# Patient Record
Sex: FEMALE | Race: WHITE | NOT HISPANIC OR LATINO | Employment: UNEMPLOYED | URBAN - NONMETROPOLITAN AREA
[De-identification: names, ages, dates, MRNs, and addresses within clinical notes are randomized per-mention and may not be internally consistent; named-entity substitution may affect disease eponyms.]

---

## 2018-08-23 ENCOUNTER — OFFICE VISIT (OUTPATIENT)
Dept: UROLOGY | Facility: CLINIC | Age: 73
End: 2018-08-23

## 2018-08-23 VITALS — HEIGHT: 63 IN | BODY MASS INDEX: 30.12 KG/M2 | WEIGHT: 170 LBS

## 2018-08-23 DIAGNOSIS — N30.00 ACUTE CYSTITIS WITHOUT HEMATURIA: Primary | ICD-10-CM

## 2018-08-23 DIAGNOSIS — N30.40 IRRADIATION CYSTITIS: ICD-10-CM

## 2018-08-23 DIAGNOSIS — N30.00 ACUTE CYSTITIS WITHOUT HEMATURIA: ICD-10-CM

## 2018-08-23 LAB
BILIRUB BLD-MCNC: NEGATIVE MG/DL
CLARITY, POC: CLEAR
COLOR UR: YELLOW
GLUCOSE UR STRIP-MCNC: NEGATIVE MG/DL
KETONES UR QL: NEGATIVE
LEUKOCYTE EST, POC: NEGATIVE
NITRITE UR-MCNC: NEGATIVE MG/ML
PH UR: 7 [PH] (ref 5–8)
PROT UR STRIP-MCNC: NEGATIVE MG/DL
RBC # UR STRIP: NEGATIVE /UL
SP GR UR: 1.01 (ref 1–1.03)
UROBILINOGEN UR QL: NORMAL

## 2018-08-23 PROCEDURE — 81003 URINALYSIS AUTO W/O SCOPE: CPT | Performed by: UROLOGY

## 2018-08-23 PROCEDURE — 99204 OFFICE O/P NEW MOD 45 MIN: CPT | Performed by: UROLOGY

## 2018-08-23 PROCEDURE — 51798 US URINE CAPACITY MEASURE: CPT | Performed by: UROLOGY

## 2018-08-23 PROCEDURE — 87086 URINE CULTURE/COLONY COUNT: CPT | Performed by: UROLOGY

## 2018-08-23 RX ORDER — LOSARTAN POTASSIUM 25 MG/1
25 TABLET ORAL DAILY
COMMUNITY
End: 2019-01-10 | Stop reason: ALTCHOICE

## 2018-08-23 RX ORDER — VENLAFAXINE HYDROCHLORIDE 37.5 MG/1
CAPSULE, EXTENDED RELEASE ORAL DAILY
Status: ON HOLD | COMMUNITY
Start: 2014-03-12 | End: 2019-11-16

## 2018-08-23 RX ORDER — HYDROCHLOROTHIAZIDE 25 MG/1
25 TABLET ORAL DAILY
COMMUNITY
End: 2019-11-23 | Stop reason: HOSPADM

## 2018-08-23 RX ORDER — EZETIMIBE 10 MG/1
10 TABLET ORAL DAILY
COMMUNITY
End: 2019-01-10 | Stop reason: ALTCHOICE

## 2018-08-23 RX ORDER — LEVOTHYROXINE SODIUM 0.03 MG/1
25 TABLET ORAL DAILY
COMMUNITY
Start: 2013-03-12

## 2018-08-23 RX ORDER — TOPIRAMATE 100 MG/1
100 TABLET, FILM COATED ORAL EVERY MORNING
Refills: 0 | COMMUNITY
Start: 2018-07-27

## 2018-08-23 RX ORDER — CLOTRIMAZOLE AND BETAMETHASONE DIPROPIONATE 10; .64 MG/G; MG/G
CREAM TOPICAL EVERY 12 HOURS SCHEDULED
Qty: 45 G | Refills: 2 | Status: SHIPPED | OUTPATIENT
Start: 2018-08-23 | End: 2018-08-23 | Stop reason: SDUPTHER

## 2018-08-23 RX ORDER — DIAZEPAM 5 MG/1
5 TABLET ORAL NIGHTLY
Refills: 0 | COMMUNITY
Start: 2018-06-26 | End: 2020-07-17 | Stop reason: HOSPADM

## 2018-08-23 RX ORDER — NITROFURANTOIN 25; 75 MG/1; MG/1
100 CAPSULE ORAL DAILY
Qty: 60 CAPSULE | Refills: 2 | Status: SHIPPED | OUTPATIENT
Start: 2018-08-23 | End: 2018-08-23 | Stop reason: SDUPTHER

## 2018-08-23 RX ORDER — MELOXICAM 15 MG/1
TABLET ORAL DAILY
Status: ON HOLD | COMMUNITY
Start: 2013-03-12 | End: 2018-12-14

## 2018-08-23 RX ORDER — LANSOPRAZOLE 30 MG/1
30 CAPSULE, DELAYED RELEASE ORAL EVERY MORNING
Refills: 0 | COMMUNITY
Start: 2018-05-16 | End: 2020-07-17 | Stop reason: HOSPADM

## 2018-08-23 RX ORDER — CYCLOBENZAPRINE HCL 10 MG
10 TABLET ORAL 3 TIMES DAILY PRN
Status: ON HOLD | COMMUNITY
End: 2018-12-14

## 2018-08-23 RX ORDER — RISEDRONATE SODIUM 150 MG/1
150 TABLET, FILM COATED ORAL
COMMUNITY
End: 2020-07-17 | Stop reason: HOSPADM

## 2018-08-23 RX ORDER — AMITRIPTYLINE HYDROCHLORIDE 50 MG/1
50 TABLET, FILM COATED ORAL NIGHTLY
COMMUNITY
End: 2020-07-17 | Stop reason: HOSPADM

## 2018-08-23 RX ORDER — ASPIRIN 81 MG/1
81 TABLET ORAL NIGHTLY
COMMUNITY

## 2018-08-23 RX ORDER — MONTELUKAST SODIUM 10 MG/1
10 TABLET ORAL NIGHTLY
COMMUNITY
End: 2018-12-12

## 2018-08-23 RX ORDER — ROSUVASTATIN CALCIUM 10 MG/1
TABLET, COATED ORAL
Status: ON HOLD | COMMUNITY
Start: 2013-03-12 | End: 2019-11-16

## 2018-08-23 RX ORDER — PHENYTOIN SODIUM 100 MG/1
2 CAPSULE, EXTENDED RELEASE ORAL EVERY MORNING
COMMUNITY
Start: 2014-03-12 | End: 2020-07-17 | Stop reason: HOSPADM

## 2018-08-23 RX ORDER — CLOTRIMAZOLE AND BETAMETHASONE DIPROPIONATE 10; .64 MG/G; MG/G
CREAM TOPICAL EVERY 12 HOURS SCHEDULED
Qty: 45 G | Refills: 2 | Status: ON HOLD | OUTPATIENT
Start: 2018-08-23 | End: 2018-12-14

## 2018-08-23 RX ORDER — ALBUTEROL SULFATE 90 UG/1
2 AEROSOL, METERED RESPIRATORY (INHALATION) EVERY 4 HOURS PRN
Status: ON HOLD | COMMUNITY
End: 2018-12-14

## 2018-08-23 RX ORDER — PREGABALIN 50 MG/1
50 CAPSULE ORAL 3 TIMES DAILY
Status: ON HOLD | COMMUNITY
End: 2019-11-16

## 2018-08-23 RX ORDER — POTASSIUM CHLORIDE 20 MEQ/1
20 TABLET, EXTENDED RELEASE ORAL DAILY
COMMUNITY
End: 2020-07-17 | Stop reason: HOSPADM

## 2018-08-23 RX ORDER — NITROFURANTOIN 25; 75 MG/1; MG/1
100 CAPSULE ORAL DAILY
Qty: 60 CAPSULE | Refills: 2 | Status: SHIPPED | OUTPATIENT
Start: 2018-08-23 | End: 2018-12-12

## 2018-08-23 NOTE — PROGRESS NOTES
Chief Complaint:          Chief Complaint   Patient presents with   • Chronic Cystitis       HPI:   72 y.o. female.  72-year-old white female referred with chronic cystitis.  He is cut off Cefdinar twice a day.  She has no dysuria, significant pressure, continued frequency.  Continued with recurrent infections she's been off and on Macrobid she had apparent brain bleed with an apparent sub-arachnoid hemorrhage she is a  2 para 2 postvoid residuals for analysis negative.    Past Medical History:        Past Medical History:   Diagnosis Date   • COPD (chronic obstructive pulmonary disease) (CMS/[facility], located within [facility])    • High cholesterol    • Hypertension    • Seizures (CMS/[facility], located within [facility])          Current Meds:     Current Outpatient Prescriptions   Medication Sig Dispense Refill   • albuterol (PROVENTIL HFA;VENTOLIN HFA) 108 (90 Base) MCG/ACT inhaler Inhale 2 puffs Every 4 (Four) Hours As Needed for Wheezing.     • amitriptyline (ELAVIL) 50 MG tablet Take 50 mg by mouth Every Night.     • aspirin 81 MG EC tablet Take 81 mg by mouth Daily.     • cyclobenzaprine (FLEXERIL) 10 MG tablet Take 10 mg by mouth 3 (Three) Times a Day As Needed for Muscle Spasms.     • diazePAM (VALIUM) 5 MG tablet   0   • ezetimibe (ZETIA) 10 MG tablet Take 10 mg by mouth Daily.     • hydrochlorothiazide (HYDRODIURIL) 25 MG tablet Take 25 mg by mouth Daily.     • lansoprazole (PREVACID) 30 MG capsule   0   • levothyroxine (SYNTHROID) 25 MCG tablet Take  by mouth Daily.     • losartan (COZAAR) 25 MG tablet Take 25 mg by mouth Daily.     • meloxicam (MOBIC) 15 MG tablet Take  by mouth Daily.     • montelukast (SINGULAIR) 10 MG tablet Take 10 mg by mouth Every Night.     • phenytoin (DILANTIN) 100 MG ER capsule Take 2 capsules by mouth Daily.     • potassium chloride (K-DUR,KLOR-CON) 20 MEQ CR tablet Take 20 mEq by mouth 2 (Two) Times a Day.     • pregabalin (LYRICA) 50 MG capsule Take 50 mg by mouth 3 (Three) Times a Day.     • risedronate (ACTONEL) 150 MG tablet  Take 150 mg by mouth Every 30 (Thirty) Days. with water on empty stomach, nothing by mouth or lie down for next 30 minutes.     • rosuvastatin (CRESTOR) 10 MG tablet Take  by mouth.     • topiramate (TOPAMAX) 100 MG tablet   0   • venlafaxine XR (EFFEXOR-XR) 37.5 MG 24 hr capsule Take  by mouth Daily.       No current facility-administered medications for this visit.         Allergies:      Allergies   Allergen Reactions   • Sulfa Antibiotics Hives        Past Surgical History:     Past Surgical History:   Procedure Laterality Date   • BRAIN SURGERY     •  SECTION     • HEMORRHOIDECTOMY           Social History:     Social History     Social History   • Marital status: Unknown     Spouse name: N/A   • Number of children: N/A   • Years of education: N/A     Occupational History   • Not on file.     Social History Main Topics   • Smoking status: Former Smoker   • Smokeless tobacco: Never Used   • Alcohol use No   • Drug use: No   • Sexual activity: Not on file     Other Topics Concern   • Not on file     Social History Narrative   • No narrative on file       Family History:     Family History   Problem Relation Age of Onset   • Heart disease Father    • Lung cancer Father        Review of Systems:     Review of Systems   Constitutional: Negative.  Negative for activity change, appetite change, chills, diaphoresis, fatigue and unexpected weight change.   HENT: Negative for congestion, dental problem, drooling, ear discharge, ear pain, facial swelling, hearing loss, mouth sores, nosebleeds, postnasal drip, rhinorrhea, sinus pressure, sneezing, sore throat, tinnitus, trouble swallowing and voice change.    Eyes: Negative.  Negative for photophobia, pain, discharge, redness, itching and visual disturbance.   Respiratory: Negative.  Negative for apnea, cough, choking, chest tightness, shortness of breath, wheezing and stridor.    Cardiovascular: Negative.  Negative for chest pain, palpitations and leg swelling.    Gastrointestinal: Negative.  Negative for abdominal distention, abdominal pain, anal bleeding, blood in stool, constipation, diarrhea, nausea, rectal pain and vomiting.   Endocrine: Negative.  Negative for cold intolerance, heat intolerance, polydipsia, polyphagia and polyuria.   Genitourinary: Positive for dysuria and pelvic pain.   Musculoskeletal: Negative.  Negative for arthralgias, back pain, gait problem, joint swelling, myalgias, neck pain and neck stiffness.   Skin: Negative.  Negative for color change, pallor, rash and wound.   Allergic/Immunologic: Negative.  Negative for environmental allergies, food allergies and immunocompromised state.   Neurological: Negative.  Negative for dizziness, tremors, seizures, syncope, facial asymmetry, speech difficulty, weakness, light-headedness, numbness and headaches.   Hematological: Negative.  Negative for adenopathy. Does not bruise/bleed easily.   Psychiatric/Behavioral: Negative for agitation, behavioral problems, confusion, decreased concentration, dysphoric mood, hallucinations, self-injury, sleep disturbance and suicidal ideas. The patient is not nervous/anxious and is not hyperactive.    All other systems reviewed and are negative.      Physical Exam:     Physical Exam   Constitutional: She appears well-developed and well-nourished.   HENT:   Head: Normocephalic and atraumatic.   Right Ear: External ear normal.   Left Ear: External ear normal.   Mouth/Throat: Oropharynx is clear and moist.   Eyes: Pupils are equal, round, and reactive to light. Conjunctivae are normal.   Cardiovascular: Normal rate, regular rhythm, normal heart sounds and intact distal pulses.    Pulmonary/Chest: Effort normal and breath sounds normal.   Abdominal: Soft. Bowel sounds are normal. She exhibits no distension and no mass. There is no tenderness. There is no rebound and no guarding.   Genitourinary: No vaginal discharge found.   Genitourinary Comments: Soft nontender abdomen with  no organomegaly, rigidity, guarding or tenderness.  Normal vaginal orifice.  No evidence of prolapse no palpable masses.  No significant perineal body abnormalities and a normal external anus.  Neurologic exam is nonfocal.   Musculoskeletal: Normal range of motion.   Neurological: She is alert. She has normal reflexes.   Skin: Skin is warm and dry.   Psychiatric: She has a normal mood and affect. Her behavior is normal. Judgment and thought content normal.       I have reviewed the following portions of the patient's history: allergies, current medications, past family history, past medical history, past social history, past surgical history, problem list and ROS and confirm it's accurate.      Procedure:       Assessment/Plan:   Recurrent urinary tract infections-patient has been referred and diagnosed with recurrent urinary tract infections.  We discussed the types of organisms that are found in the urinary tract indicating that the vast majority are results of the patient's own gastrointestinal madyson.  We discussed how many of the antibiotics that are utilized can actually exacerbate these infections by creating resistant organisms and there is only a very few antibiotics that are concentrated in the urine and do not affect the rectal reservoir nor cause recurrent yeast vaginitis.  We discussed the risk factors for recurrent infections being intercourse in younger patients and atrophic changes in older patients.  We discussed the symptoms that are found including pain, pressure, burning, frequency, urgency suprapubic pain and painful intercourse.  I discussed upper tract symptoms including fevers, chills, and indicated the workup would be much more aggressive if the patient were to present with recurrent infections in the face of upper tract symptomatology such as fever.  I discussed the history of vesicoureteral reflux in young patients and finally chronic renal scarring as a result of such.  I recommend  concomitant probiotics with treatment with antibiotics to protect the rectal reservoir including over-the-counter yogurt preparations to alisa oral pills containing the appropriate probiotics.  Going to start her on prophylactic Macrodantin, recommended probiotics and see her back based on this.  Suspect she'll need a lower tract investigation     Patient's Body mass index is 30.11 kg/m². BMI is above normal parameters. Recommendations include: educational material.          This document has been electronically signed by SHIVAM BERRY MD August 23, 2018 10:47 AM

## 2018-08-25 LAB — BACTERIA SPEC AEROBE CULT: NO GROWTH

## 2018-10-01 ENCOUNTER — CONSULT (OUTPATIENT)
Dept: GASTROENTEROLOGY | Facility: CLINIC | Age: 73
End: 2018-10-01

## 2018-10-01 ENCOUNTER — LAB (OUTPATIENT)
Dept: LAB | Facility: HOSPITAL | Age: 73
End: 2018-10-01

## 2018-10-01 ENCOUNTER — HOSPITAL ENCOUNTER (OUTPATIENT)
Dept: GENERAL RADIOLOGY | Facility: HOSPITAL | Age: 73
Discharge: HOME | End: 2018-10-01
Admitting: PHYSICIAN ASSISTANT

## 2018-10-01 ENCOUNTER — TRANSCRIBE ORDERS (OUTPATIENT)
Dept: ADMINISTRATIVE | Facility: HOSPITAL | Age: 73
End: 2018-10-01

## 2018-10-01 VITALS
DIASTOLIC BLOOD PRESSURE: 76 MMHG | HEART RATE: 88 BPM | BODY MASS INDEX: 30.15 KG/M2 | SYSTOLIC BLOOD PRESSURE: 129 MMHG | WEIGHT: 170.2 LBS | OXYGEN SATURATION: 96 %

## 2018-10-01 DIAGNOSIS — R11.0 NAUSEA: ICD-10-CM

## 2018-10-01 DIAGNOSIS — R10.817 GENERALIZED ABDOMINAL TENDERNESS WITHOUT REBOUND TENDERNESS: ICD-10-CM

## 2018-10-01 DIAGNOSIS — R74.8 ELEVATED ALKALINE PHOSPHATASE LEVEL: ICD-10-CM

## 2018-10-01 DIAGNOSIS — R74.8 ELEVATED ALKALINE PHOSPHATASE LEVEL: Primary | ICD-10-CM

## 2018-10-01 DIAGNOSIS — Z86.010 HISTORY OF COLON POLYPS: ICD-10-CM

## 2018-10-01 DIAGNOSIS — Z11.59 ENCOUNTER FOR SCREENING FOR OTHER VIRAL DISEASES: ICD-10-CM

## 2018-10-01 DIAGNOSIS — R05.9 COUGH: Primary | ICD-10-CM

## 2018-10-01 LAB
ALBUMIN SERPL-MCNC: 4.7 G/DL (ref 3.4–4.8)
ALBUMIN/GLOB SERPL: 1.9 G/DL (ref 1.5–2.5)
ALP SERPL-CCNC: 196 U/L (ref 35–104)
ALT SERPL W P-5'-P-CCNC: 37 U/L (ref 10–36)
ANION GAP SERPL CALCULATED.3IONS-SCNC: 9.8 MMOL/L (ref 3.6–11.2)
AST SERPL-CCNC: 26 U/L (ref 10–30)
BILIRUB SERPL-MCNC: 0.4 MG/DL (ref 0.2–1.8)
BUN BLD-MCNC: 11 MG/DL (ref 7–21)
BUN/CREAT SERPL: 13.6 (ref 7–25)
CALCIUM SPEC-SCNC: 9 MG/DL (ref 7.7–10)
CHLORIDE SERPL-SCNC: 104 MMOL/L (ref 99–112)
CO2 SERPL-SCNC: 27.2 MMOL/L (ref 24.3–31.9)
CREAT BLD-MCNC: 0.81 MG/DL (ref 0.43–1.29)
DEPRECATED RDW RBC AUTO: 47.6 FL (ref 37–54)
ERYTHROCYTE [DISTWIDTH] IN BLOOD BY AUTOMATED COUNT: 13.9 % (ref 11.5–14.5)
FERRITIN SERPL-MCNC: 61 NG/ML (ref 10–290.3)
GFR SERPL CREATININE-BSD FRML MDRD: 69 ML/MIN/1.73
GGT SERPL-CCNC: 220 U/L (ref 7–32)
GLOBULIN UR ELPH-MCNC: 2.5 GM/DL
GLUCOSE BLD-MCNC: 94 MG/DL (ref 70–110)
HAV IGM SERPL QL IA: NORMAL
HBV CORE IGM SERPL QL IA: NORMAL
HBV SURFACE AB SER RIA-ACNC: NORMAL
HBV SURFACE AG SERPL QL IA: NORMAL
HCT VFR BLD AUTO: 44.2 % (ref 37–47)
HCV AB SER DONR QL: NORMAL
HGB BLD-MCNC: 14.6 G/DL (ref 12–16)
IRON 24H UR-MRATE: 111 MCG/DL (ref 49–151)
IRON SATN MFR SERPL: 41 % (ref 15–50)
MCH RBC QN AUTO: 31.3 PG (ref 27–33)
MCHC RBC AUTO-ENTMCNC: 33 G/DL (ref 33–37)
MCV RBC AUTO: 94.8 FL (ref 80–94)
OSMOLALITY SERPL CALC.SUM OF ELEC: 280.4 MOSM/KG (ref 273–305)
PLATELET # BLD AUTO: 219 10*3/MM3 (ref 130–400)
PMV BLD AUTO: 10.7 FL (ref 6–10)
POTASSIUM BLD-SCNC: 3 MMOL/L (ref 3.5–5.3)
PROT SERPL-MCNC: 7.2 G/DL (ref 6–8)
RBC # BLD AUTO: 4.66 10*6/MM3 (ref 4.2–5.4)
SODIUM BLD-SCNC: 141 MMOL/L (ref 135–153)
TIBC SERPL-MCNC: 272 MCG/DL (ref 241–421)
WBC NRBC COR # BLD: 7.38 10*3/MM3 (ref 4.5–12.5)

## 2018-10-01 PROCEDURE — 86038 ANTINUCLEAR ANTIBODIES: CPT

## 2018-10-01 PROCEDURE — 82104 ALPHA-1-ANTITRYPSIN PHENO: CPT

## 2018-10-01 PROCEDURE — 82784 ASSAY IGA/IGD/IGG/IGM EACH: CPT

## 2018-10-01 PROCEDURE — 83516 IMMUNOASSAY NONANTIBODY: CPT

## 2018-10-01 PROCEDURE — 36415 COLL VENOUS BLD VENIPUNCTURE: CPT

## 2018-10-01 PROCEDURE — 74019 RADEX ABDOMEN 2 VIEWS: CPT

## 2018-10-01 PROCEDURE — 86255 FLUORESCENT ANTIBODY SCREEN: CPT

## 2018-10-01 PROCEDURE — 83540 ASSAY OF IRON: CPT

## 2018-10-01 PROCEDURE — 80074 ACUTE HEPATITIS PANEL: CPT

## 2018-10-01 PROCEDURE — 82977 ASSAY OF GGT: CPT

## 2018-10-01 PROCEDURE — 86708 HEPATITIS A ANTIBODY: CPT

## 2018-10-01 PROCEDURE — 82390 ASSAY OF CERULOPLASMIN: CPT

## 2018-10-01 PROCEDURE — 82103 ALPHA-1-ANTITRYPSIN TOTAL: CPT

## 2018-10-01 PROCEDURE — 83550 IRON BINDING TEST: CPT

## 2018-10-01 PROCEDURE — 86706 HEP B SURFACE ANTIBODY: CPT

## 2018-10-01 PROCEDURE — 82728 ASSAY OF FERRITIN: CPT

## 2018-10-01 PROCEDURE — 99203 OFFICE O/P NEW LOW 30 MIN: CPT | Performed by: PHYSICIAN ASSISTANT

## 2018-10-01 PROCEDURE — 80053 COMPREHEN METABOLIC PANEL: CPT

## 2018-10-01 PROCEDURE — 85027 COMPLETE CBC AUTOMATED: CPT

## 2018-10-01 PROCEDURE — 74018 RADEX ABDOMEN 1 VIEW: CPT | Performed by: RADIOLOGY

## 2018-10-01 NOTE — PATIENT INSTRUCTIONS
Fiber Foods  It is recommended that you consume 25-45 grams daily.    Fresh & Dried Fruit  Serving Size Fiber (g)    Apples with skin  1 medium 5.0    Apricot  3 medium 1.0    Apricots, dried  4 pieces 2.9    Banana  1 medium 3.9    Blueberries  1 cup 4.2    Cantaloupe, cubes  1 cup 1.3    Figs, dried  2 medium 3.7    Grapefruit  1/2 medium 3.1    Orange, navel  1 medium 3.4    Peach  1 medium 2.0    Peaches, dried  3 pieces 3.2    Pear  1 medium 5.1    Plum  1 medium 1.1    Raisins  1.5 oz box 1.6    Raspberries  1 cup 6.4    Strawberries  1 cup 4.4      Grains, Beans, Nuts & Seeds  Serving Size Fiber (g)    Almonds  1 oz 4.2    Black beans, cooked  1 cup 13.9    Bran cereal  1 cup 19.9    Bread, whole wheat  1 slice 2.0    Brown rice, dry  1 cup 7.9    Cashews  1 oz 1.0    Flax seeds  3 Tbsp. 6.9    Garbanzo beans, cooked  1 cup 5.8    Kidney beans, cooked  1 cup 11.6    Lentils, red cooked  1 cup 13.6    Lima beans, cooked  1 cup 8.6    Oats, rolled dry  1 cup 12.0    Quinoa (seeds) dry  1/4 cup 6.2    Quinoa, cooked  1 cup 8.4    Pasta, whole wheat  1 cup 6.3    Peanuts  1 oz 2.3    Pistachio nuts  1 oz 3.1    Pumpkin seeds  1/4 cup 4.1    Soybeans, cooked  1 cup 8.6    Sunflower seeds  1/4 cup 3.0    Walnuts  1 oz 3.1            Vegetables  Serving Size Fiber (g)    Avocado (fruit)  1 medium 11.8    Beets, cooked  1 cup 2.8    Beet greens  1 cup 4.2    Bok lian, cooked  1 cup 2.8    Broccoli, cooked  1 cup 4.5    [place] sprouts, cooked  1 cup 3.6    Cabbage, cooked  1 cup 4.2    Carrot  1 medium 2.6    Carrot, cooked  1 cup 5.2    Cauliflower, cooked  1 cup 3.4    Nathan slaw  1 cup 4.0    Symone greens, cooked  1 cup 2.6    Corn, sweet  1 cup 4.6    Green beans  1 cup 4.0    Celery  1 stalk 1.1    Kale, cooked  1 cup 7.2    Onions, raw  1 cup 2.9    Peas, cooked  1 cup 8.8    Peppers, sweet  1 cup 2.6    Pop corn, air-popped  3 cups 3.6    Potato, baked w/ skin  1 medium 4.8    Spinach, cooked  1 cup 4.3     Summer squash, cooked  1 cup 2.5    Sweet potato, cooked  1 medium 4.9    Swiss chard, cooked  1 cup 3.7    Tomato  1 medium 1.0    Winter squash, cooked  1 cup 6.2    Zucchini, cooked  1 cup 2.6

## 2018-10-01 NOTE — PROGRESS NOTES
: 1945    Chief Complaint   Patient presents with   • Elevated Alkaline Phosphate       Siria Rueda is a 73 y.o. female who presents to the office today as a consultation from ANNA Lundy for evaluation of Elevated Alkaline Phosphatase.    History of Present Illness:  She reports that she was recently told that there was an abnormality on labs and needed further GI evaluation regarding this. She denies any current weight loss and has a good appetite. She is having some nausea without vomiting. She denies any past history of alkaline phosphatase elevation. She still has her gallbladder.     Last colonoscopy was in  which showed polyps and was performed by Dr. Decker. She states that her son passed at age 21 which was very difficult for her. She states that she had constipation after this and had been using laxatives at that time of her last procedure. She was told that she had melanosis coli and was directed to use fiber supplements instead of laxatives and has been following this advice since then. She admits that she drinks a lot of water daily. Her diet is low in fiber.     Labs 2018:  Total cholesterol 148  Triglycerides 169  HDL 65  LDL 49  Alkaline phosphatase 192  AST 29  ALT 39  Bilirubin 0.2  Platelets 201    Labs 3/2015:  Phosphatase 149    Review of Systems   Constitutional: Negative for chills, fatigue and fever.   HENT: Positive for trouble swallowing.    Eyes: Negative.    Respiratory: Positive for cough, choking and shortness of breath. Negative for chest tightness.    Cardiovascular: Negative for chest pain.   Gastrointestinal: Positive for constipation and nausea. Negative for abdominal distention, abdominal pain, anal bleeding, blood in stool, diarrhea, rectal pain and vomiting.   Endocrine: Negative.    Genitourinary: Negative.    Musculoskeletal: Positive for back pain.   Skin: Negative.    Allergic/Immunologic: Negative.    Neurological: Positive for seizures.    Hematological: Bruises/bleeds easily.   Psychiatric/Behavioral: Negative.        Past Medical History:   Diagnosis Date   • COPD (chronic obstructive pulmonary disease) (CMS/HCC)    • High cholesterol    • Hypertension    • Seizures (CMS/HCC)        Past Surgical History:   Procedure Laterality Date   • BRAIN SURGERY     •  SECTION     • HEMORRHOIDECTOMY         Family History   Problem Relation Age of Onset   • Heart disease Father    • Lung cancer Father        Social History     Social History   • Marital status:      Social History Main Topics   • Smoking status: Former Smoker   • Smokeless tobacco: Never Used   • Alcohol use No   • Drug use: No     Other Topics Concern   • Not on file       Current Outpatient Prescriptions:   •  albuterol (PROVENTIL HFA;VENTOLIN HFA) 108 (90 Base) MCG/ACT inhaler, Inhale 2 puffs Every 4 (Four) Hours As Needed for Wheezing., Disp: , Rfl:   •  amitriptyline (ELAVIL) 50 MG tablet, Take 50 mg by mouth Every Night., Disp: , Rfl:   •  aspirin 81 MG EC tablet, Take 81 mg by mouth Daily., Disp: , Rfl:   •  clotrimazole-betamethasone (LOTRISONE) 1-0.05 % cream, Apply  topically to the appropriate area as directed Every 12 (Twelve) Hours., Disp: 45 g, Rfl: 2  •  conjugated estrogens (PREMARIN) 0.625 MG/GM vaginal cream, Insert  into the vagina Daily., Disp: 30 g, Rfl: 2  •  cyclobenzaprine (FLEXERIL) 10 MG tablet, Take 10 mg by mouth 3 (Three) Times a Day As Needed for Muscle Spasms., Disp: , Rfl:   •  diazePAM (VALIUM) 5 MG tablet, , Disp: , Rfl: 0  •  ezetimibe (ZETIA) 10 MG tablet, Take 10 mg by mouth Daily., Disp: , Rfl:   •  hydrochlorothiazide (HYDRODIURIL) 25 MG tablet, Take 25 mg by mouth Daily., Disp: , Rfl:   •  lansoprazole (PREVACID) 30 MG capsule, , Disp: , Rfl: 0  •  levothyroxine (SYNTHROID) 25 MCG tablet, Take  by mouth Daily., Disp: , Rfl:   •  losartan (COZAAR) 25 MG tablet, Take 25 mg by mouth Daily., Disp: , Rfl:   •  meloxicam (MOBIC) 15 MG tablet,  Take  by mouth Daily., Disp: , Rfl:   •  montelukast (SINGULAIR) 10 MG tablet, Take 10 mg by mouth Every Night., Disp: , Rfl:   •  nitrofurantoin, macrocrystal-monohydrate, (MACROBID) 100 MG capsule, Take 1 capsule by mouth Daily., Disp: 60 capsule, Rfl: 2  •  phenytoin (DILANTIN) 100 MG ER capsule, Take 2 capsules by mouth Daily., Disp: , Rfl:   •  potassium chloride (K-DUR,KLOR-CON) 20 MEQ CR tablet, Take 20 mEq by mouth 2 (Two) Times a Day., Disp: , Rfl:   •  pregabalin (LYRICA) 50 MG capsule, Take 50 mg by mouth 3 (Three) Times a Day., Disp: , Rfl:   •  risedronate (ACTONEL) 150 MG tablet, Take 150 mg by mouth Every 30 (Thirty) Days. with water on empty stomach, nothing by mouth or lie down for next 30 minutes., Disp: , Rfl:   •  rosuvastatin (CRESTOR) 10 MG tablet, Take  by mouth., Disp: , Rfl:   •  topiramate (TOPAMAX) 100 MG tablet, , Disp: , Rfl: 0  •  venlafaxine XR (EFFEXOR-XR) 37.5 MG 24 hr capsule, Take  by mouth Daily., Disp: , Rfl:     Allergies:   Sulfa antibiotics    Vitals:  /76 (BP Location: Right arm, Patient Position: Sitting, Cuff Size: Adult)   Pulse 88   Wt 77.2 kg (170 lb 3.2 oz)   SpO2 96%   BMI 30.15 kg/m²     Physical Exam   Constitutional: She is oriented to person, place, and time. She appears well-developed and well-nourished. No distress.   HENT:   Head: Normocephalic and atraumatic.   Nose: Nose normal.   Mouth/Throat: Oropharynx is clear and moist.   Eyes: Conjunctivae are normal. Right eye exhibits no discharge. Left eye exhibits no discharge. No scleral icterus.   Neck: Normal range of motion. No JVD present.   Cardiovascular: Normal rate, regular rhythm and normal heart sounds.  Exam reveals no gallop and no friction rub.    No murmur heard.  Pulmonary/Chest: Effort normal and breath sounds normal. No respiratory distress. She has no wheezes. She has no rales. She exhibits no tenderness.   Abdominal: Soft. Bowel sounds are normal. She exhibits no mass. There is  tenderness (generalized, mild).   Musculoskeletal: Normal range of motion. She exhibits no edema or deformity.   Neurological: She is alert and oriented to person, place, and time. Coordination normal.   Skin: Skin is warm and dry. No rash noted. She is not diaphoretic. No erythema.   Psychiatric: She has a normal mood and affect. Her behavior is normal. Judgment and thought content normal.   Vitals reviewed.      Assessment/Plan:  1. Elevated alkaline phosphatase level    2. Nausea     3. Encounter for screening for other viral diseases     4. Generalized abdominal tenderness without rebound tenderness    5. History of colon polyps      Orders Placed This Encounter   Procedures   • US Liver   • XR Abdomen Flat & Upright   • Comprehensive Metabolic Panel   • CBC (No Diff)   • Gamma GT   • Alpha - 1 - Antitrypsin Phenotype   • Anti-Smooth Muscle Antibody Titer   • Celiac Comprehensive Panel   • Ceruloplasmin   • Ferritin   • Hepatitis Panel, Acute   • IgG, IgA, IgM   • Iron Profile   • Mitochondrial Antibodies, M2   • Nuclear Antigen Antibody, IFA   • Hepatitis A Antibody, Total   • Hepatitis B Surface Antibody     Abnormal alkaline phosphatase was noted on recent and past labs. She will be tested for possible etiologies of this elevation including; Alpha 1 antitrypsin deficiency, autoimmune hepatitis, primary biliary cirrhosis (most consistent with elev AP), Bryant's disease, hereditary hemochromatosis, celiac diease and acute hepatitis. Immunity to hepatitis A and B will also be determined and vaccinations recommended if needed. Ultrasound of the liver will also be performed to assess for any liver lesions or other liver diseases. Alkaline phosphatase can also be elevated in some malignancies or with gallbladder disease.     I have discussed with her that she is due to have another colonoscopy for colorectal cancer screening due to history of colon polyps. She would like to have this completed by Dr. Decker who  has performed her past procedures. I have asked her to arrange this as soon as possible.     With reports of constipation, she was instructed to increase dietary fiber intake to 25-45g daily and a list of fiber foods was given. She has agreed to try to increase daily water intake and daily exercise as well.           Return in about 3 weeks (around 10/22/2018) for discussion of results.      Electronically signed 10/5/2018 4:18 PM  Jennifer Castellon PA-C, [facility]

## 2018-10-03 LAB
ACTIN IGG SERPL-ACNC: 9 UNITS (ref 0–19)
ANA SER QL IA: NEGATIVE
CERULOPLASMIN SERPL-MCNC: 37.7 MG/DL (ref 19–39)
DEPRECATED MITOCHONDRIA M2 IGG SER-ACNC: <20 UNITS (ref 0–20)
ENDOMYSIUM IGA SER QL: NEGATIVE
GLIADIN PEPTIDE IGA SER-ACNC: 3 UNITS (ref 0–19)
GLIADIN PEPTIDE IGG SER-ACNC: 7 UNITS (ref 0–19)
HAV AB SER QL IA: POSITIVE
IGA SERPL-MCNC: 199 MG/DL (ref 64–422)
IGA SERPL-MCNC: 205 MG/DL (ref 64–422)
IGG SERPL-MCNC: 666 MG/DL (ref 700–1600)
IGM SERPL-MCNC: 64 MG/DL (ref 26–217)
TTG IGA SER-ACNC: <2 U/ML (ref 0–3)
TTG IGG SER-ACNC: <2 U/ML (ref 0–5)

## 2018-10-05 LAB
A1AT SERPL-MCNC: 169 MG/DL (ref 90–200)
PHENOTYPE: NORMAL

## 2018-10-11 ENCOUNTER — TELEPHONE (OUTPATIENT)
Dept: GASTROENTEROLOGY | Facility: CLINIC | Age: 73
End: 2018-10-11

## 2018-10-11 ENCOUNTER — APPOINTMENT (OUTPATIENT)
Dept: ULTRASOUND IMAGING | Facility: HOSPITAL | Age: 73
End: 2018-10-11

## 2018-10-11 NOTE — TELEPHONE ENCOUNTER
----- Message from Dorys Richards sent at 10/10/2018 11:55 AM EDT -----  Regarding: Test  Siria called to let you know she had canceled her ultra sound for tomorrow.  Dr. Cruzito Palumbo had order one for her back in august and it was normal.  She said that his office was faxing you the results and some other test he ordered for her.

## 2018-10-11 NOTE — TELEPHONE ENCOUNTER
Spoke with Jennifer. There is an ultrasound from May 2018. The dates were misunderstood. Patient does not need new US.

## 2018-10-11 NOTE — TELEPHONE ENCOUNTER
That US was over 1 year ago. I still want a new image of her liver due to her recent abnormal results.

## 2018-10-22 ENCOUNTER — OFFICE VISIT (OUTPATIENT)
Dept: GASTROENTEROLOGY | Facility: CLINIC | Age: 73
End: 2018-10-22

## 2018-10-22 VITALS
DIASTOLIC BLOOD PRESSURE: 71 MMHG | OXYGEN SATURATION: 94 % | HEART RATE: 90 BPM | WEIGHT: 173.4 LBS | HEIGHT: 63 IN | SYSTOLIC BLOOD PRESSURE: 132 MMHG | BODY MASS INDEX: 30.72 KG/M2

## 2018-10-22 DIAGNOSIS — E78.2 MIXED HYPERLIPIDEMIA: ICD-10-CM

## 2018-10-22 DIAGNOSIS — Z23 ENCOUNTER FOR IMMUNIZATION: ICD-10-CM

## 2018-10-22 DIAGNOSIS — R74.01 ELEVATED ALT MEASUREMENT: Primary | ICD-10-CM

## 2018-10-22 DIAGNOSIS — Z01.84 LACK OF IMMUNITY TO HEPATITIS B VIRUS DEMONSTRATED BY SEROLOGIC TEST: ICD-10-CM

## 2018-10-22 DIAGNOSIS — R74.8 ELEVATED ALKALINE PHOSPHATASE LEVEL: ICD-10-CM

## 2018-10-22 PROCEDURE — 99214 OFFICE O/P EST MOD 30 MIN: CPT | Performed by: PHYSICIAN ASSISTANT

## 2018-10-22 RX ORDER — POTASSIUM CHLORIDE 750 MG/1
50 CAPSULE, EXTENDED RELEASE ORAL DAILY
Status: ON HOLD | COMMUNITY
End: 2019-11-16

## 2018-10-22 NOTE — PROGRESS NOTES
: 1945    Chief Complaint   Patient presents with   • Elevated Alkaline Phosphatase       Siria Rueda is a 73 y.o. female who presents to the office today as a follow up appointment regarding Elevated Alkaline Phosphatase.    History of Present Illness:  Today, she is feeling the same. Denies abdominal pain. She was previously told by her PCP that she has elevated liver enzymes. Reports that she had CT abd/.pelv at [facility] in Aug 2018. Also had normal US liver in May 2018. She denies any current weight loss and has a good appetite. She is having some nausea without vomiting. She denies any past history of alkaline phosphatase elevation. She still has her gallbladder. No current or past alcohol use.      Labs 10/1/2018:  Ferritin normal  Iron profile normal  [id number] normal  Anti-smooth muscle antibody normal  Anti-nuclear antibody normal  Anti-mitochondrial antibody normal  Ceruloplasmin normal  Celiac panel negative  Hep B surf Ab negative  Hep A total Ab positive  Acute hepatitis panel negative  IgG, IgA, IgM normal except mildly decr IgG at 666  CBC normal    ALT (SGPT) 10 - 36 U/L 37     AST (SGOT) 10 - 30 U/L 26    Alkaline Phosphatase 35 - 104 U/L 196     Comment: Note New Reference Ranges   Total Bilirubin 0.2 - 1.8 mg/dL 0.4      GGT 7 - 32 U/L 220       Previous history:  Last colonoscopy was in  which showed polyps and was performed by Dr. Decker and she has not scheduled another with him yet. She states that her son passed at age 21 which was very difficult for her. She states that she had constipation after this and had been using laxatives at that time of her last procedure. She was told that she had melanosis coli and was directed to use fiber supplements instead of laxatives and has been following this advice since then. She admits that she drinks a lot of water daily. Her diet is low in fiber.      Labs 2018:  Total cholesterol 148  Triglycerides 169  HDL 65  LDL 49  Alkaline  phosphatase 192  AST 29  ALT 39  Bilirubin 0.2  Platelets 201     Labs 3/2015:  Phosphatase 149    Review of Systems   Constitutional: Negative for chills, fatigue and fever.   HENT: Positive for trouble swallowing.    Eyes: Negative.    Respiratory: Positive for cough, choking and shortness of breath. Negative for chest tightness.    Cardiovascular: Negative for chest pain.   Gastrointestinal: Positive for constipation and nausea. Negative for abdominal distention, abdominal pain, anal bleeding, blood in stool, diarrhea, rectal pain and vomiting.   Endocrine: Negative.    Genitourinary: Negative.    Musculoskeletal: Positive for back pain.   Skin: Negative.    Allergic/Immunologic: Negative.    Neurological: Positive for seizures.   Hematological: Bruises/bleeds easily.   Psychiatric/Behavioral: Negative.        Past Medical History:   Diagnosis Date   • COPD (chronic obstructive pulmonary disease) (CMS/HCC)    • High cholesterol    • Hypertension    • Seizures (CMS/HCC)        Past Surgical History:   Procedure Laterality Date   • BRAIN SURGERY     •  SECTION     • HEMORRHOIDECTOMY         Family History   Problem Relation Age of Onset   • Heart disease Father    • Lung cancer Father        Social History     Social History   • Marital status:      Social History Main Topics   • Smoking status: Former Smoker   • Smokeless tobacco: Never Used   • Alcohol use No   • Drug use: No     Other Topics Concern   • Not on file       Current Outpatient Prescriptions:   •  albuterol (PROVENTIL HFA;VENTOLIN HFA) 108 (90 Base) MCG/ACT inhaler, Inhale 2 puffs Every 4 (Four) Hours As Needed for Wheezing., Disp: , Rfl:   •  amitriptyline (ELAVIL) 50 MG tablet, Take 50 mg by mouth Every Night., Disp: , Rfl:   •  aspirin 81 MG EC tablet, Take 81 mg by mouth Daily., Disp: , Rfl:   •  clotrimazole-betamethasone (LOTRISONE) 1-0.05 % cream, Apply  topically to the appropriate area as directed Every 12 (Twelve) Hours.,  Disp: 45 g, Rfl: 2  •  conjugated estrogens (PREMARIN) 0.625 MG/GM vaginal cream, Insert  into the vagina Daily., Disp: 30 g, Rfl: 2  •  cyclobenzaprine (FLEXERIL) 10 MG tablet, Take 10 mg by mouth 3 (Three) Times a Day As Needed for Muscle Spasms., Disp: , Rfl:   •  diazePAM (VALIUM) 5 MG tablet, , Disp: , Rfl: 0  •  ezetimibe (ZETIA) 10 MG tablet, Take 10 mg by mouth Daily., Disp: , Rfl:   •  hydrochlorothiazide (HYDRODIURIL) 25 MG tablet, Take 25 mg by mouth Daily., Disp: , Rfl:   •  lansoprazole (PREVACID) 30 MG capsule, , Disp: , Rfl: 0  •  levothyroxine (SYNTHROID) 25 MCG tablet, Take  by mouth Daily., Disp: , Rfl:   •  losartan (COZAAR) 25 MG tablet, Take 25 mg by mouth Daily., Disp: , Rfl:   •  meloxicam (MOBIC) 15 MG tablet, Take  by mouth Daily., Disp: , Rfl:   •  montelukast (SINGULAIR) 10 MG tablet, Take 10 mg by mouth Every Night., Disp: , Rfl:   •  nitrofurantoin, macrocrystal-monohydrate, (MACROBID) 100 MG capsule, Take 1 capsule by mouth Daily., Disp: 60 capsule, Rfl: 2  •  phenytoin (DILANTIN) 100 MG ER capsule, Take 2 capsules by mouth Daily., Disp: , Rfl:   •  potassium chloride (K-DUR,KLOR-CON) 20 MEQ CR tablet, Take 20 mEq by mouth 2 (Two) Times a Day., Disp: , Rfl:   •  potassium chloride (MICRO-K) 10 MEQ CR capsule, Take 50 mEq by mouth Daily., Disp: , Rfl:   •  pregabalin (LYRICA) 50 MG capsule, Take 50 mg by mouth 3 (Three) Times a Day., Disp: , Rfl:   •  risedronate (ACTONEL) 150 MG tablet, Take 150 mg by mouth Every 30 (Thirty) Days. with water on empty stomach, nothing by mouth or lie down for next 30 minutes., Disp: , Rfl:   •  rosuvastatin (CRESTOR) 10 MG tablet, Take  by mouth., Disp: , Rfl:   •  topiramate (TOPAMAX) 100 MG tablet, , Disp: , Rfl: 0  •  venlafaxine XR (EFFEXOR-XR) 37.5 MG 24 hr capsule, Take  by mouth Daily., Disp: , Rfl:     Allergies:   Sulfa antibiotics    Vitals:  /71 (BP Location: Right arm, Patient Position: Sitting, Cuff Size: Adult)   Pulse 90   Ht 160  "cm (63\")   Wt 78.7 kg (173 lb 6.4 oz)   SpO2 94%   BMI 30.72 kg/m²     Physical Exam   Constitutional: She is oriented to person, place, and time. She appears well-developed and well-nourished. No distress.   HENT:   Head: Normocephalic and atraumatic.   Nose: Nose normal.   Mouth/Throat: Oropharynx is clear and moist.   Eyes: Conjunctivae are normal. Right eye exhibits no discharge. Left eye exhibits no discharge. No scleral icterus.   Neck: Normal range of motion. No JVD present.   Cardiovascular: Normal rate, regular rhythm and normal heart sounds.  Exam reveals no gallop and no friction rub.    No murmur heard.  Pulmonary/Chest: Effort normal and breath sounds normal. No respiratory distress. She has no wheezes. She has no rales. She exhibits no tenderness.   Abdominal: Soft. Bowel sounds are normal. She exhibits no mass. There is tenderness (generalized, mild).   Musculoskeletal: Normal range of motion. She exhibits no edema or deformity.   Neurological: She is alert and oriented to person, place, and time. Coordination normal.   Skin: Skin is warm and dry. No rash noted. She is not diaphoretic. No erythema.   Psychiatric: She has a normal mood and affect. Her behavior is normal. Judgment and thought content normal.   Vitals reviewed.      Assessment/Plan:  1. Elevated ALT measurement    2. Elevated alkaline phosphatase level    3. Mixed hyperlipidemia     4. 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 Encounter for immunization       Orders Placed This Encounter   Procedures   • Hepatitis B Vaccine Adult IM   • Alkaline Phosphatase, Isoenzymes   • Gamma GT   • Anti-microsomal Antibody   • AFP Tumor Marker   • Hepatic Function Panel     We will request records from [place] (CT scan from Aug 2018) for review. She may need liver biopsy which was discussed today. I highly suspected primary biliary cirrhosis but her mitochondrial antibody was normal. She did not have any " auto-immune or hereditary liver diseases. Liver US has been normal without fatty liver disease. Her cholesterol has been near normal. No history of DM2. She does not drink alcohol.     Series of immunizations for Hepatitis B should be obtained and have been ordered today. Immunity is important to prevent further insult to her liver. Information regarding locations that these can be performed has been expressed.            Return visit will be determined after review of results.        Electronically signed 10/22/2018 1:35 PM  Jennifer Castellon PA-C, [facility]

## 2018-10-24 ENCOUNTER — TELEPHONE (OUTPATIENT)
Dept: GASTROENTEROLOGY | Facility: CLINIC | Age: 73
End: 2018-10-24

## 2018-10-24 DIAGNOSIS — R79.89 ELEVATED LIVER FUNCTION TESTS: Primary | ICD-10-CM

## 2018-10-25 ENCOUNTER — DOCUMENTATION (OUTPATIENT)
Dept: GASTROENTEROLOGY | Facility: CLINIC | Age: 73
End: 2018-10-25

## 2018-10-25 NOTE — PROGRESS NOTES
Patient called and wanted her lab orders faxed to [facility] I faxed them to [phone]. Pt. Forgot to go to the [facility] when was seen earlier this week.

## 2018-10-30 DIAGNOSIS — R74.01 ELEVATED ALT MEASUREMENT: ICD-10-CM

## 2018-10-30 DIAGNOSIS — R74.8 ELEVATED ALKALINE PHOSPHATASE LEVEL: ICD-10-CM

## 2018-11-07 ENCOUNTER — TELEPHONE (OUTPATIENT)
Dept: UROLOGY | Facility: CLINIC | Age: 73
End: 2018-11-07

## 2018-11-07 DIAGNOSIS — R74.01 ELEVATED ALT MEASUREMENT: ICD-10-CM

## 2018-11-07 DIAGNOSIS — R74.8 ELEVATED ALKALINE PHOSPHATASE LEVEL: ICD-10-CM

## 2018-11-07 DIAGNOSIS — Z12.11 ENCOUNTER FOR SCREENING FOR MALIGNANT NEOPLASM OF COLON: Primary | ICD-10-CM

## 2018-11-07 NOTE — TELEPHONE ENCOUNTER
Pt called to let you know she could not get a colonoscopy done in [place] until the 28 and she would have to do an office visit first.  She would like you to schedule her one her if you can do it before then.

## 2018-11-08 NOTE — TELEPHONE ENCOUNTER
After reviewing labs, it seems that [facility] has not drawn the correct orders.     So far, I have reviewed results on:  Alk phos, isoenzymes  AFP  Hepatic function panel    They vivi labs incorrectly and I do not have:  GGT (lab [place] [id number])  Anti-microsomal antibody ([facility] [place] [id number])    The labs in chart labeled thyroperoxidease Ab, HBV genotype and gamma hydroxybutric acid are incorrect. Please call the lab there and ask if they can re-do these orders, if not, patient will have to have re-draw. (I looked up lab [place] numbers so they can get correct lab)

## 2018-11-08 NOTE — TELEPHONE ENCOUNTER
Spoke with patient and told her I would send information to Dr. Lazaro to do her Colonoscopy. I mailed out the [place] prep as well.

## 2018-11-08 NOTE — TELEPHONE ENCOUNTER
I called lab and they stated they no longer had her blood. I spoke with patient and let her know that they would need to be drawn again. Do you need to put a new order in for the two labs? Sorry I wasn't sure.

## 2018-11-08 NOTE — TELEPHONE ENCOUNTER
I placed new orders, please print and fax for patient. We will need to make sure the lab gets the correct codes for these to be completed properly

## 2018-11-09 ENCOUNTER — RESULTS ENCOUNTER (OUTPATIENT)
Dept: GASTROENTEROLOGY | Facility: CLINIC | Age: 73
End: 2018-11-09

## 2018-11-09 DIAGNOSIS — R79.89 ELEVATED LIVER FUNCTION TESTS: ICD-10-CM

## 2018-11-13 ENCOUNTER — DOCUMENTATION (OUTPATIENT)
Dept: GASTROENTEROLOGY | Facility: CLINIC | Age: 73
End: 2018-11-13

## 2018-11-13 NOTE — PROGRESS NOTES
Verbal order given to Corinne in [facility] prep as directed with no refills.

## 2018-11-14 ENCOUNTER — TELEPHONE (OUTPATIENT)
Dept: SURGERY | Facility: CLINIC | Age: 73
End: 2018-11-14

## 2018-11-14 NOTE — TELEPHONE ENCOUNTER
Patient called to reschedule her colonoscopy. They have a family member dying and have to go out of town. I told her to call when they got back and we would schedule her later.

## 2018-11-19 NOTE — TELEPHONE ENCOUNTER
I left message for patient to call back. I need to ask her if she was able to go back to get her labs redrawn.

## 2018-11-21 NOTE — TELEPHONE ENCOUNTER
Spoke with patient and she stated that she had labs done at [facility]. I will call and get results. Pt. Had to cancel her EGD due to a death in the family. She is calling Dr. Lazaro's office to reschedule. I made her an appointment for the end of December.

## 2018-12-10 PROBLEM — Z12.11 ENCOUNTER FOR SCREENING FOR MALIGNANT NEOPLASM OF COLON: Status: ACTIVE | Noted: 2018-12-10

## 2018-12-10 PROBLEM — R74.8 ELEVATED ALKALINE PHOSPHATASE LEVEL: Status: ACTIVE | Noted: 2018-12-10

## 2018-12-14 ENCOUNTER — HOSPITAL ENCOUNTER (OUTPATIENT)
Facility: HOSPITAL | Age: 73
Setting detail: HOSPITAL OUTPATIENT SURGERY
Discharge: HOME | End: 2018-12-14
Attending: SURGERY | Admitting: SURGERY

## 2018-12-14 ENCOUNTER — ANESTHESIA (OUTPATIENT)
Dept: PERIOP | Facility: HOSPITAL | Age: 73
End: 2018-12-14

## 2018-12-14 ENCOUNTER — ANESTHESIA EVENT (OUTPATIENT)
Dept: PERIOP | Facility: HOSPITAL | Age: 73
End: 2018-12-14

## 2018-12-14 VITALS
HEIGHT: 63 IN | WEIGHT: 172 LBS | OXYGEN SATURATION: 95 % | BODY MASS INDEX: 30.48 KG/M2 | RESPIRATION RATE: 18 BRPM | DIASTOLIC BLOOD PRESSURE: 66 MMHG | HEART RATE: 71 BPM | SYSTOLIC BLOOD PRESSURE: 119 MMHG | TEMPERATURE: 97.2 F

## 2018-12-14 DIAGNOSIS — Z12.11 ENCOUNTER FOR SCREENING FOR MALIGNANT NEOPLASM OF COLON: ICD-10-CM

## 2018-12-14 DIAGNOSIS — R74.8 ELEVATED ALKALINE PHOSPHATASE LEVEL: ICD-10-CM

## 2018-12-14 PROCEDURE — 25010000002 PROPOFOL 1000 MG/ML EMULSION: Performed by: NURSE ANESTHETIST, CERTIFIED REGISTERED

## 2018-12-14 PROCEDURE — 45380 COLONOSCOPY AND BIOPSY: CPT | Performed by: SURGERY

## 2018-12-14 PROCEDURE — S0260 H&P FOR SURGERY: HCPCS | Performed by: SURGERY

## 2018-12-14 PROCEDURE — 25010000002 MIDAZOLAM PER 1 MG: Performed by: NURSE ANESTHETIST, CERTIFIED REGISTERED

## 2018-12-14 PROCEDURE — 88305 TISSUE EXAM BY PATHOLOGIST: CPT | Performed by: SURGERY

## 2018-12-14 PROCEDURE — 25010000002 PROPOFOL 10 MG/ML EMULSION: Performed by: NURSE ANESTHETIST, CERTIFIED REGISTERED

## 2018-12-14 RX ORDER — IPRATROPIUM BROMIDE AND ALBUTEROL SULFATE 2.5; .5 MG/3ML; MG/3ML
3 SOLUTION RESPIRATORY (INHALATION) ONCE AS NEEDED
Status: DISCONTINUED | OUTPATIENT
Start: 2018-12-14 | End: 2018-12-14 | Stop reason: HOSPADM

## 2018-12-14 RX ORDER — FENTANYL CITRATE 50 UG/ML
50 INJECTION, SOLUTION INTRAMUSCULAR; INTRAVENOUS
Status: DISCONTINUED | OUTPATIENT
Start: 2018-12-14 | End: 2018-12-14 | Stop reason: HOSPADM

## 2018-12-14 RX ORDER — SODIUM CHLORIDE 0.9 % (FLUSH) 0.9 %
3 SYRINGE (ML) INJECTION EVERY 12 HOURS SCHEDULED
Status: DISCONTINUED | OUTPATIENT
Start: 2018-12-14 | End: 2018-12-14 | Stop reason: HOSPADM

## 2018-12-14 RX ORDER — ONDANSETRON 2 MG/ML
4 INJECTION INTRAMUSCULAR; INTRAVENOUS ONCE AS NEEDED
Status: DISCONTINUED | OUTPATIENT
Start: 2018-12-14 | End: 2018-12-14 | Stop reason: HOSPADM

## 2018-12-14 RX ORDER — SODIUM CHLORIDE, SODIUM LACTATE, POTASSIUM CHLORIDE, CALCIUM CHLORIDE 600; 310; 30; 20 MG/100ML; MG/100ML; MG/100ML; MG/100ML
125 INJECTION, SOLUTION INTRAVENOUS CONTINUOUS
Status: DISCONTINUED | OUTPATIENT
Start: 2018-12-14 | End: 2018-12-14 | Stop reason: HOSPADM

## 2018-12-14 RX ORDER — PROPOFOL 10 MG/ML
VIAL (ML) INTRAVENOUS AS NEEDED
Status: DISCONTINUED | OUTPATIENT
Start: 2018-12-14 | End: 2018-12-14 | Stop reason: SURG

## 2018-12-14 RX ORDER — MIDAZOLAM HYDROCHLORIDE 1 MG/ML
INJECTION INTRAMUSCULAR; INTRAVENOUS AS NEEDED
Status: DISCONTINUED | OUTPATIENT
Start: 2018-12-14 | End: 2018-12-14 | Stop reason: SURG

## 2018-12-14 RX ORDER — SODIUM CHLORIDE 0.9 % (FLUSH) 0.9 %
3-10 SYRINGE (ML) INJECTION AS NEEDED
Status: DISCONTINUED | OUTPATIENT
Start: 2018-12-14 | End: 2018-12-14 | Stop reason: HOSPADM

## 2018-12-14 RX ORDER — LIDOCAINE HYDROCHLORIDE 20 MG/ML
INJECTION, SOLUTION INFILTRATION; PERINEURAL AS NEEDED
Status: DISCONTINUED | OUTPATIENT
Start: 2018-12-14 | End: 2018-12-14 | Stop reason: SURG

## 2018-12-14 RX ADMIN — PROPOFOL 50 MG: 10 INJECTION, EMULSION INTRAVENOUS at 08:15

## 2018-12-14 RX ADMIN — PROPOFOL 140 MCG/KG/MIN: 10 INJECTION, EMULSION INTRAVENOUS at 08:15

## 2018-12-14 RX ADMIN — MIDAZOLAM HYDROCHLORIDE 2 MG: 1 INJECTION, SOLUTION INTRAMUSCULAR; INTRAVENOUS at 08:13

## 2018-12-14 RX ADMIN — SODIUM CHLORIDE, POTASSIUM CHLORIDE, SODIUM LACTATE AND CALCIUM CHLORIDE: 600; 310; 30; 20 INJECTION, SOLUTION INTRAVENOUS at 08:13

## 2018-12-14 RX ADMIN — LIDOCAINE HYDROCHLORIDE 60 MG: 20 INJECTION, SOLUTION INFILTRATION; PERINEURAL at 08:15

## 2018-12-14 NOTE — H&P
Patient Care Team:  Cruzito Palumbo MD as PCP - General (Internal Medicine)  Radha Abarca APRN as PCP - Claims Attributed    Chief complaint Screening colonoscopy    Subjective     History of Present Illness She is here for a screening colonoscopy. She has no bowel symptoms, melena or gross bleeding. She did have a colonoscopy with some benign polyps about 5 years ago. She has had some elevated liver enzymes recently but no known hepatitis or liver disease. She had a unremarkable CT of the abdomen done in [place] October 2018.     Review of Systems   Constitutional: Negative for activity change, appetite change, chills, fever and unexpected weight change.   HENT: Negative for congestion, facial swelling and sore throat.    Eyes: Negative for photophobia and visual disturbance.   Respiratory: Negative for chest tightness, shortness of breath and wheezing.    Cardiovascular: Negative for chest pain, palpitations and leg swelling.   Gastrointestinal: Negative for abdominal distention, abdominal pain, anal bleeding, blood in stool, constipation, diarrhea, nausea, rectal pain and vomiting.   Endocrine: Negative for cold intolerance, heat intolerance, polydipsia and polyuria.   Genitourinary: Negative for difficulty urinating, dysuria, flank pain and urgency.   Musculoskeletal: Negative for back pain and myalgias.   Skin: Negative for rash and wound.   Allergic/Immunologic: Negative for immunocompromised state.   Neurological: Negative for dizziness, seizures, syncope, light-headedness, numbness and headaches.   Hematological: Negative for adenopathy. Does not bruise/bleed easily.   Psychiatric/Behavioral: Negative for behavioral problems and confusion. The patient is not nervous/anxious.         Past Medical History:   Diagnosis Date   • Arthritis    • COPD (chronic obstructive pulmonary disease) (CMS/HCC)    • Coronary artery disease    • Disease of thyroid gland    • Elevated cholesterol    • GERD  (gastroesophageal reflux disease)    • High cholesterol    • Hypertension    • Seizures (CMS/HCC)    • Stroke (CMS/HCC)      Past Surgical History:   Procedure Laterality Date   • BRAIN SURGERY     • CARDIAC CATHETERIZATION      x2   • CARDIAC SURGERY      heart stent placed   •  SECTION     • HEMORRHOIDECTOMY       Family History   Problem Relation Age of Onset   • Heart disease Father    • Lung cancer Father      Social History     Tobacco Use   • Smoking status: Former Smoker   • Smokeless tobacco: Never Used   Substance Use Topics   • Alcohol use: No   • Drug use: No     Medications Prior to Admission   Medication Sig Dispense Refill Last Dose   • amitriptyline (ELAVIL) 50 MG tablet Take 50 mg by mouth Every Night.   2018 at Unknown time   • diazePAM (VALIUM) 5 MG tablet   0 2018 at Unknown time   • ezetimibe (ZETIA) 10 MG tablet Take 10 mg by mouth Daily.   2018 at Unknown time   • hydrochlorothiazide (HYDRODIURIL) 25 MG tablet Take 25 mg by mouth Daily.   2018 at Unknown time   • lansoprazole (PREVACID) 30 MG capsule   0 2018 at Unknown time   • levothyroxine (SYNTHROID) 25 MCG tablet Take  by mouth Daily.   2018 at Unknown time   • losartan (COZAAR) 25 MG tablet Take 25 mg by mouth Daily.   2018 at Unknown time   • phenytoin (DILANTIN) 100 MG ER capsule Take 2 capsules by mouth Daily.   2018 at Unknown time   • polyethylene glycol (GoLYTELY) 236 g solution Starting at 6pm the day before procedure, drink 8 ounces every 30 minutes until all gone or stools are clear. May add flavor packet. 4000 mL 0 2018 at Unknown time   • potassium chloride (K-DUR,KLOR-CON) 20 MEQ CR tablet Take 20 mEq by mouth 2 (Two) Times a Day.   2018 at Unknown time   • potassium chloride (MICRO-K) 10 MEQ CR capsule Take 50 mEq by mouth Daily.   2018 at Unknown time   • pregabalin (LYRICA) 50 MG capsule Take 50 mg by mouth 3 (Three) Times a Day.   2018 at  Unknown time   • rosuvastatin (CRESTOR) 10 MG tablet Take  by mouth.   12/13/2018 at Unknown time   • topiramate (TOPAMAX) 100 MG tablet   0 12/13/2018 at Unknown time   • venlafaxine XR (EFFEXOR-XR) 37.5 MG 24 hr capsule Take  by mouth Daily.   12/13/2018 at Unknown time   • aspirin 81 MG EC tablet Take 81 mg by mouth Daily.   12/7/2018   • risedronate (ACTONEL) 150 MG tablet Take 150 mg by mouth Every 30 (Thirty) Days. with water on empty stomach, nothing by mouth or lie down for next 30 minutes.   11/18/2018     Allergies:  Sulfa antibiotics    Objective      Vital Signs       Physical Exam   Constitutional: She is oriented to person, place, and time. She appears well-developed and well-nourished. She does not appear ill. No distress.   HENT:   Head: Normocephalic. Head is without laceration. Hair is normal.   Right Ear: Hearing and ear canal normal.   Left Ear: Hearing and ear canal normal.   Nose: Nose normal. No sinus tenderness. No epistaxis. Right sinus exhibits no maxillary sinus tenderness and no frontal sinus tenderness. Left sinus exhibits no maxillary sinus tenderness and no frontal sinus tenderness.   Eyes: Conjunctivae and lids are normal. Pupils are equal, round, and reactive to light.   Neck: Normal range of motion. No JVD present. No tracheal tenderness present. No tracheal deviation present. No thyroid mass and no thyromegaly present.   Cardiovascular: Normal rate and regular rhythm. Exam reveals no gallop.   No murmur heard.  Pulmonary/Chest: Effort normal and breath sounds normal. No stridor. She has no wheezes. She exhibits no tenderness.   Abdominal: Soft. Bowel sounds are normal. She exhibits no distension, no ascites and no mass. There is no tenderness. There is no rebound and no guarding. No hernia.   Musculoskeletal: She exhibits no edema or deformity.   Lymphadenopathy:     She has no cervical adenopathy.     She has no axillary adenopathy.        Right: No inguinal and no supraclavicular  adenopathy present.        Left: No inguinal and no supraclavicular adenopathy present.   Neurological: She is alert and oriented to person, place, and time. She exhibits normal muscle tone.   Skin: Skin is warm, dry and intact. No rash noted. No erythema. No pallor.   Psychiatric: She has a normal mood and affect. Her behavior is normal. Thought content normal.   Vitals reviewed.      Results Review:   I reviewed the patient's new clinical results.  I reviewed the patient's other test results and agree with the interpretation      Assessment/Plan       Elevated alkaline phosphatase level    Encounter for screening for malignant neoplasm of colon      Assessment & Plan She will get a screening colonoscopy    I discussed the patients findings and my recommendations with patient and nursing staff    Jose Lazaro MD  12/14/18  7:52 AM    Time:

## 2018-12-14 NOTE — OP NOTE
COLONOSCOPY FOR SCREENING  Procedure Note    Siria Rueda  12/14/2018    Pre-op Diagnosis:   Elevated alkaline phosphatase level [R74.8]  Encounter for screening for malignant neoplasm of colon [Z12.11]    Post-op Diagnosis: polyps transverse colon, diverticulosis, hemorrhoids        Procedure(s):  COLONOSCOPY FOR SCREENING CPT CODE:     Surgeon(s):  Jose Lazaro MD    Anesthesia: General    Staff:   Circulator: Starr Rahman RN  Endo Technician: Barak Small    Estimated Blood Loss: none    Specimens:                  Order Name Source Comment Collection Info Order Time   TISSUE PATHOLOGY EXAM Large Intestine  Collected By: Jose Lazaro MD 12/14/2018  8:31 AM         Drains:      Procedure: The colon was fairly torturous and she had a lot of diverticuli in the left side, but no inflammation. The ileocecal valve was normal and the terminal ileum. There were two small polyps removed in the proximal transverse colon using biopsy forceps. The scope was slowly withdrawn and only the diverticulosis and hemorrhoids seen.     Findings: polyps, diverticulosis, hemorrhoids    Complications: none   Grafts / Implants N/A    Jose Lazaro MD     Date: 12/14/2018  Time: 8:39 AM

## 2018-12-14 NOTE — ANESTHESIA POSTPROCEDURE EVALUATION
Patient: Siria Rueda    Procedure Summary     Date:  12/14/18 Room / Location:  [facility] OR [address] [place] [address] COR OR    Anesthesia Start:  0813 Anesthesia Stop:  0839    Procedure:  COLONOSCOPY FOR SCREENING CPT CODE:  (N/A ) Diagnosis:       Elevated alkaline phosphatase level      Encounter for screening for malignant neoplasm of colon      (Elevated alkaline phosphatase level [R74.8])      (Encounter for screening for malignant neoplasm of colon [Z12.11])    Surgeon:  Jose Lazaro MD Provider:  Ricky York DO    Anesthesia Type:  general ASA Status:  3          Anesthesia Type: general  Last vitals  BP   119/66 (12/14/18 0910)   Temp   97.2 °F (36.2 °C) (12/14/18 0840)   Pulse   71 (12/14/18 0910)   Resp   18 (12/14/18 0910)     SpO2   95 % (12/14/18 0910)     Post Anesthesia Care and Evaluation    Patient location during evaluation: PHASE II  Patient participation: complete - patient participated  Level of consciousness: awake and alert  Pain score: 1  Pain management: adequate  Airway patency: patent  Anesthetic complications: No anesthetic complications  PONV Status: controlled  Cardiovascular status: acceptable  Respiratory status: acceptable  Hydration status: acceptable

## 2018-12-14 NOTE — ANESTHESIA PREPROCEDURE EVALUATION
Anesthesia Evaluation                  Airway   Mallampati: III  TM distance: >3 FB  Neck ROM: full  No difficulty expected  Dental - normal exam   (+) poor dentition    Pulmonary - normal exam   (+) COPD, sleep apnea on CPAP,   Cardiovascular - normal exam    (+) hypertension, CAD, hyperlipidemia,       Neuro/Psych  (+) seizures well controlled, CVA,     GI/Hepatic/Renal/Endo    (+)  GERD,      Musculoskeletal     Abdominal  - normal exam    Bowel sounds: normal.   Substance History      OB/GYN          Other   (+) arthritis                     Anesthesia Plan    ASA 3     general     intravenous induction   Anesthetic plan, all risks, benefits, and alternatives have been provided, discussed and informed consent has been obtained with: patient.    Plan discussed with CRNA.

## 2018-12-18 LAB
LAB AP CASE REPORT: NORMAL
PATH REPORT.FINAL DX SPEC: NORMAL

## 2018-12-28 ENCOUNTER — OFFICE VISIT (OUTPATIENT)
Dept: GASTROENTEROLOGY | Facility: CLINIC | Age: 73
End: 2018-12-28

## 2018-12-28 ENCOUNTER — TELEPHONE (OUTPATIENT)
Dept: GASTROENTEROLOGY | Facility: CLINIC | Age: 73
End: 2018-12-28

## 2018-12-28 VITALS
HEART RATE: 91 BPM | WEIGHT: 175.4 LBS | SYSTOLIC BLOOD PRESSURE: 142 MMHG | BODY MASS INDEX: 31.08 KG/M2 | OXYGEN SATURATION: 97 % | DIASTOLIC BLOOD PRESSURE: 75 MMHG | HEIGHT: 63 IN

## 2018-12-28 DIAGNOSIS — R74.01 ELEVATED ALT MEASUREMENT: ICD-10-CM

## 2018-12-28 DIAGNOSIS — D12.6 TUBULAR ADENOMA OF COLON: ICD-10-CM

## 2018-12-28 DIAGNOSIS — R74.8 ELEVATED ALKALINE PHOSPHATASE LEVEL: Primary | ICD-10-CM

## 2018-12-28 LAB
ALBUMIN SERPL-MCNC: 4.5 G/DL (ref 3.4–4.8)
ALP SERPL-CCNC: 172 U/L (ref 35–104)
ALT SERPL W P-5'-P-CCNC: 34 U/L (ref 10–36)
AST SERPL-CCNC: 25 U/L (ref 10–30)
BILIRUB CONJ SERPL-MCNC: 0.1 MG/DL (ref 0–0.2)
BILIRUB INDIRECT SERPL-MCNC: 0.2 MG/DL
BILIRUB SERPL-MCNC: 0.3 MG/DL (ref 0.2–1.8)
PROT SERPL-MCNC: 6.7 G/DL (ref 6–8)

## 2018-12-28 PROCEDURE — 80076 HEPATIC FUNCTION PANEL: CPT | Performed by: PHYSICIAN ASSISTANT

## 2018-12-28 PROCEDURE — 86376 MICROSOMAL ANTIBODY EACH: CPT | Performed by: PHYSICIAN ASSISTANT

## 2018-12-28 PROCEDURE — 99214 OFFICE O/P EST MOD 30 MIN: CPT | Performed by: PHYSICIAN ASSISTANT

## 2018-12-28 PROCEDURE — 83516 IMMUNOASSAY NONANTIBODY: CPT | Performed by: PHYSICIAN ASSISTANT

## 2018-12-28 NOTE — PROGRESS NOTES
": 1945    Chief Complaint   Patient presents with   • Constipation       Siria Rueda is a 73 y.o. female who presents to the office today as a follow up appointment regarding Elevated Hepatic Enzymes and recent procedure results.     History of Present Illness:  Today, she is feeling well per her report. She was initally referred due to elevated alkaline phosphatase, she was due for [facility] colonoscopy so had this completed since her last visit. She forgot to complete labs as ordered at [facility] last appointment so she was called and reminded to complete this and then did so at [facility] lab. Not all of these labs were translated correctly and 1 order was never resulted. She does have 1-2 glasses of wine intermittently but not daily. No family history of liver disease or liver cancer other than 1 first cousin who passed with liver cancer. Denies current abdominal pain, loss or appetite or weight loss. She still has her gallbladder. Has been told in the past that her cholesterol is only midly elevated.     Labs 2018:   elev  AFP 3.9 normal  Anti-microsomal Ab not resulted  Alk phos 182- 80 % liver fraction  ALT 40  AST 24    Colonoscopy completed by Dr. Lazaro on 18 which showed \"fairly torturous colon and a lot of diverticuli in the left side, but no inflammation.\" She also had 2 small proximal transverse colon polyps which were tubular adenomas on pathology.     Previous results:  US abd 2018 normal gallbladder    Labs 10/1/2018:  Ferritin normal  Iron profile normal  Flpv-5-[id number] normal  Anti-smooth muscle antibody normal  Anti-nuclear antibody normal  Anti-mitochondrial antibody normal  Ceruloplasmin normal  Celiac panel negative  Hep B surf Ab negative  Hep A total Ab positive  Acute hepatitis panel negative  IgG, IgA, IgM normal except mildly decr IgG at 666  CBC normal     ALT (SGPT) 10 - 36 U/L 37     AST (SGOT) 10 - 30 U/L 26    Alkaline Phosphatase 35 - 104 U/L 196   "   Comment: Note New Reference Ranges   Total Bilirubin 0.2 - 1.8 mg/dL 0.4       GGT 7 - 32 U/L 220        Labs 2018:  Total cholesterol 148  Triglycerides 169  HDL 65  LDL 49  Alkaline phosphatase 192  AST 29  ALT 39  Bilirubin 0.2  Platelets 201     Labs 3/2015:  Phosphatase 149    Review of Systems   Constitutional: Negative for chills, fatigue and fever.   HENT: Positive for trouble swallowing.    Eyes: Negative.    Respiratory: Positive for cough, choking and shortness of breath. Negative for chest tightness.    Cardiovascular: Negative for chest pain.   Gastrointestinal: Positive for constipation and nausea. Negative for abdominal distention, abdominal pain, anal bleeding, blood in stool, diarrhea, rectal pain and vomiting.   Endocrine: Negative.    Genitourinary: Negative.    Musculoskeletal: Positive for back pain.   Skin: Negative.    Allergic/Immunologic: Negative.    Neurological: Positive for seizures.   Hematological: Bruises/bleeds easily.   Psychiatric/Behavioral: Negative.        Past Medical History:   Diagnosis Date   • Arthritis    • COPD (chronic obstructive pulmonary disease) (CMS/HCC)    • Coronary artery disease    • Disease of thyroid gland    • Elevated cholesterol    • GERD (gastroesophageal reflux disease)    • High cholesterol    • Hypertension    • Seizures (CMS/HCC)    • Stroke (CMS/HCC)        Past Surgical History:   Procedure Laterality Date   • BRAIN SURGERY     • CARDIAC CATHETERIZATION      x2   • CARDIAC SURGERY      heart stent placed   •  SECTION     • COLONOSCOPY N/A 2018    Procedure: COLONOSCOPY FOR SCREENING CPT CODE: ;  Surgeon: Jose Lazaro MD;  Location: [facility];  Service: Gastroenterology   • HEMORRHOIDECTOMY         Family History   Problem Relation Age of Onset   • Heart disease Father    • Lung cancer Father        Social History     Socioeconomic History   • Marital status:      Spouse name: Not on file   • Number of children: Not  "on file   • Years of education: Not on file   • Highest education level: Not on file   Tobacco Use   • Smoking status: Former Smoker   • Smokeless tobacco: Never Used   Substance and Sexual Activity   • Alcohol use: No   • Drug use: No   • Sexual activity: Defer       Current Outpatient Medications:   •  amitriptyline (ELAVIL) 50 MG tablet, Take 50 mg by mouth Every Night., Disp: , Rfl:   •  aspirin 81 MG EC tablet, Take 81 mg by mouth Daily., Disp: , Rfl:   •  diazePAM (VALIUM) 5 MG tablet, , Disp: , Rfl: 0  •  ezetimibe (ZETIA) 10 MG tablet, Take 10 mg by mouth Daily., Disp: , Rfl:   •  hydrochlorothiazide (HYDRODIURIL) 25 MG tablet, Take 25 mg by mouth Daily., Disp: , Rfl:   •  lansoprazole (PREVACID) 30 MG capsule, , Disp: , Rfl: 0  •  levothyroxine (SYNTHROID) 25 MCG tablet, Take  by mouth Daily., Disp: , Rfl:   •  losartan (COZAAR) 25 MG tablet, Take 25 mg by mouth Daily., Disp: , Rfl:   •  phenytoin (DILANTIN) 100 MG ER capsule, Take 2 capsules by mouth Daily., Disp: , Rfl:   •  potassium chloride (K-DUR,KLOR-CON) 20 MEQ CR tablet, Take 20 mEq by mouth 2 (Two) Times a Day., Disp: , Rfl:   •  potassium chloride (MICRO-K) 10 MEQ CR capsule, Take 50 mEq by mouth Daily., Disp: , Rfl:   •  pregabalin (LYRICA) 50 MG capsule, Take 50 mg by mouth 3 (Three) Times a Day., Disp: , Rfl:   •  risedronate (ACTONEL) 150 MG tablet, Take 150 mg by mouth Every 30 (Thirty) Days. with water on empty stomach, nothing by mouth or lie down for next 30 minutes., Disp: , Rfl:   •  rosuvastatin (CRESTOR) 10 MG tablet, Take  by mouth., Disp: , Rfl:   •  topiramate (TOPAMAX) 100 MG tablet, , Disp: , Rfl: 0  •  venlafaxine XR (EFFEXOR-XR) 37.5 MG 24 hr capsule, Take  by mouth Daily., Disp: , Rfl:     Allergies:   Sulfa antibiotics    Vitals:  /75 (BP Location: Left arm, Patient Position: Sitting, Cuff Size: Adult)   Pulse 91   Ht 160 cm (63\")   Wt 79.6 kg (175 lb 6.4 oz)   SpO2 97%   BMI 31.07 kg/m²     Physical Exam "   Constitutional: She is oriented to person, place, and time. She appears well-developed and well-nourished. No distress.   HENT:   Head: Normocephalic and atraumatic.   Nose: Nose normal.   Mouth/Throat: Oropharynx is clear and moist.   Eyes: Conjunctivae are normal. Right eye exhibits no discharge. Left eye exhibits no discharge. No scleral icterus.   Neck: Normal range of motion. No JVD present.   Cardiovascular: Normal rate, regular rhythm and normal heart sounds. Exam reveals no gallop and no friction rub.   No murmur heard.  Pulmonary/Chest: Effort normal and breath sounds normal. No respiratory distress. She has no wheezes. She has no rales. She exhibits no tenderness.   Abdominal: Soft. Bowel sounds are normal. She exhibits no mass. There is tenderness (generalized, mild).   Musculoskeletal: Normal range of motion. She exhibits no edema or deformity.   Neurological: She is alert and oriented to person, place, and time. Coordination normal.   Skin: Skin is warm and dry. No rash noted. She is not diaphoretic. No erythema.   Psychiatric: She has a normal mood and affect. Her behavior is normal. Judgment and thought content normal.   Vitals reviewed.      Assessment/Plan:  1. Elevated alkaline phosphatase level    2. Elevated ALT measurement    3. Tubular adenoma of colon      Orders Placed This Encounter   Procedures   • US Guided Liver Biopsy   • Anti-microsomal Antibody   • Hepatic Function Panel   • Mitochondrial Antibodies, M2     She will have more labs today to further evaluate elevated alkaline phosphatase. Previous abdominal imaging has not shown gallbladder disease and liver has been normal in appearance. She has elevated GGT, ALT and alk phos with 80% liver fraction. Alcohol use is limited. To diagnose Primary Biliary Cirrhosis, you must meet 2/3 criteria: >1.5 elevation from UL of alk phos for 6 months, positive AMA, positive liver biopsy. She meets 1/2 and due to the severity of her elevation, I  have recommended US liver biopsy. She agrees.     She will need a repeat colonoscopy in 5 years due to personal history of tubular adenomatous colon polyps. We will place her on the recall list to be called to schedule an appointment closer to that date. She was instructed to call the office if no reminder call is made within the proper number of years. Also, the indications for a repeat colonoscopy sooner than the recall date were discussed; rectal bleeding, melena, change in bowel habits or significant abdominal pain.            Return in about 3 weeks (around 1/18/2019) for discussion of results.      Electronically signed 12/28/2018 2:11 PM  Jennifer Castellon PA-C, [facility]

## 2018-12-31 LAB
ALP LIVER CFR SERPL: <1 UNITS (ref 0–20)
DEPRECATED MITOCHONDRIA M2 IGG SER-ACNC: <20 UNITS (ref 0–20)

## 2019-01-10 ENCOUNTER — HOSPITAL ENCOUNTER (OUTPATIENT)
Dept: ULTRASOUND IMAGING | Facility: HOSPITAL | Age: 74
Discharge: HOME | End: 2019-01-10
Admitting: PHYSICIAN ASSISTANT

## 2019-01-10 VITALS
HEART RATE: 78 BPM | DIASTOLIC BLOOD PRESSURE: 68 MMHG | TEMPERATURE: 97.7 F | HEIGHT: 63 IN | WEIGHT: 173 LBS | RESPIRATION RATE: 16 BRPM | OXYGEN SATURATION: 97 % | SYSTOLIC BLOOD PRESSURE: 141 MMHG | BODY MASS INDEX: 30.65 KG/M2

## 2019-01-10 DIAGNOSIS — R74.01 ELEVATED ALT MEASUREMENT: ICD-10-CM

## 2019-01-10 DIAGNOSIS — D12.6 TUBULAR ADENOMA OF COLON: ICD-10-CM

## 2019-01-10 DIAGNOSIS — R74.8 ELEVATED ALKALINE PHOSPHATASE LEVEL: ICD-10-CM

## 2019-01-10 LAB
APTT PPP: 29.5 SECONDS (ref 23.8–36.1)
INR PPP: 1.04 (ref 0.9–1.1)
PLATELET # BLD AUTO: 202 10*3/MM3 (ref 130–400)
PROTHROMBIN TIME: 13.9 SECONDS (ref 11–15.4)

## 2019-01-10 PROCEDURE — 85049 AUTOMATED PLATELET COUNT: CPT | Performed by: PHYSICIAN ASSISTANT

## 2019-01-10 PROCEDURE — 76942 ECHO GUIDE FOR BIOPSY: CPT

## 2019-01-10 PROCEDURE — 88313 SPECIAL STAINS GROUP 2: CPT | Performed by: PHYSICIAN ASSISTANT

## 2019-01-10 PROCEDURE — 85730 THROMBOPLASTIN TIME PARTIAL: CPT | Performed by: PHYSICIAN ASSISTANT

## 2019-01-10 PROCEDURE — 47000 NEEDLE BIOPSY OF LIVER PERQ: CPT | Performed by: RADIOLOGY

## 2019-01-10 PROCEDURE — 36415 COLL VENOUS BLD VENIPUNCTURE: CPT | Performed by: PHYSICIAN ASSISTANT

## 2019-01-10 PROCEDURE — 88307 TISSUE EXAM BY PATHOLOGIST: CPT | Performed by: PHYSICIAN ASSISTANT

## 2019-01-10 PROCEDURE — 85610 PROTHROMBIN TIME: CPT | Performed by: PHYSICIAN ASSISTANT

## 2019-01-10 PROCEDURE — 76942 ECHO GUIDE FOR BIOPSY: CPT | Performed by: RADIOLOGY

## 2019-01-10 RX ORDER — NEBIVOLOL 5 MG/1
5 TABLET ORAL DAILY
Status: ON HOLD | COMMUNITY
End: 2019-11-16

## 2019-01-14 LAB
LAB AP CASE REPORT: NORMAL
PATH REPORT.FINAL DX SPEC: NORMAL

## 2019-01-17 ENCOUNTER — OFFICE VISIT (OUTPATIENT)
Dept: GASTROENTEROLOGY | Facility: CLINIC | Age: 74
End: 2019-01-17

## 2019-01-17 VITALS
BODY MASS INDEX: 30.65 KG/M2 | WEIGHT: 173 LBS | HEART RATE: 82 BPM | DIASTOLIC BLOOD PRESSURE: 74 MMHG | HEIGHT: 63 IN | SYSTOLIC BLOOD PRESSURE: 133 MMHG

## 2019-01-17 DIAGNOSIS — R74.8 ELEVATED SERUM GGT LEVEL: ICD-10-CM

## 2019-01-17 DIAGNOSIS — R74.8 ELEVATED ALKALINE PHOSPHATASE LEVEL: Primary | ICD-10-CM

## 2019-01-17 PROCEDURE — 99213 OFFICE O/P EST LOW 20 MIN: CPT | Performed by: PHYSICIAN ASSISTANT

## 2019-01-17 NOTE — PROGRESS NOTES
": 1945    Chief Complaint   Patient presents with   • Elevated Hepatic Enzymes       Siria Rueda is a 73 y.o. female who presents to the office today as a follow up appointment regarding Elevated Hepatic Enzymes.    History of Present Illness:  Patient had liver biopsy completed since last office visit as recommended. She is still feeling well, did not have any problems after her biopsy was completed and only had mild soreness the day after it was completed. She was initally referred due to elevated alkaline phosphatase, she was due for [facility] colonoscopy so had that was completed on 2018 by Dr. Lazaro. Colonoscopy completed by Dr. Lazaro on 18 which showed \"fairly torturous colon and a lot of diverticuli in the left side, but no inflammation.\" She also had 2 small proximal transverse colon polyps which were tubular adenomas on pathology.  She does have 1-2 glasses of wine intermittently but not daily. No family history of liver disease or liver cancer other than 1 first cousin who passed with liver cancer. Denies current abdominal pain, loss or appetite or weight loss. She still has her gallbladder. Has been told in the past that her cholesterol is only midly elevated.     Liver biopsy pathology:       Labs 2018:   elev  AFP 3.9 normal  Anti-microsomal Ab normal  Alk phos 182- 80 % liver fraction  ALT 40  AST 24     Previous results:  US abd 2018 normal gallbladder     Labs 10/1/2018:  Ferritin normal  Iron profile normal  [id number]-2-pfdenupkffr normal  Anti-smooth muscle antibody normal  Anti-nuclear antibody normal  Anti-mitochondrial antibody normal  Ceruloplasmin normal  Celiac panel negative  Hep B surf Ab negative  Hep A total Ab positive  Acute hepatitis panel negative  IgG, IgA, IgM normal except mildly decr IgG at 666  CBC normal     ALT (SGPT) 10 - 36 U/L 37     AST (SGOT) 10 - 30 U/L 26    Alkaline Phosphatase 35 - 104 U/L 196     Comment: Note New Reference Ranges   Total " Bilirubin 0.2 - 1.8 mg/dL 0.4       GGT 7 - 32 U/L 220        Labs 2018:  Total cholesterol 148  Triglycerides 169  HDL 65  LDL 49  Alkaline phosphatase 192  AST 29  ALT 39  Bilirubin 0.2  Platelets 201     Labs 3/2015:  Phosphatase 149      Review of Systems   Constitutional: Negative for chills, fatigue and fever.   HENT: Positive for trouble swallowing.    Eyes: Negative.    Respiratory: Positive for cough, choking and shortness of breath. Negative for chest tightness.    Cardiovascular: Negative for chest pain.   Gastrointestinal: Positive for constipation and nausea. Negative for abdominal distention, abdominal pain, anal bleeding, blood in stool, diarrhea, rectal pain and vomiting.   Endocrine: Negative.    Genitourinary: Negative.    Musculoskeletal: Positive for back pain.   Skin: Negative.    Allergic/Immunologic: Negative.    Neurological: Positive for seizures.   Hematological: Bruises/bleeds easily.   Psychiatric/Behavioral: Negative.        Past Medical History:   Diagnosis Date   • Arthritis    • COPD (chronic obstructive pulmonary disease) (CMS/HCC)    • Coronary artery disease    • Disease of thyroid gland    • Elevated cholesterol    • GERD (gastroesophageal reflux disease)    • High cholesterol    • Hypertension    • Liver disease    • Seizures (CMS/HCC)    • Sleep apnea    • Stroke (CMS/HCC)        Past Surgical History:   Procedure Laterality Date   • BRAIN SURGERY     • CARDIAC CATHETERIZATION      x2   • CARDIAC SURGERY      heart stent placed   •  SECTION     • COLONOSCOPY N/A 2018    Procedure: COLONOSCOPY FOR SCREENING CPT CODE: ;  Surgeon: Jose Lazaro MD;  Location: [facility];  Service: Gastroenterology   • HEMORRHOIDECTOMY         Family History   Problem Relation Age of Onset   • Heart disease Father    • Lung cancer Father        Social History     Socioeconomic History   • Marital status:      Spouse name: Not on file   • Number of children: Not on file  "  • Years of education: Not on file   • Highest education level: Not on file   Tobacco Use   • Smoking status: Former Smoker   • Smokeless tobacco: Never Used   Substance and Sexual Activity   • Alcohol use: No   • Drug use: No   • Sexual activity: Defer       Current Outpatient Medications:   •  amitriptyline (ELAVIL) 50 MG tablet, Take 50 mg by mouth Every Night., Disp: , Rfl:   •  aspirin 81 MG EC tablet, Take 81 mg by mouth Daily., Disp: , Rfl:   •  diazePAM (VALIUM) 5 MG tablet, , Disp: , Rfl: 0  •  hydrochlorothiazide (HYDRODIURIL) 25 MG tablet, Take 25 mg by mouth Daily., Disp: , Rfl:   •  lansoprazole (PREVACID) 30 MG capsule, , Disp: , Rfl: 0  •  levothyroxine (SYNTHROID) 25 MCG tablet, Take  by mouth Daily., Disp: , Rfl:   •  nebivolol (BYSTOLIC) 5 MG tablet, Take 5 mg by mouth Daily., Disp: , Rfl:   •  phenytoin (DILANTIN) 100 MG ER capsule, Take 2 capsules by mouth Daily., Disp: , Rfl:   •  potassium chloride (K-DUR,KLOR-CON) 20 MEQ CR tablet, Take 20 mEq by mouth 2 (Two) Times a Day., Disp: , Rfl:   •  potassium chloride (MICRO-K) 10 MEQ CR capsule, Take 50 mEq by mouth Daily., Disp: , Rfl:   •  pregabalin (LYRICA) 50 MG capsule, Take 50 mg by mouth 3 (Three) Times a Day., Disp: , Rfl:   •  risedronate (ACTONEL) 150 MG tablet, Take 150 mg by mouth Every 30 (Thirty) Days. with water on empty stomach, nothing by mouth or lie down for next 30 minutes., Disp: , Rfl:   •  rosuvastatin (CRESTOR) 10 MG tablet, Take  by mouth., Disp: , Rfl:   •  topiramate (TOPAMAX) 100 MG tablet, , Disp: , Rfl: 0  •  venlafaxine XR (EFFEXOR-XR) 37.5 MG 24 hr capsule, Take  by mouth Daily., Disp: , Rfl:     Allergies:   Sulfa antibiotics    Vitals:  /74 (BP Location: Left arm, Patient Position: Sitting, Cuff Size: Adult)   Pulse 82   Ht 160 cm (63\")   Wt 78.5 kg (173 lb)   BMI 30.65 kg/m²     Physical Exam   Constitutional: She is oriented to person, place, and time. She appears well-developed and well-nourished. No " distress.   HENT:   Head: Normocephalic and atraumatic.   Nose: Nose normal.   Mouth/Throat: Oropharynx is clear and moist.   Eyes: Conjunctivae are normal. Right eye exhibits no discharge. Left eye exhibits no discharge. No scleral icterus.   Neck: Normal range of motion. No JVD present.   Cardiovascular: Normal rate, regular rhythm and normal heart sounds. Exam reveals no gallop and no friction rub.   No murmur heard.  Pulmonary/Chest: Effort normal and breath sounds normal. No respiratory distress. She has no wheezes. She has no rales. She exhibits no tenderness.   Abdominal: Soft. Bowel sounds are normal. She exhibits no mass. There is no tenderness.   Musculoskeletal: Normal range of motion. She exhibits no edema or deformity.   Neurological: She is alert and oriented to person, place, and time. Coordination normal.   Skin: Skin is warm and dry. No rash noted. She is not diaphoretic. No erythema.   Psychiatric: She has a normal mood and affect. Her behavior is normal. Judgment and thought content normal.   Vitals reviewed.      Assessment/Plan:  1. Elevated alkaline phosphatase level    2. Elevated serum GGT level      Orders Placed This Encounter   Procedures   • Hepatic Function Panel   • Gamma GT     I have asked patient to abstain from any alcohol use for 14 days and have repeat hepatic panel and GGT lab. Liver biopsy was benign. Alk phos elevation has been persistent. All other liver disease testing has been negative. She does not meet diagnosis criteria for primary biliary cirrhosis because even though her alk phos has been consistently elevated >1.5 ULN, her liver biopsy was negative and anti-mitochondrial antibody is negative. We may need to perform extrahepatic testing to determine if alk phos elevation could be related to malignancy or other inflammation outside the GI tract.         She will be called with her results. Follow up will be determined after results have been reviewed.        Electronically signed 1/25/2019 3:10 PM  Jennifer Castellon PA-C, [facility]

## 2019-02-04 ENCOUNTER — TELEPHONE (OUTPATIENT)
Dept: GASTROENTEROLOGY | Facility: CLINIC | Age: 74
End: 2019-02-04

## 2019-02-04 NOTE — TELEPHONE ENCOUNTER
Spoke with patient and she stated she had them done at [facility]. I will call and get the results.

## 2019-02-04 NOTE — TELEPHONE ENCOUNTER
Patient was asked to have labs at local hospital on 2/1/2019 which were ordered last visit. Please ck on status of these orders. Thanks.

## 2019-02-08 NOTE — TELEPHONE ENCOUNTER
I called and requested labs again and we received the same labs. I am guessing the GGT lab was not ordered correctly at the lab.

## 2019-02-15 NOTE — TELEPHONE ENCOUNTER
Please let patient know that her alk phos is still elevated on most recent labs. Please tell her to let PCP know that we cannot find any GI source of this elevation. She needs further workup for inflammation or possible cancer in other areas of the body.

## 2019-02-19 NOTE — TELEPHONE ENCOUNTER
Spoke with patient and told her the Lab results and that she needed to follow up with her PCP. She voiced understanding and stated she will call and make her an appointment.

## 2019-07-09 DIAGNOSIS — M25.572 LEFT ANKLE PAIN, UNSPECIFIED CHRONICITY: Primary | ICD-10-CM

## 2019-07-10 ENCOUNTER — OFFICE VISIT (OUTPATIENT)
Dept: ORTHOPEDIC SURGERY | Facility: CLINIC | Age: 74
End: 2019-07-10

## 2019-07-10 ENCOUNTER — HOSPITAL ENCOUNTER (OUTPATIENT)
Dept: GENERAL RADIOLOGY | Facility: HOSPITAL | Age: 74
Discharge: HOME | End: 2019-07-10
Admitting: PHYSICIAN ASSISTANT

## 2019-07-10 VITALS
SYSTOLIC BLOOD PRESSURE: 126 MMHG | HEIGHT: 63 IN | DIASTOLIC BLOOD PRESSURE: 67 MMHG | BODY MASS INDEX: 30.66 KG/M2 | WEIGHT: 173.06 LBS | HEART RATE: 91 BPM

## 2019-07-10 DIAGNOSIS — M25.572 LEFT ANKLE PAIN, UNSPECIFIED CHRONICITY: ICD-10-CM

## 2019-07-10 DIAGNOSIS — M84.364A STRESS FRACTURE OF LEFT FIBULA, INITIAL ENCOUNTER: Primary | ICD-10-CM

## 2019-07-10 PROCEDURE — 73610 X-RAY EXAM OF ANKLE: CPT

## 2019-07-10 PROCEDURE — 73610 X-RAY EXAM OF ANKLE: CPT | Performed by: RADIOLOGY

## 2019-07-10 PROCEDURE — 99213 OFFICE O/P EST LOW 20 MIN: CPT | Performed by: PHYSICIAN ASSISTANT

## 2019-07-10 NOTE — PROGRESS NOTES
New Patient Visit        Patient: Siria Rueda  YOB: 1945  Date of encounter: 7/10/2019    Chief Complaint   Patient presents with   • Left Ankle - Pain       History of Present Illness:   Siria Rueda is a 73 y.o. female who was referred here today by Dr. Palumbo for evaluation of left ankle pain.  She states the left ankle began bothering her about 2 weeks ago with no known injury.  She complains of pain and swelling along the lateral aspect of the ankle.  She states it is worse with any kind of weightbearing activities.  She does not recall any injury to the foot or ankle.  She states she was treated for a stress fracture in the right foot about 2 months ago.  She did have a DEXA scan revealing osteopenia.    PMH:   Patient Active Problem List   Diagnosis   • Elevated alkaline phosphatase level   • Encounter for screening for malignant neoplasm of colon     Past Medical History:   Diagnosis Date   • Arthritis    • COPD (chronic obstructive pulmonary disease) (CMS/HCC)    • Coronary artery disease    • Disease of thyroid gland    • Elevated cholesterol    • GERD (gastroesophageal reflux disease)    • High cholesterol    • Hypertension    • Liver disease    • Seizures (CMS/HCC)    • Sleep apnea    • Stroke (CMS/HCC)        PSH:  Past Surgical History:   Procedure Laterality Date   • BRAIN SURGERY     • CARDIAC CATHETERIZATION      x2   • CARDIAC SURGERY      heart stent placed   •  SECTION     • COLONOSCOPY N/A 2018    Procedure: COLONOSCOPY FOR SCREENING CPT CODE: ;  Surgeon: Jose Lazaro MD;  Location: [facility];  Service: Gastroenterology   • HEMORRHOIDECTOMY         Allergies:     Allergies   Allergen Reactions   • Sulfa Antibiotics Hives       Medications:     Current Outpatient Medications:   •  amitriptyline (ELAVIL) 50 MG tablet, Take 50 mg by mouth Every Night., Disp: , Rfl:   •  aspirin 81 MG EC tablet, Take 81 mg by mouth Daily., Disp: , Rfl:   •  diazePAM (VALIUM) 5  MG tablet, , Disp: , Rfl: 0  •  hydrochlorothiazide (HYDRODIURIL) 25 MG tablet, Take 25 mg by mouth Daily., Disp: , Rfl:   •  lansoprazole (PREVACID) 30 MG capsule, , Disp: , Rfl: 0  •  levothyroxine (SYNTHROID) 25 MCG tablet, Take  by mouth Daily., Disp: , Rfl:   •  nebivolol (BYSTOLIC) 5 MG tablet, Take 5 mg by mouth Daily., Disp: , Rfl:   •  phenytoin (DILANTIN) 100 MG ER capsule, Take 2 capsules by mouth Daily., Disp: , Rfl:   •  potassium chloride (K-DUR,KLOR-CON) 20 MEQ CR tablet, Take 20 mEq by mouth 2 (Two) Times a Day., Disp: , Rfl:   •  potassium chloride (MICRO-K) 10 MEQ CR capsule, Take 50 mEq by mouth Daily., Disp: , Rfl:   •  pregabalin (LYRICA) 50 MG capsule, Take 50 mg by mouth 3 (Three) Times a Day., Disp: , Rfl:   •  risedronate (ACTONEL) 150 MG tablet, Take 150 mg by mouth Every 30 (Thirty) Days. with water on empty stomach, nothing by mouth or lie down for next 30 minutes., Disp: , Rfl:   •  rosuvastatin (CRESTOR) 10 MG tablet, Take  by mouth., Disp: , Rfl:   •  topiramate (TOPAMAX) 100 MG tablet, , Disp: , Rfl: 0  •  venlafaxine XR (EFFEXOR-XR) 37.5 MG 24 hr capsule, Take  by mouth Daily., Disp: , Rfl:     Social History:  Social History     Socioeconomic History   • Marital status:      Spouse name: Not on file   • Number of children: Not on file   • Years of education: Not on file   • Highest education level: Not on file   Tobacco Use   • Smoking status: Former Smoker   • Smokeless tobacco: Never Used   Substance and Sexual Activity   • Alcohol use: No   • Drug use: No   • Sexual activity: Defer       Family History:     Family History   Problem Relation Age of Onset   • Heart disease Father    • Lung cancer Father        Review of Systems:   Review of Systems   Constitutional: Positive for fatigue.   HENT: Positive for ear pain, sinus pressure and sore throat.    Eyes: Negative.    Respiratory: Positive for apnea, cough, chest tightness, shortness of breath and wheezing.   "  Cardiovascular: Positive for leg swelling.   Gastrointestinal: Positive for constipation.   Endocrine: Positive for polyuria.   Genitourinary: Positive for frequency and urgency.   Musculoskeletal: Positive for arthralgias, back pain, joint swelling and myalgias.   Skin: Negative.    Neurological: Positive for seizures and numbness.   Hematological: Bruises/bleeds easily.   Psychiatric/Behavioral: Negative.        Physical Exam: 73 y.o. female  General Appearance:    Alert and oriented x 3, cooperative, in no acute distress                   Vitals:    07/10/19 1543   BP: 126/67   Pulse: 91   Weight: 78.5 kg (173 lb 1 oz)   Height: 160 cm (62.99\")              Body mass index is 30.66 kg/m².        Musculoskeletal: Examination of the left ankle reveals mild swelling without ecchymosis.  He does have tenderness along the distal fibular shaft.  She has full motion.  Her neurovascular status is intact.    Radiology:     3 views of the left ankle were reviewed revealing subtle lucency along the distal fibular shaft that correlates with her point tenderness.    Assessment    ICD-10-CM ICD-9-CM   1. Stress fracture of left fibula, initial encounter M84.364A 733.93       Plan:  73-year-old female with left ankle pain with no known injury.  She does have a history of stress fracture in the right foot approximately 2 months ago.  Radiographs of the left ankle today reveal subtle lucency along the distal fibular shaft that correlates with the point tenderness on exam.  She likely has a stress fracture of the distal fibula.  I will treat conservatively with immobilization and today she was placed in a tall equalizer boot.  She can bear weight as tolerated letting pain and swelling be her guide.  She will return back in 4 weeks for repeat x-rays and evaluation.    Courtney Alfaro PA-C           "

## 2019-08-05 DIAGNOSIS — M84.364A STRESS FRACTURE OF LEFT FIBULA, INITIAL ENCOUNTER: Primary | ICD-10-CM

## 2019-08-07 ENCOUNTER — OFFICE VISIT (OUTPATIENT)
Dept: ORTHOPEDIC SURGERY | Facility: CLINIC | Age: 74
End: 2019-08-07

## 2019-08-07 ENCOUNTER — HOSPITAL ENCOUNTER (OUTPATIENT)
Dept: GENERAL RADIOLOGY | Facility: HOSPITAL | Age: 74
Discharge: HOME | End: 2019-08-07
Admitting: PHYSICIAN ASSISTANT

## 2019-08-07 ENCOUNTER — HOSPITAL ENCOUNTER (OUTPATIENT)
Dept: GENERAL RADIOLOGY | Facility: HOSPITAL | Age: 74
End: 2019-08-07

## 2019-08-07 DIAGNOSIS — M25.572 LEFT ANKLE PAIN, UNSPECIFIED CHRONICITY: Primary | ICD-10-CM

## 2019-08-07 DIAGNOSIS — M84.364A STRESS FRACTURE OF LEFT FIBULA, INITIAL ENCOUNTER: ICD-10-CM

## 2019-08-07 DIAGNOSIS — M25.572 ACUTE LEFT ANKLE PAIN: Primary | ICD-10-CM

## 2019-08-07 PROCEDURE — 73610 X-RAY EXAM OF ANKLE: CPT | Performed by: RADIOLOGY

## 2019-08-07 PROCEDURE — 73610 X-RAY EXAM OF ANKLE: CPT

## 2019-08-07 PROCEDURE — 99212 OFFICE O/P EST SF 10 MIN: CPT | Performed by: PHYSICIAN ASSISTANT

## 2019-08-07 NOTE — PROGRESS NOTES
Siria Rueda   :1945    Date of encounter:2019    Chief Complaint   Patient presents with   • Left Ankle - Follow-up       HPI:  Siria Rueda is a 73 y.o. female who returns here today for follow-up of left ankle pain.  There was some initial concern for possible stress fracture and she was placed in a equalizer boot.  She states for the last month her symptoms have improved significantly.  She states she has been going without the boot some at home and bearing full weight without any problems.  She has no further complaints of pain or swelling.  She denies paresthesias.    PMH:   Patient Active Problem List   Diagnosis   • Elevated alkaline phosphatase level   • Encounter for screening for malignant neoplasm of colon       Exam:  General Appearance:    73 y.o. female  cooperative, in no acute distress.  Alert and oriented x 3,                 There were no vitals filed for this visit.       There is no height or weight on file to calculate BMI.    Examination of the left ankle reveals minimal swelling.  There is no tenderness on palpation.  She has full range of motion.  No gross instability.  Her neurovascular status is intact.    Radiology:  3 views of the left ankle reviewed revealing no acute fractures or dislocations.  No periosteal reaction seen.    Assessment    ICD-10-CM ICD-9-CM   1. Acute left ankle pain M25.572 719.47       Plan:  73-year-old female with a left ankle pain.  Initial concern for possible stress fracture however on today's radiographs there is no evidence of callus formation.  She likely just contused the ankle.  She is doing well clinically without any further pain.  She can discontinue the boot and back into regular shoe.  She can ease back and activities as tolerated.  She will return back here on an as-needed basis with any further difficulties.    Courtney Alfaro PA-C

## 2019-11-15 ENCOUNTER — HOSPITAL ENCOUNTER (INPATIENT)
Facility: HOSPITAL | Age: 74
LOS: 8 days | Discharge: HOME | End: 2019-11-23
Attending: EMERGENCY MEDICINE | Admitting: FAMILY MEDICINE

## 2019-11-15 ENCOUNTER — APPOINTMENT (OUTPATIENT)
Dept: CT IMAGING | Facility: HOSPITAL | Age: 74
End: 2019-11-15

## 2019-11-15 ENCOUNTER — APPOINTMENT (OUTPATIENT)
Dept: GENERAL RADIOLOGY | Facility: HOSPITAL | Age: 74
End: 2019-11-15

## 2019-11-15 DIAGNOSIS — J18.9 PNEUMONIA OF BOTH LUNGS DUE TO INFECTIOUS ORGANISM, UNSPECIFIED PART OF LUNG: Primary | ICD-10-CM

## 2019-11-15 LAB
ALBUMIN SERPL-MCNC: 3.44 G/DL (ref 3.5–5.2)
ALBUMIN/GLOB SERPL: 1.1 G/DL
ALP SERPL-CCNC: 180 U/L (ref 39–117)
ALT SERPL W P-5'-P-CCNC: 26 U/L (ref 1–33)
ANION GAP SERPL CALCULATED.3IONS-SCNC: 12.3 MMOL/L (ref 5–15)
AST SERPL-CCNC: 43 U/L (ref 1–32)
BASOPHILS # BLD AUTO: 0.05 10*3/MM3 (ref 0–0.2)
BASOPHILS NFR BLD AUTO: 0.4 % (ref 0–1.5)
BILIRUB SERPL-MCNC: 0.7 MG/DL (ref 0.2–1.2)
BUN BLD-MCNC: 11 MG/DL (ref 8–23)
BUN/CREAT SERPL: 12.9 (ref 7–25)
CALCIUM SPEC-SCNC: 8.5 MG/DL (ref 8.6–10.5)
CHLORIDE SERPL-SCNC: 103 MMOL/L (ref 98–107)
CO2 SERPL-SCNC: 22.7 MMOL/L (ref 22–29)
CREAT BLD-MCNC: 0.85 MG/DL (ref 0.57–1)
CRP SERPL-MCNC: 34.11 MG/DL (ref 0–0.5)
D DIMER PPP FEU-MCNC: 1.24 MCGFEU/ML (ref 0–0.5)
D-LACTATE SERPL-SCNC: 0.9 MMOL/L (ref 0.5–2)
DEPRECATED RDW RBC AUTO: 50.8 FL (ref 37–54)
EOSINOPHIL # BLD AUTO: 0.25 10*3/MM3 (ref 0–0.4)
EOSINOPHIL NFR BLD AUTO: 2 % (ref 0.3–6.2)
ERYTHROCYTE [DISTWIDTH] IN BLOOD BY AUTOMATED COUNT: 14.1 % (ref 12.3–15.4)
FLUAV AG NPH QL: NEGATIVE
FLUBV AG NPH QL IA: NEGATIVE
GFR SERPL CREATININE-BSD FRML MDRD: 65 ML/MIN/1.73
GLOBULIN UR ELPH-MCNC: 3.1 GM/DL
GLUCOSE BLD-MCNC: 110 MG/DL (ref 65–99)
HCT VFR BLD AUTO: 34.1 % (ref 34–46.6)
HGB BLD-MCNC: 11.1 G/DL (ref 12–15.9)
IMM GRANULOCYTES # BLD AUTO: 0.06 10*3/MM3 (ref 0–0.05)
IMM GRANULOCYTES NFR BLD AUTO: 0.5 % (ref 0–0.5)
LYMPHOCYTES # BLD AUTO: 2.33 10*3/MM3 (ref 0.7–3.1)
LYMPHOCYTES NFR BLD AUTO: 18.2 % (ref 19.6–45.3)
MCH RBC QN AUTO: 31.9 PG (ref 26.6–33)
MCHC RBC AUTO-ENTMCNC: 32.6 G/DL (ref 31.5–35.7)
MCV RBC AUTO: 98 FL (ref 79–97)
MONOCYTES # BLD AUTO: 0.82 10*3/MM3 (ref 0.1–0.9)
MONOCYTES NFR BLD AUTO: 6.4 % (ref 5–12)
NEUTROPHILS # BLD AUTO: 9.28 10*3/MM3 (ref 1.7–7)
NEUTROPHILS NFR BLD AUTO: 72.5 % (ref 42.7–76)
NRBC BLD AUTO-RTO: 0 /100 WBC (ref 0–0.2)
PLATELET # BLD AUTO: 172 10*3/MM3 (ref 140–450)
PMV BLD AUTO: 9.4 FL (ref 6–12)
POTASSIUM BLD-SCNC: 3.9 MMOL/L (ref 3.5–5.2)
PROT SERPL-MCNC: 6.5 G/DL (ref 6–8.5)
RBC # BLD AUTO: 3.48 10*6/MM3 (ref 3.77–5.28)
SODIUM BLD-SCNC: 138 MMOL/L (ref 136–145)
WBC NRBC COR # BLD: 12.79 10*3/MM3 (ref 3.4–10.8)

## 2019-11-15 PROCEDURE — 85379 FIBRIN DEGRADATION QUANT: CPT | Performed by: NURSE PRACTITIONER

## 2019-11-15 PROCEDURE — 0099U HC BIOFIRE FILMARRAY RESP PANEL 1: CPT | Performed by: HOSPITALIST

## 2019-11-15 PROCEDURE — 36415 COLL VENOUS BLD VENIPUNCTURE: CPT

## 2019-11-15 PROCEDURE — 87205 SMEAR GRAM STAIN: CPT | Performed by: NURSE PRACTITIONER

## 2019-11-15 PROCEDURE — 87070 CULTURE OTHR SPECIMN AEROBIC: CPT | Performed by: NURSE PRACTITIONER

## 2019-11-15 PROCEDURE — 85025 COMPLETE CBC W/AUTO DIFF WBC: CPT | Performed by: NURSE PRACTITIONER

## 2019-11-15 PROCEDURE — 71045 X-RAY EXAM CHEST 1 VIEW: CPT

## 2019-11-15 PROCEDURE — 0 IOVERSOL 74 % SOLUTION: Performed by: FAMILY MEDICINE

## 2019-11-15 PROCEDURE — 71045 X-RAY EXAM CHEST 1 VIEW: CPT | Performed by: RADIOLOGY

## 2019-11-15 PROCEDURE — 80053 COMPREHEN METABOLIC PANEL: CPT | Performed by: NURSE PRACTITIONER

## 2019-11-15 PROCEDURE — 87804 INFLUENZA ASSAY W/OPTIC: CPT | Performed by: NURSE PRACTITIONER

## 2019-11-15 PROCEDURE — 99284 EMERGENCY DEPT VISIT MOD MDM: CPT

## 2019-11-15 PROCEDURE — 87040 BLOOD CULTURE FOR BACTERIA: CPT | Performed by: NURSE PRACTITIONER

## 2019-11-15 PROCEDURE — 25010000002 VANCOMYCIN 5 G RECONSTITUTED SOLUTION 5,000 MG VIAL: Performed by: PHYSICIAN ASSISTANT

## 2019-11-15 PROCEDURE — 83605 ASSAY OF LACTIC ACID: CPT | Performed by: NURSE PRACTITIONER

## 2019-11-15 PROCEDURE — 71275 CT ANGIOGRAPHY CHEST: CPT

## 2019-11-15 PROCEDURE — 86140 C-REACTIVE PROTEIN: CPT | Performed by: NURSE PRACTITIONER

## 2019-11-15 RX ORDER — SODIUM CHLORIDE 0.9 % (FLUSH) 0.9 %
10 SYRINGE (ML) INJECTION AS NEEDED
Status: DISCONTINUED | OUTPATIENT
Start: 2019-11-15 | End: 2019-11-23 | Stop reason: HOSPADM

## 2019-11-15 RX ORDER — SODIUM CHLORIDE 9 MG/ML
75 INJECTION, SOLUTION INTRAVENOUS CONTINUOUS
Status: DISCONTINUED | OUTPATIENT
Start: 2019-11-16 | End: 2019-11-16

## 2019-11-15 RX ORDER — HEPARIN SODIUM 5000 [USP'U]/ML
5000 INJECTION, SOLUTION INTRAVENOUS; SUBCUTANEOUS EVERY 12 HOURS SCHEDULED
Status: DISCONTINUED | OUTPATIENT
Start: 2019-11-16 | End: 2019-11-23 | Stop reason: HOSPADM

## 2019-11-15 RX ORDER — SODIUM CHLORIDE 0.9 % (FLUSH) 0.9 %
10 SYRINGE (ML) INJECTION EVERY 12 HOURS SCHEDULED
Status: DISCONTINUED | OUTPATIENT
Start: 2019-11-16 | End: 2019-11-23 | Stop reason: HOSPADM

## 2019-11-15 RX ADMIN — VANCOMYCIN HYDROCHLORIDE 1500 MG: 5 INJECTION, POWDER, LYOPHILIZED, FOR SOLUTION INTRAVENOUS at 23:02

## 2019-11-15 RX ADMIN — IOVERSOL 70 ML: 741 INJECTION INTRA-ARTERIAL; INTRAVENOUS at 22:00

## 2019-11-16 ENCOUNTER — APPOINTMENT (OUTPATIENT)
Dept: ULTRASOUND IMAGING | Facility: HOSPITAL | Age: 74
End: 2019-11-16

## 2019-11-16 LAB
ALBUMIN SERPL-MCNC: 2.91 G/DL (ref 3.5–5.2)
ALBUMIN/GLOB SERPL: 0.9 G/DL
ALP SERPL-CCNC: 167 U/L (ref 39–117)
ALT SERPL W P-5'-P-CCNC: 21 U/L (ref 1–33)
ANION GAP SERPL CALCULATED.3IONS-SCNC: 8.9 MMOL/L (ref 5–15)
AST SERPL-CCNC: 27 U/L (ref 1–32)
B PERT DNA SPEC QL NAA+PROBE: NOT DETECTED
BASOPHILS # BLD AUTO: 0.04 10*3/MM3 (ref 0–0.2)
BASOPHILS NFR BLD AUTO: 0.4 % (ref 0–1.5)
BILIRUB SERPL-MCNC: 0.4 MG/DL (ref 0.2–1.2)
BUN BLD-MCNC: 9 MG/DL (ref 8–23)
BUN/CREAT SERPL: 13.4 (ref 7–25)
C PNEUM DNA NPH QL NAA+NON-PROBE: NOT DETECTED
CALCIUM SPEC-SCNC: 8.1 MG/DL (ref 8.6–10.5)
CHLORIDE SERPL-SCNC: 107 MMOL/L (ref 98–107)
CO2 SERPL-SCNC: 22.1 MMOL/L (ref 22–29)
CREAT BLD-MCNC: 0.67 MG/DL (ref 0.57–1)
CRP SERPL-MCNC: 33.45 MG/DL (ref 0–0.5)
DEPRECATED RDW RBC AUTO: 53.2 FL (ref 37–54)
EOSINOPHIL # BLD AUTO: 0.25 10*3/MM3 (ref 0–0.4)
EOSINOPHIL NFR BLD AUTO: 2.5 % (ref 0.3–6.2)
ERYTHROCYTE [DISTWIDTH] IN BLOOD BY AUTOMATED COUNT: 14.2 % (ref 12.3–15.4)
FLUAV H1 2009 PAND RNA NPH QL NAA+PROBE: NOT DETECTED
FLUAV H1 HA GENE NPH QL NAA+PROBE: NOT DETECTED
FLUAV H3 RNA NPH QL NAA+PROBE: NOT DETECTED
FLUAV SUBTYP SPEC NAA+PROBE: NOT DETECTED
FLUBV RNA ISLT QL NAA+PROBE: NOT DETECTED
GFR SERPL CREATININE-BSD FRML MDRD: 86 ML/MIN/1.73
GLOBULIN UR ELPH-MCNC: 3.2 GM/DL
GLUCOSE BLD-MCNC: 119 MG/DL (ref 65–99)
HADV DNA SPEC NAA+PROBE: NOT DETECTED
HCOV 229E RNA SPEC QL NAA+PROBE: NOT DETECTED
HCOV HKU1 RNA SPEC QL NAA+PROBE: NOT DETECTED
HCOV NL63 RNA SPEC QL NAA+PROBE: NOT DETECTED
HCOV OC43 RNA SPEC QL NAA+PROBE: NOT DETECTED
HCT VFR BLD AUTO: 36 % (ref 34–46.6)
HGB BLD-MCNC: 11.2 G/DL (ref 12–15.9)
HMPV RNA NPH QL NAA+NON-PROBE: NOT DETECTED
HPIV1 RNA SPEC QL NAA+PROBE: NOT DETECTED
HPIV2 RNA SPEC QL NAA+PROBE: NOT DETECTED
HPIV3 RNA NPH QL NAA+PROBE: NOT DETECTED
HPIV4 P GENE NPH QL NAA+PROBE: NOT DETECTED
IMM GRANULOCYTES # BLD AUTO: 0.06 10*3/MM3 (ref 0–0.05)
IMM GRANULOCYTES NFR BLD AUTO: 0.6 % (ref 0–0.5)
L PNEUMO1 AG UR QL IA: NEGATIVE
LYMPHOCYTES # BLD AUTO: 2 10*3/MM3 (ref 0.7–3.1)
LYMPHOCYTES NFR BLD AUTO: 19.9 % (ref 19.6–45.3)
M PNEUMO IGG SER IA-ACNC: NOT DETECTED
MCH RBC QN AUTO: 31.7 PG (ref 26.6–33)
MCHC RBC AUTO-ENTMCNC: 31.1 G/DL (ref 31.5–35.7)
MCV RBC AUTO: 102 FL (ref 79–97)
MONOCYTES # BLD AUTO: 0.88 10*3/MM3 (ref 0.1–0.9)
MONOCYTES NFR BLD AUTO: 8.7 % (ref 5–12)
NEUTROPHILS # BLD AUTO: 6.83 10*3/MM3 (ref 1.7–7)
NEUTROPHILS NFR BLD AUTO: 67.9 % (ref 42.7–76)
NRBC BLD AUTO-RTO: 0 /100 WBC (ref 0–0.2)
PHENYTOIN SERPL-MCNC: 6.9 MCG/ML (ref 10–20)
PLATELET # BLD AUTO: 182 10*3/MM3 (ref 140–450)
PMV BLD AUTO: 9.5 FL (ref 6–12)
POTASSIUM BLD-SCNC: 3.4 MMOL/L (ref 3.5–5.2)
POTASSIUM BLD-SCNC: 4.6 MMOL/L (ref 3.5–5.2)
PROT SERPL-MCNC: 6.1 G/DL (ref 6–8.5)
RBC # BLD AUTO: 3.53 10*6/MM3 (ref 3.77–5.28)
RHINOVIRUS RNA SPEC NAA+PROBE: NOT DETECTED
RSV RNA NPH QL NAA+NON-PROBE: NOT DETECTED
SODIUM BLD-SCNC: 138 MMOL/L (ref 136–145)
TROPONIN T SERPL-MCNC: <0.01 NG/ML (ref 0–0.03)
WBC NRBC COR # BLD: 10.06 10*3/MM3 (ref 3.4–10.8)

## 2019-11-16 PROCEDURE — 25010000002 METHYLPREDNISOLONE PER 40 MG: Performed by: HOSPITALIST

## 2019-11-16 PROCEDURE — 93970 EXTREMITY STUDY: CPT

## 2019-11-16 PROCEDURE — 93970 EXTREMITY STUDY: CPT | Performed by: RADIOLOGY

## 2019-11-16 PROCEDURE — 86140 C-REACTIVE PROTEIN: CPT | Performed by: HOSPITALIST

## 2019-11-16 PROCEDURE — 94799 UNLISTED PULMONARY SVC/PX: CPT

## 2019-11-16 PROCEDURE — 99222 1ST HOSP IP/OBS MODERATE 55: CPT | Performed by: NURSE PRACTITIONER

## 2019-11-16 PROCEDURE — 84484 ASSAY OF TROPONIN QUANT: CPT | Performed by: HOSPITALIST

## 2019-11-16 PROCEDURE — 93005 ELECTROCARDIOGRAM TRACING: CPT | Performed by: HOSPITALIST

## 2019-11-16 PROCEDURE — 84132 ASSAY OF SERUM POTASSIUM: CPT | Performed by: INTERNAL MEDICINE

## 2019-11-16 PROCEDURE — 94640 AIRWAY INHALATION TREATMENT: CPT

## 2019-11-16 PROCEDURE — 25010000002 CEFTRIAXONE: Performed by: HOSPITALIST

## 2019-11-16 PROCEDURE — 25010000002 HEPARIN (PORCINE) PER 1000 UNITS: Performed by: HOSPITALIST

## 2019-11-16 PROCEDURE — 80185 ASSAY OF PHENYTOIN TOTAL: CPT | Performed by: HOSPITALIST

## 2019-11-16 PROCEDURE — 85025 COMPLETE CBC W/AUTO DIFF WBC: CPT | Performed by: HOSPITALIST

## 2019-11-16 PROCEDURE — 87899 AGENT NOS ASSAY W/OPTIC: CPT | Performed by: HOSPITALIST

## 2019-11-16 PROCEDURE — 80053 COMPREHEN METABOLIC PANEL: CPT | Performed by: HOSPITALIST

## 2019-11-16 RX ORDER — TOPIRAMATE 100 MG/1
100 TABLET, FILM COATED ORAL EVERY MORNING
Status: DISCONTINUED | OUTPATIENT
Start: 2019-11-16 | End: 2019-11-23 | Stop reason: HOSPADM

## 2019-11-16 RX ORDER — CALCIUM POLYCARBOPHIL 625 MG 625 MG/1
625 TABLET ORAL DAILY
Status: DISCONTINUED | OUTPATIENT
Start: 2019-11-16 | End: 2019-11-23 | Stop reason: HOSPADM

## 2019-11-16 RX ORDER — AMITRIPTYLINE HYDROCHLORIDE 50 MG/1
50 TABLET, FILM COATED ORAL NIGHTLY
Status: DISCONTINUED | OUTPATIENT
Start: 2019-11-16 | End: 2019-11-23 | Stop reason: HOSPADM

## 2019-11-16 RX ORDER — MAGNESIUM SULFATE HEPTAHYDRATE 40 MG/ML
2 INJECTION, SOLUTION INTRAVENOUS AS NEEDED
Status: DISCONTINUED | OUTPATIENT
Start: 2019-11-16 | End: 2019-11-23 | Stop reason: HOSPADM

## 2019-11-16 RX ORDER — DOCUSATE SODIUM 250 MG
250 CAPSULE ORAL 2 TIMES DAILY PRN
COMMUNITY
End: 2020-07-17 | Stop reason: HOSPADM

## 2019-11-16 RX ORDER — PRAMIPEXOLE DIHYDROCHLORIDE 1 MG/1
1 TABLET ORAL NIGHTLY
Status: DISCONTINUED | OUTPATIENT
Start: 2019-11-16 | End: 2019-11-23 | Stop reason: HOSPADM

## 2019-11-16 RX ORDER — BISACODYL 10 MG
10 SUPPOSITORY, RECTAL RECTAL DAILY PRN
Status: DISCONTINUED | OUTPATIENT
Start: 2019-11-16 | End: 2019-11-23 | Stop reason: HOSPADM

## 2019-11-16 RX ORDER — PRAMIPEXOLE DIHYDROCHLORIDE 0.5 MG/1
1 TABLET ORAL NIGHTLY
COMMUNITY

## 2019-11-16 RX ORDER — CYCLOBENZAPRINE HCL 10 MG
10 TABLET ORAL NIGHTLY PRN
Status: DISCONTINUED | OUTPATIENT
Start: 2019-11-16 | End: 2019-11-16

## 2019-11-16 RX ORDER — ERGOCALCIFEROL 1.25 MG/1
50000 CAPSULE ORAL WEEKLY
COMMUNITY
End: 2020-07-17 | Stop reason: HOSPADM

## 2019-11-16 RX ORDER — VENLAFAXINE HYDROCHLORIDE 75 MG/1
75 CAPSULE, EXTENDED RELEASE ORAL EVERY EVENING
COMMUNITY

## 2019-11-16 RX ORDER — HYDROCHLOROTHIAZIDE 25 MG/1
25 TABLET ORAL DAILY
Status: CANCELLED | OUTPATIENT
Start: 2019-11-16

## 2019-11-16 RX ORDER — GUAIFENESIN 600 MG/1
1200 TABLET, EXTENDED RELEASE ORAL EVERY 12 HOURS SCHEDULED
Status: DISCONTINUED | OUTPATIENT
Start: 2019-11-16 | End: 2019-11-23 | Stop reason: HOSPADM

## 2019-11-16 RX ORDER — LISINOPRIL 20 MG/1
20 TABLET ORAL DAILY
COMMUNITY
End: 2020-07-17 | Stop reason: HOSPADM

## 2019-11-16 RX ORDER — PHENYTOIN SODIUM 100 MG/1
100 CAPSULE, EXTENDED RELEASE ORAL NIGHTLY
COMMUNITY
End: 2020-07-17 | Stop reason: HOSPADM

## 2019-11-16 RX ORDER — LEVOTHYROXINE SODIUM 0.03 MG/1
25 TABLET ORAL
Status: DISCONTINUED | OUTPATIENT
Start: 2019-11-16 | End: 2019-11-23 | Stop reason: HOSPADM

## 2019-11-16 RX ORDER — METHYLPREDNISOLONE SODIUM SUCCINATE 40 MG/ML
20 INJECTION, POWDER, LYOPHILIZED, FOR SOLUTION INTRAMUSCULAR; INTRAVENOUS EVERY 12 HOURS
Status: DISCONTINUED | OUTPATIENT
Start: 2019-11-16 | End: 2019-11-20

## 2019-11-16 RX ORDER — CALCIUM POLYCARBOPHIL 625 MG 625 MG/1
625 TABLET ORAL DAILY
COMMUNITY

## 2019-11-16 RX ORDER — POTASSIUM CHLORIDE 20 MEQ/1
40 TABLET, EXTENDED RELEASE ORAL AS NEEDED
Status: DISCONTINUED | OUTPATIENT
Start: 2019-11-16 | End: 2019-11-23 | Stop reason: HOSPADM

## 2019-11-16 RX ORDER — POTASSIUM CHLORIDE 20 MEQ/1
40 TABLET, EXTENDED RELEASE ORAL EVERY 4 HOURS
Status: COMPLETED | OUTPATIENT
Start: 2019-11-16 | End: 2019-11-16

## 2019-11-16 RX ORDER — LACTULOSE 10 G/15ML
30 SOLUTION ORAL ONCE
Status: COMPLETED | OUTPATIENT
Start: 2019-11-16 | End: 2019-11-16

## 2019-11-16 RX ORDER — CYCLOBENZAPRINE HCL 10 MG
5 TABLET ORAL NIGHTLY PRN
Status: DISCONTINUED | OUTPATIENT
Start: 2019-11-16 | End: 2019-11-23 | Stop reason: HOSPADM

## 2019-11-16 RX ORDER — ACETYLCYSTEINE 200 MG/ML
3 SOLUTION ORAL; RESPIRATORY (INHALATION)
Status: DISCONTINUED | OUTPATIENT
Start: 2019-11-16 | End: 2019-11-23 | Stop reason: HOSPADM

## 2019-11-16 RX ORDER — ROSUVASTATIN CALCIUM 10 MG/1
5 TABLET, COATED ORAL NIGHTLY
Status: DISCONTINUED | OUTPATIENT
Start: 2019-11-16 | End: 2019-11-23 | Stop reason: HOSPADM

## 2019-11-16 RX ORDER — TOPIRAMATE 100 MG/1
150 TABLET, FILM COATED ORAL NIGHTLY
COMMUNITY

## 2019-11-16 RX ORDER — CALCIUM POLYCARBOPHIL 625 MG 625 MG/1
625 TABLET ORAL DAILY
Status: CANCELLED | OUTPATIENT
Start: 2019-11-16

## 2019-11-16 RX ORDER — POTASSIUM CHLORIDE 1.5 G/1.77G
40 POWDER, FOR SOLUTION ORAL AS NEEDED
Status: DISCONTINUED | OUTPATIENT
Start: 2019-11-16 | End: 2019-11-23 | Stop reason: HOSPADM

## 2019-11-16 RX ORDER — PANTOPRAZOLE SODIUM 40 MG/1
40 TABLET, DELAYED RELEASE ORAL EVERY MORNING
Status: DISCONTINUED | OUTPATIENT
Start: 2019-11-16 | End: 2019-11-23 | Stop reason: HOSPADM

## 2019-11-16 RX ORDER — ROSUVASTATIN CALCIUM 5 MG/1
5 TABLET, COATED ORAL NIGHTLY
Status: ON HOLD | COMMUNITY
End: 2020-07-06

## 2019-11-16 RX ORDER — DOCUSATE SODIUM 100 MG/1
200 CAPSULE, LIQUID FILLED ORAL 2 TIMES DAILY
Status: DISCONTINUED | OUTPATIENT
Start: 2019-11-16 | End: 2019-11-23 | Stop reason: HOSPADM

## 2019-11-16 RX ORDER — VENLAFAXINE HYDROCHLORIDE 75 MG/1
75 CAPSULE, EXTENDED RELEASE ORAL EVERY EVENING
Status: DISCONTINUED | OUTPATIENT
Start: 2019-11-16 | End: 2019-11-23 | Stop reason: HOSPADM

## 2019-11-16 RX ORDER — HYDROCODONE BITARTRATE AND ACETAMINOPHEN 7.5; 325 MG/1; MG/1
1 TABLET ORAL 2 TIMES DAILY PRN
Status: CANCELLED | OUTPATIENT
Start: 2019-11-16

## 2019-11-16 RX ORDER — POTASSIUM CHLORIDE 7.45 MG/ML
10 INJECTION INTRAVENOUS
Status: DISCONTINUED | OUTPATIENT
Start: 2019-11-16 | End: 2019-11-23 | Stop reason: HOSPADM

## 2019-11-16 RX ORDER — LISINOPRIL 10 MG/1
20 TABLET ORAL DAILY
Status: DISCONTINUED | OUTPATIENT
Start: 2019-11-16 | End: 2019-11-23 | Stop reason: HOSPADM

## 2019-11-16 RX ORDER — DIAZEPAM 5 MG/1
5 TABLET ORAL NIGHTLY
Status: CANCELLED | OUTPATIENT
Start: 2019-11-16

## 2019-11-16 RX ORDER — PHENYTOIN SODIUM 100 MG/1
200 CAPSULE, EXTENDED RELEASE ORAL EVERY MORNING
Status: DISCONTINUED | OUTPATIENT
Start: 2019-11-16 | End: 2019-11-23 | Stop reason: HOSPADM

## 2019-11-16 RX ORDER — METHYLPREDNISOLONE SODIUM SUCCINATE 40 MG/ML
40 INJECTION, POWDER, LYOPHILIZED, FOR SOLUTION INTRAMUSCULAR; INTRAVENOUS EVERY 12 HOURS
Status: DISCONTINUED | OUTPATIENT
Start: 2019-11-16 | End: 2019-11-16

## 2019-11-16 RX ORDER — DOCUSATE SODIUM 100 MG/1
200 CAPSULE, LIQUID FILLED ORAL 2 TIMES DAILY PRN
Status: DISCONTINUED | OUTPATIENT
Start: 2019-11-16 | End: 2019-11-16

## 2019-11-16 RX ORDER — EZETIMIBE 10 MG/1
10 TABLET ORAL DAILY
COMMUNITY
End: 2020-07-17 | Stop reason: HOSPADM

## 2019-11-16 RX ORDER — ASPIRIN 81 MG/1
81 TABLET ORAL DAILY
Status: DISCONTINUED | OUTPATIENT
Start: 2019-11-16 | End: 2019-11-23 | Stop reason: HOSPADM

## 2019-11-16 RX ORDER — MAGNESIUM SULFATE HEPTAHYDRATE 40 MG/ML
4 INJECTION, SOLUTION INTRAVENOUS AS NEEDED
Status: DISCONTINUED | OUTPATIENT
Start: 2019-11-16 | End: 2019-11-23 | Stop reason: HOSPADM

## 2019-11-16 RX ORDER — HYDROCODONE BITARTRATE AND ACETAMINOPHEN 7.5; 325 MG/1; MG/1
1 TABLET ORAL 2 TIMES DAILY PRN
COMMUNITY
End: 2020-07-17 | Stop reason: HOSPADM

## 2019-11-16 RX ORDER — IPRATROPIUM BROMIDE AND ALBUTEROL SULFATE 2.5; .5 MG/3ML; MG/3ML
3 SOLUTION RESPIRATORY (INHALATION)
Status: DISCONTINUED | OUTPATIENT
Start: 2019-11-16 | End: 2019-11-23 | Stop reason: HOSPADM

## 2019-11-16 RX ORDER — CYCLOBENZAPRINE HCL 10 MG
10 TABLET ORAL NIGHTLY
COMMUNITY
End: 2020-07-17 | Stop reason: HOSPADM

## 2019-11-16 RX ORDER — POTASSIUM CHLORIDE 20 MEQ/1
20 TABLET, EXTENDED RELEASE ORAL DAILY
Status: DISCONTINUED | OUTPATIENT
Start: 2019-11-16 | End: 2019-11-23 | Stop reason: HOSPADM

## 2019-11-16 RX ORDER — PHENYTOIN SODIUM 100 MG/1
100 CAPSULE, EXTENDED RELEASE ORAL NIGHTLY
Status: DISCONTINUED | OUTPATIENT
Start: 2019-11-16 | End: 2019-11-23 | Stop reason: HOSPADM

## 2019-11-16 RX ADMIN — MAGNESIUM GLUCONATE 500 MG ORAL TABLET 800 MG: 500 TABLET ORAL at 09:02

## 2019-11-16 RX ADMIN — DOCUSATE SODIUM 200 MG: 100 CAPSULE ORAL at 21:44

## 2019-11-16 RX ADMIN — HEPARIN SODIUM 5000 UNITS: 5000 INJECTION INTRAVENOUS; SUBCUTANEOUS at 03:28

## 2019-11-16 RX ADMIN — AMITRIPTYLINE HYDROCHLORIDE 50 MG: 50 TABLET, FILM COATED ORAL at 21:58

## 2019-11-16 RX ADMIN — SODIUM CHLORIDE 75 ML/HR: 9 INJECTION, SOLUTION INTRAVENOUS at 00:19

## 2019-11-16 RX ADMIN — IPRATROPIUM BROMIDE AND ALBUTEROL SULFATE 3 ML: .5; 3 SOLUTION RESPIRATORY (INHALATION) at 06:59

## 2019-11-16 RX ADMIN — PANTOPRAZOLE SODIUM 40 MG: 40 TABLET, DELAYED RELEASE ORAL at 05:34

## 2019-11-16 RX ADMIN — VENLAFAXINE HYDROCHLORIDE 75 MG: 75 CAPSULE, EXTENDED RELEASE ORAL at 16:04

## 2019-11-16 RX ADMIN — LEVOTHYROXINE SODIUM 25 MCG: 25 TABLET ORAL at 05:34

## 2019-11-16 RX ADMIN — PHENYTOIN SODIUM 200 MG: 100 CAPSULE, EXTENDED RELEASE ORAL at 05:34

## 2019-11-16 RX ADMIN — POLYETHYLENE GLYCOL (3350) 17 G: 17 POWDER, FOR SOLUTION ORAL at 09:03

## 2019-11-16 RX ADMIN — METHYLPREDNISOLONE SODIUM SUCCINATE 20 MG: 40 INJECTION, POWDER, FOR SOLUTION INTRAMUSCULAR; INTRAVENOUS at 16:05

## 2019-11-16 RX ADMIN — HEPARIN SODIUM 5000 UNITS: 5000 INJECTION INTRAVENOUS; SUBCUTANEOUS at 09:03

## 2019-11-16 RX ADMIN — HEPARIN SODIUM 5000 UNITS: 5000 INJECTION INTRAVENOUS; SUBCUTANEOUS at 21:40

## 2019-11-16 RX ADMIN — METRONIDAZOLE 500 MG: 500 INJECTION, SOLUTION INTRAVENOUS at 03:25

## 2019-11-16 RX ADMIN — ROSUVASTATIN CALCIUM 5 MG: 10 TABLET, FILM COATED ORAL at 21:46

## 2019-11-16 RX ADMIN — POTASSIUM CHLORIDE 40 MEQ: 1500 TABLET, EXTENDED RELEASE ORAL at 11:23

## 2019-11-16 RX ADMIN — DOCUSATE SODIUM 200 MG: 100 CAPSULE ORAL at 09:03

## 2019-11-16 RX ADMIN — SODIUM CHLORIDE, PRESERVATIVE FREE 10 ML: 5 INJECTION INTRAVENOUS at 09:03

## 2019-11-16 RX ADMIN — DOXYCYCLINE 100 MG: 100 INJECTION, POWDER, LYOPHILIZED, FOR SOLUTION INTRAVENOUS at 02:29

## 2019-11-16 RX ADMIN — SODIUM CHLORIDE, PRESERVATIVE FREE 10 ML: 5 INJECTION INTRAVENOUS at 21:58

## 2019-11-16 RX ADMIN — METRONIDAZOLE 500 MG: 500 INJECTION, SOLUTION INTRAVENOUS at 09:18

## 2019-11-16 RX ADMIN — TOPIRAMATE 150 MG: 100 TABLET, FILM COATED ORAL at 21:40

## 2019-11-16 RX ADMIN — METRONIDAZOLE 500 MG: 500 INJECTION, SOLUTION INTRAVENOUS at 17:03

## 2019-11-16 RX ADMIN — IPRATROPIUM BROMIDE AND ALBUTEROL SULFATE 3 ML: .5; 3 SOLUTION RESPIRATORY (INHALATION) at 13:13

## 2019-11-16 RX ADMIN — PHENYTOIN SODIUM 100 MG: 100 CAPSULE, EXTENDED RELEASE ORAL at 21:45

## 2019-11-16 RX ADMIN — ACETYLCYSTEINE 3 ML: 200 SOLUTION ORAL; RESPIRATORY (INHALATION) at 19:34

## 2019-11-16 RX ADMIN — POTASSIUM CHLORIDE 40 MEQ: 1500 TABLET, EXTENDED RELEASE ORAL at 15:41

## 2019-11-16 RX ADMIN — CEFTRIAXONE 1 G: 1 INJECTION, POWDER, FOR SOLUTION INTRAMUSCULAR; INTRAVENOUS at 02:23

## 2019-11-16 RX ADMIN — PRAMIPEXOLE DIHYDROCHLORIDE 1 MG: 1 TABLET ORAL at 21:45

## 2019-11-16 RX ADMIN — GUAIFENESIN 1200 MG: 600 TABLET, EXTENDED RELEASE ORAL at 21:45

## 2019-11-16 RX ADMIN — POTASSIUM CHLORIDE 20 MEQ: 1500 TABLET, EXTENDED RELEASE ORAL at 09:02

## 2019-11-16 RX ADMIN — TOPIRAMATE 100 MG: 100 TABLET, FILM COATED ORAL at 05:34

## 2019-11-16 RX ADMIN — IPRATROPIUM BROMIDE AND ALBUTEROL SULFATE 3 ML: .5; 3 SOLUTION RESPIRATORY (INHALATION) at 19:34

## 2019-11-16 RX ADMIN — METHYLPREDNISOLONE SODIUM SUCCINATE 20 MG: 40 INJECTION, POWDER, FOR SOLUTION INTRAMUSCULAR; INTRAVENOUS at 05:34

## 2019-11-16 RX ADMIN — GUAIFENESIN 1200 MG: 600 TABLET, EXTENDED RELEASE ORAL at 11:23

## 2019-11-16 RX ADMIN — LACTULOSE 30 G: 10 SOLUTION ORAL at 11:36

## 2019-11-16 RX ADMIN — ASPIRIN 81 MG: 81 TABLET, COATED ORAL at 09:03

## 2019-11-16 RX ADMIN — DOXYCYCLINE 100 MG: 100 INJECTION, POWDER, LYOPHILIZED, FOR SOLUTION INTRAVENOUS at 14:20

## 2019-11-17 LAB
ALBUMIN SERPL-MCNC: 2.79 G/DL (ref 3.5–5.2)
ALBUMIN/GLOB SERPL: 1 G/DL
ALP SERPL-CCNC: 160 U/L (ref 39–117)
ALT SERPL W P-5'-P-CCNC: 17 U/L (ref 1–33)
ANION GAP SERPL CALCULATED.3IONS-SCNC: 12.2 MMOL/L (ref 5–15)
AST SERPL-CCNC: 21 U/L (ref 1–32)
BACTERIA SPEC RESP CULT: NORMAL
BASOPHILS # BLD AUTO: 0.04 10*3/MM3 (ref 0–0.2)
BASOPHILS NFR BLD AUTO: 0.5 % (ref 0–1.5)
BILIRUB SERPL-MCNC: 0.3 MG/DL (ref 0.2–1.2)
BUN BLD-MCNC: 9 MG/DL (ref 8–23)
BUN/CREAT SERPL: 15.3 (ref 7–25)
CALCIUM SPEC-SCNC: 8.2 MG/DL (ref 8.6–10.5)
CHLORIDE SERPL-SCNC: 111 MMOL/L (ref 98–107)
CO2 SERPL-SCNC: 16.8 MMOL/L (ref 22–29)
CREAT BLD-MCNC: 0.59 MG/DL (ref 0.57–1)
CRP SERPL-MCNC: 20.83 MG/DL (ref 0–0.5)
DEPRECATED RDW RBC AUTO: 52 FL (ref 37–54)
EOSINOPHIL # BLD AUTO: 0.12 10*3/MM3 (ref 0–0.4)
EOSINOPHIL NFR BLD AUTO: 1.5 % (ref 0.3–6.2)
ERYTHROCYTE [DISTWIDTH] IN BLOOD BY AUTOMATED COUNT: 14 % (ref 12.3–15.4)
FOLATE SERPL-MCNC: 8.84 NG/ML (ref 4.78–24.2)
GFR SERPL CREATININE-BSD FRML MDRD: 100 ML/MIN/1.73
GLOBULIN UR ELPH-MCNC: 2.9 GM/DL
GLUCOSE BLD-MCNC: 88 MG/DL (ref 65–99)
GRAM STN SPEC: NORMAL
HCT VFR BLD AUTO: 32.3 % (ref 34–46.6)
HGB BLD-MCNC: 10.2 G/DL (ref 12–15.9)
IMM GRANULOCYTES # BLD AUTO: 0.07 10*3/MM3 (ref 0–0.05)
IMM GRANULOCYTES NFR BLD AUTO: 0.9 % (ref 0–0.5)
LYMPHOCYTES # BLD AUTO: 2.17 10*3/MM3 (ref 0.7–3.1)
LYMPHOCYTES NFR BLD AUTO: 26.6 % (ref 19.6–45.3)
MAGNESIUM SERPL-MCNC: 2.3 MG/DL (ref 1.6–2.4)
MCH RBC QN AUTO: 31.7 PG (ref 26.6–33)
MCHC RBC AUTO-ENTMCNC: 31.6 G/DL (ref 31.5–35.7)
MCV RBC AUTO: 100.3 FL (ref 79–97)
MONOCYTES # BLD AUTO: 0.75 10*3/MM3 (ref 0.1–0.9)
MONOCYTES NFR BLD AUTO: 9.2 % (ref 5–12)
NEUTROPHILS # BLD AUTO: 5 10*3/MM3 (ref 1.7–7)
NEUTROPHILS NFR BLD AUTO: 61.3 % (ref 42.7–76)
NRBC BLD AUTO-RTO: 0 /100 WBC (ref 0–0.2)
PLATELET # BLD AUTO: 195 10*3/MM3 (ref 140–450)
PMV BLD AUTO: 9.4 FL (ref 6–12)
POTASSIUM BLD-SCNC: 4 MMOL/L (ref 3.5–5.2)
PROT SERPL-MCNC: 5.7 G/DL (ref 6–8.5)
RBC # BLD AUTO: 3.22 10*6/MM3 (ref 3.77–5.28)
SODIUM BLD-SCNC: 140 MMOL/L (ref 136–145)
TSH SERPL DL<=0.05 MIU/L-ACNC: 1.48 UIU/ML (ref 0.27–4.2)
VIT B12 BLD-MCNC: 1396 PG/ML (ref 211–946)
WBC NRBC COR # BLD: 8.15 10*3/MM3 (ref 3.4–10.8)

## 2019-11-17 PROCEDURE — 83735 ASSAY OF MAGNESIUM: CPT | Performed by: INTERNAL MEDICINE

## 2019-11-17 PROCEDURE — 25010000002 METHYLPREDNISOLONE PER 40 MG: Performed by: HOSPITALIST

## 2019-11-17 PROCEDURE — 94799 UNLISTED PULMONARY SVC/PX: CPT

## 2019-11-17 PROCEDURE — 85025 COMPLETE CBC W/AUTO DIFF WBC: CPT | Performed by: INTERNAL MEDICINE

## 2019-11-17 PROCEDURE — 84443 ASSAY THYROID STIM HORMONE: CPT | Performed by: INTERNAL MEDICINE

## 2019-11-17 PROCEDURE — 86140 C-REACTIVE PROTEIN: CPT | Performed by: INTERNAL MEDICINE

## 2019-11-17 PROCEDURE — 82607 VITAMIN B-12: CPT | Performed by: INTERNAL MEDICINE

## 2019-11-17 PROCEDURE — 80053 COMPREHEN METABOLIC PANEL: CPT | Performed by: INTERNAL MEDICINE

## 2019-11-17 PROCEDURE — 25010000002 CEFTRIAXONE: Performed by: HOSPITALIST

## 2019-11-17 PROCEDURE — 99232 SBSQ HOSP IP/OBS MODERATE 35: CPT | Performed by: INTERNAL MEDICINE

## 2019-11-17 PROCEDURE — 82746 ASSAY OF FOLIC ACID SERUM: CPT | Performed by: INTERNAL MEDICINE

## 2019-11-17 PROCEDURE — 25010000002 HEPARIN (PORCINE) PER 1000 UNITS: Performed by: HOSPITALIST

## 2019-11-17 RX ADMIN — GUAIFENESIN 1200 MG: 600 TABLET, EXTENDED RELEASE ORAL at 08:52

## 2019-11-17 RX ADMIN — SODIUM CHLORIDE, PRESERVATIVE FREE 10 ML: 5 INJECTION INTRAVENOUS at 21:26

## 2019-11-17 RX ADMIN — TOPIRAMATE 100 MG: 100 TABLET, FILM COATED ORAL at 06:39

## 2019-11-17 RX ADMIN — MAGNESIUM GLUCONATE 500 MG ORAL TABLET 800 MG: 500 TABLET ORAL at 08:51

## 2019-11-17 RX ADMIN — PHENYTOIN SODIUM 100 MG: 100 CAPSULE, EXTENDED RELEASE ORAL at 21:25

## 2019-11-17 RX ADMIN — IPRATROPIUM BROMIDE AND ALBUTEROL SULFATE 3 ML: .5; 3 SOLUTION RESPIRATORY (INHALATION) at 00:50

## 2019-11-17 RX ADMIN — POLYETHYLENE GLYCOL (3350) 17 G: 17 POWDER, FOR SOLUTION ORAL at 08:51

## 2019-11-17 RX ADMIN — SODIUM CHLORIDE, PRESERVATIVE FREE 10 ML: 5 INJECTION INTRAVENOUS at 08:51

## 2019-11-17 RX ADMIN — IPRATROPIUM BROMIDE AND ALBUTEROL SULFATE 3 ML: .5; 3 SOLUTION RESPIRATORY (INHALATION) at 13:58

## 2019-11-17 RX ADMIN — PHENYTOIN SODIUM 200 MG: 100 CAPSULE, EXTENDED RELEASE ORAL at 06:38

## 2019-11-17 RX ADMIN — TOPIRAMATE 150 MG: 100 TABLET, FILM COATED ORAL at 21:26

## 2019-11-17 RX ADMIN — CEFTRIAXONE 1 G: 1 INJECTION, POWDER, FOR SOLUTION INTRAMUSCULAR; INTRAVENOUS at 01:45

## 2019-11-17 RX ADMIN — HEPARIN SODIUM 5000 UNITS: 5000 INJECTION INTRAVENOUS; SUBCUTANEOUS at 08:51

## 2019-11-17 RX ADMIN — DOXYCYCLINE 100 MG: 100 INJECTION, POWDER, LYOPHILIZED, FOR SOLUTION INTRAVENOUS at 12:25

## 2019-11-17 RX ADMIN — GUAIFENESIN 1200 MG: 600 TABLET, EXTENDED RELEASE ORAL at 21:26

## 2019-11-17 RX ADMIN — DOCUSATE SODIUM 200 MG: 100 CAPSULE ORAL at 08:52

## 2019-11-17 RX ADMIN — METRONIDAZOLE 500 MG: 500 INJECTION, SOLUTION INTRAVENOUS at 02:30

## 2019-11-17 RX ADMIN — HEPARIN SODIUM 5000 UNITS: 5000 INJECTION INTRAVENOUS; SUBCUTANEOUS at 21:24

## 2019-11-17 RX ADMIN — IPRATROPIUM BROMIDE AND ALBUTEROL SULFATE 3 ML: .5; 3 SOLUTION RESPIRATORY (INHALATION) at 19:32

## 2019-11-17 RX ADMIN — DOCUSATE SODIUM 200 MG: 100 CAPSULE ORAL at 21:25

## 2019-11-17 RX ADMIN — PRAMIPEXOLE DIHYDROCHLORIDE 1 MG: 1 TABLET ORAL at 21:26

## 2019-11-17 RX ADMIN — METRONIDAZOLE 500 MG: 500 INJECTION, SOLUTION INTRAVENOUS at 09:02

## 2019-11-17 RX ADMIN — PANTOPRAZOLE SODIUM 40 MG: 40 TABLET, DELAYED RELEASE ORAL at 06:39

## 2019-11-17 RX ADMIN — ACETYLCYSTEINE 3 ML: 200 SOLUTION ORAL; RESPIRATORY (INHALATION) at 07:33

## 2019-11-17 RX ADMIN — VENLAFAXINE HYDROCHLORIDE 75 MG: 75 CAPSULE, EXTENDED RELEASE ORAL at 16:13

## 2019-11-17 RX ADMIN — METRONIDAZOLE 500 MG: 500 INJECTION, SOLUTION INTRAVENOUS at 17:18

## 2019-11-17 RX ADMIN — ACETYLCYSTEINE 3 ML: 200 SOLUTION ORAL; RESPIRATORY (INHALATION) at 19:32

## 2019-11-17 RX ADMIN — METHYLPREDNISOLONE SODIUM SUCCINATE 20 MG: 40 INJECTION, POWDER, FOR SOLUTION INTRAMUSCULAR; INTRAVENOUS at 05:21

## 2019-11-17 RX ADMIN — DOXYCYCLINE 100 MG: 100 INJECTION, POWDER, LYOPHILIZED, FOR SOLUTION INTRAVENOUS at 01:40

## 2019-11-17 RX ADMIN — AMITRIPTYLINE HYDROCHLORIDE 50 MG: 50 TABLET, FILM COATED ORAL at 21:26

## 2019-11-17 RX ADMIN — POTASSIUM CHLORIDE 20 MEQ: 1500 TABLET, EXTENDED RELEASE ORAL at 08:51

## 2019-11-17 RX ADMIN — ASPIRIN 81 MG: 81 TABLET, COATED ORAL at 08:52

## 2019-11-17 RX ADMIN — SODIUM CHLORIDE, PRESERVATIVE FREE 10 ML: 5 INJECTION INTRAVENOUS at 01:37

## 2019-11-17 RX ADMIN — ROSUVASTATIN CALCIUM 5 MG: 10 TABLET, FILM COATED ORAL at 21:25

## 2019-11-17 RX ADMIN — LISINOPRIL 20 MG: 10 TABLET ORAL at 08:51

## 2019-11-17 RX ADMIN — IPRATROPIUM BROMIDE AND ALBUTEROL SULFATE 3 ML: .5; 3 SOLUTION RESPIRATORY (INHALATION) at 07:32

## 2019-11-17 RX ADMIN — METHYLPREDNISOLONE SODIUM SUCCINATE 20 MG: 40 INJECTION, POWDER, FOR SOLUTION INTRAMUSCULAR; INTRAVENOUS at 16:13

## 2019-11-17 RX ADMIN — LEVOTHYROXINE SODIUM 25 MCG: 25 TABLET ORAL at 06:39

## 2019-11-18 LAB
ALBUMIN SERPL-MCNC: 2.65 G/DL (ref 3.5–5.2)
ALBUMIN/GLOB SERPL: 0.8 G/DL
ALP SERPL-CCNC: 154 U/L (ref 39–117)
ALT SERPL W P-5'-P-CCNC: 18 U/L (ref 1–33)
ANION GAP SERPL CALCULATED.3IONS-SCNC: 11 MMOL/L (ref 5–15)
AST SERPL-CCNC: 17 U/L (ref 1–32)
BASOPHILS # BLD AUTO: 0.07 10*3/MM3 (ref 0–0.2)
BASOPHILS NFR BLD AUTO: 0.9 % (ref 0–1.5)
BILIRUB SERPL-MCNC: 0.2 MG/DL (ref 0.2–1.2)
BUN BLD-MCNC: 11 MG/DL (ref 8–23)
BUN/CREAT SERPL: 17.7 (ref 7–25)
CALCIUM SPEC-SCNC: 8.4 MG/DL (ref 8.6–10.5)
CHLORIDE SERPL-SCNC: 109 MMOL/L (ref 98–107)
CO2 SERPL-SCNC: 18 MMOL/L (ref 22–29)
CREAT BLD-MCNC: 0.62 MG/DL (ref 0.57–1)
CRP SERPL-MCNC: 12.57 MG/DL (ref 0–0.5)
DEPRECATED RDW RBC AUTO: 53.2 FL (ref 37–54)
EOSINOPHIL # BLD AUTO: 0.31 10*3/MM3 (ref 0–0.4)
EOSINOPHIL NFR BLD AUTO: 3.9 % (ref 0.3–6.2)
ERYTHROCYTE [DISTWIDTH] IN BLOOD BY AUTOMATED COUNT: 14.1 % (ref 12.3–15.4)
GFR SERPL CREATININE-BSD FRML MDRD: 94 ML/MIN/1.73
GLOBULIN UR ELPH-MCNC: 3.3 GM/DL
GLUCOSE BLD-MCNC: 94 MG/DL (ref 65–99)
HCT VFR BLD AUTO: 34.1 % (ref 34–46.6)
HGB BLD-MCNC: 10.3 G/DL (ref 12–15.9)
IMM GRANULOCYTES # BLD AUTO: 0.05 10*3/MM3 (ref 0–0.05)
IMM GRANULOCYTES NFR BLD AUTO: 0.6 % (ref 0–0.5)
LYMPHOCYTES # BLD AUTO: 2.32 10*3/MM3 (ref 0.7–3.1)
LYMPHOCYTES NFR BLD AUTO: 28.9 % (ref 19.6–45.3)
MCH RBC QN AUTO: 30.8 PG (ref 26.6–33)
MCHC RBC AUTO-ENTMCNC: 30.2 G/DL (ref 31.5–35.7)
MCV RBC AUTO: 102.1 FL (ref 79–97)
MONOCYTES # BLD AUTO: 0.71 10*3/MM3 (ref 0.1–0.9)
MONOCYTES NFR BLD AUTO: 8.8 % (ref 5–12)
NEUTROPHILS # BLD AUTO: 4.58 10*3/MM3 (ref 1.7–7)
NEUTROPHILS NFR BLD AUTO: 56.9 % (ref 42.7–76)
NRBC BLD AUTO-RTO: 0 /100 WBC (ref 0–0.2)
PLATELET # BLD AUTO: 213 10*3/MM3 (ref 140–450)
PMV BLD AUTO: 8.7 FL (ref 6–12)
POTASSIUM BLD-SCNC: 3.7 MMOL/L (ref 3.5–5.2)
PROT SERPL-MCNC: 5.9 G/DL (ref 6–8.5)
RBC # BLD AUTO: 3.34 10*6/MM3 (ref 3.77–5.28)
SODIUM BLD-SCNC: 138 MMOL/L (ref 136–145)
WBC NRBC COR # BLD: 8.04 10*3/MM3 (ref 3.4–10.8)

## 2019-11-18 PROCEDURE — 25010000002 HEPARIN (PORCINE) PER 1000 UNITS: Performed by: HOSPITALIST

## 2019-11-18 PROCEDURE — 25010000002 METHYLPREDNISOLONE PER 40 MG: Performed by: HOSPITALIST

## 2019-11-18 PROCEDURE — 80053 COMPREHEN METABOLIC PANEL: CPT | Performed by: INTERNAL MEDICINE

## 2019-11-18 PROCEDURE — 99232 SBSQ HOSP IP/OBS MODERATE 35: CPT | Performed by: FAMILY MEDICINE

## 2019-11-18 PROCEDURE — 25010000002 CEFTRIAXONE: Performed by: HOSPITALIST

## 2019-11-18 PROCEDURE — 94799 UNLISTED PULMONARY SVC/PX: CPT

## 2019-11-18 PROCEDURE — 86140 C-REACTIVE PROTEIN: CPT | Performed by: INTERNAL MEDICINE

## 2019-11-18 PROCEDURE — 85025 COMPLETE CBC W/AUTO DIFF WBC: CPT | Performed by: INTERNAL MEDICINE

## 2019-11-18 RX ORDER — L.ACID,PARA/B.BIFIDUM/S.THERM 8B CELL
1 CAPSULE ORAL DAILY
Status: DISCONTINUED | OUTPATIENT
Start: 2019-11-18 | End: 2019-11-23 | Stop reason: HOSPADM

## 2019-11-18 RX ORDER — ACETAMINOPHEN 325 MG/1
650 TABLET ORAL EVERY 6 HOURS PRN
Status: DISCONTINUED | OUTPATIENT
Start: 2019-11-18 | End: 2019-11-23 | Stop reason: HOSPADM

## 2019-11-18 RX ORDER — GUAIFENESIN/DEXTROMETHORPHAN 100-10MG/5
10 SYRUP ORAL EVERY 4 HOURS PRN
Status: DISCONTINUED | OUTPATIENT
Start: 2019-11-18 | End: 2019-11-20

## 2019-11-18 RX ADMIN — POTASSIUM CHLORIDE 20 MEQ: 1500 TABLET, EXTENDED RELEASE ORAL at 09:31

## 2019-11-18 RX ADMIN — DOCUSATE SODIUM 200 MG: 100 CAPSULE ORAL at 21:32

## 2019-11-18 RX ADMIN — SODIUM CHLORIDE, PRESERVATIVE FREE 10 ML: 5 INJECTION INTRAVENOUS at 21:44

## 2019-11-18 RX ADMIN — PHENYTOIN SODIUM 200 MG: 100 CAPSULE, EXTENDED RELEASE ORAL at 05:47

## 2019-11-18 RX ADMIN — Medication 1 CAPSULE: at 16:44

## 2019-11-18 RX ADMIN — MAGNESIUM GLUCONATE 500 MG ORAL TABLET 800 MG: 500 TABLET ORAL at 09:31

## 2019-11-18 RX ADMIN — IPRATROPIUM BROMIDE AND ALBUTEROL SULFATE 3 ML: .5; 3 SOLUTION RESPIRATORY (INHALATION) at 07:39

## 2019-11-18 RX ADMIN — ACETAMINOPHEN 650 MG: 325 TABLET ORAL at 13:55

## 2019-11-18 RX ADMIN — GUAIFENESIN 1200 MG: 600 TABLET, EXTENDED RELEASE ORAL at 21:31

## 2019-11-18 RX ADMIN — TOPIRAMATE 150 MG: 100 TABLET, FILM COATED ORAL at 21:34

## 2019-11-18 RX ADMIN — TOPIRAMATE 100 MG: 100 TABLET, FILM COATED ORAL at 05:48

## 2019-11-18 RX ADMIN — DOXYCYCLINE 100 MG: 100 INJECTION, POWDER, LYOPHILIZED, FOR SOLUTION INTRAVENOUS at 01:50

## 2019-11-18 RX ADMIN — LEVOTHYROXINE SODIUM 25 MCG: 25 TABLET ORAL at 12:59

## 2019-11-18 RX ADMIN — PANTOPRAZOLE SODIUM 40 MG: 40 TABLET, DELAYED RELEASE ORAL at 05:47

## 2019-11-18 RX ADMIN — IPRATROPIUM BROMIDE AND ALBUTEROL SULFATE 3 ML: .5; 3 SOLUTION RESPIRATORY (INHALATION) at 01:11

## 2019-11-18 RX ADMIN — METRONIDAZOLE 500 MG: 500 INJECTION, SOLUTION INTRAVENOUS at 09:37

## 2019-11-18 RX ADMIN — GUAIFENESIN 1200 MG: 600 TABLET, EXTENDED RELEASE ORAL at 09:31

## 2019-11-18 RX ADMIN — PRAMIPEXOLE DIHYDROCHLORIDE 1 MG: 1 TABLET ORAL at 21:32

## 2019-11-18 RX ADMIN — HEPARIN SODIUM 5000 UNITS: 5000 INJECTION INTRAVENOUS; SUBCUTANEOUS at 09:29

## 2019-11-18 RX ADMIN — POLYETHYLENE GLYCOL (3350) 17 G: 17 POWDER, FOR SOLUTION ORAL at 09:29

## 2019-11-18 RX ADMIN — METRONIDAZOLE 500 MG: 500 INJECTION, SOLUTION INTRAVENOUS at 02:17

## 2019-11-18 RX ADMIN — AMITRIPTYLINE HYDROCHLORIDE 50 MG: 50 TABLET, FILM COATED ORAL at 21:34

## 2019-11-18 RX ADMIN — SODIUM CHLORIDE, PRESERVATIVE FREE 10 ML: 5 INJECTION INTRAVENOUS at 09:30

## 2019-11-18 RX ADMIN — IPRATROPIUM BROMIDE AND ALBUTEROL SULFATE 3 ML: .5; 3 SOLUTION RESPIRATORY (INHALATION) at 19:23

## 2019-11-18 RX ADMIN — METHYLPREDNISOLONE SODIUM SUCCINATE 20 MG: 40 INJECTION, POWDER, FOR SOLUTION INTRAMUSCULAR; INTRAVENOUS at 05:46

## 2019-11-18 RX ADMIN — DOXYCYCLINE 100 MG: 100 INJECTION, POWDER, LYOPHILIZED, FOR SOLUTION INTRAVENOUS at 12:59

## 2019-11-18 RX ADMIN — ROSUVASTATIN CALCIUM 5 MG: 10 TABLET, FILM COATED ORAL at 21:33

## 2019-11-18 RX ADMIN — METHYLPREDNISOLONE SODIUM SUCCINATE 20 MG: 40 INJECTION, POWDER, FOR SOLUTION INTRAMUSCULAR; INTRAVENOUS at 16:45

## 2019-11-18 RX ADMIN — ACETYLCYSTEINE: 200 SOLUTION ORAL; RESPIRATORY (INHALATION) at 07:39

## 2019-11-18 RX ADMIN — VENLAFAXINE HYDROCHLORIDE 75 MG: 75 CAPSULE, EXTENDED RELEASE ORAL at 16:44

## 2019-11-18 RX ADMIN — ACETYLCYSTEINE 3 ML: 200 SOLUTION ORAL; RESPIRATORY (INHALATION) at 19:27

## 2019-11-18 RX ADMIN — CEFTRIAXONE 1 G: 1 INJECTION, POWDER, FOR SOLUTION INTRAMUSCULAR; INTRAVENOUS at 01:50

## 2019-11-18 RX ADMIN — OFLOXACIN 50000 UNITS: 300 TABLET, COATED ORAL at 09:31

## 2019-11-18 RX ADMIN — DOCUSATE SODIUM 200 MG: 100 CAPSULE ORAL at 09:31

## 2019-11-18 RX ADMIN — HEPARIN SODIUM 5000 UNITS: 5000 INJECTION INTRAVENOUS; SUBCUTANEOUS at 21:36

## 2019-11-18 RX ADMIN — ASPIRIN 81 MG: 81 TABLET, COATED ORAL at 09:31

## 2019-11-18 RX ADMIN — METRONIDAZOLE 500 MG: 500 INJECTION, SOLUTION INTRAVENOUS at 16:47

## 2019-11-18 RX ADMIN — PHENYTOIN SODIUM 100 MG: 100 CAPSULE, EXTENDED RELEASE ORAL at 21:35

## 2019-11-18 RX ADMIN — IPRATROPIUM BROMIDE AND ALBUTEROL SULFATE 3 ML: .5; 3 SOLUTION RESPIRATORY (INHALATION) at 13:51

## 2019-11-19 LAB
ANION GAP SERPL CALCULATED.3IONS-SCNC: 14 MMOL/L (ref 5–15)
BASOPHILS # BLD AUTO: 0.1 10*3/MM3 (ref 0–0.2)
BASOPHILS NFR BLD AUTO: 1.2 % (ref 0–1.5)
BUN BLD-MCNC: 15 MG/DL (ref 8–23)
BUN/CREAT SERPL: 19.7 (ref 7–25)
CALCIUM SPEC-SCNC: 8.7 MG/DL (ref 8.6–10.5)
CHLORIDE SERPL-SCNC: 107 MMOL/L (ref 98–107)
CO2 SERPL-SCNC: 20 MMOL/L (ref 22–29)
CREAT BLD-MCNC: 0.76 MG/DL (ref 0.57–1)
DEPRECATED RDW RBC AUTO: 51.5 FL (ref 37–54)
EOSINOPHIL # BLD AUTO: 0.48 10*3/MM3 (ref 0–0.4)
EOSINOPHIL NFR BLD AUTO: 5.7 % (ref 0.3–6.2)
ERYTHROCYTE [DISTWIDTH] IN BLOOD BY AUTOMATED COUNT: 14 % (ref 12.3–15.4)
GFR SERPL CREATININE-BSD FRML MDRD: 74 ML/MIN/1.73
GLUCOSE BLD-MCNC: 84 MG/DL (ref 65–99)
HCT VFR BLD AUTO: 33.3 % (ref 34–46.6)
HGB BLD-MCNC: 10.6 G/DL (ref 12–15.9)
IMM GRANULOCYTES # BLD AUTO: 0.19 10*3/MM3 (ref 0–0.05)
IMM GRANULOCYTES NFR BLD AUTO: 2.3 % (ref 0–0.5)
LYMPHOCYTES # BLD AUTO: 2.42 10*3/MM3 (ref 0.7–3.1)
LYMPHOCYTES NFR BLD AUTO: 28.9 % (ref 19.6–45.3)
MCH RBC QN AUTO: 31.7 PG (ref 26.6–33)
MCHC RBC AUTO-ENTMCNC: 31.8 G/DL (ref 31.5–35.7)
MCV RBC AUTO: 99.7 FL (ref 79–97)
MONOCYTES # BLD AUTO: 0.76 10*3/MM3 (ref 0.1–0.9)
MONOCYTES NFR BLD AUTO: 9.1 % (ref 5–12)
NEUTROPHILS # BLD AUTO: 4.43 10*3/MM3 (ref 1.7–7)
NEUTROPHILS NFR BLD AUTO: 52.8 % (ref 42.7–76)
NRBC BLD AUTO-RTO: 0 /100 WBC (ref 0–0.2)
PLATELET # BLD AUTO: 251 10*3/MM3 (ref 140–450)
PMV BLD AUTO: 8.4 FL (ref 6–12)
POTASSIUM BLD-SCNC: 3.9 MMOL/L (ref 3.5–5.2)
RBC # BLD AUTO: 3.34 10*6/MM3 (ref 3.77–5.28)
SODIUM BLD-SCNC: 141 MMOL/L (ref 136–145)
WBC NRBC COR # BLD: 8.38 10*3/MM3 (ref 3.4–10.8)

## 2019-11-19 PROCEDURE — 25010000002 METHYLPREDNISOLONE PER 40 MG: Performed by: HOSPITALIST

## 2019-11-19 PROCEDURE — 80048 BASIC METABOLIC PNL TOTAL CA: CPT | Performed by: FAMILY MEDICINE

## 2019-11-19 PROCEDURE — 25010000002 CEFTRIAXONE: Performed by: FAMILY MEDICINE

## 2019-11-19 PROCEDURE — 25010000002 HEPARIN (PORCINE) PER 1000 UNITS: Performed by: HOSPITALIST

## 2019-11-19 PROCEDURE — 94799 UNLISTED PULMONARY SVC/PX: CPT

## 2019-11-19 PROCEDURE — 85025 COMPLETE CBC W/AUTO DIFF WBC: CPT | Performed by: FAMILY MEDICINE

## 2019-11-19 PROCEDURE — 99232 SBSQ HOSP IP/OBS MODERATE 35: CPT | Performed by: FAMILY MEDICINE

## 2019-11-19 RX ORDER — DIAZEPAM 5 MG/1
2.5 TABLET ORAL NIGHTLY PRN
Status: DISCONTINUED | OUTPATIENT
Start: 2019-11-19 | End: 2019-11-23 | Stop reason: HOSPADM

## 2019-11-19 RX ADMIN — PRAMIPEXOLE DIHYDROCHLORIDE 1 MG: 1 TABLET ORAL at 21:48

## 2019-11-19 RX ADMIN — VENLAFAXINE HYDROCHLORIDE 75 MG: 75 CAPSULE, EXTENDED RELEASE ORAL at 17:39

## 2019-11-19 RX ADMIN — IPRATROPIUM BROMIDE AND ALBUTEROL SULFATE 3 ML: .5; 3 SOLUTION RESPIRATORY (INHALATION) at 13:10

## 2019-11-19 RX ADMIN — TOPIRAMATE 100 MG: 100 TABLET, FILM COATED ORAL at 05:40

## 2019-11-19 RX ADMIN — METHYLPREDNISOLONE SODIUM SUCCINATE 20 MG: 40 INJECTION, POWDER, FOR SOLUTION INTRAMUSCULAR; INTRAVENOUS at 17:39

## 2019-11-19 RX ADMIN — SODIUM CHLORIDE, PRESERVATIVE FREE 10 ML: 5 INJECTION INTRAVENOUS at 09:03

## 2019-11-19 RX ADMIN — HEPARIN SODIUM 5000 UNITS: 5000 INJECTION INTRAVENOUS; SUBCUTANEOUS at 09:02

## 2019-11-19 RX ADMIN — Medication 1 CAPSULE: at 09:02

## 2019-11-19 RX ADMIN — ASPIRIN 81 MG: 81 TABLET, COATED ORAL at 09:03

## 2019-11-19 RX ADMIN — GUAIFENESIN AND DEXTROMETHORPHAN 10 ML: 100; 10 SYRUP ORAL at 21:49

## 2019-11-19 RX ADMIN — ACETAMINOPHEN 650 MG: 325 TABLET ORAL at 17:39

## 2019-11-19 RX ADMIN — METRONIDAZOLE 500 MG: 500 INJECTION, SOLUTION INTRAVENOUS at 11:29

## 2019-11-19 RX ADMIN — DIAZEPAM 2.5 MG: 5 TABLET ORAL at 22:53

## 2019-11-19 RX ADMIN — ACETYLCYSTEINE 3 ML: 200 SOLUTION ORAL; RESPIRATORY (INHALATION) at 06:46

## 2019-11-19 RX ADMIN — PANTOPRAZOLE SODIUM 40 MG: 40 TABLET, DELAYED RELEASE ORAL at 05:40

## 2019-11-19 RX ADMIN — AMITRIPTYLINE HYDROCHLORIDE 50 MG: 50 TABLET, FILM COATED ORAL at 21:48

## 2019-11-19 RX ADMIN — LISINOPRIL 20 MG: 10 TABLET ORAL at 09:03

## 2019-11-19 RX ADMIN — PHENYTOIN SODIUM 100 MG: 100 CAPSULE, EXTENDED RELEASE ORAL at 21:47

## 2019-11-19 RX ADMIN — MAGNESIUM GLUCONATE 500 MG ORAL TABLET 800 MG: 500 TABLET ORAL at 09:03

## 2019-11-19 RX ADMIN — GUAIFENESIN AND DEXTROMETHORPHAN 10 ML: 100; 10 SYRUP ORAL at 05:59

## 2019-11-19 RX ADMIN — TOPIRAMATE 150 MG: 100 TABLET, FILM COATED ORAL at 21:47

## 2019-11-19 RX ADMIN — METHYLPREDNISOLONE SODIUM SUCCINATE 20 MG: 40 INJECTION, POWDER, FOR SOLUTION INTRAMUSCULAR; INTRAVENOUS at 06:47

## 2019-11-19 RX ADMIN — LEVOTHYROXINE SODIUM 25 MCG: 25 TABLET ORAL at 05:41

## 2019-11-19 RX ADMIN — DOCUSATE SODIUM 200 MG: 100 CAPSULE ORAL at 09:03

## 2019-11-19 RX ADMIN — METRONIDAZOLE 500 MG: 500 INJECTION, SOLUTION INTRAVENOUS at 04:02

## 2019-11-19 RX ADMIN — CEFTRIAXONE 1 G: 1 INJECTION, POWDER, FOR SOLUTION INTRAMUSCULAR; INTRAVENOUS at 02:00

## 2019-11-19 RX ADMIN — DOXYCYCLINE 100 MG: 100 INJECTION, POWDER, LYOPHILIZED, FOR SOLUTION INTRAVENOUS at 02:00

## 2019-11-19 RX ADMIN — PHENYTOIN SODIUM 200 MG: 100 CAPSULE, EXTENDED RELEASE ORAL at 05:41

## 2019-11-19 RX ADMIN — IPRATROPIUM BROMIDE AND ALBUTEROL SULFATE 3 ML: .5; 3 SOLUTION RESPIRATORY (INHALATION) at 06:46

## 2019-11-19 RX ADMIN — SODIUM CHLORIDE, PRESERVATIVE FREE 10 ML: 5 INJECTION INTRAVENOUS at 21:49

## 2019-11-19 RX ADMIN — DOCUSATE SODIUM 200 MG: 100 CAPSULE ORAL at 21:47

## 2019-11-19 RX ADMIN — POTASSIUM CHLORIDE 20 MEQ: 1500 TABLET, EXTENDED RELEASE ORAL at 09:02

## 2019-11-19 RX ADMIN — GUAIFENESIN 1200 MG: 600 TABLET, EXTENDED RELEASE ORAL at 21:48

## 2019-11-19 RX ADMIN — DOXYCYCLINE 100 MG: 100 INJECTION, POWDER, LYOPHILIZED, FOR SOLUTION INTRAVENOUS at 14:48

## 2019-11-19 RX ADMIN — HEPARIN SODIUM 5000 UNITS: 5000 INJECTION INTRAVENOUS; SUBCUTANEOUS at 21:46

## 2019-11-19 RX ADMIN — METRONIDAZOLE 500 MG: 500 INJECTION, SOLUTION INTRAVENOUS at 17:39

## 2019-11-19 RX ADMIN — IPRATROPIUM BROMIDE AND ALBUTEROL SULFATE 3 ML: .5; 3 SOLUTION RESPIRATORY (INHALATION) at 19:16

## 2019-11-19 RX ADMIN — IPRATROPIUM BROMIDE AND ALBUTEROL SULFATE 3 ML: .5; 3 SOLUTION RESPIRATORY (INHALATION) at 00:47

## 2019-11-19 RX ADMIN — ACETYLCYSTEINE 3 ML: 200 SOLUTION ORAL; RESPIRATORY (INHALATION) at 19:16

## 2019-11-19 RX ADMIN — POLYETHYLENE GLYCOL (3350) 17 G: 17 POWDER, FOR SOLUTION ORAL at 09:02

## 2019-11-19 RX ADMIN — GUAIFENESIN 1200 MG: 600 TABLET, EXTENDED RELEASE ORAL at 09:02

## 2019-11-19 RX ADMIN — ROSUVASTATIN CALCIUM 5 MG: 10 TABLET, FILM COATED ORAL at 21:48

## 2019-11-20 ENCOUNTER — APPOINTMENT (OUTPATIENT)
Dept: GENERAL RADIOLOGY | Facility: HOSPITAL | Age: 74
End: 2019-11-20

## 2019-11-20 LAB
ANION GAP SERPL CALCULATED.3IONS-SCNC: 12.5 MMOL/L (ref 5–15)
BACTERIA SPEC AEROBE CULT: NORMAL
BACTERIA SPEC AEROBE CULT: NORMAL
BASOPHILS # BLD AUTO: 0.11 10*3/MM3 (ref 0–0.2)
BASOPHILS NFR BLD AUTO: 1 % (ref 0–1.5)
BUN BLD-MCNC: 14 MG/DL (ref 8–23)
BUN/CREAT SERPL: 22.2 (ref 7–25)
CALCIUM SPEC-SCNC: 9.2 MG/DL (ref 8.6–10.5)
CHLORIDE SERPL-SCNC: 105 MMOL/L (ref 98–107)
CO2 SERPL-SCNC: 19.5 MMOL/L (ref 22–29)
CREAT BLD-MCNC: 0.63 MG/DL (ref 0.57–1)
DEPRECATED RDW RBC AUTO: 51.7 FL (ref 37–54)
EOSINOPHIL # BLD AUTO: 0.64 10*3/MM3 (ref 0–0.4)
EOSINOPHIL NFR BLD AUTO: 5.9 % (ref 0.3–6.2)
ERYTHROCYTE [DISTWIDTH] IN BLOOD BY AUTOMATED COUNT: 14.1 % (ref 12.3–15.4)
GFR SERPL CREATININE-BSD FRML MDRD: 92 ML/MIN/1.73
GLUCOSE BLD-MCNC: 101 MG/DL (ref 65–99)
HCT VFR BLD AUTO: 37.8 % (ref 34–46.6)
HGB BLD-MCNC: 11.8 G/DL (ref 12–15.9)
IMM GRANULOCYTES # BLD AUTO: 0.36 10*3/MM3 (ref 0–0.05)
IMM GRANULOCYTES NFR BLD AUTO: 3.3 % (ref 0–0.5)
LYMPHOCYTES # BLD AUTO: 3.58 10*3/MM3 (ref 0.7–3.1)
LYMPHOCYTES NFR BLD AUTO: 33.3 % (ref 19.6–45.3)
MCH RBC QN AUTO: 31.4 PG (ref 26.6–33)
MCHC RBC AUTO-ENTMCNC: 31.2 G/DL (ref 31.5–35.7)
MCV RBC AUTO: 100.5 FL (ref 79–97)
MONOCYTES # BLD AUTO: 0.75 10*3/MM3 (ref 0.1–0.9)
MONOCYTES NFR BLD AUTO: 7 % (ref 5–12)
NEUTROPHILS # BLD AUTO: 5.32 10*3/MM3 (ref 1.7–7)
NEUTROPHILS NFR BLD AUTO: 49.5 % (ref 42.7–76)
NRBC BLD AUTO-RTO: 0 /100 WBC (ref 0–0.2)
PLATELET # BLD AUTO: 295 10*3/MM3 (ref 140–450)
PMV BLD AUTO: 8.5 FL (ref 6–12)
POTASSIUM BLD-SCNC: 3.8 MMOL/L (ref 3.5–5.2)
RBC # BLD AUTO: 3.76 10*6/MM3 (ref 3.77–5.28)
SODIUM BLD-SCNC: 137 MMOL/L (ref 136–145)
WBC NRBC COR # BLD: 10.76 10*3/MM3 (ref 3.4–10.8)

## 2019-11-20 PROCEDURE — 71045 X-RAY EXAM CHEST 1 VIEW: CPT

## 2019-11-20 PROCEDURE — 85025 COMPLETE CBC W/AUTO DIFF WBC: CPT | Performed by: FAMILY MEDICINE

## 2019-11-20 PROCEDURE — 71045 X-RAY EXAM CHEST 1 VIEW: CPT | Performed by: RADIOLOGY

## 2019-11-20 PROCEDURE — 80048 BASIC METABOLIC PNL TOTAL CA: CPT | Performed by: FAMILY MEDICINE

## 2019-11-20 PROCEDURE — 25010000002 METHYLPREDNISOLONE PER 40 MG: Performed by: FAMILY MEDICINE

## 2019-11-20 PROCEDURE — 25010000002 CEFTRIAXONE: Performed by: FAMILY MEDICINE

## 2019-11-20 PROCEDURE — 94799 UNLISTED PULMONARY SVC/PX: CPT

## 2019-11-20 PROCEDURE — 25010000002 HEPARIN (PORCINE) PER 1000 UNITS: Performed by: HOSPITALIST

## 2019-11-20 PROCEDURE — 99232 SBSQ HOSP IP/OBS MODERATE 35: CPT | Performed by: FAMILY MEDICINE

## 2019-11-20 PROCEDURE — 25010000002 METHYLPREDNISOLONE PER 40 MG: Performed by: HOSPITALIST

## 2019-11-20 RX ORDER — METHYLPREDNISOLONE SODIUM SUCCINATE 40 MG/ML
40 INJECTION, POWDER, LYOPHILIZED, FOR SOLUTION INTRAMUSCULAR; INTRAVENOUS EVERY 12 HOURS
Status: DISCONTINUED | OUTPATIENT
Start: 2019-11-20 | End: 2019-11-23 | Stop reason: HOSPADM

## 2019-11-20 RX ORDER — NEOMYCIN SULFATE, POLYMYXIN B SULFATE AND HYDROCORTISONE 10; 3.5; 1 MG/ML; MG/ML; [USP'U]/ML
4 SUSPENSION/ DROPS AURICULAR (OTIC) EVERY 8 HOURS SCHEDULED
Status: COMPLETED | OUTPATIENT
Start: 2019-11-20 | End: 2019-11-21

## 2019-11-20 RX ADMIN — DOCUSATE SODIUM 200 MG: 100 CAPSULE ORAL at 23:17

## 2019-11-20 RX ADMIN — METRONIDAZOLE 500 MG: 500 INJECTION, SOLUTION INTRAVENOUS at 10:53

## 2019-11-20 RX ADMIN — METHYLPREDNISOLONE SODIUM SUCCINATE 40 MG: 40 INJECTION, POWDER, FOR SOLUTION INTRAMUSCULAR; INTRAVENOUS at 18:23

## 2019-11-20 RX ADMIN — METRONIDAZOLE 500 MG: 500 INJECTION, SOLUTION INTRAVENOUS at 18:00

## 2019-11-20 RX ADMIN — POTASSIUM CHLORIDE 20 MEQ: 1500 TABLET, EXTENDED RELEASE ORAL at 08:54

## 2019-11-20 RX ADMIN — HEPARIN SODIUM 5000 UNITS: 5000 INJECTION INTRAVENOUS; SUBCUTANEOUS at 08:56

## 2019-11-20 RX ADMIN — VENLAFAXINE HYDROCHLORIDE 75 MG: 75 CAPSULE, EXTENDED RELEASE ORAL at 18:36

## 2019-11-20 RX ADMIN — SODIUM CHLORIDE, PRESERVATIVE FREE 10 ML: 5 INJECTION INTRAVENOUS at 08:56

## 2019-11-20 RX ADMIN — PHENYTOIN SODIUM 100 MG: 100 CAPSULE, EXTENDED RELEASE ORAL at 23:18

## 2019-11-20 RX ADMIN — GUAIFENESIN 1200 MG: 600 TABLET, EXTENDED RELEASE ORAL at 08:54

## 2019-11-20 RX ADMIN — NEOMYCIN SULFATE, POLYMYXIN B SULFATE AND HYDROCORTISONE 4 DROP: 10; 3.5; 1 SUSPENSION/ DROPS AURICULAR (OTIC) at 23:23

## 2019-11-20 RX ADMIN — PANTOPRAZOLE SODIUM 40 MG: 40 TABLET, DELAYED RELEASE ORAL at 06:08

## 2019-11-20 RX ADMIN — METHYLPREDNISOLONE SODIUM SUCCINATE 20 MG: 40 INJECTION, POWDER, FOR SOLUTION INTRAMUSCULAR; INTRAVENOUS at 06:08

## 2019-11-20 RX ADMIN — METRONIDAZOLE 500 MG: 500 INJECTION, SOLUTION INTRAVENOUS at 01:46

## 2019-11-20 RX ADMIN — GUAIFENESIN AND DEXTROMETHORPHAN 10 ML: 100; 10 SYRUP ORAL at 06:11

## 2019-11-20 RX ADMIN — LISINOPRIL 20 MG: 10 TABLET ORAL at 08:54

## 2019-11-20 RX ADMIN — CEFTRIAXONE 1 G: 1 INJECTION, POWDER, FOR SOLUTION INTRAMUSCULAR; INTRAVENOUS at 01:45

## 2019-11-20 RX ADMIN — PHENYTOIN SODIUM 200 MG: 100 CAPSULE, EXTENDED RELEASE ORAL at 06:08

## 2019-11-20 RX ADMIN — IPRATROPIUM BROMIDE AND ALBUTEROL SULFATE 3 ML: .5; 3 SOLUTION RESPIRATORY (INHALATION) at 13:56

## 2019-11-20 RX ADMIN — ROSUVASTATIN CALCIUM 5 MG: 10 TABLET, FILM COATED ORAL at 23:18

## 2019-11-20 RX ADMIN — DOXYCYCLINE 100 MG: 100 INJECTION, POWDER, LYOPHILIZED, FOR SOLUTION INTRAVENOUS at 13:21

## 2019-11-20 RX ADMIN — IPRATROPIUM BROMIDE AND ALBUTEROL SULFATE 3 ML: .5; 3 SOLUTION RESPIRATORY (INHALATION) at 00:37

## 2019-11-20 RX ADMIN — GUAIFENESIN 1200 MG: 600 TABLET, EXTENDED RELEASE ORAL at 23:17

## 2019-11-20 RX ADMIN — IPRATROPIUM BROMIDE AND ALBUTEROL SULFATE 3 ML: .5; 3 SOLUTION RESPIRATORY (INHALATION) at 07:59

## 2019-11-20 RX ADMIN — MAGNESIUM GLUCONATE 500 MG ORAL TABLET 800 MG: 500 TABLET ORAL at 08:54

## 2019-11-20 RX ADMIN — Medication 1 CAPSULE: at 08:54

## 2019-11-20 RX ADMIN — ACETYLCYSTEINE 3 ML: 200 SOLUTION ORAL; RESPIRATORY (INHALATION) at 19:22

## 2019-11-20 RX ADMIN — DOXYCYCLINE 100 MG: 100 INJECTION, POWDER, LYOPHILIZED, FOR SOLUTION INTRAVENOUS at 01:45

## 2019-11-20 RX ADMIN — PRAMIPEXOLE DIHYDROCHLORIDE 1 MG: 1 TABLET ORAL at 23:18

## 2019-11-20 RX ADMIN — ACETYLCYSTEINE: 200 SOLUTION ORAL; RESPIRATORY (INHALATION) at 07:59

## 2019-11-20 RX ADMIN — ASPIRIN 81 MG: 81 TABLET, COATED ORAL at 08:54

## 2019-11-20 RX ADMIN — HYDROCODONE POLISTIREX AND CHLORPHENIRAMINE POLISTIREX 5 ML: 10; 8 SUSPENSION, EXTENDED RELEASE ORAL at 14:26

## 2019-11-20 RX ADMIN — HEPARIN SODIUM 5000 UNITS: 5000 INJECTION INTRAVENOUS; SUBCUTANEOUS at 23:17

## 2019-11-20 RX ADMIN — AMITRIPTYLINE HYDROCHLORIDE 50 MG: 50 TABLET, FILM COATED ORAL at 23:18

## 2019-11-20 RX ADMIN — ACETAMINOPHEN 650 MG: 325 TABLET ORAL at 08:54

## 2019-11-20 RX ADMIN — NEOMYCIN SULFATE, POLYMYXIN B SULFATE AND HYDROCORTISONE 4 DROP: 10; 3.5; 1 SUSPENSION/ DROPS AURICULAR (OTIC) at 14:26

## 2019-11-20 RX ADMIN — IPRATROPIUM BROMIDE AND ALBUTEROL SULFATE 3 ML: .5; 3 SOLUTION RESPIRATORY (INHALATION) at 19:22

## 2019-11-20 RX ADMIN — LEVOTHYROXINE SODIUM 25 MCG: 25 TABLET ORAL at 06:08

## 2019-11-20 RX ADMIN — TOPIRAMATE 100 MG: 100 TABLET, FILM COATED ORAL at 06:08

## 2019-11-20 RX ADMIN — TOPIRAMATE 150 MG: 100 TABLET, FILM COATED ORAL at 23:19

## 2019-11-20 RX ADMIN — SODIUM CHLORIDE, PRESERVATIVE FREE 10 ML: 5 INJECTION INTRAVENOUS at 23:23

## 2019-11-21 LAB
ANION GAP SERPL CALCULATED.3IONS-SCNC: 12.5 MMOL/L (ref 5–15)
BASOPHILS # BLD AUTO: 0.09 10*3/MM3 (ref 0–0.2)
BASOPHILS NFR BLD AUTO: 0.9 % (ref 0–1.5)
BUN BLD-MCNC: 17 MG/DL (ref 8–23)
BUN/CREAT SERPL: 23.9 (ref 7–25)
CALCIUM SPEC-SCNC: 9.1 MG/DL (ref 8.6–10.5)
CHLORIDE SERPL-SCNC: 105 MMOL/L (ref 98–107)
CO2 SERPL-SCNC: 19.5 MMOL/L (ref 22–29)
CREAT BLD-MCNC: 0.71 MG/DL (ref 0.57–1)
CRP SERPL-MCNC: 3.99 MG/DL (ref 0–0.5)
DEPRECATED RDW RBC AUTO: 52.8 FL (ref 37–54)
EOSINOPHIL # BLD AUTO: 0.63 10*3/MM3 (ref 0–0.4)
EOSINOPHIL NFR BLD AUTO: 6.3 % (ref 0.3–6.2)
ERYTHROCYTE [DISTWIDTH] IN BLOOD BY AUTOMATED COUNT: 14.2 % (ref 12.3–15.4)
GFR SERPL CREATININE-BSD FRML MDRD: 80 ML/MIN/1.73
GLUCOSE BLD-MCNC: 101 MG/DL (ref 65–99)
HCT VFR BLD AUTO: 38 % (ref 34–46.6)
HGB BLD-MCNC: 11.7 G/DL (ref 12–15.9)
IMM GRANULOCYTES # BLD AUTO: 0.5 10*3/MM3 (ref 0–0.05)
IMM GRANULOCYTES NFR BLD AUTO: 5 % (ref 0–0.5)
LYMPHOCYTES # BLD AUTO: 2.72 10*3/MM3 (ref 0.7–3.1)
LYMPHOCYTES NFR BLD AUTO: 27.4 % (ref 19.6–45.3)
MCH RBC QN AUTO: 31.5 PG (ref 26.6–33)
MCHC RBC AUTO-ENTMCNC: 30.8 G/DL (ref 31.5–35.7)
MCV RBC AUTO: 102.2 FL (ref 79–97)
MONOCYTES # BLD AUTO: 0.86 10*3/MM3 (ref 0.1–0.9)
MONOCYTES NFR BLD AUTO: 8.7 % (ref 5–12)
NEUTROPHILS # BLD AUTO: 5.14 10*3/MM3 (ref 1.7–7)
NEUTROPHILS NFR BLD AUTO: 51.7 % (ref 42.7–76)
NRBC BLD AUTO-RTO: 0 /100 WBC (ref 0–0.2)
PLATELET # BLD AUTO: 291 10*3/MM3 (ref 140–450)
PMV BLD AUTO: 8.5 FL (ref 6–12)
POTASSIUM BLD-SCNC: 4 MMOL/L (ref 3.5–5.2)
RBC # BLD AUTO: 3.72 10*6/MM3 (ref 3.77–5.28)
SODIUM BLD-SCNC: 137 MMOL/L (ref 136–145)
WBC NRBC COR # BLD: 9.94 10*3/MM3 (ref 3.4–10.8)

## 2019-11-21 PROCEDURE — 94799 UNLISTED PULMONARY SVC/PX: CPT

## 2019-11-21 PROCEDURE — 80048 BASIC METABOLIC PNL TOTAL CA: CPT | Performed by: FAMILY MEDICINE

## 2019-11-21 PROCEDURE — 85025 COMPLETE CBC W/AUTO DIFF WBC: CPT | Performed by: FAMILY MEDICINE

## 2019-11-21 PROCEDURE — 25010000002 METHYLPREDNISOLONE PER 40 MG: Performed by: FAMILY MEDICINE

## 2019-11-21 PROCEDURE — 99232 SBSQ HOSP IP/OBS MODERATE 35: CPT | Performed by: FAMILY MEDICINE

## 2019-11-21 PROCEDURE — 25010000002 CEFTRIAXONE: Performed by: FAMILY MEDICINE

## 2019-11-21 PROCEDURE — 86140 C-REACTIVE PROTEIN: CPT | Performed by: FAMILY MEDICINE

## 2019-11-21 PROCEDURE — 25010000002 HEPARIN (PORCINE) PER 1000 UNITS: Performed by: HOSPITALIST

## 2019-11-21 RX ADMIN — METRONIDAZOLE 500 MG: 500 INJECTION, SOLUTION INTRAVENOUS at 11:40

## 2019-11-21 RX ADMIN — PANTOPRAZOLE SODIUM 40 MG: 40 TABLET, DELAYED RELEASE ORAL at 06:48

## 2019-11-21 RX ADMIN — PRAMIPEXOLE DIHYDROCHLORIDE 1 MG: 1 TABLET ORAL at 21:28

## 2019-11-21 RX ADMIN — GUAIFENESIN 1200 MG: 600 TABLET, EXTENDED RELEASE ORAL at 21:28

## 2019-11-21 RX ADMIN — DOCUSATE SODIUM 200 MG: 100 CAPSULE ORAL at 08:49

## 2019-11-21 RX ADMIN — METHYLPREDNISOLONE SODIUM SUCCINATE 40 MG: 40 INJECTION, POWDER, FOR SOLUTION INTRAMUSCULAR; INTRAVENOUS at 17:50

## 2019-11-21 RX ADMIN — PHENYTOIN SODIUM 100 MG: 100 CAPSULE, EXTENDED RELEASE ORAL at 21:27

## 2019-11-21 RX ADMIN — ACETAMINOPHEN 650 MG: 325 TABLET ORAL at 03:48

## 2019-11-21 RX ADMIN — ASPIRIN 81 MG: 81 TABLET, COATED ORAL at 08:50

## 2019-11-21 RX ADMIN — METRONIDAZOLE 500 MG: 500 INJECTION, SOLUTION INTRAVENOUS at 04:36

## 2019-11-21 RX ADMIN — HEPARIN SODIUM 5000 UNITS: 5000 INJECTION INTRAVENOUS; SUBCUTANEOUS at 08:51

## 2019-11-21 RX ADMIN — IPRATROPIUM BROMIDE AND ALBUTEROL SULFATE 3 ML: .5; 3 SOLUTION RESPIRATORY (INHALATION) at 19:35

## 2019-11-21 RX ADMIN — NEOMYCIN SULFATE, POLYMYXIN B SULFATE AND HYDROCORTISONE 4 DROP: 10; 3.5; 1 SUSPENSION/ DROPS AURICULAR (OTIC) at 06:49

## 2019-11-21 RX ADMIN — IPRATROPIUM BROMIDE AND ALBUTEROL SULFATE 3 ML: .5; 3 SOLUTION RESPIRATORY (INHALATION) at 07:47

## 2019-11-21 RX ADMIN — DOCUSATE SODIUM 200 MG: 100 CAPSULE ORAL at 21:28

## 2019-11-21 RX ADMIN — TOPIRAMATE 150 MG: 100 TABLET, FILM COATED ORAL at 21:27

## 2019-11-21 RX ADMIN — TOPIRAMATE 100 MG: 100 TABLET, FILM COATED ORAL at 06:48

## 2019-11-21 RX ADMIN — METHYLPREDNISOLONE SODIUM SUCCINATE 40 MG: 40 INJECTION, POWDER, FOR SOLUTION INTRAMUSCULAR; INTRAVENOUS at 06:48

## 2019-11-21 RX ADMIN — ROSUVASTATIN CALCIUM 5 MG: 10 TABLET, FILM COATED ORAL at 21:28

## 2019-11-21 RX ADMIN — POTASSIUM CHLORIDE 20 MEQ: 1500 TABLET, EXTENDED RELEASE ORAL at 08:57

## 2019-11-21 RX ADMIN — GUAIFENESIN 1200 MG: 600 TABLET, EXTENDED RELEASE ORAL at 08:48

## 2019-11-21 RX ADMIN — NEOMYCIN SULFATE, POLYMYXIN B SULFATE AND HYDROCORTISONE 4 DROP: 10; 3.5; 1 SUSPENSION/ DROPS AURICULAR (OTIC) at 13:31

## 2019-11-21 RX ADMIN — HYDROCODONE POLISTIREX AND CHLORPHENIRAMINE POLISTIREX 5 ML: 10; 8 SUSPENSION, EXTENDED RELEASE ORAL at 06:48

## 2019-11-21 RX ADMIN — LEVOTHYROXINE SODIUM 25 MCG: 25 TABLET ORAL at 06:48

## 2019-11-21 RX ADMIN — NEOMYCIN SULFATE, POLYMYXIN B SULFATE AND HYDROCORTISONE 4 DROP: 10; 3.5; 1 SUSPENSION/ DROPS AURICULAR (OTIC) at 21:28

## 2019-11-21 RX ADMIN — ACETYLCYSTEINE: 200 SOLUTION ORAL; RESPIRATORY (INHALATION) at 07:48

## 2019-11-21 RX ADMIN — ACETYLCYSTEINE 3 ML: 200 SOLUTION ORAL; RESPIRATORY (INHALATION) at 19:35

## 2019-11-21 RX ADMIN — HEPARIN SODIUM 5000 UNITS: 5000 INJECTION INTRAVENOUS; SUBCUTANEOUS at 21:28

## 2019-11-21 RX ADMIN — DOXYCYCLINE 100 MG: 100 INJECTION, POWDER, LYOPHILIZED, FOR SOLUTION INTRAVENOUS at 01:56

## 2019-11-21 RX ADMIN — MAGNESIUM GLUCONATE 500 MG ORAL TABLET 800 MG: 500 TABLET ORAL at 08:49

## 2019-11-21 RX ADMIN — AMITRIPTYLINE HYDROCHLORIDE 50 MG: 50 TABLET, FILM COATED ORAL at 21:28

## 2019-11-21 RX ADMIN — METRONIDAZOLE 500 MG: 500 INJECTION, SOLUTION INTRAVENOUS at 17:52

## 2019-11-21 RX ADMIN — IPRATROPIUM BROMIDE AND ALBUTEROL SULFATE 3 ML: .5; 3 SOLUTION RESPIRATORY (INHALATION) at 13:49

## 2019-11-21 RX ADMIN — DIAZEPAM 2.5 MG: 5 TABLET ORAL at 22:57

## 2019-11-21 RX ADMIN — Medication 1 CAPSULE: at 08:50

## 2019-11-21 RX ADMIN — DOXYCYCLINE 100 MG: 100 INJECTION, POWDER, LYOPHILIZED, FOR SOLUTION INTRAVENOUS at 13:32

## 2019-11-21 RX ADMIN — PHENYTOIN SODIUM 200 MG: 100 CAPSULE, EXTENDED RELEASE ORAL at 06:48

## 2019-11-21 RX ADMIN — SODIUM CHLORIDE, PRESERVATIVE FREE 10 ML: 5 INJECTION INTRAVENOUS at 08:58

## 2019-11-21 RX ADMIN — SODIUM CHLORIDE, PRESERVATIVE FREE 10 ML: 5 INJECTION INTRAVENOUS at 21:29

## 2019-11-21 RX ADMIN — CEFTRIAXONE 1 G: 1 INJECTION, POWDER, FOR SOLUTION INTRAMUSCULAR; INTRAVENOUS at 01:56

## 2019-11-21 RX ADMIN — POLYETHYLENE GLYCOL (3350) 17 G: 17 POWDER, FOR SOLUTION ORAL at 08:57

## 2019-11-21 RX ADMIN — LISINOPRIL 20 MG: 10 TABLET ORAL at 08:50

## 2019-11-21 RX ADMIN — VENLAFAXINE HYDROCHLORIDE 75 MG: 75 CAPSULE, EXTENDED RELEASE ORAL at 17:51

## 2019-11-21 RX ADMIN — IPRATROPIUM BROMIDE AND ALBUTEROL SULFATE 3 ML: .5; 3 SOLUTION RESPIRATORY (INHALATION) at 00:36

## 2019-11-22 PROCEDURE — 25010000002 CEFTRIAXONE: Performed by: FAMILY MEDICINE

## 2019-11-22 PROCEDURE — 94799 UNLISTED PULMONARY SVC/PX: CPT

## 2019-11-22 PROCEDURE — 25010000002 HEPARIN (PORCINE) PER 1000 UNITS: Performed by: HOSPITALIST

## 2019-11-22 PROCEDURE — 25010000002 ONDANSETRON PER 1 MG: Performed by: FAMILY MEDICINE

## 2019-11-22 PROCEDURE — 99232 SBSQ HOSP IP/OBS MODERATE 35: CPT | Performed by: FAMILY MEDICINE

## 2019-11-22 PROCEDURE — 25010000002 METHYLPREDNISOLONE PER 40 MG: Performed by: FAMILY MEDICINE

## 2019-11-22 RX ORDER — ONDANSETRON 2 MG/ML
4 INJECTION INTRAMUSCULAR; INTRAVENOUS EVERY 6 HOURS PRN
Status: DISCONTINUED | OUTPATIENT
Start: 2019-11-22 | End: 2019-11-23 | Stop reason: HOSPADM

## 2019-11-22 RX ORDER — SUCRALFATE ORAL 1 G/10ML
1 SUSPENSION ORAL
Status: DISCONTINUED | OUTPATIENT
Start: 2019-11-22 | End: 2019-11-23 | Stop reason: HOSPADM

## 2019-11-22 RX ADMIN — IPRATROPIUM BROMIDE AND ALBUTEROL SULFATE 3 ML: .5; 3 SOLUTION RESPIRATORY (INHALATION) at 19:02

## 2019-11-22 RX ADMIN — ACETYLCYSTEINE 3 ML: 200 SOLUTION ORAL; RESPIRATORY (INHALATION) at 06:52

## 2019-11-22 RX ADMIN — METHYLPREDNISOLONE SODIUM SUCCINATE 40 MG: 40 INJECTION, POWDER, FOR SOLUTION INTRAMUSCULAR; INTRAVENOUS at 05:51

## 2019-11-22 RX ADMIN — DOXYCYCLINE 100 MG: 100 INJECTION, POWDER, LYOPHILIZED, FOR SOLUTION INTRAVENOUS at 02:07

## 2019-11-22 RX ADMIN — HEPARIN SODIUM 5000 UNITS: 5000 INJECTION INTRAVENOUS; SUBCUTANEOUS at 08:07

## 2019-11-22 RX ADMIN — PHENYTOIN SODIUM 100 MG: 100 CAPSULE, EXTENDED RELEASE ORAL at 21:30

## 2019-11-22 RX ADMIN — CEFTRIAXONE 1 G: 1 INJECTION, POWDER, FOR SOLUTION INTRAMUSCULAR; INTRAVENOUS at 02:07

## 2019-11-22 RX ADMIN — ROSUVASTATIN CALCIUM 5 MG: 10 TABLET, FILM COATED ORAL at 21:31

## 2019-11-22 RX ADMIN — IPRATROPIUM BROMIDE AND ALBUTEROL SULFATE 3 ML: .5; 3 SOLUTION RESPIRATORY (INHALATION) at 13:19

## 2019-11-22 RX ADMIN — HYDROCODONE POLISTIREX AND CHLORPHENIRAMINE POLISTIREX 5 ML: 10; 8 SUSPENSION, EXTENDED RELEASE ORAL at 05:51

## 2019-11-22 RX ADMIN — TOPIRAMATE 150 MG: 100 TABLET, FILM COATED ORAL at 05:50

## 2019-11-22 RX ADMIN — TOPIRAMATE 100 MG: 100 TABLET, FILM COATED ORAL at 05:53

## 2019-11-22 RX ADMIN — GUAIFENESIN 1200 MG: 600 TABLET, EXTENDED RELEASE ORAL at 08:07

## 2019-11-22 RX ADMIN — SODIUM CHLORIDE, PRESERVATIVE FREE 10 ML: 5 INJECTION INTRAVENOUS at 08:08

## 2019-11-22 RX ADMIN — SODIUM CHLORIDE, PRESERVATIVE FREE 10 ML: 5 INJECTION INTRAVENOUS at 21:31

## 2019-11-22 RX ADMIN — TOPIRAMATE 150 MG: 100 TABLET, FILM COATED ORAL at 21:31

## 2019-11-22 RX ADMIN — DOXYCYCLINE 100 MG: 100 INJECTION, POWDER, LYOPHILIZED, FOR SOLUTION INTRAVENOUS at 14:00

## 2019-11-22 RX ADMIN — LEVOTHYROXINE SODIUM 25 MCG: 25 TABLET ORAL at 05:55

## 2019-11-22 RX ADMIN — METRONIDAZOLE 500 MG: 500 INJECTION, SOLUTION INTRAVENOUS at 11:41

## 2019-11-22 RX ADMIN — PHENYTOIN SODIUM 200 MG: 100 CAPSULE, EXTENDED RELEASE ORAL at 05:51

## 2019-11-22 RX ADMIN — PRAMIPEXOLE DIHYDROCHLORIDE 1 MG: 1 TABLET ORAL at 21:31

## 2019-11-22 RX ADMIN — AMITRIPTYLINE HYDROCHLORIDE 50 MG: 50 TABLET, FILM COATED ORAL at 21:30

## 2019-11-22 RX ADMIN — GUAIFENESIN 1200 MG: 600 TABLET, EXTENDED RELEASE ORAL at 21:30

## 2019-11-22 RX ADMIN — POTASSIUM CHLORIDE 20 MEQ: 1500 TABLET, EXTENDED RELEASE ORAL at 08:07

## 2019-11-22 RX ADMIN — SUCRALFATE 1 G: 1 SUSPENSION ORAL at 17:51

## 2019-11-22 RX ADMIN — METRONIDAZOLE 500 MG: 500 INJECTION, SOLUTION INTRAVENOUS at 03:00

## 2019-11-22 RX ADMIN — VENLAFAXINE HYDROCHLORIDE 75 MG: 75 CAPSULE, EXTENDED RELEASE ORAL at 17:51

## 2019-11-22 RX ADMIN — PANTOPRAZOLE SODIUM 40 MG: 40 TABLET, DELAYED RELEASE ORAL at 05:50

## 2019-11-22 RX ADMIN — DIAZEPAM 2.5 MG: 5 TABLET ORAL at 21:29

## 2019-11-22 RX ADMIN — METRONIDAZOLE 500 MG: 500 INJECTION, SOLUTION INTRAVENOUS at 17:51

## 2019-11-22 RX ADMIN — METHYLPREDNISOLONE SODIUM SUCCINATE 40 MG: 40 INJECTION, POWDER, FOR SOLUTION INTRAMUSCULAR; INTRAVENOUS at 17:51

## 2019-11-22 RX ADMIN — IPRATROPIUM BROMIDE AND ALBUTEROL SULFATE 3 ML: .5; 3 SOLUTION RESPIRATORY (INHALATION) at 06:52

## 2019-11-22 RX ADMIN — ASPIRIN 81 MG: 81 TABLET, COATED ORAL at 08:06

## 2019-11-22 RX ADMIN — HYDROCODONE POLISTIREX AND CHLORPHENIRAMINE POLISTIREX 5 ML: 10; 8 SUSPENSION, EXTENDED RELEASE ORAL at 21:29

## 2019-11-22 RX ADMIN — DOCUSATE SODIUM 200 MG: 100 CAPSULE ORAL at 21:30

## 2019-11-22 RX ADMIN — ONDANSETRON 4 MG: 2 INJECTION, SOLUTION INTRAMUSCULAR; INTRAVENOUS at 21:29

## 2019-11-22 RX ADMIN — Medication 1 CAPSULE: at 08:06

## 2019-11-22 RX ADMIN — ONDANSETRON 4 MG: 2 INJECTION, SOLUTION INTRAMUSCULAR; INTRAVENOUS at 14:50

## 2019-11-22 RX ADMIN — HEPARIN SODIUM 5000 UNITS: 5000 INJECTION INTRAVENOUS; SUBCUTANEOUS at 21:29

## 2019-11-22 RX ADMIN — LISINOPRIL 20 MG: 10 TABLET ORAL at 08:06

## 2019-11-22 RX ADMIN — MAGNESIUM GLUCONATE 500 MG ORAL TABLET 800 MG: 500 TABLET ORAL at 08:07

## 2019-11-22 RX ADMIN — SUCRALFATE 1 G: 1 SUSPENSION ORAL at 21:29

## 2019-11-22 RX ADMIN — ACETYLCYSTEINE 3 ML: 200 SOLUTION ORAL; RESPIRATORY (INHALATION) at 19:02

## 2019-11-22 RX ADMIN — DOCUSATE SODIUM 200 MG: 100 CAPSULE ORAL at 08:07

## 2019-11-23 VITALS
HEART RATE: 107 BPM | SYSTOLIC BLOOD PRESSURE: 132 MMHG | BODY MASS INDEX: 29.89 KG/M2 | TEMPERATURE: 98 F | RESPIRATION RATE: 20 BRPM | HEIGHT: 63 IN | OXYGEN SATURATION: 94 % | DIASTOLIC BLOOD PRESSURE: 59 MMHG | WEIGHT: 168.7 LBS

## 2019-11-23 PROCEDURE — 94799 UNLISTED PULMONARY SVC/PX: CPT

## 2019-11-23 PROCEDURE — 99238 HOSP IP/OBS DSCHRG MGMT 30/<: CPT | Performed by: FAMILY MEDICINE

## 2019-11-23 PROCEDURE — 25010000002 METHYLPREDNISOLONE PER 40 MG: Performed by: FAMILY MEDICINE

## 2019-11-23 PROCEDURE — 25010000002 HEPARIN (PORCINE) PER 1000 UNITS: Performed by: HOSPITALIST

## 2019-11-23 PROCEDURE — 25010000002 CEFTRIAXONE: Performed by: FAMILY MEDICINE

## 2019-11-23 RX ORDER — CEFDINIR 300 MG/1
300 CAPSULE ORAL 2 TIMES DAILY
Qty: 14 CAPSULE | Refills: 0 | Status: SHIPPED | OUTPATIENT
Start: 2019-11-23 | End: 2019-11-23 | Stop reason: SDUPTHER

## 2019-11-23 RX ORDER — METRONIDAZOLE 500 MG/1
500 TABLET ORAL 3 TIMES DAILY
Qty: 15 TABLET | Refills: 0 | Status: SHIPPED | OUTPATIENT
Start: 2019-11-23 | End: 2019-11-28

## 2019-11-23 RX ORDER — METRONIDAZOLE 500 MG/1
500 TABLET ORAL 3 TIMES DAILY
Qty: 15 TABLET | Refills: 0 | Status: SHIPPED | OUTPATIENT
Start: 2019-11-23 | End: 2019-11-23 | Stop reason: SDUPTHER

## 2019-11-23 RX ORDER — ALBUTEROL SULFATE 90 UG/1
2 AEROSOL, METERED RESPIRATORY (INHALATION) EVERY 4 HOURS PRN
Qty: 1 INHALER | Refills: 0 | Status: SHIPPED | OUTPATIENT
Start: 2019-11-23 | End: 2019-11-23 | Stop reason: SDUPTHER

## 2019-11-23 RX ORDER — SUCRALFATE ORAL 1 G/10ML
1 SUSPENSION ORAL EVERY 6 HOURS SCHEDULED
Qty: 200 ML | Refills: 0 | Status: SHIPPED | OUTPATIENT
Start: 2019-11-23 | End: 2019-11-23

## 2019-11-23 RX ORDER — SUCRALFATE ORAL 1 G/10ML
1 SUSPENSION ORAL EVERY 6 HOURS SCHEDULED
Qty: 200 ML | Refills: 0 | Status: SHIPPED | OUTPATIENT
Start: 2019-11-23 | End: 2019-11-28

## 2019-11-23 RX ORDER — CEFDINIR 300 MG/1
300 CAPSULE ORAL 2 TIMES DAILY
Qty: 14 CAPSULE | Refills: 0 | Status: SHIPPED | OUTPATIENT
Start: 2019-11-23 | End: 2019-11-30

## 2019-11-23 RX ORDER — PREDNISONE 10 MG/1
TABLET ORAL
Qty: 12 TABLET | Refills: 0 | Status: SHIPPED | OUTPATIENT
Start: 2019-11-23 | End: 2019-11-23 | Stop reason: SDUPTHER

## 2019-11-23 RX ORDER — ALBUTEROL SULFATE 90 UG/1
2 AEROSOL, METERED RESPIRATORY (INHALATION) EVERY 4 HOURS PRN
Qty: 1 INHALER | Refills: 0 | Status: ON HOLD | OUTPATIENT
Start: 2019-11-23 | End: 2020-07-05

## 2019-11-23 RX ORDER — PREDNISONE 10 MG/1
TABLET ORAL
Qty: 12 TABLET | Refills: 0 | Status: ON HOLD | OUTPATIENT
Start: 2019-11-23 | End: 2020-07-06

## 2019-11-23 RX ADMIN — PHENYTOIN SODIUM 200 MG: 100 CAPSULE, EXTENDED RELEASE ORAL at 05:30

## 2019-11-23 RX ADMIN — HEPARIN SODIUM 5000 UNITS: 5000 INJECTION INTRAVENOUS; SUBCUTANEOUS at 08:13

## 2019-11-23 RX ADMIN — METRONIDAZOLE 500 MG: 500 INJECTION, SOLUTION INTRAVENOUS at 03:01

## 2019-11-23 RX ADMIN — METHYLPREDNISOLONE SODIUM SUCCINATE 40 MG: 40 INJECTION, POWDER, FOR SOLUTION INTRAMUSCULAR; INTRAVENOUS at 05:29

## 2019-11-23 RX ADMIN — LEVOTHYROXINE SODIUM 25 MCG: 25 TABLET ORAL at 05:30

## 2019-11-23 RX ADMIN — DOXYCYCLINE 100 MG: 100 INJECTION, POWDER, LYOPHILIZED, FOR SOLUTION INTRAVENOUS at 02:55

## 2019-11-23 RX ADMIN — POTASSIUM CHLORIDE 20 MEQ: 1500 TABLET, EXTENDED RELEASE ORAL at 08:11

## 2019-11-23 RX ADMIN — Medication 1 CAPSULE: at 08:12

## 2019-11-23 RX ADMIN — SODIUM CHLORIDE, PRESERVATIVE FREE 10 ML: 5 INJECTION INTRAVENOUS at 08:12

## 2019-11-23 RX ADMIN — TOPIRAMATE 100 MG: 100 TABLET, FILM COATED ORAL at 05:30

## 2019-11-23 RX ADMIN — ACETYLCYSTEINE 3 ML: 200 SOLUTION ORAL; RESPIRATORY (INHALATION) at 07:12

## 2019-11-23 RX ADMIN — SUCRALFATE 1 G: 1 SUSPENSION ORAL at 05:29

## 2019-11-23 RX ADMIN — CEFTRIAXONE 1 G: 1 INJECTION, POWDER, FOR SOLUTION INTRAMUSCULAR; INTRAVENOUS at 02:55

## 2019-11-23 RX ADMIN — GUAIFENESIN 1200 MG: 600 TABLET, EXTENDED RELEASE ORAL at 08:08

## 2019-11-23 RX ADMIN — METRONIDAZOLE 500 MG: 500 INJECTION, SOLUTION INTRAVENOUS at 09:49

## 2019-11-23 RX ADMIN — IPRATROPIUM BROMIDE AND ALBUTEROL SULFATE 3 ML: .5; 3 SOLUTION RESPIRATORY (INHALATION) at 07:12

## 2019-11-23 RX ADMIN — HYDROCODONE POLISTIREX AND CHLORPHENIRAMINE POLISTIREX 5 ML: 10; 8 SUSPENSION, EXTENDED RELEASE ORAL at 09:47

## 2019-11-23 RX ADMIN — LISINOPRIL 20 MG: 10 TABLET ORAL at 08:07

## 2019-11-23 RX ADMIN — MAGNESIUM GLUCONATE 500 MG ORAL TABLET 800 MG: 500 TABLET ORAL at 08:08

## 2019-11-23 RX ADMIN — PANTOPRAZOLE SODIUM 40 MG: 40 TABLET, DELAYED RELEASE ORAL at 05:30

## 2019-11-23 RX ADMIN — SUCRALFATE 1 G: 1 SUSPENSION ORAL at 10:36

## 2019-11-23 RX ADMIN — ASPIRIN 81 MG: 81 TABLET, COATED ORAL at 08:08

## 2019-11-24 ENCOUNTER — READMISSION MANAGEMENT (OUTPATIENT)
Dept: CALL CENTER | Facility: HOSPITAL | Age: 74
End: 2019-11-24

## 2019-11-24 NOTE — OUTREACH NOTE
Prep Survey      Responses   Facility patient discharged from?  [facility]   Is patient eligible?  Yes   Discharge diagnosis  Community-acquired pneumonia with bilateral pneumonia   Does the patient have one of the following disease processes/diagnoses(primary or secondary)?  Sepsis   Does the patient have Home health ordered?  No   Is there a DME ordered?  No   Prep survey completed?  Yes          Sara Soliz RN

## 2019-11-25 ENCOUNTER — TELEPHONE (OUTPATIENT)
Dept: MEDSURG UNIT | Facility: HOSPITAL | Age: 74
End: 2019-11-25

## 2019-11-26 ENCOUNTER — READMISSION MANAGEMENT (OUTPATIENT)
Dept: CALL CENTER | Facility: HOSPITAL | Age: 74
End: 2019-11-26

## 2019-11-26 NOTE — OUTREACH NOTE
Sepsis Week 1 Survey      Responses   Facility patient discharged from?  [facility]   Does the patient have one of the following disease processes/diagnoses(primary or secondary)?  Sepsis [Pneumonia]   Is there a successful TCM telephone encounter documented?  No   Week 1 attempt successful?  Yes   Call start time  1029   Call end time  1035   Discharge diagnosis  Community-acquired pneumonia with bilateral pneumonia   Meds reviewed with patient/caregiver?  Yes   Is the patient having any side effects they believe may be caused by any medication additions or changes?  Yes   Side effects comments   diarrhea   Does the patient have all medications related to this admission filled (includes all antibiotics, inhalers, nebulizers,steroids,etc.)  Yes   Is the patient taking all medications as directed (includes completed medication regime)?  Yes   Does the patient have a primary care provider?   Yes   Does the patient have an appointment with their PCP within 7 days of discharge?  Yes   Has the patient kept scheduled appointments due by today?  N/A   Has home health visited the patient within 72 hours of discharge?  Yes   Home health comments  HH to come in a couple times per week   Psychosocial issues?  No   Did the patient receive a copy of their discharge instructions?  Yes   Nursing interventions  Reviewed instructions with patient   What is the patient's perception of their health status since discharge?  Same   Nursing interventions  Nurse provided patient education   Is the patient/caregiver able to teach back Sepsis?  S - Shivering,fever or very cold, E - Extreme pain or generalized discomfort (worst ever,especially abdomen)   Nursing interventions  Nurse provided reassurance to patient   Is patient/caregiver able to teach back steps to recovery at home?  Set small, achievable goals for return to baseline health, Rest and regain strength, Eat a balanced diet   Is the patient/caregiver able to teach back signs and  symptoms of worsening condition:  Fever, Hyperthermia, Rapid heart rate (>90), Shortness of breath/rapid respiratory rate, Altered mental status(confusion/coma)   If the patient is a current smoker, are they able to teach back resources for cessation?  -- [non smoker]   Is the patient/caregiver able to teach back the hierarchy of who to call/visit for symptoms/problems? PCP, Specialist, Home health nurse, Urgent Care, ED, 911  Yes   Additional teach back comments  pt has deep cough but she is taking meds as ordered and will return for her DR arnett in december. Enc fluids and probiotics/yogurt/buttermilk for her diarrhea.    Week 1 call completed?  Yes          Macey Rubin RN

## 2019-12-04 ENCOUNTER — READMISSION MANAGEMENT (OUTPATIENT)
Dept: CALL CENTER | Facility: HOSPITAL | Age: 74
End: 2019-12-04

## 2019-12-04 NOTE — OUTREACH NOTE
Sepsis Week 2 Survey      Responses   Facility patient discharged from?  [facility]   Does the patient have one of the following disease processes/diagnoses(primary or secondary)?  Sepsis   Week 2 attempt successful?  Yes   Call start time  0948   Call end time  1002   Meds reviewed with patient/caregiver?  Yes   Is the patient having any side effects they believe may be caused by any medication additions or changes?  No   Does the patient have all medications related to this admission filled (includes all antibiotics, inhalers, nebulizers,steroids,etc.)  Yes   Is the patient taking all medications as directed (includes completed medication regime)?  Yes   Does the patient have a primary care provider?   Yes   Does the patient have an appointment with their PCP within 7 days of discharge?  Yes   Has the patient kept scheduled appointments due by today?  Yes   Comments  Metronidazole was started due to nausea/diarrhea yesterday.   Has home health visited the patient within 72 hours of discharge?  Yes   Home health comments  home health visits twice per week   Psychosocial issues?  No   Did the patient receive a copy of their discharge instructions?  Yes   Nursing interventions  Reviewed instructions with patient   What is the patient's perception of their health status since discharge?  Improving   Nursing interventions  Nurse provided patient education   Is the patient/caregiver able to teach back Sepsis?  S - Shivering,fever or very cold, E - Extreme pain or generalized discomfort (worst ever,especially abdomen), P - Pale or discolored skin, S - Sleepy, difficult to arouse,confused, I -   I feel like I might die-a feeling of hopelessness, S - Short of breath   Nursing interventions  Nurse provided patient education   Is patient/caregiver able to teach back steps to recovery at home?  Set small, achievable goals for return to baseline health, Rest and regain strength, Talk about feelings with family/friends, Learn about  sepsis(sepsis.org), Eat a balanced diet, Exercise as tolerated, Make a list of questions for PCP appoinment   Is the patient/caregiver able to teach back signs and symptoms of worsening condition:  Fever, Rapid heart rate (>90), Altered mental status(confusion/coma), Hyperthermia, Shortness of breath/rapid respiratory rate, Edema   Is the patient/caregiver able to teach back the hierarchy of who to call/visit for symptoms/problems? PCP, Specialist, Home health nurse, Urgent Care, ED, 911  Yes   Additional teach back comments  Reviewed s/s of worsening lung infection, colitis.   Week 2 call completed?  Yes   Wrap up additional comments  States is feeling better-states has some diarrhea from antibiotics-taking metronidazole as prescribed yesterday. Denies any fever/chills. States will call PCP office to discuss probiotics. No complaints today.          Opal Osei RN

## 2019-12-12 ENCOUNTER — READMISSION MANAGEMENT (OUTPATIENT)
Dept: CALL CENTER | Facility: HOSPITAL | Age: 74
End: 2019-12-12

## 2019-12-12 NOTE — OUTREACH NOTE
Sepsis Week 3 Survey      Responses   Facility patient discharged from?  [facility]   Does the patient have one of the following disease processes/diagnoses(primary or secondary)?  Sepsis   Week 3 attempt successful?  Yes   Call start time  0942   Call end time  0945   Discharge diagnosis  Community-acquired pneumonia with bilateral pneumonia   Has the patient kept scheduled appointments due by today?  Yes   What is the patient's perception of their health status since discharge?  Same   Nursing interventions  Nurse provided patient education   Is the patient/caregiver able to teach back Sepsis?  S - Shivering,fever or very cold, E - Extreme pain or generalized discomfort (worst ever,especially abdomen), P - Pale or discolored skin, S - Sleepy, difficult to arouse,confused, I -   I feel like I might die-a feeling of hopelessness, S - Short of breath   Is the patient/caregiver able to teach back signs and symptoms of worsening condition:  Fever, Rapid heart rate (>90), Hyperthermia, Shortness of breath/rapid respiratory rate, Altered mental status(confusion/coma), Edema, High blood glucose without diabetes   Is the patient/caregiver able to teach back the hierarchy of who to call/visit for symptoms/problems? PCP, Specialist, Home health nurse, Urgent Care, ED, 911  Yes   Additional teach back comments  pt says she is doing about the same, her congestion is still there but she recently saw her PCP and was given more antibiotics, encouraged her to call PCP or go to ER with worsening symptoms.   Week 3 call completed?  Yes          Marycarmen Hernandez RN

## 2019-12-19 ENCOUNTER — READMISSION MANAGEMENT (OUTPATIENT)
Dept: CALL CENTER | Facility: HOSPITAL | Age: 74
End: 2019-12-19

## 2019-12-19 NOTE — OUTREACH NOTE
Sepsis Week 4 Survey      Responses   Facility patient discharged from?  [facility]   Does the patient have one of the following disease processes/diagnoses(primary or secondary)?  Sepsis   Week 4 attempt successful?  Yes   Call start time  1355   Call end time  1358   Discharge diagnosis  Community-acquired pneumonia with bilateral pneumonia   Meds reviewed with patient/caregiver?  Yes   Is the patient taking all medications as directed (includes completed medication regime)?  Yes   Medication comments  had another round of antibx but is feeling much, much better   Has the patient kept scheduled appointments due by today?  Yes   What is the patient's perception of their health status since discharge?  Improving   Is the patient/caregiver able to teach back signs and symptoms of worsening condition:  Shortness of breath/rapid respiratory rate, Fever   Is the patient/caregiver able to teach back the hierarchy of who to call/visit for symptoms/problems? PCP, Specialist, Home health nurse, Urgent Care, ED, 911  Yes   Additional teach back comments  Enc fluids and watching for reoccurring s/s   Week 4 call completed?  Yes   Would the patient like one additional call?  No   Graduated  Yes   Did the patient feel the follow up calls were helpful during their recovery period?  Yes   Was the number of calls appropriate?  Yes          Macey Rubin RN

## 2019-12-31 ENCOUNTER — HOSPITAL ENCOUNTER (OUTPATIENT)
Dept: GENERAL RADIOLOGY | Facility: HOSPITAL | Age: 74
Discharge: HOME | End: 2019-12-31
Admitting: INTERNAL MEDICINE

## 2019-12-31 ENCOUNTER — TRANSCRIBE ORDERS (OUTPATIENT)
Dept: ADMINISTRATIVE | Facility: HOSPITAL | Age: 74
End: 2019-12-31

## 2019-12-31 DIAGNOSIS — J44.9 CHRONIC OBSTRUCTIVE PULMONARY DISEASE, UNSPECIFIED COPD TYPE (HCC): Primary | ICD-10-CM

## 2019-12-31 PROCEDURE — 71046 X-RAY EXAM CHEST 2 VIEWS: CPT | Performed by: RADIOLOGY

## 2019-12-31 PROCEDURE — 71046 X-RAY EXAM CHEST 2 VIEWS: CPT

## 2020-06-08 ENCOUNTER — TRANSCRIBE ORDERS (OUTPATIENT)
Dept: ADMINISTRATIVE | Facility: HOSPITAL | Age: 75
End: 2020-06-08

## 2020-06-08 DIAGNOSIS — R07.9 CHEST PAIN, UNSPECIFIED TYPE: ICD-10-CM

## 2020-06-08 DIAGNOSIS — R06.02 SHORTNESS OF BREATH: Primary | ICD-10-CM

## 2020-06-10 ENCOUNTER — HOSPITAL ENCOUNTER (OUTPATIENT)
Dept: CARDIOLOGY | Facility: HOSPITAL | Age: 75
Discharge: HOME | End: 2020-06-10
Admitting: INTERNAL MEDICINE

## 2020-06-10 DIAGNOSIS — R06.02 SHORTNESS OF BREATH: ICD-10-CM

## 2020-06-10 DIAGNOSIS — R07.9 CHEST PAIN, UNSPECIFIED TYPE: ICD-10-CM

## 2020-06-10 LAB
BH CV ECHO MEAS - % IVS THICK: 11.2 %
BH CV ECHO MEAS - % LVPW THICK: 56.1 %
BH CV ECHO MEAS - ACS: 1.9 CM
BH CV ECHO MEAS - AO MAX PG: 14.6 MMHG
BH CV ECHO MEAS - AO MEAN PG: 6 MMHG
BH CV ECHO MEAS - AO ROOT AREA (BSA CORRECTED): 1.4
BH CV ECHO MEAS - AO ROOT AREA: 4.9 CM^2
BH CV ECHO MEAS - AO ROOT DIAM: 2.5 CM
BH CV ECHO MEAS - AO V2 MAX: 191 CM/SEC
BH CV ECHO MEAS - AO V2 MEAN: 108 CM/SEC
BH CV ECHO MEAS - AO V2 VTI: 35.5 CM
BH CV ECHO MEAS - BSA(HAYCOCK): 1.9 M^2
BH CV ECHO MEAS - BSA: 1.8 M^2
BH CV ECHO MEAS - BZI_BMI: 29.8 KILOGRAMS/M^2
BH CV ECHO MEAS - BZI_METRIC_HEIGHT: 160 CM
BH CV ECHO MEAS - BZI_METRIC_WEIGHT: 76.2 KG
BH CV ECHO MEAS - EDV(CUBED): 65.7 ML
BH CV ECHO MEAS - EDV(MOD-SP4): 30.8 ML
BH CV ECHO MEAS - EDV(TEICH): 71.5 ML
BH CV ECHO MEAS - EF(CUBED): 69.9 %
BH CV ECHO MEAS - EF(MOD-SP4): 64 %
BH CV ECHO MEAS - EF(TEICH): 62 %
BH CV ECHO MEAS - ESV(CUBED): 19.8 ML
BH CV ECHO MEAS - ESV(MOD-SP4): 11.1 ML
BH CV ECHO MEAS - ESV(TEICH): 27.1 ML
BH CV ECHO MEAS - FS: 33 %
BH CV ECHO MEAS - IVS/LVPW: 1
BH CV ECHO MEAS - IVSD: 1 CM
BH CV ECHO MEAS - IVSS: 1.1 CM
BH CV ECHO MEAS - LA DIMENSION: 3.2 CM
BH CV ECHO MEAS - LA/AO: 1.3
BH CV ECHO MEAS - LV DIASTOLIC VOL/BSA (35-75): 17.2 ML/M^2
BH CV ECHO MEAS - LV MASS(C)D: 125.2 GRAMS
BH CV ECHO MEAS - LV MASS(C)DI: 69.7 GRAMS/M^2
BH CV ECHO MEAS - LV MASS(C)S: 109.1 GRAMS
BH CV ECHO MEAS - LV MASS(C)SI: 60.7 GRAMS/M^2
BH CV ECHO MEAS - LV SYSTOLIC VOL/BSA (12-30): 6.2 ML/M^2
BH CV ECHO MEAS - LVIDD: 4 CM
BH CV ECHO MEAS - LVIDS: 2.7 CM
BH CV ECHO MEAS - LVLD AP4: 5.6 CM
BH CV ECHO MEAS - LVLS AP4: 5 CM
BH CV ECHO MEAS - LVOT AREA (M): 2.5 CM^2
BH CV ECHO MEAS - LVOT AREA: 2.5 CM^2
BH CV ECHO MEAS - LVOT DIAM: 1.8 CM
BH CV ECHO MEAS - LVPWD: 0.96 CM
BH CV ECHO MEAS - LVPWS: 1.5 CM
BH CV ECHO MEAS - MV A MAX VEL: 110 CM/SEC
BH CV ECHO MEAS - MV E MAX VEL: 71.1 CM/SEC
BH CV ECHO MEAS - MV E/A: 0.65
BH CV ECHO MEAS - PA ACC TIME: 0.08 SEC
BH CV ECHO MEAS - PA PR(ACCEL): 42.6 MMHG
BH CV ECHO MEAS - RAP SYSTOLE: 10 MMHG
BH CV ECHO MEAS - RVSP: 33.8 MMHG
BH CV ECHO MEAS - SI(AO): 97 ML/M^2
BH CV ECHO MEAS - SI(CUBED): 25.6 ML/M^2
BH CV ECHO MEAS - SI(MOD-SP4): 11 ML/M^2
BH CV ECHO MEAS - SI(TEICH): 24.7 ML/M^2
BH CV ECHO MEAS - SV(AO): 174.3 ML
BH CV ECHO MEAS - SV(CUBED): 45.9 ML
BH CV ECHO MEAS - SV(MOD-SP4): 19.7 ML
BH CV ECHO MEAS - SV(TEICH): 44.3 ML
BH CV ECHO MEAS - TR MAX VEL: 243 CM/SEC
MAXIMAL PREDICTED HEART RATE: 146 BPM
STRESS TARGET HR: 124 BPM

## 2020-06-10 PROCEDURE — 93306 TTE W/DOPPLER COMPLETE: CPT | Performed by: INTERNAL MEDICINE

## 2020-06-10 PROCEDURE — 93306 TTE W/DOPPLER COMPLETE: CPT

## 2020-07-05 ENCOUNTER — APPOINTMENT (OUTPATIENT)
Dept: CT IMAGING | Facility: HOSPITAL | Age: 75
End: 2020-07-05

## 2020-07-05 ENCOUNTER — APPOINTMENT (OUTPATIENT)
Dept: GENERAL RADIOLOGY | Facility: HOSPITAL | Age: 75
End: 2020-07-05

## 2020-07-05 ENCOUNTER — HOSPITAL ENCOUNTER (INPATIENT)
Facility: HOSPITAL | Age: 75
LOS: 12 days | Discharge: ANOTHER HOSPITAL | End: 2020-07-17
Attending: EMERGENCY MEDICINE | Admitting: INTERNAL MEDICINE

## 2020-07-05 DIAGNOSIS — U07.1 COVID-19 VIRUS INFECTION: ICD-10-CM

## 2020-07-05 DIAGNOSIS — J96.01 ACUTE RESPIRATORY FAILURE WITH HYPOXIA (HCC): ICD-10-CM

## 2020-07-05 DIAGNOSIS — J18.9 PNEUMONIA OF RIGHT LUNG DUE TO INFECTIOUS ORGANISM, UNSPECIFIED PART OF LUNG: Primary | ICD-10-CM

## 2020-07-05 DIAGNOSIS — U07.1 PNEUMONIA DUE TO COVID-19 VIRUS: ICD-10-CM

## 2020-07-05 DIAGNOSIS — J12.82 PNEUMONIA DUE TO COVID-19 VIRUS: ICD-10-CM

## 2020-07-05 LAB
A-A DO2: 39.2 MMHG (ref 0–300)
ALBUMIN SERPL-MCNC: 4.23 G/DL (ref 3.5–5.2)
ALBUMIN/GLOB SERPL: 1.6 G/DL
ALP SERPL-CCNC: 195 U/L (ref 39–117)
ALT SERPL W P-5'-P-CCNC: 37 U/L (ref 1–33)
ANION GAP SERPL CALCULATED.3IONS-SCNC: 14.9 MMOL/L (ref 5–15)
ARTERIAL PATENCY WRIST A: POSITIVE
AST SERPL-CCNC: 32 U/L (ref 1–32)
ATMOSPHERIC PRESS: 727 MMHG
BASE EXCESS BLDA CALC-SCNC: -3.8 MMOL/L (ref 0–2)
BASOPHILS # BLD AUTO: 0.04 10*3/MM3 (ref 0–0.2)
BASOPHILS NFR BLD AUTO: 0.6 % (ref 0–1.5)
BDY SITE: ABNORMAL
BILIRUB SERPL-MCNC: 0.2 MG/DL (ref 0.2–1.2)
BODY TEMPERATURE: 0 C
BUN SERPL-MCNC: 12 MG/DL (ref 8–23)
BUN/CREAT SERPL: 14.3 (ref 7–25)
CALCIUM SPEC-SCNC: 8.6 MG/DL (ref 8.6–10.5)
CHLORIDE SERPL-SCNC: 107 MMOL/L (ref 98–107)
CO2 BLDA-SCNC: 20.2 MMOL/L (ref 22–33)
CO2 SERPL-SCNC: 23.1 MMOL/L (ref 22–29)
COHGB MFR BLD: 0.3 % (ref 0–5)
CREAT SERPL-MCNC: 0.84 MG/DL (ref 0.57–1)
CRP SERPL-MCNC: 6.46 MG/DL (ref 0–0.5)
D DIMER PPP FEU-MCNC: 0.78 MCGFEU/ML (ref 0–0.5)
D DIMER PPP FEU-MCNC: 0.92 MCGFEU/ML (ref 0–0.5)
D-LACTATE SERPL-SCNC: 1.3 MMOL/L (ref 0.5–2)
DEPRECATED RDW RBC AUTO: 51.2 FL (ref 37–54)
EOSINOPHIL # BLD AUTO: 0.05 10*3/MM3 (ref 0–0.4)
EOSINOPHIL NFR BLD AUTO: 0.7 % (ref 0.3–6.2)
ERYTHROCYTE [DISTWIDTH] IN BLOOD BY AUTOMATED COUNT: 14.1 % (ref 12.3–15.4)
FERRITIN SERPL-MCNC: 60.58 NG/ML (ref 13–150)
FLUAV AG NPH QL: NEGATIVE
FLUBV AG NPH QL IA: NEGATIVE
GFR SERPL CREATININE-BSD FRML MDRD: 66 ML/MIN/1.73
GLOBULIN UR ELPH-MCNC: 2.7 GM/DL
GLUCOSE SERPL-MCNC: 94 MG/DL (ref 65–99)
HCO3 BLDA-SCNC: 19.3 MMOL/L (ref 20–26)
HCT VFR BLD AUTO: 41.4 % (ref 34–46.6)
HCT VFR BLD CALC: 41.2 % (ref 38–51)
HGB BLD-MCNC: 13.1 G/DL (ref 12–15.9)
HGB BLDA-MCNC: 13.4 G/DL (ref 13.5–17.5)
IMM GRANULOCYTES # BLD AUTO: 0.03 10*3/MM3 (ref 0–0.05)
IMM GRANULOCYTES NFR BLD AUTO: 0.4 % (ref 0–0.5)
INHALED O2 CONCENTRATION: 21 %
LDH SERPL-CCNC: 215 U/L (ref 135–214)
LYMPHOCYTES # BLD AUTO: 1.48 10*3/MM3 (ref 0.7–3.1)
LYMPHOCYTES NFR BLD AUTO: 20.9 % (ref 19.6–45.3)
Lab: ABNORMAL
MCH RBC QN AUTO: 31.3 PG (ref 26.6–33)
MCHC RBC AUTO-ENTMCNC: 31.6 G/DL (ref 31.5–35.7)
MCV RBC AUTO: 98.8 FL (ref 79–97)
METHGB BLD QL: 0.4 % (ref 0–3)
MODALITY: ABNORMAL
MONOCYTES # BLD AUTO: 0.77 10*3/MM3 (ref 0.1–0.9)
MONOCYTES NFR BLD AUTO: 10.9 % (ref 5–12)
NEUTROPHILS NFR BLD AUTO: 4.72 10*3/MM3 (ref 1.7–7)
NEUTROPHILS NFR BLD AUTO: 66.5 % (ref 42.7–76)
NOTE: ABNORMAL
NOTIFIED BY: ABNORMAL
NOTIFIED WHO: ABNORMAL
NRBC BLD AUTO-RTO: 0 /100 WBC (ref 0–0.2)
NT-PROBNP SERPL-MCNC: 102.3 PG/ML (ref 5–900)
OXYHGB MFR BLDV: 93.8 % (ref 94–99)
PCO2 BLDA: 29.1 MM HG (ref 35–45)
PCO2 TEMP ADJ BLD: ABNORMAL MM[HG]
PH BLDA: 7.43 PH UNITS (ref 7.35–7.45)
PH, TEMP CORRECTED: ABNORMAL
PLATELET # BLD AUTO: 147 10*3/MM3 (ref 140–450)
PMV BLD AUTO: 9.8 FL (ref 6–12)
PO2 BLDA: 70.8 MM HG (ref 83–108)
PO2 TEMP ADJ BLD: ABNORMAL MM[HG]
POTASSIUM SERPL-SCNC: 3.6 MMOL/L (ref 3.5–5.2)
PROT SERPL-MCNC: 6.9 G/DL (ref 6–8.5)
RBC # BLD AUTO: 4.19 10*6/MM3 (ref 3.77–5.28)
S PYO AG THROAT QL: NEGATIVE
SAO2 % BLDCOA: 94.5 % (ref 94–99)
SARS-COV-2 RDRP RESP QL NAA+PROBE: DETECTED
SODIUM SERPL-SCNC: 145 MMOL/L (ref 136–145)
TROPONIN T SERPL-MCNC: <0.01 NG/ML (ref 0–0.03)
VENTILATOR MODE: ABNORMAL
WBC # BLD AUTO: 7.09 10*3/MM3 (ref 3.4–10.8)

## 2020-07-05 PROCEDURE — 87635 SARS-COV-2 COVID-19 AMP PRB: CPT | Performed by: NURSE PRACTITIONER

## 2020-07-05 PROCEDURE — 83605 ASSAY OF LACTIC ACID: CPT | Performed by: NURSE PRACTITIONER

## 2020-07-05 PROCEDURE — 87880 STREP A ASSAY W/OPTIC: CPT | Performed by: NURSE PRACTITIONER

## 2020-07-05 PROCEDURE — 99285 EMERGENCY DEPT VISIT HI MDM: CPT

## 2020-07-05 PROCEDURE — 36600 WITHDRAWAL OF ARTERIAL BLOOD: CPT

## 2020-07-05 PROCEDURE — 71045 X-RAY EXAM CHEST 1 VIEW: CPT | Performed by: RADIOLOGY

## 2020-07-05 PROCEDURE — 93010 ELECTROCARDIOGRAM REPORT: CPT | Performed by: SPECIALIST

## 2020-07-05 PROCEDURE — 80053 COMPREHEN METABOLIC PANEL: CPT | Performed by: NURSE PRACTITIONER

## 2020-07-05 PROCEDURE — 87040 BLOOD CULTURE FOR BACTERIA: CPT | Performed by: NURSE PRACTITIONER

## 2020-07-05 PROCEDURE — 0099U HC BIOFIRE FILMARRAY RESP PANEL 1: CPT | Performed by: HOSPITALIST

## 2020-07-05 PROCEDURE — 82805 BLOOD GASES W/O2 SATURATION: CPT

## 2020-07-05 PROCEDURE — 87804 INFLUENZA ASSAY W/OPTIC: CPT | Performed by: NURSE PRACTITIONER

## 2020-07-05 PROCEDURE — 82375 ASSAY CARBOXYHB QUANT: CPT

## 2020-07-05 PROCEDURE — 93005 ELECTROCARDIOGRAM TRACING: CPT | Performed by: INTERNAL MEDICINE

## 2020-07-05 PROCEDURE — 84145 PROCALCITONIN (PCT): CPT | Performed by: NURSE PRACTITIONER

## 2020-07-05 PROCEDURE — 83050 HGB METHEMOGLOBIN QUAN: CPT

## 2020-07-05 PROCEDURE — 71045 X-RAY EXAM CHEST 1 VIEW: CPT

## 2020-07-05 PROCEDURE — 25010000002 CEFTRIAXONE PER 250 MG: Performed by: NURSE PRACTITIONER

## 2020-07-05 PROCEDURE — 83880 ASSAY OF NATRIURETIC PEPTIDE: CPT | Performed by: PHYSICIAN ASSISTANT

## 2020-07-05 PROCEDURE — 85379 FIBRIN DEGRADATION QUANT: CPT | Performed by: PHYSICIAN ASSISTANT

## 2020-07-05 PROCEDURE — 84484 ASSAY OF TROPONIN QUANT: CPT | Performed by: NURSE PRACTITIONER

## 2020-07-05 PROCEDURE — 83615 LACTATE (LD) (LDH) ENZYME: CPT | Performed by: NURSE PRACTITIONER

## 2020-07-05 PROCEDURE — 85025 COMPLETE CBC W/AUTO DIFF WBC: CPT | Performed by: NURSE PRACTITIONER

## 2020-07-05 PROCEDURE — 71275 CT ANGIOGRAPHY CHEST: CPT

## 2020-07-05 PROCEDURE — 86140 C-REACTIVE PROTEIN: CPT | Performed by: NURSE PRACTITIONER

## 2020-07-05 PROCEDURE — 87081 CULTURE SCREEN ONLY: CPT | Performed by: NURSE PRACTITIONER

## 2020-07-05 PROCEDURE — 85379 FIBRIN DEGRADATION QUANT: CPT | Performed by: NURSE PRACTITIONER

## 2020-07-05 PROCEDURE — 99284 EMERGENCY DEPT VISIT MOD MDM: CPT

## 2020-07-05 PROCEDURE — 99223 1ST HOSP IP/OBS HIGH 75: CPT | Performed by: INTERNAL MEDICINE

## 2020-07-05 PROCEDURE — 94799 UNLISTED PULMONARY SVC/PX: CPT

## 2020-07-05 PROCEDURE — 0 IOVERSOL 74 % SOLUTION: Performed by: EMERGENCY MEDICINE

## 2020-07-05 PROCEDURE — 82728 ASSAY OF FERRITIN: CPT | Performed by: NURSE PRACTITIONER

## 2020-07-05 RX ORDER — SODIUM CHLORIDE 0.9 % (FLUSH) 0.9 %
10 SYRINGE (ML) INJECTION EVERY 12 HOURS SCHEDULED
Status: DISCONTINUED | OUTPATIENT
Start: 2020-07-05 | End: 2020-07-17 | Stop reason: HOSPADM

## 2020-07-05 RX ORDER — NITROGLYCERIN 0.4 MG/1
0.4 TABLET SUBLINGUAL
Status: DISCONTINUED | OUTPATIENT
Start: 2020-07-05 | End: 2020-07-15

## 2020-07-05 RX ORDER — SODIUM CHLORIDE 0.9 % (FLUSH) 0.9 %
10 SYRINGE (ML) INJECTION AS NEEDED
Status: DISCONTINUED | OUTPATIENT
Start: 2020-07-05 | End: 2020-07-17 | Stop reason: HOSPADM

## 2020-07-05 RX ORDER — SODIUM CHLORIDE 0.9 % (FLUSH) 0.9 %
10 SYRINGE (ML) INJECTION AS NEEDED
Status: DISCONTINUED | OUTPATIENT
Start: 2020-07-05 | End: 2020-07-14

## 2020-07-05 RX ORDER — DOXYCYCLINE 100 MG/1
100 CAPSULE ORAL 2 TIMES DAILY
Qty: 20 CAPSULE | Refills: 0 | Status: SHIPPED | OUTPATIENT
Start: 2020-07-05 | End: 2020-07-17 | Stop reason: HOSPADM

## 2020-07-05 RX ORDER — BENZONATATE 200 MG/1
200 CAPSULE ORAL 3 TIMES DAILY PRN
Qty: 20 CAPSULE | Refills: 0 | Status: SHIPPED | OUTPATIENT
Start: 2020-07-05 | End: 2020-07-17 | Stop reason: HOSPADM

## 2020-07-05 RX ORDER — HEPARIN SODIUM 5000 [USP'U]/ML
5000 INJECTION, SOLUTION INTRAVENOUS; SUBCUTANEOUS EVERY 12 HOURS SCHEDULED
Status: DISCONTINUED | OUTPATIENT
Start: 2020-07-06 | End: 2020-07-06

## 2020-07-05 RX ADMIN — IOVERSOL 70 ML: 741 INJECTION INTRA-ARTERIAL; INTRAVENOUS at 18:09

## 2020-07-05 RX ADMIN — CEFTRIAXONE 1 G: 1 INJECTION, POWDER, FOR SOLUTION INTRAMUSCULAR; INTRAVENOUS at 19:30

## 2020-07-05 NOTE — DISCHARGE INSTRUCTIONS
Follow up with your primary care provider in 1-2 days.    Rest.     Increase fluids.     Return to the emergency room for worsening symptoms.

## 2020-07-05 NOTE — ED PROVIDER NOTES
Subjective     History provided by:  Patient   used: No    Shortness of Breath   Severity:  Moderate  Onset quality:  Sudden  Duration:  3 days  Timing:  Constant  Progression:  Waxing and waning  Chronicity:  New  Context: URI    Context: not activity, not fumes and not known allergens    Relieved by:  Nothing  Worsened by:  Activity and coughing  Ineffective treatments:  None tried  Associated symptoms: cough and wheezing    Associated symptoms: no abdominal pain, no chest pain, no claudication, no diaphoresis, no headaches and no hemoptysis    Risk factors comment:  Diabetes, CAD      Review of Systems   Constitutional: Negative.  Negative for diaphoresis.   HENT: Negative.    Eyes: Negative.    Respiratory: Positive for cough, shortness of breath and wheezing. Negative for hemoptysis.    Cardiovascular: Negative.  Negative for chest pain and claudication.   Gastrointestinal: Negative.  Negative for abdominal pain.   Endocrine: Negative.    Genitourinary: Negative.    Musculoskeletal: Negative.    Skin: Negative.    Allergic/Immunologic: Negative.    Neurological: Negative.  Negative for headaches.   Hematological: Negative.    Psychiatric/Behavioral: Negative.        Past Medical History:   Diagnosis Date   • Arthritis    • COPD (chronic obstructive pulmonary disease) (CMS/HCC)    • Coronary artery disease    • Disease of thyroid gland    • Elevated cholesterol    • GERD (gastroesophageal reflux disease)    • High cholesterol    • Hypertension    • Liver disease    • Seizures (CMS/HCC)    • Sleep apnea    • Stroke (CMS/HCC)        Allergies   Allergen Reactions   • Sulfa Antibiotics Hives       Past Surgical History:   Procedure Laterality Date   • BLADDER SLING MODIFIED, ANTERIOR AND POSTERIOR VAGINAL REPAIR  2019   • BRAIN SURGERY     • CARDIAC CATHETERIZATION      x2   • CARDIAC SURGERY      heart stent placed   •  SECTION     • COLONOSCOPY N/A 2018    Procedure:  COLONOSCOPY FOR SCREENING CPT CODE: ;  Surgeon: Jose Lazaro MD;  Location: [facility];  Service: Gastroenterology   • HEMORRHOIDECTOMY         Family History   Problem Relation Age of Onset   • Heart disease Father    • Lung cancer Father        Social History     Socioeconomic History   • Marital status:      Spouse name: Not on file   • Number of children: Not on file   • Years of education: Not on file   • Highest education level: Not on file   Tobacco Use   • Smoking status: Former Smoker   • Smokeless tobacco: Never Used   Substance and Sexual Activity   • Alcohol use: No   • Drug use: No   • Sexual activity: Defer           Objective   Physical Exam   Constitutional: She is oriented to person, place, and time. She appears well-developed and well-nourished.   HENT:   Head: Normocephalic.   Right Ear: External ear normal.   Left Ear: External ear normal.   Mouth/Throat: Oropharynx is clear and moist.   Eyes: Pupils are equal, round, and reactive to light. Conjunctivae and EOM are normal.   Neck: Normal range of motion. Neck supple.   Cardiovascular: Normal rate, regular rhythm, normal heart sounds and intact distal pulses.   Pulmonary/Chest: Effort normal. She has wheezes.   Abdominal: Soft. Bowel sounds are normal.   Musculoskeletal: Normal range of motion.   Neurological: She is alert and oriented to person, place, and time.   Skin: Skin is warm and dry. Capillary refill takes less than 2 seconds.   Psychiatric: She has a normal mood and affect. Her behavior is normal. Thought content normal.   Nursing note and vitals reviewed.      Procedures           ED Course                                           MDM    Final diagnoses:   Pneumonia of right lung due to infectious organism, unspecified part of lung            Andrea Antonio, APRN  07/05/20 2567

## 2020-07-06 PROBLEM — U07.1 PNEUMONIA DUE TO COVID-19 VIRUS: Status: ACTIVE | Noted: 2020-07-06

## 2020-07-06 PROBLEM — J12.82 PNEUMONIA DUE TO COVID-19 VIRUS: Status: ACTIVE | Noted: 2020-07-06

## 2020-07-06 LAB
A-A DO2: 44.9 MMHG (ref 0–300)
ABO GROUP BLD: NORMAL
ALBUMIN SERPL-MCNC: 3.65 G/DL (ref 3.5–5.2)
ALBUMIN/GLOB SERPL: 1.4 G/DL
ALP SERPL-CCNC: 179 U/L (ref 39–117)
ALT SERPL W P-5'-P-CCNC: 33 U/L (ref 1–33)
ANION GAP SERPL CALCULATED.3IONS-SCNC: 16.3 MMOL/L (ref 5–15)
ARTERIAL PATENCY WRIST A: ABNORMAL
AST SERPL-CCNC: 36 U/L (ref 1–32)
ATMOSPHERIC PRESS: 729 MMHG
B PERT DNA SPEC QL NAA+PROBE: NOT DETECTED
BASE EXCESS BLDA CALC-SCNC: -3.8 MMOL/L (ref 0–2)
BASOPHILS # BLD AUTO: 0.04 10*3/MM3 (ref 0–0.2)
BASOPHILS NFR BLD AUTO: 0.6 % (ref 0–1.5)
BDY SITE: ABNORMAL
BILIRUB SERPL-MCNC: 0.3 MG/DL (ref 0.2–1.2)
BLD GP AB SCN SERPL QL: NEGATIVE
BODY TEMPERATURE: 0 C
BUN SERPL-MCNC: 13 MG/DL (ref 8–23)
BUN/CREAT SERPL: 17.1 (ref 7–25)
C PNEUM DNA NPH QL NAA+NON-PROBE: NOT DETECTED
CALCIUM SPEC-SCNC: 8.2 MG/DL (ref 8.6–10.5)
CHLORIDE SERPL-SCNC: 108 MMOL/L (ref 98–107)
CK SERPL-CCNC: 231 U/L (ref 20–180)
CO2 BLDA-SCNC: 21 MMOL/L (ref 22–33)
CO2 SERPL-SCNC: 17.7 MMOL/L (ref 22–29)
COHGB MFR BLD: <0.2 % (ref 0–5)
CREAT SERPL-MCNC: 0.76 MG/DL (ref 0.57–1)
CRP SERPL-MCNC: 11.89 MG/DL (ref 0–0.5)
D DIMER PPP FEU-MCNC: 0.92 MCGFEU/ML (ref 0–0.5)
DEPRECATED RDW RBC AUTO: 51.1 FL (ref 37–54)
EOSINOPHIL # BLD AUTO: 0.01 10*3/MM3 (ref 0–0.4)
EOSINOPHIL NFR BLD AUTO: 0.2 % (ref 0.3–6.2)
ERYTHROCYTE [DISTWIDTH] IN BLOOD BY AUTOMATED COUNT: 14 % (ref 12.3–15.4)
FERRITIN SERPL-MCNC: 93.32 NG/ML (ref 13–150)
FIBRINOGEN PPP-MCNC: 384 MG/DL (ref 173–524)
FLUAV H1 2009 PAND RNA NPH QL NAA+PROBE: NOT DETECTED
FLUAV H1 HA GENE NPH QL NAA+PROBE: NOT DETECTED
FLUAV H3 RNA NPH QL NAA+PROBE: NOT DETECTED
FLUAV SUBTYP SPEC NAA+PROBE: NOT DETECTED
FLUBV RNA ISLT QL NAA+PROBE: NOT DETECTED
GFR SERPL CREATININE-BSD FRML MDRD: 74 ML/MIN/1.73
GLOBULIN UR ELPH-MCNC: 2.6 GM/DL
GLUCOSE SERPL-MCNC: 107 MG/DL (ref 65–99)
HADV DNA SPEC NAA+PROBE: NOT DETECTED
HCO3 BLDA-SCNC: 20 MMOL/L (ref 20–26)
HCOV 229E RNA SPEC QL NAA+PROBE: NOT DETECTED
HCOV HKU1 RNA SPEC QL NAA+PROBE: NOT DETECTED
HCOV NL63 RNA SPEC QL NAA+PROBE: NOT DETECTED
HCOV OC43 RNA SPEC QL NAA+PROBE: NOT DETECTED
HCT VFR BLD AUTO: 41.6 % (ref 34–46.6)
HCT VFR BLD CALC: 40.5 % (ref 38–51)
HGB BLD-MCNC: 12.9 G/DL (ref 12–15.9)
HGB BLDA-MCNC: 13.2 G/DL (ref 13.5–17.5)
HMPV RNA NPH QL NAA+NON-PROBE: NOT DETECTED
HPIV1 RNA SPEC QL NAA+PROBE: NOT DETECTED
HPIV2 RNA SPEC QL NAA+PROBE: NOT DETECTED
HPIV3 RNA NPH QL NAA+PROBE: NOT DETECTED
HPIV4 P GENE NPH QL NAA+PROBE: NOT DETECTED
IMM GRANULOCYTES # BLD AUTO: 0.05 10*3/MM3 (ref 0–0.05)
IMM GRANULOCYTES NFR BLD AUTO: 0.8 % (ref 0–0.5)
INHALED O2 CONCENTRATION: 21 %
LDH SERPL-CCNC: 232 U/L (ref 135–214)
LYMPHOCYTES # BLD AUTO: 1.25 10*3/MM3 (ref 0.7–3.1)
LYMPHOCYTES NFR BLD AUTO: 19.1 % (ref 19.6–45.3)
Lab: ABNORMAL
M PNEUMO IGG SER IA-ACNC: NOT DETECTED
MAGNESIUM SERPL-MCNC: 1.9 MG/DL (ref 1.6–2.4)
MCH RBC QN AUTO: 30.9 PG (ref 26.6–33)
MCHC RBC AUTO-ENTMCNC: 31 G/DL (ref 31.5–35.7)
MCV RBC AUTO: 99.5 FL (ref 79–97)
METHGB BLD QL: 0.3 % (ref 0–3)
MODALITY: ABNORMAL
MONOCYTES # BLD AUTO: 0.59 10*3/MM3 (ref 0.1–0.9)
MONOCYTES NFR BLD AUTO: 9 % (ref 5–12)
NEUTROPHILS NFR BLD AUTO: 4.6 10*3/MM3 (ref 1.7–7)
NEUTROPHILS NFR BLD AUTO: 70.3 % (ref 42.7–76)
NOTE: ABNORMAL
NOTIFIED BY: ABNORMAL
NOTIFIED WHO: ABNORMAL
NRBC BLD AUTO-RTO: 0 /100 WBC (ref 0–0.2)
OXYHGB MFR BLDV: 90.1 % (ref 94–99)
PCO2 BLDA: 32.3 MM HG (ref 35–45)
PCO2 TEMP ADJ BLD: ABNORMAL MM[HG]
PH BLDA: 7.4 PH UNITS (ref 7.35–7.45)
PH, TEMP CORRECTED: ABNORMAL
PHENYTOIN SERPL-MCNC: 11.5 MCG/ML (ref 10–20)
PLATELET # BLD AUTO: 129 10*3/MM3 (ref 140–450)
PMV BLD AUTO: 10.1 FL (ref 6–12)
PO2 BLDA: 61.9 MM HG (ref 83–108)
PO2 TEMP ADJ BLD: ABNORMAL MM[HG]
POTASSIUM SERPL-SCNC: 3 MMOL/L (ref 3.5–5.2)
PROCALCITONIN SERPL-MCNC: 0.04 NG/ML (ref 0.1–0.25)
PROT SERPL-MCNC: 6.2 G/DL (ref 6–8.5)
RBC # BLD AUTO: 4.18 10*6/MM3 (ref 3.77–5.28)
RH BLD: POSITIVE
RHINOVIRUS RNA SPEC NAA+PROBE: NOT DETECTED
RSV RNA NPH QL NAA+NON-PROBE: NOT DETECTED
SAO2 % BLDCOA: 90.2 % (ref 94–99)
SODIUM SERPL-SCNC: 142 MMOL/L (ref 136–145)
T&S EXPIRATION DATE: NORMAL
TSH SERPL DL<=0.05 MIU/L-ACNC: 1.27 UIU/ML (ref 0.27–4.2)
VENTILATOR MODE: ABNORMAL
WBC # BLD AUTO: 6.54 10*3/MM3 (ref 3.4–10.8)

## 2020-07-06 PROCEDURE — 83735 ASSAY OF MAGNESIUM: CPT | Performed by: HOSPITALIST

## 2020-07-06 PROCEDURE — 86900 BLOOD TYPING SEROLOGIC ABO: CPT | Performed by: INTERNAL MEDICINE

## 2020-07-06 PROCEDURE — 82550 ASSAY OF CK (CPK): CPT | Performed by: INTERNAL MEDICINE

## 2020-07-06 PROCEDURE — 25010000002 DEXAMETHASONE PER 1 MG: Performed by: INTERNAL MEDICINE

## 2020-07-06 PROCEDURE — 82805 BLOOD GASES W/O2 SATURATION: CPT

## 2020-07-06 PROCEDURE — 80185 ASSAY OF PHENYTOIN TOTAL: CPT | Performed by: HOSPITALIST

## 2020-07-06 PROCEDURE — 82728 ASSAY OF FERRITIN: CPT | Performed by: INTERNAL MEDICINE

## 2020-07-06 PROCEDURE — 86900 BLOOD TYPING SEROLOGIC ABO: CPT

## 2020-07-06 PROCEDURE — 85379 FIBRIN DEGRADATION QUANT: CPT | Performed by: INTERNAL MEDICINE

## 2020-07-06 PROCEDURE — 86901 BLOOD TYPING SEROLOGIC RH(D): CPT | Performed by: INTERNAL MEDICINE

## 2020-07-06 PROCEDURE — 84443 ASSAY THYROID STIM HORMONE: CPT | Performed by: HOSPITALIST

## 2020-07-06 PROCEDURE — 25010000002 MAGNESIUM SULFATE IN D5W 1G/100ML (PREMIX) 1-5 GM/100ML-% SOLUTION: Performed by: HOSPITALIST

## 2020-07-06 PROCEDURE — 85384 FIBRINOGEN ACTIVITY: CPT | Performed by: INTERNAL MEDICINE

## 2020-07-06 PROCEDURE — 85025 COMPLETE CBC W/AUTO DIFF WBC: CPT | Performed by: INTERNAL MEDICINE

## 2020-07-06 PROCEDURE — 86850 RBC ANTIBODY SCREEN: CPT | Performed by: INTERNAL MEDICINE

## 2020-07-06 PROCEDURE — 25010000002 CEFTRIAXONE PER 250 MG: Performed by: NURSE PRACTITIONER

## 2020-07-06 PROCEDURE — 36600 WITHDRAWAL OF ARTERIAL BLOOD: CPT

## 2020-07-06 PROCEDURE — 86901 BLOOD TYPING SEROLOGIC RH(D): CPT

## 2020-07-06 PROCEDURE — 25010000002 HEPARIN (PORCINE) PER 1000 UNITS: Performed by: INTERNAL MEDICINE

## 2020-07-06 PROCEDURE — 80053 COMPREHEN METABOLIC PANEL: CPT | Performed by: INTERNAL MEDICINE

## 2020-07-06 PROCEDURE — 83615 LACTATE (LD) (LDH) ENZYME: CPT | Performed by: INTERNAL MEDICINE

## 2020-07-06 PROCEDURE — 25010000002 HEPARIN (PORCINE) PER 1000 UNITS: Performed by: HOSPITALIST

## 2020-07-06 PROCEDURE — 86140 C-REACTIVE PROTEIN: CPT | Performed by: INTERNAL MEDICINE

## 2020-07-06 PROCEDURE — 82375 ASSAY CARBOXYHB QUANT: CPT

## 2020-07-06 PROCEDURE — 99232 SBSQ HOSP IP/OBS MODERATE 35: CPT | Performed by: HOSPITALIST

## 2020-07-06 PROCEDURE — 83050 HGB METHEMOGLOBIN QUAN: CPT

## 2020-07-06 RX ORDER — LEVOTHYROXINE SODIUM 0.03 MG/1
25 TABLET ORAL DAILY
Status: CANCELLED | OUTPATIENT
Start: 2020-07-06

## 2020-07-06 RX ORDER — TOPIRAMATE 100 MG/1
100 TABLET, FILM COATED ORAL EVERY 12 HOURS SCHEDULED
Status: DISCONTINUED | OUTPATIENT
Start: 2020-07-06 | End: 2020-07-17 | Stop reason: HOSPADM

## 2020-07-06 RX ORDER — PHENYTOIN SODIUM 100 MG/1
200 CAPSULE, EXTENDED RELEASE ORAL EVERY MORNING
Status: DISCONTINUED | OUTPATIENT
Start: 2020-07-06 | End: 2020-07-15

## 2020-07-06 RX ORDER — FUROSEMIDE 20 MG/1
20 TABLET ORAL DAILY
Status: CANCELLED | OUTPATIENT
Start: 2020-07-06

## 2020-07-06 RX ORDER — CYCLOBENZAPRINE HCL 10 MG
10 TABLET ORAL NIGHTLY
Status: CANCELLED | OUTPATIENT
Start: 2020-07-06

## 2020-07-06 RX ORDER — DIAZEPAM 5 MG/1
4 TABLET ORAL NIGHTLY
Status: DISCONTINUED | OUTPATIENT
Start: 2020-07-06 | End: 2020-07-15

## 2020-07-06 RX ORDER — ASPIRIN 81 MG/1
81 TABLET ORAL NIGHTLY
Status: CANCELLED | OUTPATIENT
Start: 2020-07-06

## 2020-07-06 RX ORDER — PRAMIPEXOLE DIHYDROCHLORIDE 1 MG/1
1 TABLET ORAL NIGHTLY
Status: DISCONTINUED | OUTPATIENT
Start: 2020-07-06 | End: 2020-07-17 | Stop reason: HOSPADM

## 2020-07-06 RX ORDER — PHENYTOIN SODIUM 100 MG/1
100 CAPSULE, EXTENDED RELEASE ORAL NIGHTLY
Status: CANCELLED | OUTPATIENT
Start: 2020-07-06

## 2020-07-06 RX ORDER — ROSUVASTATIN CALCIUM 10 MG/1
10 TABLET, COATED ORAL NIGHTLY
COMMUNITY
End: 2020-07-17 | Stop reason: HOSPADM

## 2020-07-06 RX ORDER — FUROSEMIDE 20 MG/1
20 TABLET ORAL DAILY
COMMUNITY
End: 2020-07-17 | Stop reason: HOSPADM

## 2020-07-06 RX ORDER — DOCUSATE SODIUM 100 MG/1
200 CAPSULE, LIQUID FILLED ORAL 2 TIMES DAILY PRN
Status: DISCONTINUED | OUTPATIENT
Start: 2020-07-06 | End: 2020-07-15

## 2020-07-06 RX ORDER — PHENYTOIN SODIUM 100 MG/1
100 CAPSULE, EXTENDED RELEASE ORAL EVERY EVENING
Status: DISCONTINUED | OUTPATIENT
Start: 2020-07-06 | End: 2020-07-15

## 2020-07-06 RX ORDER — CYCLOBENZAPRINE HCL 10 MG
10 TABLET ORAL NIGHTLY PRN
Status: DISCONTINUED | OUTPATIENT
Start: 2020-07-06 | End: 2020-07-15

## 2020-07-06 RX ORDER — DOCUSATE SODIUM 250 MG
250 CAPSULE ORAL 2 TIMES DAILY PRN
Status: CANCELLED | OUTPATIENT
Start: 2020-07-06

## 2020-07-06 RX ORDER — PANTOPRAZOLE SODIUM 40 MG/1
40 TABLET, DELAYED RELEASE ORAL EVERY MORNING
Status: CANCELLED | OUTPATIENT
Start: 2020-07-07

## 2020-07-06 RX ORDER — ALBUTEROL SULFATE 90 UG/1
2 AEROSOL, METERED RESPIRATORY (INHALATION) EVERY 6 HOURS PRN
Status: DISCONTINUED | OUTPATIENT
Start: 2020-07-06 | End: 2020-07-06

## 2020-07-06 RX ORDER — HYDROCODONE BITARTRATE AND ACETAMINOPHEN 7.5; 325 MG/1; MG/1
1 TABLET ORAL 2 TIMES DAILY PRN
Status: CANCELLED | OUTPATIENT
Start: 2020-07-06

## 2020-07-06 RX ORDER — HYDROCODONE BITARTRATE AND ACETAMINOPHEN 7.5; 325 MG/1; MG/1
1 TABLET ORAL 2 TIMES DAILY PRN
Status: DISCONTINUED | OUTPATIENT
Start: 2020-07-06 | End: 2020-07-15

## 2020-07-06 RX ORDER — ROSUVASTATIN CALCIUM 10 MG/1
10 TABLET, COATED ORAL NIGHTLY
Status: CANCELLED | OUTPATIENT
Start: 2020-07-06

## 2020-07-06 RX ORDER — BENZONATATE 100 MG/1
200 CAPSULE ORAL EVERY 8 HOURS SCHEDULED
Status: COMPLETED | OUTPATIENT
Start: 2020-07-06 | End: 2020-07-08

## 2020-07-06 RX ORDER — HEPARIN SODIUM 5000 [USP'U]/ML
5000 INJECTION, SOLUTION INTRAVENOUS; SUBCUTANEOUS EVERY 8 HOURS SCHEDULED
Status: DISCONTINUED | OUTPATIENT
Start: 2020-07-06 | End: 2020-07-07

## 2020-07-06 RX ORDER — VENLAFAXINE HYDROCHLORIDE 75 MG/1
75 CAPSULE, EXTENDED RELEASE ORAL EVERY EVENING
Status: CANCELLED | OUTPATIENT
Start: 2020-07-06

## 2020-07-06 RX ORDER — AMITRIPTYLINE HYDROCHLORIDE 50 MG/1
50 TABLET, FILM COATED ORAL NIGHTLY
Status: CANCELLED | OUTPATIENT
Start: 2020-07-06

## 2020-07-06 RX ORDER — BENZONATATE 100 MG/1
100 CAPSULE ORAL 3 TIMES DAILY PRN
Status: DISCONTINUED | OUTPATIENT
Start: 2020-07-06 | End: 2020-07-06

## 2020-07-06 RX ORDER — POTASSIUM CHLORIDE 20 MEQ/1
20 TABLET, EXTENDED RELEASE ORAL DAILY
Status: CANCELLED | OUTPATIENT
Start: 2020-07-07

## 2020-07-06 RX ORDER — NIFEDIPINE 10 MG/1
10 CAPSULE ORAL DAILY
Status: CANCELLED | OUTPATIENT
Start: 2020-07-06

## 2020-07-06 RX ORDER — ALBUTEROL SULFATE 90 UG/1
2 AEROSOL, METERED RESPIRATORY (INHALATION) EVERY 6 HOURS
Status: DISCONTINUED | OUTPATIENT
Start: 2020-07-06 | End: 2020-07-15

## 2020-07-06 RX ORDER — AMITRIPTYLINE HYDROCHLORIDE 25 MG/1
25 TABLET, FILM COATED ORAL NIGHTLY
Status: DISCONTINUED | OUTPATIENT
Start: 2020-07-06 | End: 2020-07-08

## 2020-07-06 RX ORDER — TOPIRAMATE 100 MG/1
100 TABLET, FILM COATED ORAL EVERY MORNING
Status: CANCELLED | OUTPATIENT
Start: 2020-07-07

## 2020-07-06 RX ORDER — MAGNESIUM SULFATE 1 G/100ML
1 INJECTION INTRAVENOUS ONCE
Status: COMPLETED | OUTPATIENT
Start: 2020-07-06 | End: 2020-07-06

## 2020-07-06 RX ORDER — FAMOTIDINE 20 MG/1
20 TABLET, FILM COATED ORAL
Status: DISCONTINUED | OUTPATIENT
Start: 2020-07-06 | End: 2020-07-15

## 2020-07-06 RX ORDER — ACETAMINOPHEN 325 MG/1
650 TABLET ORAL EVERY 6 HOURS PRN
Status: DISCONTINUED | OUTPATIENT
Start: 2020-07-06 | End: 2020-07-17 | Stop reason: HOSPADM

## 2020-07-06 RX ORDER — L.ACID,PARA/B.BIFIDUM/S.THERM 8B CELL
1 CAPSULE ORAL DAILY
Status: DISCONTINUED | OUTPATIENT
Start: 2020-07-06 | End: 2020-07-17 | Stop reason: HOSPADM

## 2020-07-06 RX ORDER — LISINOPRIL 10 MG/1
20 TABLET ORAL DAILY
Status: CANCELLED | OUTPATIENT
Start: 2020-07-06

## 2020-07-06 RX ORDER — VENLAFAXINE HYDROCHLORIDE 75 MG/1
75 CAPSULE, EXTENDED RELEASE ORAL EVERY EVENING
Status: DISCONTINUED | OUTPATIENT
Start: 2020-07-06 | End: 2020-07-17 | Stop reason: HOSPADM

## 2020-07-06 RX ORDER — ASPIRIN 81 MG/1
81 TABLET ORAL NIGHTLY
Status: DISCONTINUED | OUTPATIENT
Start: 2020-07-06 | End: 2020-07-17 | Stop reason: HOSPADM

## 2020-07-06 RX ORDER — GUAIFENESIN/DEXTROMETHORPHAN 100-10MG/5
5 SYRUP ORAL EVERY 4 HOURS PRN
Status: DISCONTINUED | OUTPATIENT
Start: 2020-07-06 | End: 2020-07-10

## 2020-07-06 RX ORDER — NIFEDIPINE 10 MG/1
10 CAPSULE ORAL DAILY
COMMUNITY
End: 2020-07-17 | Stop reason: HOSPADM

## 2020-07-06 RX ORDER — CALCIUM POLYCARBOPHIL 625 MG 625 MG/1
625 TABLET ORAL DAILY
Status: CANCELLED | OUTPATIENT
Start: 2020-07-06

## 2020-07-06 RX ORDER — LEVOTHYROXINE SODIUM 0.03 MG/1
25 TABLET ORAL DAILY
Status: DISCONTINUED | OUTPATIENT
Start: 2020-07-06 | End: 2020-07-17 | Stop reason: HOSPADM

## 2020-07-06 RX ORDER — PHENYTOIN SODIUM 100 MG/1
200 CAPSULE, EXTENDED RELEASE ORAL EVERY MORNING
Status: CANCELLED | OUTPATIENT
Start: 2020-07-07

## 2020-07-06 RX ORDER — POTASSIUM CHLORIDE 20 MEQ/1
40 TABLET, EXTENDED RELEASE ORAL EVERY 4 HOURS
Status: COMPLETED | OUTPATIENT
Start: 2020-07-06 | End: 2020-07-06

## 2020-07-06 RX ORDER — ALBUTEROL SULFATE 2.5 MG/3ML
2.5 SOLUTION RESPIRATORY (INHALATION) EVERY 6 HOURS PRN
Status: DISCONTINUED | OUTPATIENT
Start: 2020-07-06 | End: 2020-07-06

## 2020-07-06 RX ORDER — POTASSIUM CHLORIDE 20 MEQ/1
20 TABLET, EXTENDED RELEASE ORAL DAILY
Status: DISCONTINUED | OUTPATIENT
Start: 2020-07-07 | End: 2020-07-10

## 2020-07-06 RX ORDER — ROSUVASTATIN CALCIUM 10 MG/1
10 TABLET, COATED ORAL NIGHTLY
Status: DISCONTINUED | OUTPATIENT
Start: 2020-07-06 | End: 2020-07-09

## 2020-07-06 RX ORDER — ERGOCALCIFEROL 1.25 MG/1
50000 CAPSULE ORAL WEEKLY
Status: CANCELLED | OUTPATIENT
Start: 2020-07-06

## 2020-07-06 RX ORDER — DIAZEPAM 5 MG/1
5 TABLET ORAL NIGHTLY
Status: CANCELLED | OUTPATIENT
Start: 2020-07-06

## 2020-07-06 RX ORDER — DEXAMETHASONE SODIUM PHOSPHATE 4 MG/ML
6 INJECTION, SOLUTION INTRA-ARTICULAR; INTRALESIONAL; INTRAMUSCULAR; INTRAVENOUS; SOFT TISSUE DAILY
Status: DISCONTINUED | OUTPATIENT
Start: 2020-07-06 | End: 2020-07-17 | Stop reason: HOSPADM

## 2020-07-06 RX ORDER — PRAMIPEXOLE DIHYDROCHLORIDE 1 MG/1
1 TABLET ORAL NIGHTLY
Status: CANCELLED | OUTPATIENT
Start: 2020-07-06

## 2020-07-06 RX ADMIN — PRAMIPEXOLE DIHYDROCHLORIDE 1 MG: 1 TABLET ORAL at 22:41

## 2020-07-06 RX ADMIN — SODIUM CHLORIDE, PRESERVATIVE FREE 10 ML: 5 INJECTION INTRAVENOUS at 07:51

## 2020-07-06 RX ADMIN — Medication 1 CAPSULE: at 11:16

## 2020-07-06 RX ADMIN — BENZONATATE 200 MG: 100 CAPSULE, LIQUID FILLED ORAL at 22:41

## 2020-07-06 RX ADMIN — PHENYTOIN SODIUM 200 MG: 100 CAPSULE, EXTENDED RELEASE ORAL at 12:28

## 2020-07-06 RX ADMIN — DEXTROMETHORPHAN HYDROBROMIDE AND GUAIFENESIN 5 ML: 10; 100 LIQUID ORAL at 15:04

## 2020-07-06 RX ADMIN — ROSUVASTATIN CALCIUM 10 MG: 10 TABLET, FILM COATED ORAL at 22:41

## 2020-07-06 RX ADMIN — SODIUM CHLORIDE, PRESERVATIVE FREE 10 ML: 5 INJECTION INTRAVENOUS at 01:31

## 2020-07-06 RX ADMIN — POTASSIUM CHLORIDE 40 MEQ: 1500 TABLET, EXTENDED RELEASE ORAL at 15:05

## 2020-07-06 RX ADMIN — ACETAMINOPHEN 650 MG: 325 TABLET ORAL at 22:48

## 2020-07-06 RX ADMIN — HEPARIN SODIUM 5000 UNITS: 5000 INJECTION INTRAVENOUS; SUBCUTANEOUS at 22:42

## 2020-07-06 RX ADMIN — HEPARIN SODIUM 5000 UNITS: 5000 INJECTION INTRAVENOUS; SUBCUTANEOUS at 01:31

## 2020-07-06 RX ADMIN — PHENYTOIN SODIUM 100 MG: 100 CAPSULE, EXTENDED RELEASE ORAL at 17:58

## 2020-07-06 RX ADMIN — POTASSIUM CHLORIDE 40 MEQ: 1500 TABLET, EXTENDED RELEASE ORAL at 11:16

## 2020-07-06 RX ADMIN — ALBUTEROL SULFATE 2 PUFF: 90 AEROSOL, METERED RESPIRATORY (INHALATION) at 22:40

## 2020-07-06 RX ADMIN — MAGNESIUM GLUCONATE 500 MG ORAL TABLET 400 MG: 500 TABLET ORAL at 15:05

## 2020-07-06 RX ADMIN — TOPIRAMATE 100 MG: 100 TABLET, FILM COATED ORAL at 22:41

## 2020-07-06 RX ADMIN — LEVOTHYROXINE SODIUM 25 MCG: 25 TABLET ORAL at 15:04

## 2020-07-06 RX ADMIN — SODIUM CHLORIDE, PRESERVATIVE FREE 10 ML: 5 INJECTION INTRAVENOUS at 22:42

## 2020-07-06 RX ADMIN — PSYLLIUM HUSK 1 PACKET: 3.4 POWDER ORAL at 15:03

## 2020-07-06 RX ADMIN — HEPARIN SODIUM 5000 UNITS: 5000 INJECTION INTRAVENOUS; SUBCUTANEOUS at 15:04

## 2020-07-06 RX ADMIN — CEFTRIAXONE 2 G: 2 INJECTION, POWDER, FOR SOLUTION INTRAMUSCULAR; INTRAVENOUS at 18:04

## 2020-07-06 RX ADMIN — AMITRIPTYLINE HYDROCHLORIDE 25 MG: 25 TABLET, FILM COATED ORAL at 22:42

## 2020-07-06 RX ADMIN — VENLAFAXINE HYDROCHLORIDE 75 MG: 75 CAPSULE, EXTENDED RELEASE ORAL at 17:57

## 2020-07-06 RX ADMIN — DEXTROMETHORPHAN HYDROBROMIDE AND GUAIFENESIN 5 ML: 10; 100 LIQUID ORAL at 22:42

## 2020-07-06 RX ADMIN — FAMOTIDINE 20 MG: 20 TABLET ORAL at 07:51

## 2020-07-06 RX ADMIN — BENZONATATE 200 MG: 100 CAPSULE, LIQUID FILLED ORAL at 15:05

## 2020-07-06 RX ADMIN — DEXAMETHASONE SODIUM PHOSPHATE 6 MG: 4 INJECTION, SOLUTION INTRA-ARTICULAR; INTRALESIONAL; INTRAMUSCULAR; INTRAVENOUS; SOFT TISSUE at 05:38

## 2020-07-06 RX ADMIN — ACETAMINOPHEN 650 MG: 325 TABLET ORAL at 01:38

## 2020-07-06 RX ADMIN — DOXYCYCLINE 100 MG: 100 INJECTION, POWDER, LYOPHILIZED, FOR SOLUTION INTRAVENOUS at 11:16

## 2020-07-06 RX ADMIN — ALBUTEROL SULFATE 2 PUFF: 90 AEROSOL, METERED RESPIRATORY (INHALATION) at 15:03

## 2020-07-06 RX ADMIN — DOXYCYCLINE 100 MG: 100 INJECTION, POWDER, LYOPHILIZED, FOR SOLUTION INTRAVENOUS at 01:30

## 2020-07-06 RX ADMIN — DIAZEPAM 3.75 MG: 5 TABLET ORAL at 22:42

## 2020-07-06 RX ADMIN — OFLOXACIN 50000 UNITS: 300 TABLET, COATED ORAL at 15:04

## 2020-07-06 RX ADMIN — MAGNESIUM SULFATE IN DEXTROSE 1 G: 10 INJECTION, SOLUTION INTRAVENOUS at 11:16

## 2020-07-06 RX ADMIN — FAMOTIDINE 20 MG: 20 TABLET ORAL at 17:57

## 2020-07-06 RX ADMIN — ASPIRIN 81 MG: 81 TABLET, COATED ORAL at 22:41

## 2020-07-06 NOTE — PROGRESS NOTES
[facility] HOSPITALIST PROGRESS NOTE     Patient Identification:  Name:  Siria Rueda  Age:  74 y.o.  Sex:  female  :  1945  MRN:  [id number]  Visit Number:  [id number]  ROOM: [address]     Primary Care Provider:  Cruzito Palumbo MD    Length of stay:  1     This was an audio and video enabled telemedicine encounter.    Subjective        Chief Compliant Follow up for the Shortness of breath and cough    Patient seen and examined this morning with KEZIA Santizo at the bedside by telemedicine.  Complains of being short of breath and having dry cough.  Feels tired and fatigued.  Denies any chest pain.  Denies any nausea vomiting abdominal pain.  Would like to use her home CPAP at night.  Currently on room air.  Placed on 2 L nasal cannula.  Afebrile and no events overnight.       Objective     Current Hospital Meds:    albuterol sulfate HFA 2 puff Inhalation Q6H   amitriptyline 25 mg Oral Nightly   aspirin 81 mg Oral Nightly   benzonatate 200 mg Oral Q8H   cefTRIAXone 2 g Intravenous Q24H   cholecalciferol 50,000 Units Oral Weekly   dexamethasone 6 mg Intravenous Daily   diazePAM 3.75 mg Oral Nightly   doxycycline 100 mg Intravenous Q12H   famotidine 20 mg Oral BID AC   heparin (porcine) 5,000 Units Subcutaneous Q8H   lactobacillus acidophilus 1 capsule Oral Daily   levothyroxine 25 mcg Oral Daily   magnesium oxide 400 mg Oral Daily   phenytoin 100 mg Oral Q PM   phenytoin 200 mg Oral QAM   [START ON 2020] potassium chloride 20 mEq Oral Daily   pramipexole 1 mg Oral Nightly   psyllium 1 packet Oral Daily   rosuvastatin 10 mg Oral Nightly   sodium chloride 10 mL Intravenous Q12H   topiramate 100 mg Oral Q12H   venlafaxine XR 75 mg Oral Q PM      ----------------------------------------------------------------------------------------------------------------------  Vital Signs:  Temp:  [97 °F (36.1 °C)-100.3 °F (37.9 °C)] 99.1 °F (37.3 °C)  Heart Rate:  [] 86  Resp:  [13-28] 20  BP:  ()/(46-84) 109/52  SpO2:  [89 %-99 %] 99 %  on  Flow (L/min):  [2] 2;   Device (Oxygen Therapy): nasal cannula  Body mass index is 32.85 kg/m².    Wt Readings from Last 3 Encounters:   07/05/20 84.1 kg (185 lb 6 oz)   11/16/19 76.5 kg (168 lb 11.2 oz)   07/10/19 78.5 kg (173 lb 1 oz)       Intake/Output Summary (Last 24 hours) at 7/6/2020 1949  Last data filed at 7/6/2020 1834  Gross per 24 hour   Intake 3007.07 ml   Output --   Net 3007.07 ml     Diet Regular; Cardiac  ----------------------------------------------------------------------------------------------------------------------  Physical exam(by telemedicine with KEZIA Santizo):  General: Comfortable, Not in distress.  Well-developed and well-nourished.   HENT:  Head:  Normocephalic and atraumatic.  Mouth:  Moist mucous membranes.    Eyes:  Conjunctivae and EOM are normal.  Pupils are equal, round, and reactive to light.    Cardiovascular:  Normal rate  Pulmonary/Chest:  No respiratory distress, no wheezes, no crackles, with decrease breath sounds and decrease air movement.  Abdomen:  Soft. No distension and no tenderness.   Neurological:  Alert and oriented to person, place, and time.  No cranial nerve deficit. No focal deficits. No facial droop.  No slurred speech.   Peripheral vascular:  no edema.  Genitourinary: no hart  ----------------------------------------------------------------------------------------------------------------------  ----------------------------------------------------------------------------------------------------------------------  Results from last 7 days   Lab Units 07/06/20  0548 07/05/20  1532   CRP mg/dL 11.89* 6.46*   LACTATE mmol/L  --  1.3   WBC 10*3/mm3 6.54 7.09   HEMOGLOBIN g/dL 12.9 13.1   HEMATOCRIT % 41.6 41.4   MCV fL 99.5* 98.8*   MCHC g/dL 31.0* 31.6   PLATELETS 10*3/mm3 129* 147     Results from last 7 days   Lab Units 07/06/20  0548 07/05/20  1532   SODIUM mmol/L 142 145   POTASSIUM mmol/L 3.0* 3.6    MAGNESIUM mg/dL 1.9  --    CHLORIDE mmol/L 108* 107   CO2 mmol/L 17.7* 23.1   BUN mg/dL 13 12   CREATININE mg/dL 0.76 0.84   EGFR IF NONAFRICN AM mL/min/1.73 74 66   CALCIUM mg/dL 8.2* 8.6   GLUCOSE mg/dL 107* 94   ALBUMIN g/dL 3.65 4.23   BILIRUBIN mg/dL 0.3 0.2   ALK PHOS U/L 179* 195*   AST (SGOT) U/L 36* 32   ALT (SGPT) U/L 33 37*   Estimated Creatinine Clearance: 63.4 mL/min (by C-G formula based on SCr of 0.76 mg/dL).  Results from last 7 days   Lab Units 07/06/20  0548 07/05/20  1712   CK TOTAL U/L 231*  --    TROPONIN T ng/mL  --  <0.010     No results found for: HGBA1C, POCGLU  No results found for: AMMONIA      No results found for: BLOODCXNo results found for: RESPCXNo results found for: URINECXNo results found for: WOUNDCXNo results found for: BODYFLDCXNo results found for: STOOLCX  I have personally looked at the labs and they are summarized above.  ----------------------------------------------------------------------------------------------------------------------  Imaging Results (Last 24 Hours)     ** No results found for the last 24 hours. **        I have personally reviewed the radiology images and read the final radiology report.    Assessment & Plan      Assessment:  Sepsis  COVID 19 positive  R Pneumonia  Acute hypoxic respiratory failure  Hypokalemia  COPD  Essential HTN  CAD, S/P PCI  Seizure disorder  GIOVANNI, on CPAP  Depression/Anxiety  Obesity    Plan:  Sepsis secondary to COVID 19 positive and R Pneumonia.  Continue with IV antibiotics Rocephin doxycycline.  On IV Decadron daily for hypoxia.  Start on lactobacillus.  Currently patient is afebrile and VSS.  CRP trending up and no leukocytosis.  Procalcitonin negative. LDH minimally elevated.  Normal ferritin.  D-dimer minimally elevated and stable.  Normal fibrinogen.  Prelim blood cultures are negative.  Respiratory panel PCR ordered and negative. Check Legionella urine antigen.  Respiratory culture ordered.     Acute hypoxic respiratory  failure, ABG repeated and reviewed.  I will place the patient on 2 L nasal cannula.    Hypokalemia, replace and monitor.  Will resume home on a daily supplement.    COPD, continue with albuterol MDI every 6 hours.  No duo nebs because of COVID-19 positive.    Essential HTN, controlled.  Hold home Lasix nifedipine and lisinopril.    CAD, S/P PCI, stable.  Troponin negative.  Continue with home aspirin And Crestor.    Seizure disorder, continue with home phenytoin regimen.  Ferritin level checked and within normal limit.    GIOVANNI, on CPAP, resume home CPAP.    Depression/Anxiety, resume home amitriptyline and Valium but will decrease the dose.  Continue with home Topamax Effexor.    Heparin subcu for DVT prophylaxis.  Pepcid for GI prophylaxis.    Management plan discussed in detail with patient and nursing.     The patient is high risk due to the following diagnoses/reasons:  Sepsis  COVID 19 positive, R Pneumonia, Acute hypoxic respiratory failure    Isi Fowler MD  07/06/20  19:49

## 2020-07-06 NOTE — PLAN OF CARE
Problem: Patient Care Overview  Goal: Plan of Care Review  7/6/2020 1446 by Britt Ernst RNA  Outcome: Ongoing (interventions implemented as appropriate)  Flowsheets (Taken 7/6/2020 1446)  Plan of Care Reviewed With: patient  7/6/2020 1444 by Britt Ernst RNA  Outcome: Ongoing (interventions implemented as appropriate)  Flowsheets (Taken 7/6/2020 1444)  Progress: no change  Plan of Care Reviewed With: patient  Note:   PT has been up ad ariadna throughout the day. PT complains of her cough, and being tired. Doctor notified and has medications to help relieve this. PT's family should be bringing her CPAP to help PT rest at night. Will Continue to Monitor.  Goal: Individualization and Mutuality  7/6/2020 1446 by Britt Ernst RNA  Outcome: Ongoing (interventions implemented as appropriate)  7/6/2020 1444 by Britt Ernst RNA  Outcome: Ongoing (interventions implemented as appropriate)  Goal: Discharge Needs Assessment  7/6/2020 1446 by Britt Ernst RNA  Outcome: Ongoing (interventions implemented as appropriate)  7/6/2020 1444 by Britt Ernst RNA  Outcome: Ongoing (interventions implemented as appropriate)  Goal: Interprofessional Rounds/Family Conf  7/6/2020 1446 by Britt Ernst RNA  Outcome: Ongoing (interventions implemented as appropriate)  7/6/2020 1444 by Britt Ernst RNA  Outcome: Ongoing (interventions implemented as appropriate)     Problem: Fall Risk (Adult)  Goal: Identify Related Risk Factors and Signs and Symptoms  7/6/2020 1446 by Britt Ernst RNA  Outcome: Ongoing (interventions implemented as appropriate)  7/6/2020 1444 by Britt Ernst RNA  Outcome: Ongoing (interventions implemented as appropriate)  Goal: Absence of Fall  7/6/2020 1446 by Britt Ernst RNA  Outcome: Ongoing (interventions implemented as appropriate)  7/6/2020 1444 by Britt Ernst, RNA  Outcome: Ongoing (interventions implemented as appropriate)     Problem: Pain, Chronic (Adult)  Goal: Identify  Related Risk Factors and Signs and Symptoms  7/6/2020 1446 by Britt Ernst, RNA  Outcome: Ongoing (interventions implemented as appropriate)  7/6/2020 1444 by Britt Ernst, RNA  Outcome: Ongoing (interventions implemented as appropriate)  Goal: Acceptable Pain/Comfort Level and Functional Ability  7/6/2020 1446 by Britt Ernst, RNA  Outcome: Ongoing (interventions implemented as appropriate)  7/6/2020 1444 by Britt Ernst, RNA  Outcome: Ongoing (interventions implemented as appropriate)

## 2020-07-06 NOTE — CONSULTS
INFECTIOUS DISEASE CONSULTATION REPORT        Patient Identification:  Name:  Siria Rueda  Age:  74 y.o.  Sex:  female  :  1945  MRN:  [id number]   Visit Number:  [id number]  Primary Care Physician:  Cruzito Palumbo MD       LOS: 1 day        Subjective       Subjective     History of present illness:      Thank you Dr. Mathis for allowing us to participate in the care of your patient.  As you well know, Ms. Siria Rueda is a 74 y.o. female with past medical history significant for COPD, history of tobacco abuse, obstructive sleep apnea, compliant with CPAP, coronary artery disease status post previous coronary artery stenting x1 and seizure disorder, who presented to [facility] Emergency Department on 2020 for shortness of air and cough. Patient has chronic cough that has had worsening over the last few days. She has also reported that she has had progressive shortness of breath. Patient denied any known sick contact. T-max of 100.3. WBC normal. CRP 11.89. Ferritin 93.32. Lactate 1.3.  D-dimer 0.92. .  proBNP 102.3.  Respiratory panel PCR negative.  Influenza screen negative.  COVID-19 PCR positive.  Blood cultures on 2020 pending.  Chest x-ray on 2020 reveals no radiographic evidence of acute cardiac or pulmonary disease-per radiology.  CT chest on 2020 reveals no evidence of pulmonary embolism.  Likely underlying chronic lung disease with emphysematous changes at apices in the area of air trapping. Patchy airspace disease in the right middle lobe and lingula is nonspecific. Prominent mediastinal and right hilar lymph nodes not significantly changed. One AP window lymph node is mildly enlarged at 1.1 cm short axis dimension but unchanged-per radiology.       Infectious Disease consultation was requested for antimicrobial management.      ---------------------------------------------------------------------------------------------------------------------     Review  Of Systems:    Constitutional: Positive for fever and fatigue.  Eyes: no eye drainage, itching or redness.  HEENT: no mouth sores, dysphagia or nose bleed.  Respiratory: Positive for shortness of breath and croupy cough.  Cardiovascular: no chest pain, no palpitations, no orthopnea.  Gastrointestinal: no nausea, vomiting or diarrhea. No abdominal pain, hematemesis or rectal bleeding.  Genitourinary: no dysuria or polyuria.  Hematologic/lymphatic: no lymph node abnormalities, no easy bruising or easy bleeding.  Musculoskeletal: Positive for myalgias.  Skin: No rash and no itching.  Neurological: no loss of consciousness, no seizure, no headache.  Behavioral/Psych: no depression or suicidal ideation.  Endocrine: no hot flashes.  Immunologic: negative.    ---------------------------------------------------------------------------------------------------------------------     Past Medical History    Past Medical History:   Diagnosis Date   • Arthritis    • COPD (chronic obstructive pulmonary disease) (CMS/HCC)    • Coronary artery disease    • Disease of thyroid gland    • Elevated cholesterol    • GERD (gastroesophageal reflux disease)    • High cholesterol    • Hypertension    • Liver disease    • Seizures (CMS/HCC)    • Sleep apnea    • Stroke (CMS/HCC)        Past Surgical History    Past Surgical History:   Procedure Laterality Date   • BLADDER SLING MODIFIED, ANTERIOR AND POSTERIOR VAGINAL REPAIR  2019   • BRAIN SURGERY     • CARDIAC CATHETERIZATION      x2   • CARDIAC SURGERY      heart stent placed   •  SECTION     • COLONOSCOPY N/A 2018    Procedure: COLONOSCOPY FOR SCREENING CPT CODE: ;  Surgeon: Jose Lazaro MD;  [facility];  Service: Gastroenterology   • HEMORRHOIDECTOMY         Family History    Family History   Problem Relation Age of Onset   • Heart disease Father    • Lung cancer Father        Social History    Social History     Tobacco Use   • Smoking status:  Former Smoker   • Smokeless tobacco: Never Used   Substance Use Topics   • Alcohol use: No   • Drug use: No       Allergies    Sulfa antibiotics  ---------------------------------------------------------------------------------------------------------------------     Home Medications:    Prior to Admission Medications     Prescriptions Last Dose Informant Patient Reported? Taking?    amitriptyline (ELAVIL) 50 MG tablet 7/4/2020 Self Yes Yes    Take 50 mg by mouth Every Night.    aspirin 81 MG EC tablet 7/4/2020 Self Yes Yes    Take 81 mg by mouth Every Night.    calcium polycarbophil (FIBERCON) 625 MG tablet 7/5/2020 Self Yes Yes    Take 625 mg by mouth Daily.    cyclobenzaprine (FLEXERIL) 10 MG tablet 7/4/2020 Self Yes Yes    Take 10 mg by mouth Every Night.    diazePAM (VALIUM) 5 MG tablet 7/4/2020 Self Yes Yes    Take 5 mg by mouth Every Night.    docusate sodium (COLACE) 250 MG capsule Past Week Self Yes Yes    Take 250 mg by mouth 2 (Two) Times a Day As Needed for Constipation.    ezetimibe (ZETIA) 10 MG tablet 7/5/2020 Self Yes Yes    Take 10 mg by mouth Daily.    Fluticasone-Umeclidin-Vilant (Trelegy Ellipta) 100-62.5-25 MCG/INH aerosol powder  7/5/2020  Yes Yes    Inhale 1 puff Daily.    furosemide (LASIX) 20 MG tablet 7/4/2020 Pharmacy Yes Yes    Take 20 mg by mouth Daily.    lansoprazole (PREVACID) 30 MG capsule 7/5/2020 Self Yes Yes    Take 30 mg by mouth Every Morning.    levothyroxine (SYNTHROID) 25 MCG tablet 7/5/2020 Self Yes Yes    Take 25 mcg by mouth Daily.    magnesium oxide (MAGOX) 400 (241.3 Mg) MG tablet tablet 7/5/2020 Self Yes Yes    Take 400 mg by mouth Daily.    NIFEdipine (PROCARDIA) 10 MG capsule 7/5/2020 Pharmacy Yes Yes    Take 10 mg by mouth Daily.    phenytoin (DILANTIN) 100 MG ER capsule 7/5/2020 Self Yes Yes    Take 2 capsules by mouth Every Morning.    phenytoin (DILANTIN) 100 MG ER capsule 7/4/2020 Self Yes Yes    Take 100 mg by mouth Every Night.    potassium chloride  (K-DUR,KLOR-CON) 20 MEQ CR tablet 7/5/2020 Self Yes Yes    Take 20 mEq by mouth Daily.    pramipexole (MIRAPEX) 0.5 MG tablet 7/4/2020 Self Yes Yes    Take 1 mg by mouth Every Night.    rosuvastatin (CRESTOR) 10 MG tablet 7/4/2020  Yes Yes    Take 10 mg by mouth Every Night.    topiramate (TOPAMAX) 100 MG tablet 7/5/2020 Self Yes Yes    Take 100 mg by mouth Every Morning.    topiramate (TOPAMAX) 100 MG tablet 7/4/2020 Self Yes Yes    Take 150 mg by mouth Every Night.    venlafaxine XR (EFFEXOR-XR) 75 MG 24 hr capsule 7/4/2020 Self Yes Yes    Take 75 mg by mouth Every Evening.    vitamin D (ERGOCALCIFEROL) 1.25 MG (50531 UT) capsule capsule Past Week Self Yes Yes    Take 50,000 Units by mouth 1 (One) Time Per Week. Mondays.    HYDROcodone-acetaminophen (NORCO) 7.5-325 MG per tablet Unknown Self Yes No    Take 1 tablet by mouth 2 (Two) Times a Day As Needed for Moderate Pain .    lisinopril (PRINIVIL,ZESTRIL) 20 MG tablet  Self Yes No    Take 20 mg by mouth Daily.    risedronate (ACTONEL) 150 MG tablet Unknown Self Yes No    Take 150 mg by mouth Every 30 (Thirty) Days. with water on empty stomach, nothing by mouth or lie down for next 30 minutes.        ---------------------------------------------------------------------------------------------------------------------    Objective       Objective     Hospital Scheduled Meds:    cefTRIAXone 2 g Intravenous Q24H   dexamethasone 6 mg Intravenous Daily   doxycycline 100 mg Intravenous Q12H   famotidine 20 mg Oral BID AC   heparin (porcine) 5,000 Units Subcutaneous Q8H   lactobacillus acidophilus 1 capsule Oral Daily   magnesium sulfate 1 g Intravenous Once   phenytoin 100 mg Oral Q PM   phenytoin 200 mg Oral QAM   potassium chloride 40 mEq Oral Q4H   sodium chloride 10 mL Intravenous Q12H        ---------------------------------------------------------------------------------------------------------------------   Vital Signs:  Temp:  [97 °F (36.1 °C)-100.3 °F (37.9 °C)]  97 °F (36.1 °C)  Heart Rate:  [] 83  Resp:  [13-23] 17  BP: (101-159)/(46-88) 126/60  Mean Arterial Pressure (Non-Invasive) for the past 24 hrs (Last 3 readings):   Noninvasive MAP (mmHg)   07/06/20 1005 84   07/06/20 0905 77   07/06/20 0803 70     SpO2 Percentage    07/06/20 0803 07/06/20 0905 07/06/20 1005   SpO2: 91% (!) 89% 95%     SpO2:  [89 %-98 %] 95 %  on  Flow (L/min):  [2] 2;   Device (Oxygen Therapy): nasal cannula    Body mass index is 32.85 kg/m².  Wt Readings from Last 3 Encounters:   07/05/20 84.1 kg (185 lb 6 oz)   11/16/19 76.5 kg (168 lb 11.2 oz)   07/10/19 78.5 kg (173 lb 1 oz)     ---------------------------------------------------------------------------------------------------------------------     Physical Exam:    Deferred as patient is in COVID-19 isolation.    ---------------------------------------------------------------------------------------------------------------------    Results from last 7 days   Lab Units 07/06/20  0548 07/05/20  1712   CK TOTAL U/L 231*  --    TROPONIN T ng/mL  --  <0.010     Results from last 7 days   Lab Units 07/05/20  1712   PROBNP pg/mL 102.3       Results from last 7 days   Lab Units 07/06/20  0931   PH, ARTERIAL pH units 7.402   PO2 ART mm Hg 61.9*   PCO2, ARTERIAL mm Hg 32.3*   HCO3 ART mmol/L 20.0     Results from last 7 days   Lab Units 07/06/20  0548 07/05/20  1532   CRP mg/dL 11.89* 6.46*   LACTATE mmol/L  --  1.3   WBC 10*3/mm3 6.54 7.09   HEMOGLOBIN g/dL 12.9 13.1   HEMATOCRIT % 41.6 41.4   MCV fL 99.5* 98.8*   MCHC g/dL 31.0* 31.6   PLATELETS 10*3/mm3 129* 147     Results from last 7 days   Lab Units 07/06/20  0548 07/05/20  1532   SODIUM mmol/L 142 145   POTASSIUM mmol/L 3.0* 3.6   MAGNESIUM mg/dL 1.9  --    CHLORIDE mmol/L 108* 107   CO2 mmol/L 17.7* 23.1   BUN mg/dL 13 12   CREATININE mg/dL 0.76 0.84   EGFR IF NONAFRICN AM mL/min/1.73 74 66   CALCIUM mg/dL 8.2* 8.6   GLUCOSE mg/dL 107* 94   ALBUMIN g/dL 3.65 4.23   BILIRUBIN mg/dL 0.3 0.2     ALK PHOS U/L 179* 195*   AST (SGOT) U/L 36* 32   ALT (SGPT) U/L 33 37*   Estimated Creatinine Clearance: 63.4 mL/min (by C-G formula based on SCr of 0.76 mg/dL).  No results found for: AMMONIA    No results found for: HGBA1C, POCGLU  No results found for: HGBA1C  Lab Results   Component Value Date    TSH 1.270 07/06/2020    FREET4 1.05 03/13/2015       No results found for: BLOODCX  No results found for: URINECX  No results found for: WOUNDCX  No results found for: STOOLCX  No results found for: RESPCX  Pain Management Panel     There is no flowsheet data to display.        I have personally reviewed the above laboratory results.   ---------------------------------------------------------------------------------------------------------------------  Imaging Results (Last 7 Days)     Procedure Component Value Units Date/Time    CT Chest Pulmonary Embolism [[id number]] Collected:  07/05/20 1838     Updated:  07/05/20 1840    Narrative:       INDICATION:   Short of breath for 2 days. History of pneumonia and COPD.    TECHNIQUE:   CT angiogram of the chest with contrast. 3-D reformatted images were acquired.  Radiation dose reduction techniques included automated exposure control or exposure modulation based on body size. Radiation audit for number of CT and nuclear cardiology  exams performed in the last year:  1.      COMPARISON:   CT chest PE protocol from 11/15/2019.    FINDINGS:   There is no evidence for pulmonary embolism. There are atherosclerotic vascular calcifications including the coronary arteries. There is no evidence for thoracic aortic dissection. There is no pericardial effusion. There is no pleural effusion. There is  no axillary lymphadenopathy. There is a mildly enlarged right hilar node which is not changed from prior. There were several other prominent mediastinal nodes also not changed. This includes a AP window lymph node that measures about 1.1 cm in short axis  dimension and is mildly  enlarged.    There is underlying chronic lung disease with likely emphysematous changes at apices and some areas of air trapping. There is some dependent atelectasis at lung bases and there is patchy airspace disease in the right middle lobe posteriorly and medially  and in the lingula laterally. This is nonspecific. No acute congestive failure is suspected. No pneumothorax..      Impression:         1. No evidence for pulmonary embolism.  2. Likely underlying chronic lung disease with emphysematous changes at apices and areas of air trapping.  3. Patchy airspace disease in the right middle lobe and lingula is nonspecific. Follow-up to clearing is recommended. Please correlate for clinical evidence of early pneumonia. Covid type pneumonia not excluded. There is some dependent atelectasis as  well.  4. Prominent mediastinal and right hilar lymph nodes not significantly changed. One AP window lymph node is mildly enlarged at 1.1 cm short axis dimension but unchanged from November 2019. These are nonspecific.  5. Atherosclerotic vascular calcifications include coronary arteries    Signer Name: Tammy Millan MD   Signed: 7/5/2020 6:38 PM   Workstation Name: [email or web address]    [facility]    XR Chest AP [[id number]] Collected:  07/05/20 1325     Updated:  07/05/20 1327    Narrative:       EXAMINATION: XR CHEST AP-      CLINICAL INDICATION:     COVID Evaluation, Cough, Fever     TECHNIQUE:  XR CHEST AP-      COMPARISON: 12/31/2019      FINDINGS:      Lungs are aerated.   Heart size, mediastinum, and pulmonary vascularity are unremarkable.   No pneumothorax.   No effusions.   No acute osseous findings.          Impression:       No radiographic evidence of acute cardiac or pulmonary  disease.     This report was finalized on 7/5/2020 1:25 PM by Dr. Josh Crump MD.           I have personally reviewed the above radiology results.    ---------------------------------------------------------------------------------------------------------------------      Assessment & Plan        Assessment/Plan       ASSESSMENT:    1.  Pneumonia due to COVID-19 virus        PLAN:    Patient presented with shortness of air and cough. Patient has chronic cough that has had worsening over the last few days. She has also reported that she has had progressive shortness of breath. Patient denied any known sick contact. T-max of 100.3. WBC normal. CRP 11.89. Ferritin 93.32. Lactate 1.3.  D-dimer 0.92. .  proBNP 102.3.  Respiratory panel PCR negative.  Influenza screen negative. Blood cultures on 7/5/2020 pending.      COVID-19 PCR positive.  Currently receiving Decadron 6 mg IV daily.    Chest x-ray on 7/5/2020 reveals no radiographic evidence of acute cardiac or pulmonary disease-per radiology.  CT chest on 7/5/2020 reveals no evidence of pulmonary embolism.  Likely underlying chronic lung disease with emphysematous changes at apices in the area of air trapping. Patchy airspace disease in the right middle lobe and lingula is nonspecific. Prominent mediastinal and right hilar lymph nodes not significantly changed. One AP window lymph node is mildly enlarged at 1.1 cm short axis dimension but unchanged-per radiology.     We agree with current regimen of Rocephin 2 g IV every 24 hours and doxycycline 100 mg IV every 12 hours and recommend to continue for now.    At this time patient is not a candidate for Remdesivir or convalescent plasma therapy at this time.  If respiratory status deteriorates we may need to consider it.      Again, thank you Dr. Mathis for allowing us to participate in the care of your patient and please feel free to call for any questions you may have.    Code Status:   Code Status and Medical Interventions:   Ordered at: 07/05/20 2049     Code Status:    CPR     Medical Interventions (Level of Support Prior to Arrest):    Full            Fidelia Irene, ANNA  07/06/20  10:46

## 2020-07-06 NOTE — H&P
"Hospitalist History and Physical    Patient Identification:  Name: Siria Rueda  Age/Sex: 74 y.o. female  :  1945  MRN: [id number]         Primary Care Physician: Cruzito Palumbo MD    Chief Complaint   Patient presents with   • Shortness of Breath       History of Present Illness  Patient is a 74 y.o. female presents with the following: shortness of air, cough    The use of a video visit has been reviewed with the patient and verbal informed consent has been obtained. HPI obtain via video visit then patient examined at bedside.     The patient is a 74-year-old female with past medical history significant for COPD, history of tobacco abuse, obstructive sleep apnea, compliant with CPAP, coronary artery disease status post previous coronary artery stenting x1 and seizure disorder (last seizure 2013) who presents to the emergency department complaining of progressive shortness of air and cough.    Patient reports a chronic cough which she states is nonproductive but becoming more severe over the past several days.  Patient also reports chronic \"smothering\" but states that this has significantly worsened over the past 3 days.  The patient complains of myalgias.  She denies fevers but does report chills.    Patient denies any nausea or vomiting but does report that she had some mild diarrhea that resolved approximately 2 days ago.  The patient denies any change in her sense of smell and/or taste. The patient denies any known sick contacts.    In the emergency department, patient's temperature was 99.2 °F, heart rate 94, respiratory rate 20, initial blood pressure was 156/65 mmHg.  Her initial room air oxygen saturation at rest was 96%.  According to the emergency department provider, the patient was ambulated without oxygen and her oxygen level dropped to 87%.  Patient does not require supplemental oxygen at home.      CT scan of the chest with PE protocol was negative for pulmonary embolism.  She had changes " consistent with emphysema.  Patient had patchy airspace disease in the right middle lobe and lingula that was nonspecific.  Patient was found to have a mildly enlarged right hilar node which was not changed from prior exam as well as several other prominent mediastinal nodes that were found to be unchanged, this included 1 AP window lymph node that was mildly enlarged at 1.1 cm but unchanged from 2019.    Patient has been admitted to the progressive care unit for further evaluation and management.      Patient is currently sitting in her bed, oxygen saturation is in the upper 90s and she is not using supplemental oxygen.    Past History:  Past Medical History:   Diagnosis Date   • Arthritis    • COPD (chronic obstructive pulmonary disease) (CMS/HCC)    • Coronary artery disease    • Disease of thyroid gland    • Elevated cholesterol    • GERD (gastroesophageal reflux disease)    • High cholesterol    • Hypertension    • Liver disease    • Seizures (CMS/HCC)    • Sleep apnea    • Stroke (CMS/HCC)      Past Surgical History:   Procedure Laterality Date   • BLADDER SLING MODIFIED, ANTERIOR AND POSTERIOR VAGINAL REPAIR  2019   • BRAIN SURGERY     • CARDIAC CATHETERIZATION      x2   • CARDIAC SURGERY      heart stent placed   •  SECTION     • COLONOSCOPY N/A 2018    Procedure: COLONOSCOPY FOR SCREENING CPT CODE: ;  Surgeon: Jose Lazaro MD;  Location: [facility];  Service: Gastroenterology   • HEMORRHOIDECTOMY       Family History   Problem Relation Age of Onset   • Heart disease Father    • Lung cancer Father      Social History     Tobacco Use   • Smoking status: Former Smoker   • Smokeless tobacco: Never Used   Substance Use Topics   • Alcohol use: No   • Drug use: No       (Not in a hospital admission)  Allergies: Sulfa antibiotics    Review of Systems:  Review of Systems   Constitutional: Positive for chills. Negative for appetite change, diaphoresis and fever.   HENT: Negative  for hearing loss, tinnitus and trouble swallowing.    Eyes: Negative for photophobia, discharge and visual disturbance.   Respiratory: Positive for cough and shortness of breath. Negative for wheezing.    Cardiovascular: Negative for chest pain, palpitations and leg swelling.   Gastrointestinal: Negative for abdominal pain, constipation, diarrhea, nausea and vomiting.   Endocrine: Negative for polydipsia, polyphagia and polyuria.   Genitourinary: Negative for dysuria, frequency and hematuria.   Musculoskeletal: Positive for myalgias. Negative for gait problem and neck pain.   Skin: Negative for rash and wound.   Neurological: Positive for weakness. Negative for dizziness, tremors, seizures, syncope and light-headedness.   Hematological: Does not bruise/bleed easily.   Psychiatric/Behavioral: Negative for confusion, hallucinations and suicidal ideas.      Vital Signs  Temp:  [99.2 °F (37.3 °C)] 99.2 °F (37.3 °C)  Heart Rate:  [] 101  Resp:  [18-20] 18  BP: (132-159)/(65-88) 132/81  Body mass index is 31 kg/m².    Physical Exam:  Physical Exam   Constitutional: She is oriented to person, place, and time. She appears well-developed and well-nourished. No distress.   HENT:   Head: Normocephalic and atraumatic.   Mouth/Throat: Oropharynx is clear and moist.   Eyes: Pupils are equal, round, and reactive to light. Conjunctivae and EOM are normal.   Neck: Neck supple. No tracheal deviation present.   Cardiovascular: Normal rate and regular rhythm. Exam reveals no gallop and no friction rub.   No murmur heard.  Pulses:       Posterior tibial pulses are 2+ on the right side, and 2+ on the left side.   Pulmonary/Chest: Breath sounds normal. No respiratory distress. She has no wheezes. She has no rales.   Coarse right sided breath sounds.    Abdominal: Soft. Bowel sounds are normal. She exhibits no distension. There is no tenderness. There is no guarding.   Musculoskeletal: Normal range of motion. She exhibits no  tenderness.   Trace bilateral lower extremity edema.    Neurological: She is alert and oriented to person, place, and time. No cranial nerve deficit.   Skin: Skin is warm and dry. No rash noted. No erythema.   Psychiatric: She has a normal mood and affect.      Results Review:    Results from last 7 days   Lab Units 07/05/20  2119   PH, ARTERIAL pH units 7.430   PO2 ART mm Hg 70.8*   PCO2, ARTERIAL mm Hg 29.1*   HCO3 ART mmol/L 19.3*       Results from last 7 days   Lab Units 07/05/20  1532   WBC 10*3/mm3 7.09   HEMOGLOBIN g/dL 13.1   HEMATOCRIT % 41.4   PLATELETS 10*3/mm3 147     Results from last 7 days   Lab Units 07/05/20  1532   SODIUM mmol/L 145   POTASSIUM mmol/L 3.6   CHLORIDE mmol/L 107   CO2 mmol/L 23.1   BUN mg/dL 12   CREATININE mg/dL 0.84   CALCIUM mg/dL 8.6   GLUCOSE mg/dL 94     Results from last 7 days   Lab Units 07/05/20  1532   BILIRUBIN mg/dL 0.2   ALK PHOS U/L 195*   AST (SGOT) U/L 32   ALT (SGPT) U/L 37*     Results from last 7 days   Lab Units 07/05/20  1532   CRP mg/dL 6.46*         Results from last 7 days   Lab Units 07/05/20  1712   TROPONIN T ng/mL <0.010     Imaging:    CT images reviewed; per my view patient with a faint peripheral infiltrate RML and lingula. No effusions noted.    Imaging Results (Most Recent)     Procedure Component Value Units Date/Time    CT Chest Pulmonary Embolism [[id number]] Collected:  07/05/20 1838     Updated:  07/05/20 1840    Narrative:       INDICATION:   Short of breath for 2 days. History of pneumonia and COPD.    TECHNIQUE:   CT angiogram of the chest with contrast. 3-D reformatted images were acquired.  Radiation dose reduction techniques included automated exposure control or exposure modulation based on body size. Radiation audit for number of CT and nuclear cardiology  exams performed in the last year:  1.      COMPARISON:   CT chest PE protocol from 11/15/2019.    FINDINGS:   There is no evidence for pulmonary embolism. There are atherosclerotic  vascular calcifications including the coronary arteries. There is no evidence for thoracic aortic dissection. There is no pericardial effusion. There is no pleural effusion. There is  no axillary lymphadenopathy. There is a mildly enlarged right hilar node which is not changed from prior. There were several other prominent mediastinal nodes also not changed. This includes a AP window lymph node that measures about 1.1 cm in short axis  dimension and is mildly enlarged.    There is underlying chronic lung disease with likely emphysematous changes at apices and some areas of air trapping. There is some dependent atelectasis at lung bases and there is patchy airspace disease in the right middle lobe posteriorly and medially  and in the lingula laterally. This is nonspecific. No acute congestive failure is suspected. No pneumothorax..      Impression:         1. No evidence for pulmonary embolism.  2. Likely underlying chronic lung disease with emphysematous changes at apices and areas of air trapping.  3. Patchy airspace disease in the right middle lobe and lingula is nonspecific. Follow-up to clearing is recommended. Please correlate for clinical evidence of early pneumonia. Covid type pneumonia not excluded. There is some dependent atelectasis as  well.  4. Prominent mediastinal and right hilar lymph nodes not significantly changed. One AP window lymph node is mildly enlarged at 1.1 cm short axis dimension but unchanged from November 2019. These are nonspecific.  5. Atherosclerotic vascular calcifications include coronary arteries    Signer Name: Tammy Millan MD   Signed: 7/5/2020 6:38 PM   Workstation Name: [email or web address]    [facility]    XR Chest AP [[id number]] Collected:  07/05/20 1325     Updated:  07/05/20 1327    Narrative:       EXAMINATION: XR CHEST AP-      CLINICAL INDICATION:     COVID Evaluation, Cough, Fever     TECHNIQUE:  XR CHEST AP-      COMPARISON: 12/31/2019      FINDINGS:       Lungs are aerated.   Heart size, mediastinum, and pulmonary vascularity are unremarkable.   No pneumothorax.   No effusions.   No acute osseous findings.          Impression:       No radiographic evidence of acute cardiac or pulmonary  disease.     This report was finalized on 7/5/2020 1:25 PM by Dr. Josh Crump MD.             Assessment/Plan     -Severe sepsis, present upon admission with tachycardia, RR 20 and COVID-19 pneumonia causing acute hypoxic respiratory failure with room air O2 saturations 87% with ambulation:   Patient has been admitted to the progressive care unit with enhanced/contact precautions. Continue with O2 and titrate for saturations 90-95%. Patient will be given a dose of IV dexamethasone. She is encouraged to use her home albuterol inhaler. I have consulted infectious disease for further recommendations. For now, patient has been started on IV Rocephin and IV Doxycycline. Will trend her C-RP, LDH, CK, Fibrinogen and D-dimer levels. Will provided PRN tylenol, cough suppressants and other supportive care.    -Acute hypoxic respiratory failure due to above: Supplemental oxygen, albuterol INH. Titrate O2 for saturations 90-95%.    -Sleep apnea: CPAP dependent at home. For now, continue with O2 per nasal cannula.     -Essential hypertension: Currently controlled.  I am awaiting her home medication list and reconciled by pharmacy.  We will plan to continue her home medications with holding parameters.    -Seizure disorder: Seizure precautions will be ordered.  Plan to resume patient's home medications.    -Coronary artery disease status post previous stent: Patient denies any chest pains or pressure at this time.  Monitor on telemetry and plan to resume her cardiac medications once reconciled by pharmacy.    -Minimal ALT elevation, 37: Patient with reported liver disease; liver biopsy from 2019 revealed benign tissue with focal minimal portal chronic inflammation and fibrosis with minimal  steatosis.  We will trend her LFTs for now.    -History of tobacco abuse: Patient quit approximately 29 years ago.     DVT/GI prophylaxis: For now, subcutaneous heparin/Famotidine    Estimated Length of Stay: > 2 MNs    CODE: FULL/CPR    Patient is considered to be a high risk patient due to: Severe sepsis causing acute respiratory failure, COVID-19 pneumonia, CAD, COPD/GIOVANNI    I discussed the patients findings and my recommendations with patient    Bell Mathis DO  07/05/20  21:50

## 2020-07-06 NOTE — PLAN OF CARE
Problem: Patient Care Overview  Goal: Plan of Care Review  Outcome: Ongoing (interventions implemented as appropriate)  Flowsheets (Taken 7/6/2020 0000)  Plan of Care Reviewed With: patient  Note:   Patient resting in bed. VSS. 2LNC. Patient ambulated to restroom without assistance. Gait steady. Verbalizes lessened anxiety. Will continue to monitor.   Goal: Individualization and Mutuality  Outcome: Ongoing (interventions implemented as appropriate)  Goal: Discharge Needs Assessment  Outcome: Ongoing (interventions implemented as appropriate)  Flowsheets  Taken 7/6/2020 0318  Equipment Needed After Discharge: none  Taken 7/5/2020 2257  Equipment Currently Used at Home: none  Taken 7/5/2020 2259  Transportation Anticipated: family or friend will provide  Patient/Family Anticipated Services at Transition: none  Patient/Family Anticipates Transition to: home;home with family  Goal: Interprofessional Rounds/Family Conf  Outcome: Ongoing (interventions implemented as appropriate)  Flowsheets (Taken 7/6/2020 0318)  Participants: patient     Problem: Fall Risk (Adult)  Goal: Identify Related Risk Factors and Signs and Symptoms  Description  Related risk factors and signs and symptoms are identified upon initiation of Human Response Clinical Practice Guideline (CPG).  Outcome: Ongoing (interventions implemented as appropriate)  Flowsheets (Taken 7/6/2020 0318)  Related Risk Factors (Fall Risk): age-related changes; environment unfamiliar  Goal: Absence of Fall  Description  Patient will demonstrate the desired outcomes by discharge/transition of care.  Outcome: Ongoing (interventions implemented as appropriate)  Flowsheets (Taken 7/6/2020 0318)  Absence of Fall: making progress toward outcome  Intervention: Monitor/Assist with Self Care  Description  Provide a safe, barrier-free environment that encourages independent activity  Keep care area uncluttered  Keep needed items within reach (e.g., call light, personal  items)  Promote use of personal vision/auditory aids (e.g., glasses, hearing aids)  Assess assistance level required for safe/effective self-care  Encourage functional activity performance with appropriate level of assistance based upon level of ability  Flowsheets (Taken 7/6/2020 0000)  Activity Assistance Provided: independent  Intervention: Reduce Risk/Promote Restraint Free Environment  Description  Reassess fall risk frequently and with change in status/transfer to another level of care  Communicate fall/injury risk to interprofessional health care team  Determine need for increased observation, or bed/chair alarms  Assess equipment /environmental modification needs (e.g., low bed, signage, nonskid footwear)  Define behavior and activity limits to patient/family  Perform regular intentional rounding to assess need for position change, pain assessment, personal needs  Flowsheets (Taken 7/6/2020 0100 by Leticia Romero RN)  Safety Promotion/Fall Prevention: safety round/check completed  Environmental Safety Modification: clutter free environment maintained  Safety/Security Measures: bed alarm set  Intervention: Review Medications/Identify Contributors to Fall Risk  Description  Regularly review medication contribution to fall risk  Consider risk related to polypharmacy and age  Balance adequate pain management with potential for oversedation  Schedule medication administration times to minimize fall risk (e.g., avoid diuretics in pm)  High-risk medications related to falls include; narcotics, sedatives, diuretics, laxatives, hypnotic agents, insulin/oral hypoglycemics, regional blocks, recent anesthesia/sedation and cardiovascular drugs  Flowsheets (Taken 7/6/2020 0000)  Medication Review/Management: medications reviewed     Problem: Pain, Chronic (Adult)  Goal: Identify Related Risk Factors and Signs and Symptoms  Description  Related risk factors and signs and symptoms are identified upon initiation of  Human Response Clinical Practice Guideline (CPG).  Outcome: Ongoing (interventions implemented as appropriate)  Goal: Acceptable Pain/Comfort Level and Functional Ability  Description  Patient will demonstrate the desired outcomes by discharge/transition of care.  Outcome: Ongoing (interventions implemented as appropriate)

## 2020-07-06 NOTE — PROGRESS NOTES
Discharge Planning Assessment   [place]     Patient Name: Siria Rueda  MRN: [id number]  Today's Date: 7/6/2020    Admit Date: 7/5/2020    Discharge Needs Assessment     Row Name 07/06/20 1541       Living Environment    Lives With  spouse    Name(s) of Who Lives With Patient  Jason Rueda (Spouse) [phone]    Row Name 07/06/20 1533       Living Environment    Lives With  alone    Current Living Arrangements  home/apartment/condo       Transition Planning    Patient/Family Anticipates Transition to  home with family    Transportation Anticipated  family or friend will provide       Discharge Needs Assessment    Equipment Currently Used at Home  none        Discharge Plan     Row Name 07/06/20 1538       Plan    Plan Pt was admitted on 07/05/20. SS received CM consult for [id number]. Pt does not use any DME services. Pt does not have a POA/living will. Pt's PCP is Cruzito Rosen MD. Pt's pharmacy is [facility] at [place]. Pt's plans on returning home once stable with spouse transporting. SS will follow.   At 1115 am on this date SS notified by CM pt has a CPAP and nebulizer at home from unknown provider. SS clarified pt' PCP is Alfredo Palumbo MD. SS will follow.    Row Name 07/06/20 1308       Plan    Plan Comments  CM trigger: Sepsis/PNA.  Covid isolation. 7/6: Rocephin, doxy IV, Decadron. Sat 92% 2lpm. T 100.3 max overnight. Repl Mg+ & K+. WBC 6.54, CRP 11.89.            Rose Kirk

## 2020-07-07 LAB
ANION GAP SERPL CALCULATED.3IONS-SCNC: 14 MMOL/L (ref 5–15)
BACTERIA SPEC AEROBE CULT: NORMAL
BASOPHILS # BLD AUTO: 0.02 10*3/MM3 (ref 0–0.2)
BASOPHILS NFR BLD AUTO: 0.3 % (ref 0–1.5)
BUN SERPL-MCNC: 19 MG/DL (ref 8–23)
BUN/CREAT SERPL: 24.4 (ref 7–25)
CALCIUM SPEC-SCNC: 8.2 MG/DL (ref 8.6–10.5)
CHLORIDE SERPL-SCNC: 109 MMOL/L (ref 98–107)
CK SERPL-CCNC: 329 U/L (ref 20–180)
CO2 SERPL-SCNC: 19 MMOL/L (ref 22–29)
CREAT SERPL-MCNC: 0.78 MG/DL (ref 0.57–1)
CRP SERPL-MCNC: 15.38 MG/DL (ref 0–0.5)
DEPRECATED RDW RBC AUTO: 50.5 FL (ref 37–54)
EOSINOPHIL # BLD AUTO: 0.01 10*3/MM3 (ref 0–0.4)
EOSINOPHIL NFR BLD AUTO: 0.2 % (ref 0.3–6.2)
ERYTHROCYTE [DISTWIDTH] IN BLOOD BY AUTOMATED COUNT: 14 % (ref 12.3–15.4)
FERRITIN SERPL-MCNC: 142.6 NG/ML (ref 13–150)
GFR SERPL CREATININE-BSD FRML MDRD: 72 ML/MIN/1.73
GLUCOSE SERPL-MCNC: 107 MG/DL (ref 65–99)
HCT VFR BLD AUTO: 38.2 % (ref 34–46.6)
HGB BLD-MCNC: 11.9 G/DL (ref 12–15.9)
IMM GRANULOCYTES # BLD AUTO: 0.03 10*3/MM3 (ref 0–0.05)
IMM GRANULOCYTES NFR BLD AUTO: 0.5 % (ref 0–0.5)
L PNEUMO1 AG UR QL IA: NEGATIVE
LYMPHOCYTES # BLD AUTO: 1.71 10*3/MM3 (ref 0.7–3.1)
LYMPHOCYTES NFR BLD AUTO: 27.9 % (ref 19.6–45.3)
MCH RBC QN AUTO: 30.6 PG (ref 26.6–33)
MCHC RBC AUTO-ENTMCNC: 31.2 G/DL (ref 31.5–35.7)
MCV RBC AUTO: 98.2 FL (ref 79–97)
MONOCYTES # BLD AUTO: 0.44 10*3/MM3 (ref 0.1–0.9)
MONOCYTES NFR BLD AUTO: 7.2 % (ref 5–12)
NEUTROPHILS NFR BLD AUTO: 3.91 10*3/MM3 (ref 1.7–7)
NEUTROPHILS NFR BLD AUTO: 63.9 % (ref 42.7–76)
NRBC BLD AUTO-RTO: 0 /100 WBC (ref 0–0.2)
NT-PROBNP SERPL-MCNC: 122.2 PG/ML (ref 0–900)
PLATELET # BLD AUTO: 123 10*3/MM3 (ref 140–450)
PMV BLD AUTO: 9.9 FL (ref 6–12)
POTASSIUM SERPL-SCNC: 3.6 MMOL/L (ref 3.5–5.2)
RBC # BLD AUTO: 3.89 10*6/MM3 (ref 3.77–5.28)
SODIUM SERPL-SCNC: 142 MMOL/L (ref 136–145)
TROPONIN T SERPL-MCNC: <0.01 NG/ML (ref 0–0.03)
WBC # BLD AUTO: 6.12 10*3/MM3 (ref 3.4–10.8)

## 2020-07-07 PROCEDURE — 83880 ASSAY OF NATRIURETIC PEPTIDE: CPT | Performed by: HOSPITALIST

## 2020-07-07 PROCEDURE — 25010000002 DEXAMETHASONE PER 1 MG: Performed by: INTERNAL MEDICINE

## 2020-07-07 PROCEDURE — 85025 COMPLETE CBC W/AUTO DIFF WBC: CPT | Performed by: HOSPITALIST

## 2020-07-07 PROCEDURE — 80048 BASIC METABOLIC PNL TOTAL CA: CPT | Performed by: HOSPITALIST

## 2020-07-07 PROCEDURE — 86140 C-REACTIVE PROTEIN: CPT | Performed by: INTERNAL MEDICINE

## 2020-07-07 PROCEDURE — 86927 PLASMA FRESH FROZEN: CPT

## 2020-07-07 PROCEDURE — 99232 SBSQ HOSP IP/OBS MODERATE 35: CPT | Performed by: HOSPITALIST

## 2020-07-07 PROCEDURE — 25010000002 HEPARIN (PORCINE) PER 1000 UNITS: Performed by: HOSPITALIST

## 2020-07-07 PROCEDURE — 82728 ASSAY OF FERRITIN: CPT | Performed by: INTERNAL MEDICINE

## 2020-07-07 PROCEDURE — 87899 AGENT NOS ASSAY W/OPTIC: CPT | Performed by: HOSPITALIST

## 2020-07-07 PROCEDURE — 25010000002 CEFTRIAXONE PER 250 MG: Performed by: NURSE PRACTITIONER

## 2020-07-07 PROCEDURE — 82550 ASSAY OF CK (CPK): CPT | Performed by: INTERNAL MEDICINE

## 2020-07-07 PROCEDURE — 87070 CULTURE OTHR SPECIMN AEROBIC: CPT | Performed by: HOSPITALIST

## 2020-07-07 PROCEDURE — 84484 ASSAY OF TROPONIN QUANT: CPT | Performed by: HOSPITALIST

## 2020-07-07 PROCEDURE — 87205 SMEAR GRAM STAIN: CPT | Performed by: HOSPITALIST

## 2020-07-07 RX ORDER — HYDROCORTISONE 25 MG/G
CREAM TOPICAL 2 TIMES DAILY
Status: DISCONTINUED | OUTPATIENT
Start: 2020-07-07 | End: 2020-07-15

## 2020-07-07 RX ORDER — BUDESONIDE AND FORMOTEROL FUMARATE DIHYDRATE 160; 4.5 UG/1; UG/1
2 AEROSOL RESPIRATORY (INHALATION)
Status: DISCONTINUED | OUTPATIENT
Start: 2020-07-07 | End: 2020-07-15

## 2020-07-07 RX ADMIN — PHENYTOIN SODIUM 100 MG: 100 CAPSULE, EXTENDED RELEASE ORAL at 16:52

## 2020-07-07 RX ADMIN — BENZONATATE 200 MG: 100 CAPSULE, LIQUID FILLED ORAL at 06:36

## 2020-07-07 RX ADMIN — HEPARIN SODIUM 5000 UNITS: 5000 INJECTION INTRAVENOUS; SUBCUTANEOUS at 14:22

## 2020-07-07 RX ADMIN — ALBUTEROL SULFATE 2 PUFF: 90 AEROSOL, METERED RESPIRATORY (INHALATION) at 01:32

## 2020-07-07 RX ADMIN — DIAZEPAM 3.75 MG: 5 TABLET ORAL at 21:40

## 2020-07-07 RX ADMIN — ALBUTEROL SULFATE 2 PUFF: 90 AEROSOL, METERED RESPIRATORY (INHALATION) at 14:20

## 2020-07-07 RX ADMIN — BENZONATATE 200 MG: 100 CAPSULE, LIQUID FILLED ORAL at 14:20

## 2020-07-07 RX ADMIN — HEPARIN SODIUM 5000 UNITS: 5000 INJECTION INTRAVENOUS; SUBCUTANEOUS at 06:36

## 2020-07-07 RX ADMIN — TOPIRAMATE 100 MG: 100 TABLET, FILM COATED ORAL at 07:59

## 2020-07-07 RX ADMIN — DEXTROMETHORPHAN HYDROBROMIDE AND GUAIFENESIN 5 ML: 10; 100 LIQUID ORAL at 11:34

## 2020-07-07 RX ADMIN — BUDESONIDE AND FORMOTEROL FUMARATE DIHYDRATE 2 PUFF: 160; 4.5 AEROSOL RESPIRATORY (INHALATION) at 14:20

## 2020-07-07 RX ADMIN — ACETAMINOPHEN 650 MG: 325 TABLET ORAL at 06:43

## 2020-07-07 RX ADMIN — ACETAMINOPHEN 650 MG: 325 TABLET ORAL at 17:02

## 2020-07-07 RX ADMIN — BENZONATATE 200 MG: 100 CAPSULE, LIQUID FILLED ORAL at 21:40

## 2020-07-07 RX ADMIN — BUDESONIDE AND FORMOTEROL FUMARATE DIHYDRATE 2 PUFF: 160; 4.5 AEROSOL RESPIRATORY (INHALATION) at 21:40

## 2020-07-07 RX ADMIN — DOXYCYCLINE 100 MG: 100 INJECTION, POWDER, LYOPHILIZED, FOR SOLUTION INTRAVENOUS at 23:32

## 2020-07-07 RX ADMIN — FAMOTIDINE 20 MG: 20 TABLET ORAL at 07:52

## 2020-07-07 RX ADMIN — LEVOTHYROXINE SODIUM 25 MCG: 25 TABLET ORAL at 07:58

## 2020-07-07 RX ADMIN — VENLAFAXINE HYDROCHLORIDE 75 MG: 75 CAPSULE, EXTENDED RELEASE ORAL at 16:52

## 2020-07-07 RX ADMIN — CEFTRIAXONE 2 G: 2 INJECTION, POWDER, FOR SOLUTION INTRAMUSCULAR; INTRAVENOUS at 18:14

## 2020-07-07 RX ADMIN — ASPIRIN 81 MG: 81 TABLET, COATED ORAL at 21:40

## 2020-07-07 RX ADMIN — PHENYTOIN SODIUM 200 MG: 100 CAPSULE, EXTENDED RELEASE ORAL at 06:36

## 2020-07-07 RX ADMIN — DEXTROMETHORPHAN HYDROBROMIDE AND GUAIFENESIN 5 ML: 10; 100 LIQUID ORAL at 21:42

## 2020-07-07 RX ADMIN — ALBUTEROL SULFATE 2 PUFF: 90 AEROSOL, METERED RESPIRATORY (INHALATION) at 21:40

## 2020-07-07 RX ADMIN — DOXYCYCLINE 100 MG: 100 INJECTION, POWDER, LYOPHILIZED, FOR SOLUTION INTRAVENOUS at 11:33

## 2020-07-07 RX ADMIN — ROSUVASTATIN CALCIUM 10 MG: 10 TABLET, FILM COATED ORAL at 21:40

## 2020-07-07 RX ADMIN — DEXAMETHASONE SODIUM PHOSPHATE 6 MG: 4 INJECTION, SOLUTION INTRA-ARTICULAR; INTRALESIONAL; INTRAMUSCULAR; INTRAVENOUS; SOFT TISSUE at 08:04

## 2020-07-07 RX ADMIN — TOPIRAMATE 100 MG: 100 TABLET, FILM COATED ORAL at 21:40

## 2020-07-07 RX ADMIN — AMITRIPTYLINE HYDROCHLORIDE 25 MG: 25 TABLET, FILM COATED ORAL at 21:40

## 2020-07-07 RX ADMIN — FAMOTIDINE 20 MG: 20 TABLET ORAL at 16:52

## 2020-07-07 RX ADMIN — SODIUM CHLORIDE, PRESERVATIVE FREE 10 ML: 5 INJECTION INTRAVENOUS at 07:59

## 2020-07-07 RX ADMIN — MAGNESIUM GLUCONATE 500 MG ORAL TABLET 400 MG: 500 TABLET ORAL at 07:57

## 2020-07-07 RX ADMIN — HYDROCORTISONE 2.5%: 25 CREAM TOPICAL at 11:34

## 2020-07-07 RX ADMIN — DOXYCYCLINE 100 MG: 100 INJECTION, POWDER, LYOPHILIZED, FOR SOLUTION INTRAVENOUS at 00:25

## 2020-07-07 RX ADMIN — HYDROCORTISONE 2.5%: 25 CREAM TOPICAL at 21:41

## 2020-07-07 RX ADMIN — Medication 1 CAPSULE: at 07:58

## 2020-07-07 RX ADMIN — ALBUTEROL SULFATE 2 PUFF: 90 AEROSOL, METERED RESPIRATORY (INHALATION) at 07:51

## 2020-07-07 RX ADMIN — PRAMIPEXOLE DIHYDROCHLORIDE 1 MG: 1 TABLET ORAL at 21:40

## 2020-07-07 RX ADMIN — SODIUM CHLORIDE, PRESERVATIVE FREE 10 ML: 5 INJECTION INTRAVENOUS at 21:41

## 2020-07-07 RX ADMIN — POTASSIUM CHLORIDE 20 MEQ: 1500 TABLET, EXTENDED RELEASE ORAL at 07:58

## 2020-07-07 NOTE — PROGRESS NOTES
[facility] HOSPITALIST PROGRESS NOTE     Patient Identification:  Name:  Siria Rueda  Age:  74 y.o.  Sex:  female  :  1945  MRN:  [id number]  Visit Number:  [id number]  ROOM: [address]     Primary Care Provider:  Cruzito Palumbo MD    Length of stay:  2     This was an audio and video enabled telemedicine encounter.    Subjective        Chief Compliant Follow up for the Shortness of breath and cough    Patient seen and examined this morning with KEZIA Santizo at the bedside by telemedicine. States that she is slightly feeling better. Continues to have shortness of breath and having dry cough most of the time but had one whitish phlegm this morning.  Has chest tightness when takes deep breath. Feels tired and fatigued. Has muscle and the back pain today.  Denies any chest pain.  Denies any nausea vomiting abdominal pain.  Getting OOB. on 2 L nasal cannula.  Afebrile and no events overnight.       Objective     Current Hospital Meds:    albuterol sulfate HFA 2 puff Inhalation Q6H   amitriptyline 25 mg Oral Nightly   aspirin 81 mg Oral Nightly   benzonatate 200 mg Oral Q8H   budesonide-formoterol 2 puff Inhalation BID - RT   cefTRIAXone 2 g Intravenous Q24H   cholecalciferol 50,000 Units Oral Weekly   dexamethasone 6 mg Intravenous Daily   diazePAM 3.75 mg Oral Nightly   doxycycline 100 mg Intravenous Q12H   famotidine 20 mg Oral BID AC   heparin (porcine) 5,000 Units Subcutaneous Q8H   Hydrocortisone (Perianal)  Rectal BID   INV [id number] remdesivir in NS IVPB (from room temp reconstituted vial) 200 mg Intravenous Q24H   Followed by      [START ON 2020] INV [id number] remdesivir in NS IVPB (from room temp reconstituted vial) 100 mg Intravenous Q24H   lactobacillus acidophilus 1 capsule Oral Daily   levothyroxine 25 mcg Oral Daily   magnesium oxide 400 mg Oral Daily   phenytoin 100 mg Oral Q PM   phenytoin 200 mg Oral QAM   potassium chloride 20 mEq Oral Daily   pramipexole 1 mg Oral Nightly      psyllium 1 packet Oral Daily   rosuvastatin 10 mg Oral Nightly   sodium chloride 10 mL Intravenous Q12H   topiramate 100 mg Oral Q12H   venlafaxine XR 75 mg Oral Q PM       Pharmacy Consult      ----------------------------------------------------------------------------------------------------------------------  Vital Signs:  Temp:  [97.5 °F (36.4 °C)-100.6 °F (38.1 °C)] 97.5 °F (36.4 °C)  Heart Rate:  [70-90] 78  Resp:  [14-28] 18  BP: ()/(45-73) 103/53  SpO2:  [90 %-99 %] 97 %  on  Flow (L/min):  [2] 2;   Device (Oxygen Therapy): nasal cannula  Body mass index is 33.47 kg/m².    Wt Readings from Last 3 Encounters:   07/07/20 85.7 kg (188 lb 14.4 oz)   11/16/19 76.5 kg (168 lb 11.2 oz)   07/10/19 78.5 kg (173 lb 1 oz)       Intake/Output Summary (Last 24 hours) at 7/7/2020 1447  Last data filed at 7/7/2020 1310  Gross per 24 hour   Intake 1984.66 ml   Output 300 ml   Net 1684.66 ml     Diet Regular; Cardiac  ----------------------------------------------------------------------------------------------------------------------  Physical exam(by telemedicine with KEZIA Santizo):  General: Comfortable, Not in distress.  Well-developed and well-nourished.   HENT:  Head:  Normocephalic and atraumatic.  Mouth:  Moist mucous membranes.    Eyes:  Conjunctivae and EOM are normal.  Pupils are equal, round, and reactive to light.    Cardiovascular:  Normal rate  Pulmonary/Chest:  No respiratory distress, no wheezes, no crackles, with decrease breath sounds and decrease air movement.  Abdomen:  Soft. No distension and no tenderness.   Neurological:  Alert and oriented to person, place, and time.  No cranial nerve deficit. No focal deficits. No facial droop.  No slurred speech.   Peripheral vascular:  no edema.  Genitourinary: no  hart  ----------------------------------------------------------------------------------------------------------------------  ----------------------------------------------------------------------------------------------------------------------  Results from last 7 days   Lab Units 07/07/20  0136 07/06/20  0548 07/05/20  1532   CRP mg/dL 15.38* 11.89* 6.46*   LACTATE mmol/L  --   --  1.3   WBC 10*3/mm3 6.12 6.54 7.09   HEMOGLOBIN g/dL 11.9* 12.9 13.1   HEMATOCRIT % 38.2 41.6 41.4   MCV fL 98.2* 99.5* 98.8*   MCHC g/dL 31.2* 31.0* 31.6   PLATELETS 10*3/mm3 123* 129* 147     Results from last 7 days   Lab Units 07/07/20  0136 07/06/20  0548 07/05/20  1532   SODIUM mmol/L 142 142 145   POTASSIUM mmol/L 3.6 3.0* 3.6   MAGNESIUM mg/dL  --  1.9  --    CHLORIDE mmol/L 109* 108* 107   CO2 mmol/L 19.0* 17.7* 23.1   BUN mg/dL 19 13 12   CREATININE mg/dL 0.78 0.76 0.84   EGFR IF NONAFRICN AM mL/min/1.73 72 74 66   CALCIUM mg/dL 8.2* 8.2* 8.6   GLUCOSE mg/dL 107* 107* 94   ALBUMIN g/dL  --  3.65 4.23   BILIRUBIN mg/dL  --  0.3 0.2   ALK PHOS U/L  --  179* 195*   AST (SGOT) U/L  --  36* 32   ALT (SGPT) U/L  --  33 37*   Estimated Creatinine Clearance: 64 mL/min (by C-G formula based on SCr of 0.78 mg/dL).  Results from last 7 days   Lab Units 07/07/20  0136 07/06/20  0548 07/05/20  1712   CK TOTAL U/L 329* 231*  --    TROPONIN T ng/mL  --   --  <0.010     No results found for: HGBA1C, POCGLU  No results found for: AMMONIA      No results found for: BLOODCXNo results found for: RESPCXNo results found for: URINECXNo results found for: WOUNDCXNo results found for: BODYFLDCXNo results found for: STOOLCX  I have personally looked at the labs and they are summarized above.  ----------------------------------------------------------------------------------------------------------------------  Imaging Results (Last 24 Hours)     ** No results found for the last 24 hours. **        I have personally reviewed the radiology images and  read the final radiology report.    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  Continue with IV antibiotics Rocephin doxycycline.  On IV Decadron daily for hypoxia.  on lactobacillus.  Currently patient is afebrile and VSS.  CRP trending up and no leukocytosis.  Procalcitonin negative. LDH minimally elevated.  Normal ferritin. D-dimer minimally elevated and stable.  Normal fibrinogen.  Prelim blood cultures are negative.  Respiratory panel PCR negative. Legionella urine antigen Is negative. F/U Respiratory culture. ID had discussion with the patient regarding the treatment for COVID 19 including plasma therapy and the Remdesivir. Patient and the daughter is agreeable.     Acute hypoxic respiratory failure, on 2 L nasal cannula. Will repeat xray in am.     Hypokalemia, resolved. on a daily supplement.    COPD, continue with albuterol MDI every 6 hours.  No duo nebs because of COVID-19 positive. Will start on Symbicort inhalers.     Thrombocytopenia, platelet stable. Monitor.     Essential HTN, controlled.  Hold home Lasix nifedipine and lisinopril.    CAD, S/P PCI, stable.  Troponin negative.  Continue with home aspirin And Crestor.     Seizure disorder, continue with home phenytoin regimen.  phenytoin level checked and within normal limit.    GIOVANNI, on CPAP, on home CPAP.    Depression/Anxiety, on home amitriptyline and Valium with dose decreased.   Continue with home Topamax Effexor.     Lovenox subcu for DVT prophylaxis.  Pepcid for GI prophylaxis.    Management plan discussed in detail with patient, daughter Sidra over the phone and nursing.  Sidra would like to discuss with Dr. Humphrey.    Kam was informed regarding that.    The patient is high risk due to the following diagnoses/reasons:  Sepsis  COVID 19 positive, R Pneumonia, Acute hypoxic respiratory failure    Isi Fowler MD  07/07/20  14:47

## 2020-07-07 NOTE — PLAN OF CARE
Problem: Patient Care Overview  Goal: Plan of Care Review  Outcome: Ongoing (interventions implemented as appropriate)  Flowsheets  Taken 7/7/2020 1441  Progress: no change  Taken 7/7/2020 0800  Plan of Care Reviewed With: patient  Note:   Patient has rested well this shift. Pt continues to have croupy cough but has begun to have sputum production. VSS. Will continue to monitor.

## 2020-07-07 NOTE — PROGRESS NOTES
Discussed with patient and reviewed the Convalescent Plasma Trial. Inclusion and exclusion has been reviewed. We discussed alternative treatment options. Patient is aware that participation is voluntary and the consent can be withdrawn at any time. Both Dr Fowler and I have reviewed patient’s chart and agree that patient needs convalescent plasma. All elements of consent has been covered including the involvement of research, risks and benefits. A copy of the signed consent has been provided to patient.     I have as well discussed the Remdesivir therapy and the different possible side effects knowingly that it is not currently FDA approved but based on reported case studies and small observational studies that might carry some benefit.  I went over the frequently asked questions with the patient who was in agreement to initiate therapy understanding the risk.

## 2020-07-07 NOTE — PLAN OF CARE
Problem: Patient Care Overview  Goal: Plan of Care Review  Outcome: Ongoing (interventions implemented as appropriate)  Flowsheets (Taken 7/7/2020 4456)  Outcome Summary: Patient had low grade fever during shift, PRN tylenol was given. Patient still on 2L NC. VSS. Pt denies pain or shortness of breath. No signs of respiratory distress. Patient has nonproductive cough. Will continue to monitor.  Goal: Individualization and Mutuality  Outcome: Ongoing (interventions implemented as appropriate)  Goal: Discharge Needs Assessment  Outcome: Ongoing (interventions implemented as appropriate)  Goal: Interprofessional Rounds/Family Conf  Outcome: Ongoing (interventions implemented as appropriate)     Problem: Fall Risk (Adult)  Goal: Identify Related Risk Factors and Signs and Symptoms  Outcome: Ongoing (interventions implemented as appropriate)  Goal: Absence of Fall  Outcome: Ongoing (interventions implemented as appropriate)     Problem: Pain, Chronic (Adult)  Goal: Identify Related Risk Factors and Signs and Symptoms  Outcome: Ongoing (interventions implemented as appropriate)  Goal: Acceptable Pain/Comfort Level and Functional Ability  Outcome: Ongoing (interventions implemented as appropriate)

## 2020-07-07 NOTE — PROGRESS NOTES
PROGRESS NOTE         Patient Identification:  Name:  Siria Rueda  Age:  74 y.o.  Sex:  female  :  1945  MRN:  [id number]  Visit Number:  [id number]  Primary Care Provider:  Cruzito Palumbo MD         LOS: 2 days       ----------------------------------------------------------------------------------------------------------------------  Subjective       Chief Complaints:    Shortness of Breath        Interval History:      Spoke with Екатерина MAST who states patient is doing okay this morning.  Continues to have a pretty severe croupy cough.  She is currently on 2 L via nasal cannula.  WBC 6.12.  CRP worse at 15.3.  Ferritin 142.6.  T-max of 100.6.  No diarrhea reported    Review of Systems:    Constitutional: Positive for fever and fatigue.  Eyes: no eye drainage, itching or redness.  HEENT: no mouth sores, dysphagia or nose bleed.  Respiratory: Positive for shortness of breath and croupy cough.  Cardiovascular: no chest pain, no palpitations, no orthopnea.  Gastrointestinal: no nausea, vomiting or diarrhea. No abdominal pain, hematemesis or rectal bleeding.  Genitourinary: no dysuria or polyuria.  Hematologic/lymphatic: no lymph node abnormalities, no easy bruising or easy bleeding.  Musculoskeletal: Positive for myalgias.  Skin: No rash and no itching.  Neurological: no loss of consciousness, no seizure, no headache.  Behavioral/Psych: no depression or suicidal ideation.  Endocrine: no hot flashes.  Immunologic: negative.  ----------------------------------------------------------------------------------------------------------------------      Objective       Current [facility] Meds:    albuterol sulfate HFA 2 puff Inhalation Q6H   amitriptyline 25 mg Oral Nightly   aspirin 81 mg Oral Nightly   benzonatate 200 mg Oral Q8H   budesonide-formoterol 2 puff Inhalation BID - RT   cefTRIAXone 2 g Intravenous Q24H   cholecalciferol 50,000 Units Oral Weekly   dexamethasone 6 mg Intravenous Daily   diazePAM  3.75 mg Oral Nightly   doxycycline 100 mg Intravenous Q12H   famotidine 20 mg Oral BID AC   heparin (porcine) 5,000 Units Subcutaneous Q8H   Hydrocortisone (Perianal)  Rectal BID   lactobacillus acidophilus 1 capsule Oral Daily   levothyroxine 25 mcg Oral Daily   magnesium oxide 400 mg Oral Daily   phenytoin 100 mg Oral Q PM   phenytoin 200 mg Oral QAM   potassium chloride 20 mEq Oral Daily   pramipexole 1 mg Oral Nightly   psyllium 1 packet Oral Daily   rosuvastatin 10 mg Oral Nightly   sodium chloride 10 mL Intravenous Q12H   topiramate 100 mg Oral Q12H   venlafaxine XR 75 mg Oral Q PM       Pharmacy Consult      ----------------------------------------------------------------------------------------------------------------------    Vital Signs:  Temp:  [97.5 °F (36.4 °C)-100.6 °F (38.1 °C)] 97.5 °F (36.4 °C)  Heart Rate:  [70-90] 78  Resp:  [14-28] 18  BP: ()/(45-73) 103/53  Mean Arterial Pressure (Non-Invasive) for the past 24 hrs (Last 3 readings):   Noninvasive MAP (mmHg)   07/07/20 1310 73   07/07/20 1203 75   07/07/20 1104 72     SpO2 Percentage    07/07/20 1104 07/07/20 1203 07/07/20 1310   SpO2: 93% 94% 97%     SpO2:  [90 %-99 %] 97 %  on  Flow (L/min):  [2] 2;   Device (Oxygen Therapy): nasal cannula    Body mass index is 33.47 kg/m².  Wt Readings from Last 3 Encounters:   07/07/20 85.7 kg (188 lb 14.4 oz)   11/16/19 76.5 kg (168 lb 11.2 oz)   07/10/19 78.5 kg (173 lb 1 oz)        Intake/Output Summary (Last 24 hours) at 7/7/2020 1403  Last data filed at 7/7/2020 0903  Gross per 24 hour   Intake 1624.66 ml   Output 300 ml   Net 1324.66 ml     Diet Regular; Cardiac  ----------------------------------------------------------------------------------------------------------------------      Physical Exam:    Deferred as patient is in COVID-19 isolation.       ----------------------------------------------------------------------------------------------------------------------  Results from last 7 days      Lab Units 07/07/20  0136 07/06/20  0548 07/05/20  1712   CK TOTAL U/L 329* 231*  --    TROPONIN T ng/mL  --   --  <0.010     Results from last 7 days   Lab Units 07/07/20  0136 07/05/20  1712   PROBNP pg/mL 122.2 102.3       Results from last 7 days   Lab Units 07/06/20  0931   PH, ARTERIAL pH units 7.402   PO2 ART mm Hg 61.9*   PCO2, ARTERIAL mm Hg 32.3*   HCO3 ART mmol/L 20.0     Results from last 7 days   Lab Units 07/07/20 0136 07/06/20  0548 07/05/20  1532   CRP mg/dL 15.38* 11.89* 6.46*   LACTATE mmol/L  --   --  1.3   WBC 10*3/mm3 6.12 6.54 7.09   HEMOGLOBIN g/dL 11.9* 12.9 13.1   HEMATOCRIT % 38.2 41.6 41.4   MCV fL 98.2* 99.5* 98.8*   MCHC g/dL 31.2* 31.0* 31.6   PLATELETS 10*3/mm3 123* 129* 147     Results from last 7 days   Lab Units 07/07/20 0136 07/06/20  0548 07/05/20  1532   SODIUM mmol/L 142 142 145   POTASSIUM mmol/L 3.6 3.0* 3.6   MAGNESIUM mg/dL  --  1.9  --    CHLORIDE mmol/L 109* 108* 107   CO2 mmol/L 19.0* 17.7* 23.1   BUN mg/dL 19 13 12   CREATININE mg/dL 0.78 0.76 0.84   EGFR IF NONAFRICN AM mL/min/1.73 72 74 66   CALCIUM mg/dL 8.2* 8.2* 8.6   GLUCOSE mg/dL 107* 107* 94   ALBUMIN g/dL  --  3.65 4.23   BILIRUBIN mg/dL  --  0.3 0.2   ALK PHOS U/L  --  179* 195*   AST (SGOT) U/L  --  36* 32   ALT (SGPT) U/L  --  33 37*   Estimated Creatinine Clearance: 64 mL/min (by C-G formula based on SCr of 0.78 mg/dL).  No results found for: AMMONIA    No results found for: HGBA1C, POCGLU  No results found for: HGBA1C  Lab Results   Component Value Date    TSH 1.270 07/06/2020    FREET4 1.05 03/13/2015       Blood Culture   Date Value Ref Range Status   07/05/2020 No growth at 24 hours  Preliminary   07/05/2020 No growth at 24 hours  Preliminary     No results found for: URINECX  No results found for: WOUNDCX  No results found for: STOOLCX  No results found for: RESPCX  Pain Management Panel     There is no flowsheet data to display.             ----------------------------------------------------------------------------------------------------------------------  Imaging Results (Last 24 Hours)     ** No results found for the last 24 hours. **          ----------------------------------------------------------------------------------------------------------------------    Assessment/Plan       Assessment/Plan     ASSESSMENT:    1.  Pneumonia due to COVID-19 virus    PLAN:  Spoke with Екатерина MAST who states patient is doing okay this morning.  Continues to have a pretty severe croupy cough.  She is currently on 2 L via nasal cannula.  WBC 6.12.  CRP worse at 15.3.  Ferritin 142.6.  T-max of 100.6.  No diarrhea reported    COVID-19 PCR positive.  Currently receiving Decadron 6 mg IV daily.     Chest x-ray on 7/5/2020 reveals no radiographic evidence of acute cardiac or pulmonary disease-per radiology.  CT chest on 7/5/2020 reveals no evidence of pulmonary embolism.  Likely underlying chronic lung disease with emphysematous changes at apices in the area of air trapping. Patchy airspace disease in the right middle lobe and lingula is nonspecific. Prominent mediastinal and right hilar lymph nodes not significantly changed. One AP window lymph node is mildly enlarged at 1.1 cm short axis dimension but unchanged-per radiology.      We agree with current regimen of Rocephin 2 g IV every 24 hours and doxycycline 100 mg IV every 12 hours and recommend to continue for now.     We will talk to patient about initiating Remdesivir and convalescent plasma therapy and obtain consent today as we feel patient would benefit from this.    Code Status:   Code Status and Medical Interventions:   Ordered at: 07/05/20 2049     Code Status:    CPR     Medical Interventions (Level of Support Prior to Arrest):    Full       ANNA Morley  07/07/20  14:03     Physician Attestation:    I have personally performed a face-to-face evaluation on this patient. I have collected  the review of systems and performed my own physical exam. I reviewed the patient's data including history of present illness, past medical history, past surgical history and allergy list. . The assessment and plan documented above are my own after discussing the case in detail with the APC. I have reviewed the laboratory and radiological pertinent results. I have reviewed and edited the note above after discussing the findings with the APC.    Tyrone Humphrey MD  Infectious Diseases  07/08/20  12:30

## 2020-07-08 ENCOUNTER — APPOINTMENT (OUTPATIENT)
Dept: GENERAL RADIOLOGY | Facility: HOSPITAL | Age: 75
End: 2020-07-08

## 2020-07-08 LAB
A-A DO2: 94.4 MMHG (ref 0–300)
ALBUMIN SERPL-MCNC: 3.49 G/DL (ref 3.5–5.2)
ALP SERPL-CCNC: 150 U/L (ref 39–117)
ALT SERPL W P-5'-P-CCNC: 28 U/L (ref 1–33)
ANION GAP SERPL CALCULATED.3IONS-SCNC: 14.7 MMOL/L (ref 5–15)
ARTERIAL PATENCY WRIST A: POSITIVE
AST SERPL-CCNC: 43 U/L (ref 1–32)
ATMOSPHERIC PRESS: 726 MMHG
BASE EXCESS BLDA CALC-SCNC: -6.1 MMOL/L (ref 0–2)
BASOPHILS # BLD AUTO: 0.02 10*3/MM3 (ref 0–0.2)
BASOPHILS NFR BLD AUTO: 0.4 % (ref 0–1.5)
BDY SITE: ABNORMAL
BH BB BLOOD EXPIRATION DATE: NORMAL
BH BB BLOOD TYPE BARCODE: 6200
BH BB DISPENSE STATUS: NORMAL
BH BB PRODUCT CODE: NORMAL
BH BB UNIT NUMBER: NORMAL
BILIRUB CONJ SERPL-MCNC: <0.2 MG/DL (ref 0–0.3)
BILIRUB INDIRECT SERPL-MCNC: ABNORMAL MG/DL
BILIRUB SERPL-MCNC: 0.2 MG/DL (ref 0–1.2)
BODY TEMPERATURE: 0 C
BUN SERPL-MCNC: 17 MG/DL (ref 8–23)
BUN/CREAT SERPL: 23.9 (ref 7–25)
CALCIUM SPEC-SCNC: 8.2 MG/DL (ref 8.6–10.5)
CHLORIDE SERPL-SCNC: 107 MMOL/L (ref 98–107)
CK SERPL-CCNC: 462 U/L (ref 20–180)
CO2 BLDA-SCNC: 19.3 MMOL/L (ref 22–33)
CO2 SERPL-SCNC: 18.3 MMOL/L (ref 22–29)
COHGB MFR BLD: <0.2 % (ref 0–5)
CREAT SERPL-MCNC: 0.71 MG/DL (ref 0.57–1)
CRP SERPL-MCNC: 18.43 MG/DL (ref 0–0.5)
D DIMER PPP FEU-MCNC: 0.5 MCGFEU/ML (ref 0–0.5)
DEPRECATED RDW RBC AUTO: 52.5 FL (ref 37–54)
EOSINOPHIL # BLD AUTO: 0 10*3/MM3 (ref 0–0.4)
EOSINOPHIL NFR BLD AUTO: 0 % (ref 0.3–6.2)
ERYTHROCYTE [DISTWIDTH] IN BLOOD BY AUTOMATED COUNT: 14.2 % (ref 12.3–15.4)
FERRITIN SERPL-MCNC: 221.6 NG/ML (ref 13–150)
FIBRINOGEN PPP-MCNC: 420 MG/DL (ref 173–524)
GAS FLOW AIRWAY: 2 LPM
GFR SERPL CREATININE-BSD FRML MDRD: 80 ML/MIN/1.73
GLUCOSE SERPL-MCNC: 103 MG/DL (ref 65–99)
HCO3 BLDA-SCNC: 18.3 MMOL/L (ref 20–26)
HCT VFR BLD AUTO: 37.4 % (ref 34–46.6)
HCT VFR BLD CALC: 38 % (ref 38–51)
HGB BLD-MCNC: 11.5 G/DL (ref 12–15.9)
HGB BLDA-MCNC: 12.4 G/DL (ref 13.5–17.5)
IMM GRANULOCYTES # BLD AUTO: 0.01 10*3/MM3 (ref 0–0.05)
IMM GRANULOCYTES NFR BLD AUTO: 0.2 % (ref 0–0.5)
INHALED O2 CONCENTRATION: 28 %
LDH SERPL-CCNC: 326 U/L (ref 135–214)
LYMPHOCYTES # BLD AUTO: 1.68 10*3/MM3 (ref 0.7–3.1)
LYMPHOCYTES NFR BLD AUTO: 32.4 % (ref 19.6–45.3)
Lab: ABNORMAL
MAGNESIUM SERPL-MCNC: 2 MG/DL (ref 1.6–2.4)
MCH RBC QN AUTO: 30.5 PG (ref 26.6–33)
MCHC RBC AUTO-ENTMCNC: 30.7 G/DL (ref 31.5–35.7)
MCV RBC AUTO: 99.2 FL (ref 79–97)
METHGB BLD QL: 0.2 % (ref 0–3)
MODALITY: ABNORMAL
MONOCYTES # BLD AUTO: 0.41 10*3/MM3 (ref 0.1–0.9)
MONOCYTES NFR BLD AUTO: 7.9 % (ref 5–12)
NEUTROPHILS NFR BLD AUTO: 3.07 10*3/MM3 (ref 1.7–7)
NEUTROPHILS NFR BLD AUTO: 59.1 % (ref 42.7–76)
NOTE: ABNORMAL
NRBC BLD AUTO-RTO: 0 /100 WBC (ref 0–0.2)
NT-PROBNP SERPL-MCNC: 188.7 PG/ML (ref 0–900)
OXYHGB MFR BLDV: 88.8 % (ref 94–99)
PCO2 BLDA: 32.1 MM HG (ref 35–45)
PCO2 TEMP ADJ BLD: ABNORMAL MM[HG]
PH BLDA: 7.37 PH UNITS (ref 7.35–7.45)
PH, TEMP CORRECTED: ABNORMAL
PLATELET # BLD AUTO: 112 10*3/MM3 (ref 140–450)
PMV BLD AUTO: 10.2 FL (ref 6–12)
PO2 BLDA: 59.9 MM HG (ref 83–108)
PO2 TEMP ADJ BLD: ABNORMAL MM[HG]
POTASSIUM SERPL-SCNC: 3.9 MMOL/L (ref 3.5–5.2)
PROT SERPL-MCNC: 6.3 G/DL (ref 6–8.5)
RBC # BLD AUTO: 3.77 10*6/MM3 (ref 3.77–5.28)
SAO2 % BLDCOA: 88.7 % (ref 94–99)
SODIUM SERPL-SCNC: 140 MMOL/L (ref 136–145)
UNIT  ABO: NORMAL
UNIT  RH: NORMAL
VENTILATOR MODE: ABNORMAL
WBC # BLD AUTO: 5.19 10*3/MM3 (ref 3.4–10.8)

## 2020-07-08 PROCEDURE — 86140 C-REACTIVE PROTEIN: CPT | Performed by: INTERNAL MEDICINE

## 2020-07-08 PROCEDURE — 36600 WITHDRAWAL OF ARTERIAL BLOOD: CPT

## 2020-07-08 PROCEDURE — 25010000002 FUROSEMIDE PER 20 MG: Performed by: HOSPITALIST

## 2020-07-08 PROCEDURE — 85025 COMPLETE CBC W/AUTO DIFF WBC: CPT | Performed by: HOSPITALIST

## 2020-07-08 PROCEDURE — 25010000002 ENOXAPARIN PER 10 MG: Performed by: HOSPITALIST

## 2020-07-08 PROCEDURE — 83735 ASSAY OF MAGNESIUM: CPT | Performed by: HOSPITALIST

## 2020-07-08 PROCEDURE — 82728 ASSAY OF FERRITIN: CPT | Performed by: INTERNAL MEDICINE

## 2020-07-08 PROCEDURE — 82550 ASSAY OF CK (CPK): CPT | Performed by: INTERNAL MEDICINE

## 2020-07-08 PROCEDURE — 25010000002 CEFTRIAXONE PER 250 MG: Performed by: NURSE PRACTITIONER

## 2020-07-08 PROCEDURE — 71045 X-RAY EXAM CHEST 1 VIEW: CPT | Performed by: RADIOLOGY

## 2020-07-08 PROCEDURE — 83880 ASSAY OF NATRIURETIC PEPTIDE: CPT | Performed by: HOSPITALIST

## 2020-07-08 PROCEDURE — 94799 UNLISTED PULMONARY SVC/PX: CPT

## 2020-07-08 PROCEDURE — 80076 HEPATIC FUNCTION PANEL: CPT | Performed by: HOSPITALIST

## 2020-07-08 PROCEDURE — 80048 BASIC METABOLIC PNL TOTAL CA: CPT | Performed by: HOSPITALIST

## 2020-07-08 PROCEDURE — 85384 FIBRINOGEN ACTIVITY: CPT | Performed by: INTERNAL MEDICINE

## 2020-07-08 PROCEDURE — 25010000002 DEXAMETHASONE PER 1 MG: Performed by: INTERNAL MEDICINE

## 2020-07-08 PROCEDURE — 63710000001 PROMETHAZINE PER 12.5 MG: Performed by: HOSPITALIST

## 2020-07-08 PROCEDURE — 83615 LACTATE (LD) (LDH) ENZYME: CPT | Performed by: INTERNAL MEDICINE

## 2020-07-08 PROCEDURE — 71045 X-RAY EXAM CHEST 1 VIEW: CPT

## 2020-07-08 PROCEDURE — 82805 BLOOD GASES W/O2 SATURATION: CPT

## 2020-07-08 PROCEDURE — 85379 FIBRIN DEGRADATION QUANT: CPT | Performed by: INTERNAL MEDICINE

## 2020-07-08 PROCEDURE — 83050 HGB METHEMOGLOBIN QUAN: CPT

## 2020-07-08 PROCEDURE — 99233 SBSQ HOSP IP/OBS HIGH 50: CPT | Performed by: HOSPITALIST

## 2020-07-08 PROCEDURE — 82375 ASSAY CARBOXYHB QUANT: CPT

## 2020-07-08 RX ORDER — FUROSEMIDE 10 MG/ML
20 INJECTION INTRAMUSCULAR; INTRAVENOUS ONCE
Status: COMPLETED | OUTPATIENT
Start: 2020-07-08 | End: 2020-07-08

## 2020-07-08 RX ORDER — PROMETHAZINE HYDROCHLORIDE 12.5 MG/1
12.5 TABLET ORAL EVERY 6 HOURS PRN
Status: DISCONTINUED | OUTPATIENT
Start: 2020-07-08 | End: 2020-07-15

## 2020-07-08 RX ORDER — HYDROXYZINE HYDROCHLORIDE 25 MG/1
25 TABLET, FILM COATED ORAL 3 TIMES DAILY PRN
Status: DISCONTINUED | OUTPATIENT
Start: 2020-07-08 | End: 2020-07-15

## 2020-07-08 RX ORDER — BENZONATATE 100 MG/1
200 CAPSULE ORAL EVERY 8 HOURS SCHEDULED
Status: COMPLETED | OUTPATIENT
Start: 2020-07-08 | End: 2020-07-10

## 2020-07-08 RX ORDER — AMITRIPTYLINE HYDROCHLORIDE 50 MG/1
50 TABLET, FILM COATED ORAL NIGHTLY
Status: DISCONTINUED | OUTPATIENT
Start: 2020-07-08 | End: 2020-07-10

## 2020-07-08 RX ORDER — POTASSIUM CHLORIDE 20 MEQ/1
20 TABLET, EXTENDED RELEASE ORAL ONCE
Status: COMPLETED | OUTPATIENT
Start: 2020-07-08 | End: 2020-07-08

## 2020-07-08 RX ADMIN — Medication 1 CAPSULE: at 08:03

## 2020-07-08 RX ADMIN — DOXYCYCLINE 100 MG: 100 INJECTION, POWDER, LYOPHILIZED, FOR SOLUTION INTRAVENOUS at 13:47

## 2020-07-08 RX ADMIN — HYDROCORTISONE 2.5%: 25 CREAM TOPICAL at 21:12

## 2020-07-08 RX ADMIN — AMITRIPTYLINE HYDROCHLORIDE 50 MG: 50 TABLET, FILM COATED ORAL at 20:54

## 2020-07-08 RX ADMIN — VENLAFAXINE HYDROCHLORIDE 75 MG: 75 CAPSULE, EXTENDED RELEASE ORAL at 18:06

## 2020-07-08 RX ADMIN — BENZONATATE 200 MG: 100 CAPSULE, LIQUID FILLED ORAL at 06:28

## 2020-07-08 RX ADMIN — HYDROCODONE BITARTRATE AND ACETAMINOPHEN 1 TABLET: 7.5; 325 TABLET ORAL at 20:52

## 2020-07-08 RX ADMIN — FAMOTIDINE 20 MG: 20 TABLET ORAL at 08:03

## 2020-07-08 RX ADMIN — ACETAMINOPHEN 650 MG: 325 TABLET ORAL at 20:53

## 2020-07-08 RX ADMIN — ALBUTEROL SULFATE 2 PUFF: 90 AEROSOL, METERED RESPIRATORY (INHALATION) at 21:12

## 2020-07-08 RX ADMIN — BUDESONIDE AND FORMOTEROL FUMARATE DIHYDRATE 2 PUFF: 160; 4.5 AEROSOL RESPIRATORY (INHALATION) at 10:21

## 2020-07-08 RX ADMIN — PHENYTOIN SODIUM 100 MG: 100 CAPSULE, EXTENDED RELEASE ORAL at 18:06

## 2020-07-08 RX ADMIN — POTASSIUM CHLORIDE 20 MEQ: 1500 TABLET, EXTENDED RELEASE ORAL at 08:03

## 2020-07-08 RX ADMIN — ENOXAPARIN SODIUM 40 MG: 40 INJECTION SUBCUTANEOUS at 08:03

## 2020-07-08 RX ADMIN — HYDROXYZINE 25 MG: 25 TABLET, FILM COATED ORAL at 20:54

## 2020-07-08 RX ADMIN — PHENYTOIN SODIUM 200 MG: 100 CAPSULE, EXTENDED RELEASE ORAL at 06:28

## 2020-07-08 RX ADMIN — BENZONATATE 200 MG: 100 CAPSULE ORAL at 23:53

## 2020-07-08 RX ADMIN — ASPIRIN 81 MG: 81 TABLET, COATED ORAL at 21:00

## 2020-07-08 RX ADMIN — ACETAMINOPHEN 650 MG: 325 TABLET ORAL at 10:19

## 2020-07-08 RX ADMIN — DOXYCYCLINE 100 MG: 100 INJECTION, POWDER, LYOPHILIZED, FOR SOLUTION INTRAVENOUS at 23:54

## 2020-07-08 RX ADMIN — SODIUM CHLORIDE, PRESERVATIVE FREE 10 ML: 5 INJECTION INTRAVENOUS at 08:04

## 2020-07-08 RX ADMIN — DEXTROMETHORPHAN HYDROBROMIDE AND GUAIFENESIN 5 ML: 10; 100 LIQUID ORAL at 20:55

## 2020-07-08 RX ADMIN — DEXAMETHASONE SODIUM PHOSPHATE 6 MG: 4 INJECTION, SOLUTION INTRA-ARTICULAR; INTRALESIONAL; INTRAMUSCULAR; INTRAVENOUS; SOFT TISSUE at 08:05

## 2020-07-08 RX ADMIN — DEXTROMETHORPHAN HYDROBROMIDE AND GUAIFENESIN 5 ML: 10; 100 LIQUID ORAL at 10:19

## 2020-07-08 RX ADMIN — BUDESONIDE AND FORMOTEROL FUMARATE DIHYDRATE 2 PUFF: 160; 4.5 AEROSOL RESPIRATORY (INHALATION) at 21:10

## 2020-07-08 RX ADMIN — POTASSIUM CHLORIDE 20 MEQ: 1500 TABLET, EXTENDED RELEASE ORAL at 15:44

## 2020-07-08 RX ADMIN — HYDROXYZINE 25 MG: 25 TABLET, FILM COATED ORAL at 15:44

## 2020-07-08 RX ADMIN — TOPIRAMATE 100 MG: 100 TABLET, FILM COATED ORAL at 20:53

## 2020-07-08 RX ADMIN — CEFTRIAXONE 2 G: 2 INJECTION, POWDER, FOR SOLUTION INTRAMUSCULAR; INTRAVENOUS at 18:06

## 2020-07-08 RX ADMIN — BENZONATATE 200 MG: 100 CAPSULE ORAL at 13:47

## 2020-07-08 RX ADMIN — SODIUM CHLORIDE, PRESERVATIVE FREE 10 ML: 5 INJECTION INTRAVENOUS at 21:48

## 2020-07-08 RX ADMIN — PRAMIPEXOLE DIHYDROCHLORIDE 1 MG: 1 TABLET ORAL at 20:54

## 2020-07-08 RX ADMIN — ALBUTEROL SULFATE 2 PUFF: 90 AEROSOL, METERED RESPIRATORY (INHALATION) at 08:00

## 2020-07-08 RX ADMIN — PROMETHAZINE HYDROCHLORIDE 12.5 MG: 12.5 TABLET ORAL at 20:53

## 2020-07-08 RX ADMIN — ROSUVASTATIN CALCIUM 10 MG: 10 TABLET, FILM COATED ORAL at 20:53

## 2020-07-08 RX ADMIN — HYDROCORTISONE 2.5%: 25 CREAM TOPICAL at 08:03

## 2020-07-08 RX ADMIN — FUROSEMIDE 20 MG: 10 INJECTION, SOLUTION INTRAMUSCULAR; INTRAVENOUS at 15:44

## 2020-07-08 RX ADMIN — LEVOTHYROXINE SODIUM 25 MCG: 25 TABLET ORAL at 08:03

## 2020-07-08 RX ADMIN — PROMETHAZINE HYDROCHLORIDE 12.5 MG: 12.5 TABLET ORAL at 11:20

## 2020-07-08 RX ADMIN — TOPIRAMATE 100 MG: 100 TABLET, FILM COATED ORAL at 08:03

## 2020-07-08 RX ADMIN — ALBUTEROL SULFATE 2 PUFF: 90 AEROSOL, METERED RESPIRATORY (INHALATION) at 13:47

## 2020-07-08 RX ADMIN — DEXTROMETHORPHAN HYDROBROMIDE AND GUAIFENESIN 5 ML: 10; 100 LIQUID ORAL at 15:44

## 2020-07-08 RX ADMIN — DIAZEPAM 3.75 MG: 5 TABLET ORAL at 20:55

## 2020-07-08 RX ADMIN — FAMOTIDINE 20 MG: 20 TABLET ORAL at 18:06

## 2020-07-08 RX ADMIN — MAGNESIUM GLUCONATE 500 MG ORAL TABLET 400 MG: 500 TABLET ORAL at 08:03

## 2020-07-08 NOTE — PROGRESS NOTES
Discharge Planning Assessment   Harvinder     Patient Name: Siria Rueda  MRN: [id number]  Today's Date: 7/8/2020    Admit Date: 7/5/2020    Discharge Plan     Row Name 07/08/20 1426       Plan    Plan  Pt plans to return home at discharge. Pt receives [facility] services. Pt has a CPAP and a nebulizer. [facility] to be contacted at discharge. SS will follow and assist as needed.     Patient/Family in Agreement with Plan  yes          SUSANNE Miguel

## 2020-07-08 NOTE — PLAN OF CARE
Problem: Patient Care Overview  Goal: Plan of Care Review  Outcome: Ongoing (interventions implemented as appropriate)  Flowsheets  Taken 7/8/2020 1727 by Darlyn Torrez, RN  Progress: no change  Taken 7/7/2020 2100 by Pearl Viera, RN  Plan of Care Reviewed With: patient  Note:   Patient has complained of increased shortness of air and cough. O2 increased to 3L NC. Will continue to monitor.

## 2020-07-08 NOTE — PROGRESS NOTES
PROGRESS NOTE         Patient Identification:  Name:  Siria Rueda  Age:  74 y.o.  Sex:  female  :  1945  MRN:  [id number]  Visit Number:  [id number]  Primary Care Provider:  Cruzito Palumbo MD         LOS: 3 days       ----------------------------------------------------------------------------------------------------------------------  Subjective       Chief Complaints:    Shortness of Breath        Interval History:      T-max of 100.1 overnight. Continues with severe cough. Remains on 2L via nasal cannula. WBC normal. CRP trending up at 18.43. Spoke with daughterTrinity today and updated her on plan of care.     Review of Systems:    Constitutional: Positive for fever and fatigue.  Eyes: no eye drainage, itching or redness.  HEENT: no mouth sores, dysphagia or nose bleed.  Respiratory: Positive for shortness of breath and croupy cough.  Cardiovascular: no chest pain, no palpitations, no orthopnea.  Gastrointestinal: no nausea, vomiting or diarrhea. No abdominal pain, hematemesis or rectal bleeding.  Genitourinary: no dysuria or polyuria.  Hematologic/lymphatic: no lymph node abnormalities, no easy bruising or easy bleeding.  Musculoskeletal: Positive for myalgias.  Skin: No rash and no itching.  Neurological: no loss of consciousness, no seizure, no headache.  Behavioral/Psych: no depression or suicidal ideation.  Endocrine: no hot flashes.  Immunologic: negative.  ----------------------------------------------------------------------------------------------------------------------      Objective       Current Hospital Meds:    albuterol sulfate HFA 2 puff Inhalation Q6H   amitriptyline 25 mg Oral Nightly   aspirin 81 mg Oral Nightly   benzonatate 200 mg Oral Q8H   budesonide-formoterol 2 puff Inhalation BID - RT   cefTRIAXone 2 g Intravenous Q24H   cholecalciferol 50,000 Units Oral Weekly   dexamethasone 6 mg Intravenous Daily   diazePAM 3.75 mg Oral Nightly   doxycycline 100 mg Intravenous  Q12H   enoxaparin 40 mg Subcutaneous Daily   famotidine 20 mg Oral BID AC   Hydrocortisone (Perianal)  Rectal BID   INV [id number] remdesivir in NS IVPB (from room temp reconstituted vial) 100 mg Intravenous Q24H   lactobacillus acidophilus 1 capsule Oral Daily   levothyroxine 25 mcg Oral Daily   magnesium oxide 400 mg Oral Daily   phenytoin 100 mg Oral Q PM   phenytoin 200 mg Oral QAM   potassium chloride 20 mEq Oral Daily   pramipexole 1 mg Oral Nightly   psyllium 1 packet Oral Daily   rosuvastatin 10 mg Oral Nightly   sodium chloride 10 mL Intravenous Q12H   topiramate 100 mg Oral Q12H   venlafaxine XR 75 mg Oral Q PM        ----------------------------------------------------------------------------------------------------------------------    Vital Signs:  Temp:  [98.8 °F (37.1 °C)-100.1 °F (37.8 °C)] 99.1 °F (37.3 °C)  Heart Rate:  [78-98] 98  Resp:  [16-24] 18  BP: (105-146)/() 124/76  Mean Arterial Pressure (Non-Invasive) for the past 24 hrs (Last 3 readings):   Noninvasive MAP (mmHg)   07/08/20 0805 89   07/08/20 0702 83   07/08/20 0605 76     SpO2 Percentage    07/08/20 0605 07/08/20 0702 07/08/20 0805   SpO2: 90% 90% 98%     SpO2:  [90 %-98 %] 98 %  on  Flow (L/min):  [2] 2;   Device (Oxygen Therapy): nasal cannula    Body mass index is 33.26 kg/m².  Wt Readings from Last 3 Encounters:   07/08/20 85.1 kg (187 lb 11.2 oz)   11/16/19 76.5 kg (168 lb 11.2 oz)   07/10/19 78.5 kg (173 lb 1 oz)        Intake/Output Summary (Last 24 hours) at 7/8/2020 1329  Last data filed at 7/7/2020 1823  Gross per 24 hour   Intake 450 ml   Output --   Net 450 ml     Diet Regular; Cardiac  ----------------------------------------------------------------------------------------------------------------------      Physical Exam:    Deferred as patient is in COVID-19 isolation.       ----------------------------------------------------------------------------------------------------------------------  Results from last 7 days    Lab Units 07/08/20  0512 07/07/20 0136 07/06/20  0548 07/05/20  1712   CK TOTAL U/L 462* 329* 231*  --    TROPONIN T ng/mL  --  <0.010  --  <0.010     Results from last 7 days   Lab Units 07/07/20  0136 07/05/20  1712   PROBNP pg/mL 122.2 102.3       Results from last 7 days   Lab Units 07/06/20  0931   PH, ARTERIAL pH units 7.402   PO2 ART mm Hg 61.9*   PCO2, ARTERIAL mm Hg 32.3*   HCO3 ART mmol/L 20.0     Results from last 7 days   Lab Units 07/08/20 0512 07/07/20 0136 07/06/20  0548 07/05/20  1532   CRP mg/dL 18.43* 15.38* 11.89* 6.46*   LACTATE mmol/L  --   --   --  1.3   WBC 10*3/mm3 5.19 6.12 6.54 7.09   HEMOGLOBIN g/dL 11.5* 11.9* 12.9 13.1   HEMATOCRIT % 37.4 38.2 41.6 41.4   MCV fL 99.2* 98.2* 99.5* 98.8*   MCHC g/dL 30.7* 31.2* 31.0* 31.6   PLATELETS 10*3/mm3 112* 123* 129* 147     Results from last 7 days   Lab Units 07/08/20 0512 07/07/20 0136 07/06/20  0548 07/05/20  1532   SODIUM mmol/L 140 142 142 145   POTASSIUM mmol/L 3.9 3.6 3.0* 3.6   MAGNESIUM mg/dL 2.0  --  1.9  --    CHLORIDE mmol/L 107 109* 108* 107   CO2 mmol/L 18.3* 19.0* 17.7* 23.1   BUN mg/dL 17 19 13 12   CREATININE mg/dL 0.71 0.78 0.76 0.84   EGFR IF NONAFRICN AM mL/min/1.73 80 72 74 66   CALCIUM mg/dL 8.2* 8.2* 8.2* 8.6   GLUCOSE mg/dL 103* 107* 107* 94   ALBUMIN g/dL 3.49*  --  3.65 4.23   BILIRUBIN mg/dL 0.2  --  0.3 0.2   ALK PHOS U/L 150*  --  179* 195*   AST (SGOT) U/L 43*  --  36* 32   ALT (SGPT) U/L 28  --  33 37*   Estimated Creatinine Clearance: 63.8 mL/min (by C-G formula based on SCr of 0.71 mg/dL).  No results found for: AMMONIA    No results found for: HGBA1C, POCGLU  No results found for: HGBA1C  Lab Results   Component Value Date    TSH 1.270 07/06/2020    FREET4 1.05 03/13/2015       Blood Culture   Date Value Ref Range Status   07/05/2020 No growth at 24 hours  Preliminary   07/05/2020 No growth at 24 hours  Preliminary     No results found for: URINECX  No results found for: WOUNDCX  No results found for:  STOOLCX  No results found for: RESPCX  Pain Management Panel     There is no flowsheet data to display.            ----------------------------------------------------------------------------------------------------------------------  Imaging Results (Last 24 Hours)     Procedure Component Value Units Date/Time    XR Chest 1 View [[id number]] Collected:  07/08/20 0828     Updated:  07/08/20 0831    Narrative:       EXAMINATION:  XR CHEST 1 VW-      CLINICAL INDICATION:     hypoxia; J18.9-Pneumonia, unspecified organism;  U07.1-COVID-19     TECHNIQUE:  XR CHEST 1 VW-       COMPARISON: NONE      FINDINGS:   Worsening left lung airspace disease. Heart size is stable.   No pneumothorax.   No pleural effusion.   Bony and soft tissue structures are unremarkable.          Impression:       Worsening left lung airspace disease.        This report was finalized on 7/8/2020 8:29 AM by Dr. Quincy Acevedo MD.             ----------------------------------------------------------------------------------------------------------------------    Assessment/Plan       Assessment/Plan     ASSESSMENT:    1.  Pneumonia due to COVID-19 virus    PLAN:    T-max of 100.1 overnight. Continues with severe cough. Remains on 2L via nasal cannula. WBC normal. CRP trending up at 18.43. Spoke with daughterTrinity today and updated her on plan of care.     COVID-19 PCR positive.  Currently receiving Decadron 6 mg IV daily.     Chest x-ray on 7/5/2020 reveals no radiographic evidence of acute cardiac or pulmonary disease-per radiology.  CT chest on 7/5/2020 reveals no evidence of pulmonary embolism.  Likely underlying chronic lung disease with emphysematous changes at apices in the area of air trapping. Patchy airspace disease in the right middle lobe and lingula is nonspecific. Prominent mediastinal and right hilar lymph nodes not significantly changed. One AP window lymph node is mildly enlarged at 1.1 cm short axis dimension but unchanged-per  radiology.      We agree with current regimen of Rocephin 2 g IV every 24 hours and doxycycline 100 mg IV every 12 hours and recommend to continue for now.     Patient receiving Remdesivir and received and completed convalescent plasma therapy yesterday. Consents were obtained.     Code Status:   Code Status and Medical Interventions:   Ordered at: 07/05/20 2049     Code Status:    CPR     Medical Interventions (Level of Support Prior to Arrest):    Full       ANNA Morley  07/08/20  13:29     Physician Attestation:    The documentation recorded by the scribe accurately reflects the service I personally performed and the decisions made by me.    Tyrone Humphrey MD  Infectious Diseases  07/09/20  03:50

## 2020-07-08 NOTE — PLAN OF CARE
Problem: Patient Care Overview  Goal: Plan of Care Review  Outcome: Ongoing (interventions implemented as appropriate)  Flowsheets  Taken 7/8/2020 0128 by Sidra Carrera, RN  Progress: improving  Outcome Summary: pt. still on 2L/NC, VSS  Taken 7/7/2020 2100 by Pearl Viera, RN  Plan of Care Reviewed With: patient  Goal: Individualization and Mutuality  Outcome: Ongoing (interventions implemented as appropriate)  Goal: Discharge Needs Assessment  Outcome: Ongoing (interventions implemented as appropriate)  Flowsheets  Taken 7/6/2020 1530 by Rose Kirk  Equipment Currently Used at Home: none  Transportation Anticipated: family or friend will provide  Patient/Family Anticipates Transition to: home with family  Taken 7/8/2020 0128 by Sidra Carrera, RN  Concerns to be Addressed: no discharge needs identified  Readmission Within the Last 30 Days: no previous admission in last 30 days  Goal: Interprofessional Rounds/Family Conf  Outcome: Ongoing (interventions implemented as appropriate)  Flowsheets (Taken 7/6/2020 0318 by Any Renteria RN)  Participants: patient     Problem: Fall Risk (Adult)  Goal: Identify Related Risk Factors and Signs and Symptoms  Outcome: Ongoing (interventions implemented as appropriate)  Flowsheets  Taken 7/6/2020 0318 by Any Renteria RN  Related Risk Factors (Fall Risk): age-related changes;environment unfamiliar  Taken 7/7/2020 1441 by Darlyn Torrez RN  Signs and Symptoms (Fall Risk): presence of risk factors  Goal: Absence of Fall  Outcome: Ongoing (interventions implemented as appropriate)  Flowsheets (Taken 7/8/2020 0124)  Absence of Fall: achieves outcome     Problem: Pain, Chronic (Adult)  Goal: Identify Related Risk Factors and Signs and Symptoms  Outcome: Ongoing (interventions implemented as appropriate)  Flowsheets (Taken 7/8/2020 0124)  Related Risk Factors (Chronic Pain): disease process  Signs and Symptoms (Chronic Pain): fatigue/weakness  Goal:  Acceptable Pain/Comfort Level and Functional Ability  Outcome: Ongoing (interventions implemented as appropriate)  Flowsheets (Taken 7/8/2020 0124)  Acceptable Pain/Comfort Level and Functional Ability: making progress toward outcome

## 2020-07-08 NOTE — PROGRESS NOTES
[facility] HOSPITALIST PROGRESS NOTE     Patient Identification:  Name:  Siria Rueda  Age:  74 y.o.  Sex:  female  :  1945  MRN:  [id number]  Visit Number:  [id number]  ROOM: [address]     Primary Care Provider:  Cruzito Palumbo MD    Length of stay:  3     This was an audio and video enabled telemedicine encounter.    Subjective        Chief Compliant Follow up for the Shortness of breath and cough    Patient seen and examined this morning with KEZIA Santizo at the bedside by telemedicine. States that she is feeling very poorly today. Feels like she is smothering. Continues to have shortness of breath and having dry cough, getting more productive now, slightly blood tinged per the patient.  Has chest tightness when takes deep breath. Feels tired and fatigued. Very anxious.  Denies any chest pain.  Had nausea, which resolved. Denies vomiting abdominal pain. Getting OOB. on 2 L nasal cannula. Temp of 100.1 yesterday evening. Afebrile and no events overnight.       Objective     Current Hospital Meds:    albuterol sulfate HFA 2 puff Inhalation Q6H   amitriptyline 50 mg Oral Nightly   aspirin 81 mg Oral Nightly   benzonatate 200 mg Oral Q8H   budesonide-formoterol 2 puff Inhalation BID - RT   cefTRIAXone 2 g Intravenous Q24H   cholecalciferol 50,000 Units Oral Weekly   dexamethasone 6 mg Intravenous Daily   diazePAM 3.75 mg Oral Nightly   doxycycline 100 mg Intravenous Q12H   enoxaparin 40 mg Subcutaneous Daily   famotidine 20 mg Oral BID AC   Hydrocortisone (Perianal)  Rectal BID   INV [id number] remdesivir in NS IVPB (from room temp reconstituted vial) 100 mg Intravenous Q24H   lactobacillus acidophilus 1 capsule Oral Daily   levothyroxine 25 mcg Oral Daily   magnesium oxide 400 mg Oral Daily   phenytoin 100 mg Oral Q PM   phenytoin 200 mg Oral QAM   potassium chloride 20 mEq Oral Daily   pramipexole 1 mg Oral Nightly   psyllium 1 packet Oral Daily   rosuvastatin 10 mg Oral Nightly   sodium chloride  10 mL Intravenous Q12H   topiramate 100 mg Oral Q12H   venlafaxine XR 75 mg Oral Q PM        ----------------------------------------------------------------------------------------------------------------------  Vital Signs:  Temp:  [97.5 °F (36.4 °C)-100.1 °F (37.8 °C)] 97.5 °F (36.4 °C)  Heart Rate:  [78-98] 79  Resp:  [18-24] 20  BP: (104-141)/(52-78) 104/77  SpO2:  [90 %-98 %] 95 %  on  Flow (L/min):  [2] 2;   Device (Oxygen Therapy): nasal cannula  Body mass index is 33.26 kg/m².    Wt Readings from Last 3 Encounters:   07/08/20 85.1 kg (187 lb 11.2 oz)   11/16/19 76.5 kg (168 lb 11.2 oz)   07/10/19 78.5 kg (173 lb 1 oz)       Intake/Output Summary (Last 24 hours) at 7/8/2020 1630  Last data filed at 7/7/2020 1823  Gross per 24 hour   Intake 450 ml   Output --   Net 450 ml     Diet Regular; Cardiac  ----------------------------------------------------------------------------------------------------------------------  Physical exam(by telemedicine with KEZIA Santizo):  General: Anxious. Not in distress.  Well-developed and well-nourished.   HENT:  Head:  Normocephalic and atraumatic.  Mouth:  Moist mucous membranes.    Eyes:  Conjunctivae and EOM are normal.  Pupils are equal, round, and reactive to light.    Cardiovascular:  Normal rate  Pulmonary/Chest: No tachypnea, No respiratory distress, no wheezes, no crackles, with decrease breath sounds and decrease air movement.  Abdomen:  Soft. No distension and no tenderness.   Neurological:  Alert and oriented to person, place, and time.  No cranial nerve deficit. No focal deficits. No facial droop.  No slurred speech.   Peripheral vascular:  no edema.  Genitourinary: no hart  ----------------------------------------------------------------------------------------------------------------------  ----------------------------------------------------------------------------------------------------------------------  Results from last 7 days   Lab Units 07/08/20  6914  07/07/20 0136 07/06/20  0548 07/05/20  1532   CRP mg/dL 18.43* 15.38* 11.89* 6.46*   LACTATE mmol/L  --   --   --  1.3   WBC 10*3/mm3 5.19 6.12 6.54 7.09   HEMOGLOBIN g/dL 11.5* 11.9* 12.9 13.1   HEMATOCRIT % 37.4 38.2 41.6 41.4   MCV fL 99.2* 98.2* 99.5* 98.8*   MCHC g/dL 30.7* 31.2* 31.0* 31.6   PLATELETS 10*3/mm3 112* 123* 129* 147     Results from last 7 days   Lab Units 07/08/20 0512 07/07/20 0136 07/06/20 0548 07/05/20  1532   SODIUM mmol/L 140 142 142 145   POTASSIUM mmol/L 3.9 3.6 3.0* 3.6   MAGNESIUM mg/dL 2.0  --  1.9  --    CHLORIDE mmol/L 107 109* 108* 107   CO2 mmol/L 18.3* 19.0* 17.7* 23.1   BUN mg/dL 17 19 13 12   CREATININE mg/dL 0.71 0.78 0.76 0.84   EGFR IF NONAFRICN AM mL/min/1.73 80 72 74 66   CALCIUM mg/dL 8.2* 8.2* 8.2* 8.6   GLUCOSE mg/dL 103* 107* 107* 94   ALBUMIN g/dL 3.49*  --  3.65 4.23   BILIRUBIN mg/dL 0.2  --  0.3 0.2   ALK PHOS U/L 150*  --  179* 195*   AST (SGOT) U/L 43*  --  36* 32   ALT (SGPT) U/L 28  --  33 37*   Estimated Creatinine Clearance: 63.8 mL/min (by C-G formula based on SCr of 0.71 mg/dL).  Results from last 7 days   Lab Units 07/08/20 0512 07/07/20 0136 07/06/20 0548 07/05/20  1712   CK TOTAL U/L 462* 329* 231*  --    TROPONIN T ng/mL  --  <0.010  --  <0.010     No results found for: HGBA1C, POCGLU  No results found for: AMMONIA      No results found for: BLOODCXNo results found for: RESPCXNo results found for: URINECXNo results found for: WOUNDCXNo results found for: BODYFLDCXNo results found for: STOOLCX  I have personally looked at the labs and they are summarized above.  ----------------------------------------------------------------------------------------------------------------------  Imaging Results (Last 24 Hours)     Procedure Component Value Units Date/Time    XR Chest 1 View [[id number]] Collected:  07/08/20 0828     Updated:  07/08/20 0831    Narrative:       EXAMINATION:  XR CHEST 1 VW-      CLINICAL INDICATION:     hypoxia; J18.9-Pneumonia,  unspecified organism;  U07.1-COVID-19     TECHNIQUE:  XR CHEST 1 VW-       COMPARISON: NONE      FINDINGS:   Worsening left lung airspace disease. Heart size is stable.   No pneumothorax.   No pleural effusion.   Bony and soft tissue structures are unremarkable.          Impression:       Worsening left lung airspace disease.        This report was finalized on 7/8/2020 8:29 AM by Dr. Quincy Acevedo MD.           I have personally reviewed the radiology images and read the final radiology report.    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  Continue with IV antibiotics Rocephin doxycycline.  On IV Decadron daily for hypoxia.  on lactobacillus.  Had low grade temp yesterday evening. Currently patient is afebrile and VSS.  CRP trending up and no leukocytosis.  Procalcitonin negative. LDH and CK trending up. Ferritin trending up. D-dimer normal. Normal fibrinogen.  Prelim blood cultures are negative.  Respiratory panel PCR negative. Legionella urine antigen Is negative. Respiratory culture with normal madyson. Received and tolerated plasma therapy on 07/7. On Remdesivir (D-2).     Acute hypoxic respiratory failure, on 2 L nasal cannula. Chest xray 07/8 reviewed and shows Left sided infiltrates. ABG done this afternoon with hypoxia on 2L NC. Increase FIO2 to 4 L NC. Will do a dose of iv lasix to improve the lung compliance. On Albuterol MDI and Symbicort.  Repeat ABG in am.     Hypokalemia, resolved. on a daily supplement.    COPD, continue with albuterol MDI every 6 hours and Symbicort inhalers. No duo nebs because of COVID-19 positive.     Thrombocytopenia, worsening. Monitor.     Essential HTN, controlled.  Hold home Lasix  nifedipine and lisinopril.    CAD, S/P PCI, stable.  Troponin negative.  Continue with home aspirin And Crestor.     Seizure disorder, continue with home phenytoin regimen.  phenytoin level within normal limit.    GIOVANNI, on CPAP, on home CPAP.    Depression/Anxiety, on home Valium with dose decreased.   Continue with home amitriptyline, Topamax Effexor. Will add prn atarax.     Lovenox subcu for DVT prophylaxis.  Pepcid for GI prophylaxis.    Guarded prognosis since patient is progressively worsening.     Management plan discussed in detail with patient, daughter Sidra over the phone and nursing.      The patient is high risk due to the following diagnoses/reasons:  Sepsis  COVID 19 positive, R Pneumonia, Acute hypoxic respiratory failure    Isi Fowler MD  07/08/20  16:30

## 2020-07-09 ENCOUNTER — APPOINTMENT (OUTPATIENT)
Dept: GENERAL RADIOLOGY | Facility: HOSPITAL | Age: 75
End: 2020-07-09

## 2020-07-09 LAB
A-A DO2: 119.6 MMHG (ref 0–300)
ANION GAP SERPL CALCULATED.3IONS-SCNC: 13 MMOL/L (ref 5–15)
ARTERIAL PATENCY WRIST A: POSITIVE
ATMOSPHERIC PRESS: 726 MMHG
BACTERIA SPEC RESP CULT: NORMAL
BASE EXCESS BLDA CALC-SCNC: -3.4 MMOL/L (ref 0–2)
BASOPHILS # BLD MANUAL: 0.04 10*3/MM3 (ref 0–0.2)
BASOPHILS NFR BLD AUTO: 1 % (ref 0–1.5)
BDY SITE: ABNORMAL
BODY TEMPERATURE: 0 C
BUN SERPL-MCNC: 19 MG/DL (ref 8–23)
BUN/CREAT SERPL: 26.4 (ref 7–25)
CALCIUM SPEC-SCNC: 7.9 MG/DL (ref 8.6–10.5)
CHLORIDE SERPL-SCNC: 108 MMOL/L (ref 98–107)
CK SERPL-CCNC: 1155 U/L (ref 20–180)
CO2 BLDA-SCNC: 22 MMOL/L (ref 22–33)
CO2 SERPL-SCNC: 19 MMOL/L (ref 22–29)
COHGB MFR BLD: <0.2 % (ref 0–5)
CREAT SERPL-MCNC: 0.72 MG/DL (ref 0.57–1)
CRP SERPL-MCNC: 18.31 MG/DL (ref 0–0.5)
DEPRECATED RDW RBC AUTO: 51.1 FL (ref 37–54)
ERYTHROCYTE [DISTWIDTH] IN BLOOD BY AUTOMATED COUNT: 14.1 % (ref 12.3–15.4)
FERRITIN SERPL-MCNC: 333 NG/ML (ref 13–150)
GAS FLOW AIRWAY: 3 LPM
GFR SERPL CREATININE-BSD FRML MDRD: 79 ML/MIN/1.73
GLUCOSE SERPL-MCNC: 98 MG/DL (ref 65–99)
GRAM STN SPEC: NORMAL
HCO3 BLDA-SCNC: 20.9 MMOL/L (ref 20–26)
HCT VFR BLD AUTO: 38.9 % (ref 34–46.6)
HCT VFR BLD CALC: 37 % (ref 38–51)
HGB BLD-MCNC: 12.2 G/DL (ref 12–15.9)
HGB BLDA-MCNC: 12.1 G/DL (ref 13.5–17.5)
INHALED O2 CONCENTRATION: 32 %
LYMPHOCYTES # BLD MANUAL: 1.78 10*3/MM3 (ref 0.7–3.1)
LYMPHOCYTES NFR BLD MANUAL: 13 % (ref 5–12)
LYMPHOCYTES NFR BLD MANUAL: 40 % (ref 19.6–45.3)
Lab: ABNORMAL
MCH RBC QN AUTO: 30.6 PG (ref 26.6–33)
MCHC RBC AUTO-ENTMCNC: 31.4 G/DL (ref 31.5–35.7)
MCV RBC AUTO: 97.5 FL (ref 79–97)
METHGB BLD QL: 0.2 % (ref 0–3)
MODALITY: ABNORMAL
MONOCYTES # BLD AUTO: 0.58 10*3/MM3 (ref 0.1–0.9)
NEUTROPHILS # BLD AUTO: 2.05 10*3/MM3 (ref 1.7–7)
NEUTROPHILS NFR BLD MANUAL: 44 % (ref 42.7–76)
NEUTS BAND NFR BLD MANUAL: 2 % (ref 0–5)
NOTE: ABNORMAL
NOTIFIED BY: ABNORMAL
NOTIFIED WHO: ABNORMAL
OXYHGB MFR BLDV: 88.6 % (ref 94–99)
PCO2 BLDA: 34.5 MM HG (ref 35–45)
PCO2 TEMP ADJ BLD: ABNORMAL MM[HG]
PH BLDA: 7.39 PH UNITS (ref 7.35–7.45)
PH, TEMP CORRECTED: ABNORMAL
PLAT MORPH BLD: NORMAL
PLATELET # BLD AUTO: 114 10*3/MM3 (ref 140–450)
PMV BLD AUTO: 10 FL (ref 6–12)
PO2 BLDA: 59.3 MM HG (ref 83–108)
PO2 TEMP ADJ BLD: ABNORMAL MM[HG]
POTASSIUM SERPL-SCNC: 3.5 MMOL/L (ref 3.5–5.2)
RBC # BLD AUTO: 3.99 10*6/MM3 (ref 3.77–5.28)
RBC MORPH BLD: NORMAL
SAO2 % BLDCOA: 88.8 % (ref 94–99)
SCAN SLIDE: NORMAL
SODIUM SERPL-SCNC: 140 MMOL/L (ref 136–145)
VENTILATOR MODE: ABNORMAL
WBC # BLD AUTO: 4.46 10*3/MM3 (ref 3.4–10.8)

## 2020-07-09 PROCEDURE — 25010000002 ENOXAPARIN PER 10 MG: Performed by: HOSPITALIST

## 2020-07-09 PROCEDURE — 82805 BLOOD GASES W/O2 SATURATION: CPT

## 2020-07-09 PROCEDURE — 25010000002 MEROPENEM PER 100 MG: Performed by: NURSE PRACTITIONER

## 2020-07-09 PROCEDURE — 86140 C-REACTIVE PROTEIN: CPT | Performed by: INTERNAL MEDICINE

## 2020-07-09 PROCEDURE — 83050 HGB METHEMOGLOBIN QUAN: CPT

## 2020-07-09 PROCEDURE — 82728 ASSAY OF FERRITIN: CPT | Performed by: INTERNAL MEDICINE

## 2020-07-09 PROCEDURE — 94799 UNLISTED PULMONARY SVC/PX: CPT

## 2020-07-09 PROCEDURE — 71045 X-RAY EXAM CHEST 1 VIEW: CPT

## 2020-07-09 PROCEDURE — 25010000002 MEROPENEM: Performed by: NURSE PRACTITIONER

## 2020-07-09 PROCEDURE — 85025 COMPLETE CBC W/AUTO DIFF WBC: CPT | Performed by: HOSPITALIST

## 2020-07-09 PROCEDURE — 25010000002 DEXAMETHASONE PER 1 MG: Performed by: INTERNAL MEDICINE

## 2020-07-09 PROCEDURE — 36600 WITHDRAWAL OF ARTERIAL BLOOD: CPT

## 2020-07-09 PROCEDURE — 99233 SBSQ HOSP IP/OBS HIGH 50: CPT | Performed by: HOSPITALIST

## 2020-07-09 PROCEDURE — 80048 BASIC METABOLIC PNL TOTAL CA: CPT | Performed by: HOSPITALIST

## 2020-07-09 PROCEDURE — 71045 X-RAY EXAM CHEST 1 VIEW: CPT | Performed by: RADIOLOGY

## 2020-07-09 PROCEDURE — 85007 BL SMEAR W/DIFF WBC COUNT: CPT | Performed by: HOSPITALIST

## 2020-07-09 PROCEDURE — 82550 ASSAY OF CK (CPK): CPT | Performed by: INTERNAL MEDICINE

## 2020-07-09 PROCEDURE — 82375 ASSAY CARBOXYHB QUANT: CPT

## 2020-07-09 RX ORDER — LIDOCAINE HYDROCHLORIDE 10 MG/ML
5 INJECTION, SOLUTION INFILTRATION; PERINEURAL ONCE
Status: DISCONTINUED | OUTPATIENT
Start: 2020-07-09 | End: 2020-07-17 | Stop reason: HOSPADM

## 2020-07-09 RX ADMIN — DOXYCYCLINE 100 MG: 100 INJECTION, POWDER, LYOPHILIZED, FOR SOLUTION INTRAVENOUS at 12:16

## 2020-07-09 RX ADMIN — FAMOTIDINE 20 MG: 20 TABLET ORAL at 16:46

## 2020-07-09 RX ADMIN — BENZONATATE 200 MG: 100 CAPSULE ORAL at 21:17

## 2020-07-09 RX ADMIN — FAMOTIDINE 20 MG: 20 TABLET ORAL at 06:00

## 2020-07-09 RX ADMIN — ALBUTEROL SULFATE 2 PUFF: 90 AEROSOL, METERED RESPIRATORY (INHALATION) at 08:03

## 2020-07-09 RX ADMIN — BENZONATATE 200 MG: 100 CAPSULE ORAL at 14:33

## 2020-07-09 RX ADMIN — DEXAMETHASONE SODIUM PHOSPHATE 6 MG: 4 INJECTION, SOLUTION INTRA-ARTICULAR; INTRALESIONAL; INTRAMUSCULAR; INTRAVENOUS; SOFT TISSUE at 08:05

## 2020-07-09 RX ADMIN — ALBUTEROL SULFATE 2 PUFF: 90 AEROSOL, METERED RESPIRATORY (INHALATION) at 02:27

## 2020-07-09 RX ADMIN — HYDROCORTISONE 2.5%: 25 CREAM TOPICAL at 21:11

## 2020-07-09 RX ADMIN — MEROPENEM 1 G: 1 INJECTION, POWDER, FOR SOLUTION INTRAVENOUS at 15:54

## 2020-07-09 RX ADMIN — PHENYTOIN SODIUM 200 MG: 100 CAPSULE, EXTENDED RELEASE ORAL at 06:00

## 2020-07-09 RX ADMIN — ASPIRIN 81 MG: 81 TABLET, COATED ORAL at 21:09

## 2020-07-09 RX ADMIN — DIAZEPAM 3.75 MG: 5 TABLET ORAL at 21:09

## 2020-07-09 RX ADMIN — PSYLLIUM HUSK 1 PACKET: 3.4 POWDER ORAL at 08:03

## 2020-07-09 RX ADMIN — HYDROCODONE BITARTRATE AND ACETAMINOPHEN 1 TABLET: 7.5; 325 TABLET ORAL at 21:08

## 2020-07-09 RX ADMIN — POTASSIUM CHLORIDE 20 MEQ: 1500 TABLET, EXTENDED RELEASE ORAL at 08:04

## 2020-07-09 RX ADMIN — ALBUTEROL SULFATE 2 PUFF: 90 AEROSOL, METERED RESPIRATORY (INHALATION) at 21:07

## 2020-07-09 RX ADMIN — ACETAMINOPHEN 650 MG: 325 TABLET ORAL at 21:09

## 2020-07-09 RX ADMIN — BUDESONIDE AND FORMOTEROL FUMARATE DIHYDRATE 2 PUFF: 160; 4.5 AEROSOL RESPIRATORY (INHALATION) at 21:07

## 2020-07-09 RX ADMIN — TOPIRAMATE 100 MG: 100 TABLET, FILM COATED ORAL at 21:09

## 2020-07-09 RX ADMIN — ACETAMINOPHEN 650 MG: 325 TABLET ORAL at 06:01

## 2020-07-09 RX ADMIN — DEXTROMETHORPHAN HYDROBROMIDE AND GUAIFENESIN 5 ML: 10; 100 LIQUID ORAL at 08:04

## 2020-07-09 RX ADMIN — AMITRIPTYLINE HYDROCHLORIDE 50 MG: 50 TABLET, FILM COATED ORAL at 21:09

## 2020-07-09 RX ADMIN — PHENYTOIN SODIUM 100 MG: 100 CAPSULE, EXTENDED RELEASE ORAL at 16:00

## 2020-07-09 RX ADMIN — MEROPENEM 1 G: 1 INJECTION, POWDER, FOR SOLUTION INTRAVENOUS at 21:11

## 2020-07-09 RX ADMIN — ENOXAPARIN SODIUM 40 MG: 40 INJECTION SUBCUTANEOUS at 08:04

## 2020-07-09 RX ADMIN — LEVOTHYROXINE SODIUM 25 MCG: 25 TABLET ORAL at 08:17

## 2020-07-09 RX ADMIN — Medication 1 CAPSULE: at 08:06

## 2020-07-09 RX ADMIN — SODIUM CHLORIDE, PRESERVATIVE FREE 10 ML: 5 INJECTION INTRAVENOUS at 08:03

## 2020-07-09 RX ADMIN — BUDESONIDE AND FORMOTEROL FUMARATE DIHYDRATE 2 PUFF: 160; 4.5 AEROSOL RESPIRATORY (INHALATION) at 09:00

## 2020-07-09 RX ADMIN — TOPIRAMATE 100 MG: 100 TABLET, FILM COATED ORAL at 08:08

## 2020-07-09 RX ADMIN — HYDROXYZINE 25 MG: 25 TABLET, FILM COATED ORAL at 08:09

## 2020-07-09 RX ADMIN — PRAMIPEXOLE DIHYDROCHLORIDE 1 MG: 1 TABLET ORAL at 21:09

## 2020-07-09 RX ADMIN — BENZONATATE 200 MG: 100 CAPSULE ORAL at 05:58

## 2020-07-09 RX ADMIN — VENLAFAXINE HYDROCHLORIDE 75 MG: 75 CAPSULE, EXTENDED RELEASE ORAL at 16:00

## 2020-07-09 RX ADMIN — ALBUTEROL SULFATE 2 PUFF: 90 AEROSOL, METERED RESPIRATORY (INHALATION) at 14:33

## 2020-07-09 RX ADMIN — SODIUM CHLORIDE, PRESERVATIVE FREE 10 ML: 5 INJECTION INTRAVENOUS at 21:16

## 2020-07-09 RX ADMIN — MAGNESIUM GLUCONATE 500 MG ORAL TABLET 400 MG: 500 TABLET ORAL at 08:06

## 2020-07-09 NOTE — PROGRESS NOTES
PROGRESS NOTE         Patient Identification:  Name:  Siria Rueda  Age:  74 y.o.  Sex:  female  :  1945  MRN:  [id number]  Visit Number:  [id number]  Primary Care Provider:  Cruzito Palumbo MD         LOS: 4 days       ----------------------------------------------------------------------------------------------------------------------  Subjective       Chief Complaints:    Shortness of Breath        Interval History:      Patient had a high-grade fever of 104.3 overnight.  Increased oxygen requirement of 4 L per nasal cannula.  C normal.  CRP stable at 18.31.  Increased shortness of breath and cough today.  Ferritin worsening at 333.  Chest x-ray from 2020 reports worsening patchy bilateral airspace disease.    Review of Systems:    Constitutional: Positive for fever and fatigue.  Eyes: no eye drainage, itching or redness.  HEENT: no mouth sores, dysphagia or nose bleed.  Respiratory: Positive for shortness of breath and croupy cough.  Cardiovascular: no chest pain, no palpitations, no orthopnea.  Gastrointestinal: no nausea, vomiting or diarrhea. No abdominal pain, hematemesis or rectal bleeding.  Genitourinary: no dysuria or polyuria.  Hematologic/lymphatic: no lymph node abnormalities, no easy bruising or easy bleeding.  Musculoskeletal: Positive for myalgias.  Skin: No rash and no itching.  Neurological: no loss of consciousness, no seizure, no headache.  Behavioral/Psych: no depression or suicidal ideation.  Endocrine: no hot flashes.  Immunologic: negative.  ----------------------------------------------------------------------------------------------------------------------      Objective       Current [facility] Meds:    albuterol sulfate HFA 2 puff Inhalation Q6H   amitriptyline 50 mg Oral Nightly   aspirin 81 mg Oral Nightly   benzonatate 200 mg Oral Q8H   budesonide-formoterol 2 puff Inhalation BID - RT   cholecalciferol 50,000 Units Oral Weekly   dexamethasone 6 mg Intravenous  Daily   diazePAM 3.75 mg Oral Nightly   doxycycline 100 mg Intravenous Q12H   enoxaparin 40 mg Subcutaneous Daily   famotidine 20 mg Oral BID AC   Hydrocortisone (Perianal)  Rectal BID   INV [id number] remdesivir in NS IVPB (from room temp reconstituted vial) 100 mg Intravenous Q24H   lactobacillus acidophilus 1 capsule Oral Daily   levothyroxine 25 mcg Oral Daily   lidocaine 5 mL Subcutaneous Once   magnesium oxide 400 mg Oral Daily   meropenem 1 g Intravenous Once   meropenem 1 g Intravenous Q8H   phenytoin 100 mg Oral Q PM   phenytoin 200 mg Oral QAM   potassium chloride 20 mEq Oral Daily   pramipexole 1 mg Oral Nightly   psyllium 1 packet Oral Daily   sodium chloride 10 mL Intravenous Q12H   topiramate 100 mg Oral Q12H   venlafaxine XR 75 mg Oral Q PM        ----------------------------------------------------------------------------------------------------------------------    Vital Signs:  Temp:  [96.5 °F (35.8 °C)-104.3 °F (40.2 °C)] 97.9 °F (36.6 °C)  Heart Rate:  [66-98] 78  Resp:  [16-22] 16  BP: ()/(43-77) 93/55  Mean Arterial Pressure (Non-Invasive) for the past 24 hrs (Last 3 readings):   Noninvasive MAP (mmHg)   07/09/20 1203 77   07/09/20 0803 75   07/09/20 0500 67     SpO2 Percentage    07/09/20 0600 07/09/20 0803 07/09/20 1203   SpO2: 95% 90% 91%     SpO2:  [86 %-97 %] 91 %  on  Flow (L/min):  [2-4] 4;   Device (Oxygen Therapy): nasal cannula    Body mass index is 33.26 kg/m².  Wt Readings from Last 3 Encounters:   07/08/20 85.1 kg (187 lb 11.2 oz)   11/16/19 76.5 kg (168 lb 11.2 oz)   07/10/19 78.5 kg (173 lb 1 oz)        Intake/Output Summary (Last 24 hours) at 7/9/2020 1428  Last data filed at 7/9/2020 0900  Gross per 24 hour   Intake 1570 ml   Output --   Net 1570 ml     Diet Regular; Cardiac  ----------------------------------------------------------------------------------------------------------------------      Physical Exam:    Deferred as patient is in COVID-19  isolation.       ----------------------------------------------------------------------------------------------------------------------  Results from last 7 days   Lab Units 07/09/20 0533 07/08/20 0512 07/07/20 0136 07/05/20  1712   CK TOTAL U/L 1,155* 462* 329*   < >  --    TROPONIN T ng/mL  --   --  <0.010  --  <0.010    < > = values in this interval not displayed.     Results from last 7 days   Lab Units 07/08/20 0512 07/07/20 0136 07/05/20  1712   PROBNP pg/mL 188.7 122.2 102.3       Results from last 7 days   Lab Units 07/09/20  0529   PH, ARTERIAL pH units 7.392   PO2 ART mm Hg 59.3*   PCO2, ARTERIAL mm Hg 34.5*   HCO3 ART mmol/L 20.9     Results from last 7 days   Lab Units 07/09/20 0533 07/08/20 0512 07/07/20 0136 07/05/20  1532   CRP mg/dL 18.31* 18.43* 15.38*   < > 6.46*   LACTATE mmol/L  --   --   --   --  1.3   WBC 10*3/mm3 4.46 5.19 6.12   < > 7.09   HEMOGLOBIN g/dL 12.2 11.5* 11.9*   < > 13.1   HEMATOCRIT % 38.9 37.4 38.2   < > 41.4   MCV fL 97.5* 99.2* 98.2*   < > 98.8*   MCHC g/dL 31.4* 30.7* 31.2*   < > 31.6   PLATELETS 10*3/mm3 114* 112* 123*   < > 147    < > = values in this interval not displayed.     Results from last 7 days   Lab Units 07/09/20 0533 07/08/20 0512 07/07/20  0136 07/06/20  0548 07/05/20  1532   SODIUM mmol/L 140 140 142 142 145   POTASSIUM mmol/L 3.5 3.9 3.6 3.0* 3.6   MAGNESIUM mg/dL  --  2.0  --  1.9  --    CHLORIDE mmol/L 108* 107 109* 108* 107   CO2 mmol/L 19.0* 18.3* 19.0* 17.7* 23.1   BUN mg/dL 19 17 19 13 12   CREATININE mg/dL 0.72 0.71 0.78 0.76 0.84   EGFR IF NONAFRICN AM mL/min/1.73 79 80 72 74 66   CALCIUM mg/dL 7.9* 8.2* 8.2* 8.2* 8.6   GLUCOSE mg/dL 98 103* 107* 107* 94   ALBUMIN g/dL  --  3.49*  --  3.65 4.23   BILIRUBIN mg/dL  --  0.2  --  0.3 0.2   ALK PHOS U/L  --  150*  --  179* 195*   AST (SGOT) U/L  --  43*  --  36* 32   ALT (SGPT) U/L  --  28  --  33 37*   Estimated Creatinine Clearance: 63.8 mL/min (by C-G formula based on SCr of 0.72 mg/dL).  No  results found for: AMMONIA    No results found for: HGBA1C, POCGLU  No results found for: HGBA1C  Lab Results   Component Value Date    TSH 1.270 07/06/2020    FREET4 1.05 03/13/2015       Blood Culture   Date Value Ref Range Status   07/05/2020 No growth at 24 hours  Preliminary   07/05/2020 No growth at 24 hours  Preliminary     No results found for: URINECX  No results found for: WOUNDCX  No results found for: STOOLCX  No results found for: RESPCX  Pain Management Panel     There is no flowsheet data to display.            ----------------------------------------------------------------------------------------------------------------------  Imaging Results (Last 24 Hours)     Procedure Component Value Units Date/Time    XR Chest 1 View [[id number]] Collected:  07/09/20 0835     Updated:  07/09/20 0839    Narrative:       EXAMINATION: XR CHEST 1 VW-      CLINICAL INDICATION:     Hypoxia/COVID; J18.9-Pneumonia, unspecified  organism; U07.1-COVID-19     TECHNIQUE: Single AP view of chest.      COMPARISON: 07/08/2020      FINDINGS:   There is bibasilar atelectasis.   Worsened patchy bilateral airspace disease.  Heart size is stable.  No pneumothorax.           Impression:       Worsened patchy bilateral airspace disease.     This report was finalized on 7/9/2020 8:36 AM by Dr. Quincy Acevedo MD.             ----------------------------------------------------------------------------------------------------------------------    Assessment/Plan       Assessment/Plan     ASSESSMENT:    1.  Pneumonia due to COVID-19 virus    PLAN:    Patient had a high-grade fever of 104.3 overnight.  Increased oxygen requirement of 4 L per nasal cannula.  C normal.  CRP stable at 18.31.  Increased shortness of breath and cough today.  Ferritin worsening at 333.  Chest x-ray from 7/9/2020 reports worsening patchy bilateral airspace disease.    COVID-19 PCR positive.  Currently receiving Decadron 6 mg IV daily.     Chest x-ray on 7/5/2020  reveals no radiographic evidence of acute cardiac or pulmonary disease-per radiology.  CT chest on 7/5/2020 reveals no evidence of pulmonary embolism.  Likely underlying chronic lung disease with emphysematous changes at apices in the area of air trapping. Patchy airspace disease in the right middle lobe and lingula is nonspecific. Prominent mediastinal and right hilar lymph nodes not significantly changed. One AP window lymph node is mildly enlarged at 1.1 cm short axis dimension but unchanged-per radiology.      Patient receiving Remdesivir and received and completed convalescent plasma therapy. Consents were obtained.     In the setting of persistent worsening and persistent fever Rocephin was escalated to Merrem 1 g IV every 8 hours to continue with doxycycline 100 mg IV every 12 hours.  We will continue to follow closely and adjust antibiotic therapy as needed.    Code Status:   Code Status and Medical Interventions:   Ordered at: 07/05/20 2049     Code Status:    CPR     Medical Interventions (Level of Support Prior to Arrest):    Full       ANNA Mari  07/09/20  14:28

## 2020-07-09 NOTE — PLAN OF CARE
Problem: Patient Care Overview  Goal: Plan of Care Review  Outcome: Ongoing (interventions implemented as appropriate)  Flowsheets (Taken 7/9/2020 0440)  Progress: no change  Plan of Care Reviewed With: patient  Outcome Summary: Pt febrile overnight. Pt on 2-3L NC. See RN note. Will continue to monitor.

## 2020-07-09 NOTE — PROGRESS NOTES
[facility] HOSPITALIST PROGRESS NOTE     Patient Identification:  Name:  Siria Rueda  Age:  74 y.o.  Sex:  female  :  1945  MRN:  [id number]  Visit Number:  [id number]  ROOM: [address]     Primary Care Provider:  Cruzito Palumbo MD    Length of stay:  4     This was an audio and video enabled telemedicine encounter.    Subjective        Chief Compliant Follow up for the Shortness of breath and cough    Patient seen and examined this morning with KEZIA Harry at the bedside by telemedicine. Was very sick last night with temp of 104, cooling blanket placed and tylenol given and the temp normalized. States that she continues to feel very poorly today. Feels like she is smothering. Continues to have shortness of breath and having productive cough, more blood tinged now.  Has chest tightness when takes deep breath. Feels tired and fatigued. Anxiety better with the atarax.  Denies any chest pain.  Had nausea, which resolved. Denies vomiting abdominal pain. Getting OOB. on 4 L nasal cannula. no events overnight.       Objective     Current Hospital Meds:    albuterol sulfate HFA 2 puff Inhalation Q6H   amitriptyline 50 mg Oral Nightly   aspirin 81 mg Oral Nightly   benzonatate 200 mg Oral Q8H   budesonide-formoterol 2 puff Inhalation BID - RT   cefTRIAXone 2 g Intravenous Q24H   cholecalciferol 50,000 Units Oral Weekly   dexamethasone 6 mg Intravenous Daily   diazePAM 3.75 mg Oral Nightly   doxycycline 100 mg Intravenous Q12H   enoxaparin 40 mg Subcutaneous Daily   famotidine 20 mg Oral BID AC   Hydrocortisone (Perianal)  Rectal BID   INV [id number] remdesivir in NS IVPB (from room temp reconstituted vial) 100 mg Intravenous Q24H   lactobacillus acidophilus 1 capsule Oral Daily   levothyroxine 25 mcg Oral Daily   lidocaine 5 mL Subcutaneous Once   magnesium oxide 400 mg Oral Daily   phenytoin 100 mg Oral Q PM   phenytoin 200 mg Oral QAM   potassium chloride 20 mEq Oral Daily   pramipexole 1 mg Oral  Nightly   psyllium 1 packet Oral Daily   sodium chloride 10 mL Intravenous Q12H   topiramate 100 mg Oral Q12H   venlafaxine XR 75 mg Oral Q PM        ----------------------------------------------------------------------------------------------------------------------  Vital Signs:  Temp:  [96.5 °F (35.8 °C)-104.3 °F (40.2 °C)] 97.9 °F (36.6 °C)  Heart Rate:  [66-98] 78  Resp:  [16-22] 16  BP: ()/(43-77) 93/55  SpO2:  [86 %-97 %] 91 %  on  Flow (L/min):  [2-4] 4;   Device (Oxygen Therapy): nasal cannula  Body mass index is 33.26 kg/m².    Wt Readings from Last 3 Encounters:   07/08/20 85.1 kg (187 lb 11.2 oz)   11/16/19 76.5 kg (168 lb 11.2 oz)   07/10/19 78.5 kg (173 lb 1 oz)       Intake/Output Summary (Last 24 hours) at 7/9/2020 1246  Last data filed at 7/9/2020 0900  Gross per 24 hour   Intake 1570 ml   Output --   Net 1570 ml     Diet Regular; Cardiac  ----------------------------------------------------------------------------------------------------------------------  Physical exam(by telemedicine with KEZIA Santizo):  General: Anxious. Not in distress.  Well-developed and well-nourished.   HENT:  Head:  Normocephalic and atraumatic.  Mouth:  Moist mucous membranes.    Eyes:  Conjunctivae and EOM are normal.  Pupils are equal, round, and reactive to light.    Cardiovascular:  Normal rate  Pulmonary/Chest: No tachypnea, No respiratory distress, no wheezes, no crackles, with decrease breath sounds and decrease air movement.  Abdomen:  Soft. No distension and no tenderness.   Neurological:  Alert and oriented to person, place, and time.  No cranial nerve deficit. No focal deficits. No facial droop.  No slurred speech.   Peripheral vascular:  no edema.  Genitourinary: no  hart  ----------------------------------------------------------------------------------------------------------------------  ----------------------------------------------------------------------------------------------------------------------  Results from last 7 days   Lab Units 07/09/20  0533 07/08/20  0512 07/07/20  0136  07/05/20  1532   CRP mg/dL 18.31* 18.43* 15.38*   < > 6.46*   LACTATE mmol/L  --   --   --   --  1.3   WBC 10*3/mm3 4.46 5.19 6.12   < > 7.09   HEMOGLOBIN g/dL 12.2 11.5* 11.9*   < > 13.1   HEMATOCRIT % 38.9 37.4 38.2   < > 41.4   MCV fL 97.5* 99.2* 98.2*   < > 98.8*   MCHC g/dL 31.4* 30.7* 31.2*   < > 31.6   PLATELETS 10*3/mm3 114* 112* 123*   < > 147    < > = values in this interval not displayed.     Results from last 7 days   Lab Units 07/09/20  0533 07/08/20  0512 07/07/20  0136 07/06/20  0548 07/05/20  1532   SODIUM mmol/L 140 140 142 142 145   POTASSIUM mmol/L 3.5 3.9 3.6 3.0* 3.6   MAGNESIUM mg/dL  --  2.0  --  1.9  --    CHLORIDE mmol/L 108* 107 109* 108* 107   CO2 mmol/L 19.0* 18.3* 19.0* 17.7* 23.1   BUN mg/dL 19 17 19 13 12   CREATININE mg/dL 0.72 0.71 0.78 0.76 0.84   EGFR IF NONAFRICN AM mL/min/1.73 79 80 72 74 66   CALCIUM mg/dL 7.9* 8.2* 8.2* 8.2* 8.6   GLUCOSE mg/dL 98 103* 107* 107* 94   ALBUMIN g/dL  --  3.49*  --  3.65 4.23   BILIRUBIN mg/dL  --  0.2  --  0.3 0.2   ALK PHOS U/L  --  150*  --  179* 195*   AST (SGOT) U/L  --  43*  --  36* 32   ALT (SGPT) U/L  --  28  --  33 37*   Estimated Creatinine Clearance: 63.8 mL/min (by C-G formula based on SCr of 0.72 mg/dL).  Results from last 7 days   Lab Units 07/09/20  0533 07/08/20  0512 07/07/20  0136  07/05/20  1712   CK TOTAL U/L 1,155* 462* 329*   < >  --    TROPONIN T ng/mL  --   --  <0.010  --  <0.010    < > = values in this interval not displayed.     No results found for: HGBA1C, POCGLU  No results found for: AMMONIA      No results found for: BLOODCXNo results found for: RESPCXNo results found for: URINECXNo results  found for: WOUNDCXNo results found for: BODYFLDCXNo results found for: STOOLCX  I have personally looked at the labs and they are summarized above.  ----------------------------------------------------------------------------------------------------------------------  Imaging Results (Last 24 Hours)     Procedure Component Value Units Date/Time    XR Chest 1 View [[id number]] Collected:  07/09/20 0835     Updated:  07/09/20 0839    Narrative:       EXAMINATION: XR CHEST 1 VW-      CLINICAL INDICATION:     Hypoxia/COVID; J18.9-Pneumonia, unspecified  organism; U07.1-COVID-19     TECHNIQUE: Single AP view of chest.      COMPARISON: 07/08/2020      FINDINGS:   There is bibasilar atelectasis.   Worsened patchy bilateral airspace disease.  Heart size is stable.  No pneumothorax.           Impression:       Worsened patchy bilateral airspace disease.     This report was finalized on 7/9/2020 8:36 AM by Dr. Quincy Acevedo MD.           I have personally reviewed the radiology images and read the final radiology report.    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  Continue with IV antibiotics Rocephin doxycycline.  On IV Decadron daily for hypoxia.  on lactobacillus.  Had high grade temp last night. Currently patient is afebrile and VSS.  CRP stable and no leukocytosis.  Procalcitonin negative. LDH and CK trending up. Ferritin trending up. D-dimer normal. Normal fibrinogen.  Prelim blood cultures are negative.  Respiratory panel PCR negative. Legionella urine antigen Is negative. Respiratory culture with normal madyson. Received and tolerated plasma therapy on 07/7. On Remdesivir (D-3).      Acute hypoxic respiratory  failure, worsening. on 4 L nasal cannula. Chest xray 07/9 reviewed and shows worsening b/l infiltrates. ABG done this morning with hypoxia on 3 L NC. FIO2 increased to 4 L NC. On Albuterol MDI and Symbicort.  Repeat ABG/Chest xray in am.     Hypokalemia, resolved. on a daily supplement.    COPD, continue with albuterol MDI every 6 hours and Symbicort inhalers. No duo nebs because of COVID-19 positive.     Thrombocytopenia, stable. Monitor.     Essential HTN, controlled.  Hold home Lasix nifedipine and lisinopril.    CAD, S/P PCI, stable.  Troponin negative.  Continue with home aspirin And dc Crestor since CPK is worsening.     Seizure disorder, continue with home phenytoin regimen.  phenytoin level within normal limit.    GIOVANNI, on CPAP, on home CPAP.    Depression/Anxiety, on home Valium with dose decreased.   Continue with home amitriptyline, Topamax, Effexor. Will add prn atarax.     Lovenox subcu for DVT prophylaxis.  Pepcid for GI prophylaxis.    Guarded prognosis since patient is progressively worsening and very high risk of intuabtion.     Management plan discussed in detail with patient and nursing.      The patient is high risk due to the following diagnoses/reasons:  Sepsis  COVID 19 positive, R Pneumonia, Acute hypoxic respiratory failure    Isi Fowler MD  07/09/20  12:46

## 2020-07-09 NOTE — PLAN OF CARE
Patient is alert and oriented. Denies chest pain but complains of SOA. Is wearing oxygen @ 4L/NC. Patient up to BR independently and no increased SOA was noted. Patient has productive cough with blood tinged sputum noted, MD aware. Patient has been afebrile and VS are WNL. Will continue to monitor and follow POC.

## 2020-07-09 NOTE — NURSING NOTE
Pt rectal temp 104.3 @ 2100. Cooling blankets applied along with cool cloths. Pt temp decreased to 97.8 PO after approximately 2 hrs on cooling blanket and PO Tylenol. Pt encouraged to cough, deep breath and use IS to facilitate 02. Pt had brief 02 Desaturation to mid 80's but RT assessed pt and determined that CPAP machine may not be working correctly explained to patient that she may need to have machine adjusted but to continue to wear the nasal cannula instead. 3L/NC applied and titrated down to 2.5L. Pt sats continue to be in 90's. Will continue to monitor and update as necessary.

## 2020-07-10 ENCOUNTER — APPOINTMENT (OUTPATIENT)
Dept: GENERAL RADIOLOGY | Facility: HOSPITAL | Age: 75
End: 2020-07-10

## 2020-07-10 PROBLEM — J18.9 PNEUMONIA OF RIGHT LUNG DUE TO INFECTIOUS ORGANISM: Status: RESOLVED | Noted: 2020-07-05 | Resolved: 2020-07-10

## 2020-07-10 LAB
A-A DO2: 176.9 MMHG (ref 0–300)
A-A DO2: 567.9 MMHG (ref 0–300)
ALBUMIN SERPL-MCNC: 3.14 G/DL (ref 3.5–5.2)
ALP SERPL-CCNC: 135 U/L (ref 39–117)
ALT SERPL W P-5'-P-CCNC: 25 U/L (ref 1–33)
ANION GAP SERPL CALCULATED.3IONS-SCNC: 13.5 MMOL/L (ref 5–15)
ARTERIAL PATENCY WRIST A: ABNORMAL
ARTERIAL PATENCY WRIST A: POSITIVE
AST SERPL-CCNC: 59 U/L (ref 1–32)
ATMOSPHERIC PRESS: 724 MMHG
ATMOSPHERIC PRESS: 726 MMHG
BACTERIA SPEC AEROBE CULT: NORMAL
BACTERIA SPEC AEROBE CULT: NORMAL
BASE EXCESS BLDA CALC-SCNC: -3.3 MMOL/L (ref 0–2)
BASE EXCESS BLDA CALC-SCNC: -4.8 MMOL/L (ref 0–2)
BASOPHILS # BLD AUTO: 0.02 10*3/MM3 (ref 0–0.2)
BASOPHILS NFR BLD AUTO: 0.3 % (ref 0–1.5)
BDY SITE: ABNORMAL
BDY SITE: ABNORMAL
BILIRUB CONJ SERPL-MCNC: <0.2 MG/DL (ref 0–0.3)
BILIRUB INDIRECT SERPL-MCNC: ABNORMAL MG/DL
BILIRUB SERPL-MCNC: 0.3 MG/DL (ref 0–1.2)
BODY TEMPERATURE: 0 C
BODY TEMPERATURE: 0 C
BUN SERPL-MCNC: 21 MG/DL (ref 8–23)
BUN/CREAT SERPL: 32.3 (ref 7–25)
CALCIUM SPEC-SCNC: 8.3 MG/DL (ref 8.6–10.5)
CHLORIDE SERPL-SCNC: 107 MMOL/L (ref 98–107)
CK SERPL-CCNC: 1142 U/L (ref 20–180)
CO2 BLDA-SCNC: 20.2 MMOL/L (ref 22–33)
CO2 BLDA-SCNC: 21.9 MMOL/L (ref 22–33)
CO2 SERPL-SCNC: 17.5 MMOL/L (ref 22–29)
COHGB MFR BLD: <0.2 % (ref 0–5)
COHGB MFR BLD: <0.2 % (ref 0–5)
CREAT SERPL-MCNC: 0.65 MG/DL (ref 0.57–1)
CRP SERPL-MCNC: 19.55 MG/DL (ref 0–0.5)
D DIMER PPP FEU-MCNC: 0.8 MCGFEU/ML (ref 0–0.5)
DEPRECATED RDW RBC AUTO: 52 FL (ref 37–54)
EOSINOPHIL # BLD AUTO: 0 10*3/MM3 (ref 0–0.4)
EOSINOPHIL NFR BLD AUTO: 0 % (ref 0.3–6.2)
ERYTHROCYTE [DISTWIDTH] IN BLOOD BY AUTOMATED COUNT: 14 % (ref 12.3–15.4)
FERRITIN SERPL-MCNC: 419.3 NG/ML (ref 13–150)
FIBRINOGEN PPP-MCNC: 417 MG/DL (ref 173–524)
GAS FLOW AIRWAY: 15 LPM
GFR SERPL CREATININE-BSD FRML MDRD: 89 ML/MIN/1.73
GLUCOSE SERPL-MCNC: 92 MG/DL (ref 65–99)
HCO3 BLDA-SCNC: 19.2 MMOL/L (ref 20–26)
HCO3 BLDA-SCNC: 20.8 MMOL/L (ref 20–26)
HCT VFR BLD AUTO: 40.2 % (ref 34–46.6)
HCT VFR BLD CALC: 36.3 % (ref 38–51)
HCT VFR BLD CALC: 41.1 % (ref 38–51)
HGB BLD-MCNC: 12.4 G/DL (ref 12–15.9)
HGB BLDA-MCNC: 11.9 G/DL (ref 13.5–17.5)
HGB BLDA-MCNC: 13.4 G/DL (ref 13.5–17.5)
IMM GRANULOCYTES # BLD AUTO: 0.02 10*3/MM3 (ref 0–0.05)
IMM GRANULOCYTES NFR BLD AUTO: 0.3 % (ref 0–0.5)
INHALED O2 CONCENTRATION: 100 %
INHALED O2 CONCENTRATION: 40 %
LDH SERPL-CCNC: 543 U/L (ref 135–214)
LYMPHOCYTES # BLD AUTO: 2.5 10*3/MM3 (ref 0.7–3.1)
LYMPHOCYTES NFR BLD AUTO: 38.9 % (ref 19.6–45.3)
Lab: ABNORMAL
Lab: ABNORMAL
MAGNESIUM SERPL-MCNC: 2 MG/DL (ref 1.6–2.4)
MCH RBC QN AUTO: 30.9 PG (ref 26.6–33)
MCHC RBC AUTO-ENTMCNC: 30.8 G/DL (ref 31.5–35.7)
MCV RBC AUTO: 100.2 FL (ref 79–97)
METHGB BLD QL: 0.3 % (ref 0–3)
METHGB BLD QL: 0.3 % (ref 0–3)
MODALITY: ABNORMAL
MODALITY: ABNORMAL
MONOCYTES # BLD AUTO: 0.55 10*3/MM3 (ref 0.1–0.9)
MONOCYTES NFR BLD AUTO: 8.6 % (ref 5–12)
NEUTROPHILS NFR BLD AUTO: 3.33 10*3/MM3 (ref 1.7–7)
NEUTROPHILS NFR BLD AUTO: 51.9 % (ref 42.7–76)
NOTE: ABNORMAL
NOTE: ABNORMAL
NRBC BLD AUTO-RTO: 0 /100 WBC (ref 0–0.2)
OXYHGB MFR BLDV: 88 % (ref 94–99)
OXYHGB MFR BLDV: 93.7 % (ref 94–99)
PCO2 BLDA: 32 MM HG (ref 35–45)
PCO2 BLDA: 33.7 MM HG (ref 35–45)
PCO2 TEMP ADJ BLD: ABNORMAL MM[HG]
PCO2 TEMP ADJ BLD: ABNORMAL MM[HG]
PH BLDA: 7.39 PH UNITS (ref 7.35–7.45)
PH BLDA: 7.4 PH UNITS (ref 7.35–7.45)
PH, TEMP CORRECTED: ABNORMAL
PH, TEMP CORRECTED: ABNORMAL
PLATELET # BLD AUTO: 118 10*3/MM3 (ref 140–450)
PMV BLD AUTO: 10.5 FL (ref 6–12)
PO2 BLDA: 57.8 MM HG (ref 83–108)
PO2 BLDA: 77.4 MM HG (ref 83–108)
PO2 TEMP ADJ BLD: ABNORMAL MM[HG]
PO2 TEMP ADJ BLD: ABNORMAL MM[HG]
POTASSIUM SERPL-SCNC: 4 MMOL/L (ref 3.5–5.2)
PROCALCITONIN SERPL-MCNC: 0.55 NG/ML (ref 0–0.25)
PROT SERPL-MCNC: 5.7 G/DL (ref 6–8.5)
RBC # BLD AUTO: 4.01 10*6/MM3 (ref 3.77–5.28)
SAO2 % BLDCOA: 88.2 % (ref 94–99)
SAO2 % BLDCOA: 93.6 % (ref 94–99)
SODIUM SERPL-SCNC: 138 MMOL/L (ref 136–145)
VENTILATOR MODE: ABNORMAL
VENTILATOR MODE: ABNORMAL
WBC # BLD AUTO: 6.42 10*3/MM3 (ref 3.4–10.8)

## 2020-07-10 PROCEDURE — 94799 UNLISTED PULMONARY SVC/PX: CPT

## 2020-07-10 PROCEDURE — 99291 CRITICAL CARE FIRST HOUR: CPT | Performed by: HOSPITALIST

## 2020-07-10 PROCEDURE — 25010000002 MEROPENEM PER 100 MG: Performed by: HOSPITALIST

## 2020-07-10 PROCEDURE — 82805 BLOOD GASES W/O2 SATURATION: CPT

## 2020-07-10 PROCEDURE — 83050 HGB METHEMOGLOBIN QUAN: CPT

## 2020-07-10 PROCEDURE — 71045 X-RAY EXAM CHEST 1 VIEW: CPT | Performed by: RADIOLOGY

## 2020-07-10 PROCEDURE — 83735 ASSAY OF MAGNESIUM: CPT | Performed by: HOSPITALIST

## 2020-07-10 PROCEDURE — 71045 X-RAY EXAM CHEST 1 VIEW: CPT

## 2020-07-10 PROCEDURE — 02HV33Z INSERTION OF INFUSION DEVICE INTO SUPERIOR VENA CAVA, PERCUTANEOUS APPROACH: ICD-10-PCS | Performed by: SURGERY

## 2020-07-10 PROCEDURE — 94640 AIRWAY INHALATION TREATMENT: CPT

## 2020-07-10 PROCEDURE — 85379 FIBRIN DEGRADATION QUANT: CPT | Performed by: INTERNAL MEDICINE

## 2020-07-10 PROCEDURE — 86140 C-REACTIVE PROTEIN: CPT | Performed by: INTERNAL MEDICINE

## 2020-07-10 PROCEDURE — 25010000002 MEROPENEM: Performed by: HOSPITALIST

## 2020-07-10 PROCEDURE — 82728 ASSAY OF FERRITIN: CPT | Performed by: INTERNAL MEDICINE

## 2020-07-10 PROCEDURE — 83615 LACTATE (LD) (LDH) ENZYME: CPT | Performed by: INTERNAL MEDICINE

## 2020-07-10 PROCEDURE — 25010000002 DEXAMETHASONE PER 1 MG: Performed by: INTERNAL MEDICINE

## 2020-07-10 PROCEDURE — 99222 1ST HOSP IP/OBS MODERATE 55: CPT | Performed by: SURGERY

## 2020-07-10 PROCEDURE — 85025 COMPLETE CBC W/AUTO DIFF WBC: CPT | Performed by: HOSPITALIST

## 2020-07-10 PROCEDURE — 36600 WITHDRAWAL OF ARTERIAL BLOOD: CPT

## 2020-07-10 PROCEDURE — 80076 HEPATIC FUNCTION PANEL: CPT | Performed by: HOSPITALIST

## 2020-07-10 PROCEDURE — 80048 BASIC METABOLIC PNL TOTAL CA: CPT | Performed by: HOSPITALIST

## 2020-07-10 PROCEDURE — 25010000002 MEROPENEM PER 100 MG: Performed by: NURSE PRACTITIONER

## 2020-07-10 PROCEDURE — 36556 INSERT NON-TUNNEL CV CATH: CPT | Performed by: SURGERY

## 2020-07-10 PROCEDURE — 85384 FIBRINOGEN ACTIVITY: CPT | Performed by: INTERNAL MEDICINE

## 2020-07-10 PROCEDURE — 82550 ASSAY OF CK (CPK): CPT | Performed by: INTERNAL MEDICINE

## 2020-07-10 PROCEDURE — 84145 PROCALCITONIN (PCT): CPT | Performed by: HOSPITALIST

## 2020-07-10 PROCEDURE — 82375 ASSAY CARBOXYHB QUANT: CPT

## 2020-07-10 RX ORDER — AMITRIPTYLINE HYDROCHLORIDE 25 MG/1
25 TABLET, FILM COATED ORAL NIGHTLY
Status: DISCONTINUED | OUTPATIENT
Start: 2020-07-10 | End: 2020-07-11

## 2020-07-10 RX ORDER — CODEINE PHOSPHATE AND GUAIFENESIN 10; 100 MG/5ML; MG/5ML
5 SOLUTION ORAL EVERY 6 HOURS PRN
Status: DISCONTINUED | OUTPATIENT
Start: 2020-07-10 | End: 2020-07-15

## 2020-07-10 RX ORDER — BENZONATATE 100 MG/1
200 CAPSULE ORAL EVERY 8 HOURS SCHEDULED
Status: COMPLETED | OUTPATIENT
Start: 2020-07-10 | End: 2020-07-12

## 2020-07-10 RX ORDER — GUAIFENESIN/DEXTROMETHORPHAN 100-10MG/5
10 SYRUP ORAL EVERY 4 HOURS PRN
Status: DISCONTINUED | OUTPATIENT
Start: 2020-07-10 | End: 2020-07-10

## 2020-07-10 RX ADMIN — DEXTROMETHORPHAN HYDROBROMIDE AND GUAIFENESIN 5 ML: 10; 100 LIQUID ORAL at 05:36

## 2020-07-10 RX ADMIN — BUDESONIDE AND FORMOTEROL FUMARATE DIHYDRATE 2 PUFF: 160; 4.5 AEROSOL RESPIRATORY (INHALATION) at 09:00

## 2020-07-10 RX ADMIN — MAGNESIUM GLUCONATE 500 MG ORAL TABLET 400 MG: 500 TABLET ORAL at 08:33

## 2020-07-10 RX ADMIN — ALBUTEROL SULFATE 2 PUFF: 90 AEROSOL, METERED RESPIRATORY (INHALATION) at 01:52

## 2020-07-10 RX ADMIN — FAMOTIDINE 20 MG: 20 TABLET ORAL at 17:26

## 2020-07-10 RX ADMIN — LEVOTHYROXINE SODIUM 25 MCG: 25 TABLET ORAL at 08:33

## 2020-07-10 RX ADMIN — PHENYTOIN SODIUM 100 MG: 100 CAPSULE, EXTENDED RELEASE ORAL at 17:26

## 2020-07-10 RX ADMIN — MEROPENEM 1 G: 1 INJECTION, POWDER, FOR SOLUTION INTRAVENOUS at 20:30

## 2020-07-10 RX ADMIN — PHENYTOIN SODIUM 200 MG: 100 CAPSULE, EXTENDED RELEASE ORAL at 06:39

## 2020-07-10 RX ADMIN — FAMOTIDINE 20 MG: 20 TABLET ORAL at 08:34

## 2020-07-10 RX ADMIN — DOXYCYCLINE 100 MG: 100 INJECTION, POWDER, LYOPHILIZED, FOR SOLUTION INTRAVENOUS at 22:58

## 2020-07-10 RX ADMIN — PSYLLIUM HUSK 1 PACKET: 3.4 POWDER ORAL at 08:35

## 2020-07-10 RX ADMIN — DIAZEPAM 3.75 MG: 5 TABLET ORAL at 20:30

## 2020-07-10 RX ADMIN — MEROPENEM 1 G: 1 INJECTION, POWDER, FOR SOLUTION INTRAVENOUS at 14:53

## 2020-07-10 RX ADMIN — MEROPENEM 1 G: 1 INJECTION, POWDER, FOR SOLUTION INTRAVENOUS at 05:05

## 2020-07-10 RX ADMIN — SODIUM CHLORIDE, PRESERVATIVE FREE 10 ML: 5 INJECTION INTRAVENOUS at 08:34

## 2020-07-10 RX ADMIN — BENZONATATE 200 MG: 100 CAPSULE ORAL at 14:53

## 2020-07-10 RX ADMIN — DOXYCYCLINE 100 MG: 100 INJECTION, POWDER, LYOPHILIZED, FOR SOLUTION INTRAVENOUS at 01:52

## 2020-07-10 RX ADMIN — PRAMIPEXOLE DIHYDROCHLORIDE 1 MG: 1 TABLET ORAL at 20:30

## 2020-07-10 RX ADMIN — VENLAFAXINE HYDROCHLORIDE 75 MG: 75 CAPSULE, EXTENDED RELEASE ORAL at 18:43

## 2020-07-10 RX ADMIN — ALBUTEROL SULFATE 2 PUFF: 90 AEROSOL, METERED RESPIRATORY (INHALATION) at 20:30

## 2020-07-10 RX ADMIN — SODIUM CHLORIDE, PRESERVATIVE FREE 10 ML: 5 INJECTION INTRAVENOUS at 20:31

## 2020-07-10 RX ADMIN — Medication 1 CAPSULE: at 08:33

## 2020-07-10 RX ADMIN — AMITRIPTYLINE HYDROCHLORIDE 25 MG: 25 TABLET, FILM COATED ORAL at 20:30

## 2020-07-10 RX ADMIN — ALBUTEROL SULFATE 2 PUFF: 90 AEROSOL, METERED RESPIRATORY (INHALATION) at 08:33

## 2020-07-10 RX ADMIN — BENZONATATE 200 MG: 100 CAPSULE ORAL at 05:06

## 2020-07-10 RX ADMIN — TOPIRAMATE 100 MG: 100 TABLET, FILM COATED ORAL at 08:33

## 2020-07-10 RX ADMIN — TOPIRAMATE 100 MG: 100 TABLET, FILM COATED ORAL at 20:30

## 2020-07-10 RX ADMIN — HYDROCODONE BITARTRATE AND ACETAMINOPHEN 1 TABLET: 7.5; 325 TABLET ORAL at 15:57

## 2020-07-10 RX ADMIN — DEXTROMETHORPHAN HYDROBROMIDE AND GUAIFENESIN 5 ML: 10; 100 LIQUID ORAL at 08:34

## 2020-07-10 RX ADMIN — ASPIRIN 81 MG: 81 TABLET, COATED ORAL at 20:30

## 2020-07-10 RX ADMIN — BENZONATATE 200 MG: 100 CAPSULE ORAL at 22:59

## 2020-07-10 RX ADMIN — POTASSIUM CHLORIDE 20 MEQ: 1500 TABLET, EXTENDED RELEASE ORAL at 08:33

## 2020-07-10 RX ADMIN — DEXAMETHASONE SODIUM PHOSPHATE 6 MG: 4 INJECTION, SOLUTION INTRA-ARTICULAR; INTRALESIONAL; INTRAMUSCULAR; INTRAVENOUS; SOFT TISSUE at 08:32

## 2020-07-10 RX ADMIN — BUDESONIDE AND FORMOTEROL FUMARATE DIHYDRATE 2 PUFF: 160; 4.5 AEROSOL RESPIRATORY (INHALATION) at 18:35

## 2020-07-10 RX ADMIN — DOXYCYCLINE 100 MG: 100 INJECTION, POWDER, LYOPHILIZED, FOR SOLUTION INTRAVENOUS at 14:53

## 2020-07-10 RX ADMIN — HYDROCORTISONE 2.5%: 25 CREAM TOPICAL at 20:29

## 2020-07-10 NOTE — NURSING NOTE
Upon arrival to unit, pt found to be in respiratory distress with RR in 30s, use of accessory muscles, feeling of impending doom, facial flushing, and sat 79% on 6LNC.  o2 NC turned up to 8LNC and Dr Fowler paged.  placed on 10L bubble hi flow and 100%NRB per Dr Fowler telephone order.

## 2020-07-10 NOTE — PROCEDURES
Need for IV access for pressors    Surgeon Dr Lazaro    Procedure  The left shoulder was prepped and draped. The clavicle area was injected with lidocaine and the subclavian vein accessed. The guide wire threaded in easily and the dilator passed. The catheter was placed and sutured in at 18 cm. The dressing was applied and all ports flushed easily and had good blood return. The CXR was in good position.

## 2020-07-10 NOTE — PROGRESS NOTES
Discharge Planning Assessment   [place]     Patient Name: Siria Rueda  MRN: [id number]  Today's Date: 7/10/2020    Admit Date: 7/5/2020    Discharge Needs Assessment    No documentation.       Discharge Plan     Row Name 07/10/20 0959       Plan    Plan SS updating discharge planning on this date. Pt has been transferred to CCU on this date. Pt plans to return home at discharge. Pt receives [facility] services. Pt has a CPAP and a nebulizer. [facility]  [phone] to be contacted at discharge. SS will follow and assist as needed.     Row Name 07/10/20 0943       Plan    Plan Comments  7/10:  Pt will be T/F to CCU. ARDS due to Covid-19. O2 bumped up to 6L NC. Dyspnea w/exertion. Sats down to 89% overnight. T 98. Remdesivir-7/12. Also, received convalescent plasma as well. Cont Merrem,doxy & Decadron IV. Symbicort, Albuterol MDI. ID following. CRP 19.55 Ferritin 419.30. WBC 6.42.           Rose Kirk

## 2020-07-10 NOTE — PROGRESS NOTES
PROGRESS NOTE         Patient Identification:  Name:  Siria Rueda  Age:  74 y.o.  Sex:  female  :  1945  MRN:  [id number]  Visit Number:  [id number]  Primary Care Provider:  Cruzito Palumbo MD         LOS: 5 days       ----------------------------------------------------------------------------------------------------------------------  Subjective       Chief Complaints:    Shortness of Breath        Interval History:      Case discussed in depth with primary RN Kamryn.  Patient is more short of breath today, declined overnight.  Continues to cough with blood-tinged sputum.  WBC normal.  CRP slightly worsening at 19.55.  Ferritin elevated at 419.3.  D-dimer elevated at 0.8.  Chest x-ray from 7/10/2020 reports slightly worsening bilateral airspace disease.  Patient being transferred to CCU as patient is continuing to require higher level of oxygen.     Review of Systems:    Constitutional: Positive for fever and fatigue.  Eyes: no eye drainage, itching or redness.  HEENT: no mouth sores, dysphagia or nose bleed.  Respiratory: Positive for shortness of breath and croupy cough.  Cardiovascular: no chest pain, no palpitations, no orthopnea.  Gastrointestinal: no nausea, vomiting or diarrhea. No abdominal pain, hematemesis or rectal bleeding.  Genitourinary: no dysuria or polyuria.  Hematologic/lymphatic: no lymph node abnormalities, no easy bruising or easy bleeding.  Musculoskeletal: Positive for myalgias.  Skin: No rash and no itching.  Neurological: no loss of consciousness, no seizure, no headache.  Behavioral/Psych: no depression or suicidal ideation.  Endocrine: no hot flashes.  Immunologic: negative.  ----------------------------------------------------------------------------------------------------------------------      Objective       Current [facility] Meds:    albuterol sulfate HFA 2 puff Inhalation Q6H   amitriptyline 25 mg Oral Nightly   aspirin 81 mg Oral Nightly   benzonatate 200 mg  Oral Q8H   budesonide-formoterol 2 puff Inhalation BID - RT   cholecalciferol 50,000 Units Oral Weekly   dexamethasone 6 mg Intravenous Daily   diazePAM 3.75 mg Oral Nightly   doxycycline 100 mg Intravenous Q12H   enoxaparin 40 mg Subcutaneous Daily   famotidine 20 mg Oral BID AC   Hydrocortisone (Perianal)  Rectal BID   INV [id number] remdesivir in NS IVPB (from room temp reconstituted vial) 100 mg Intravenous Q24H   lactobacillus acidophilus 1 capsule Oral Daily   levothyroxine 25 mcg Oral Daily   lidocaine 5 mL Subcutaneous Once   magnesium oxide 400 mg Oral Daily   meropenem 1 g Intravenous Q8H   phenytoin 100 mg Oral Q PM   phenytoin 200 mg Oral QAM   potassium chloride 20 mEq Oral Daily   pramipexole 1 mg Oral Nightly   psyllium 1 packet Oral Daily   sodium chloride 10 mL Intravenous Q12H   topiramate 100 mg Oral Q12H   venlafaxine XR 75 mg Oral Q PM        ----------------------------------------------------------------------------------------------------------------------    Vital Signs:  Temp:  [97.2 °F (36.2 °C)-98.7 °F (37.1 °C)] 98.7 °F (37.1 °C)  Heart Rate:  [75-92] 77  Resp:  [16-36] 28  BP: ()/(49-72) 132/56  Mean Arterial Pressure (Non-Invasive) for the past 24 hrs (Last 3 readings):   Noninvasive MAP (mmHg)   07/10/20 1003 88   07/10/20 0953 87   07/10/20 0940 92     SpO2 Percentage    07/10/20 0953 07/10/20 0959 07/10/20 1003   SpO2: 92% 95% 97%     SpO2:  [88 %-98 %] 97 %  on  Flow (L/min):  [4-15] 15;   Device (Oxygen Therapy): bubble;high-flow nasal cannula;nonrebreather mask    Body mass index is 32.59 kg/m².  Wt Readings from Last 3 Encounters:   07/10/20 83.5 kg (184 lb)   11/16/19 76.5 kg (168 lb 11.2 oz)   07/10/19 78.5 kg (173 lb 1 oz)        Intake/Output Summary (Last 24 hours) at 7/10/2020 1058  Last data filed at 7/10/2020 0900  Gross per 24 hour   Intake 2428 ml   Output 300 ml   Net 2128 ml     Diet Regular;  Cardiac  ----------------------------------------------------------------------------------------------------------------------      Physical Exam:    Deferred as patient is in COVID-19 isolation.       ----------------------------------------------------------------------------------------------------------------------  Results from last 7 days   Lab Units 07/10/20  0531 07/09/20  0533 07/08/20  0512 07/07/20  0136  07/05/20  1712   CK TOTAL U/L 1,142* 1,155* 462* 329*   < >  --    TROPONIN T ng/mL  --   --   --  <0.010  --  <0.010    < > = values in this interval not displayed.     Results from last 7 days   Lab Units 07/08/20 0512 07/07/20 0136 07/05/20  1712   PROBNP pg/mL 188.7 122.2 102.3       Results from last 7 days   Lab Units 07/10/20  0454   PH, ARTERIAL pH units 7.400   PO2 ART mm Hg 57.8*   PCO2, ARTERIAL mm Hg 33.7*   HCO3 ART mmol/L 20.8     Results from last 7 days   Lab Units 07/10/20  0531 07/09/20  0533 07/08/20  0512  07/05/20  1532   CRP mg/dL 19.55* 18.31* 18.43*   < > 6.46*   LACTATE mmol/L  --   --   --   --  1.3   WBC 10*3/mm3 6.42 4.46 5.19   < > 7.09   HEMOGLOBIN g/dL 12.4 12.2 11.5*   < > 13.1   HEMATOCRIT % 40.2 38.9 37.4   < > 41.4   MCV fL 100.2* 97.5* 99.2*   < > 98.8*   MCHC g/dL 30.8* 31.4* 30.7*   < > 31.6   PLATELETS 10*3/mm3 118* 114* 112*   < > 147    < > = values in this interval not displayed.     Results from last 7 days   Lab Units 07/10/20  0531 07/09/20  0533 07/08/20  0512  07/06/20  0548   SODIUM mmol/L 138 140 140   < > 142   POTASSIUM mmol/L 4.0 3.5 3.9   < > 3.0*   MAGNESIUM mg/dL 2.0  --  2.0  --  1.9   CHLORIDE mmol/L 107 108* 107   < > 108*   CO2 mmol/L 17.5* 19.0* 18.3*   < > 17.7*   BUN mg/dL 21 19 17   < > 13   CREATININE mg/dL 0.65 0.72 0.71   < > 0.76   EGFR IF NONAFRICN AM mL/min/1.73 89 79 80   < > 74   CALCIUM mg/dL 8.3* 7.9* 8.2*   < > 8.2*   GLUCOSE mg/dL 92 98 103*   < > 107*   ALBUMIN g/dL 3.14*  --  3.49*  --  3.65   BILIRUBIN mg/dL 0.3  --  0.2  --   0.3   ALK PHOS U/L 135*  --  150*  --  179*   AST (SGOT) U/L 59*  --  43*  --  36*   ALT (SGPT) U/L 25  --  28  --  33    < > = values in this interval not displayed.   Estimated Creatinine Clearance: 63.1 mL/min (by C-G formula based on SCr of 0.65 mg/dL).  No results found for: AMMONIA    No results found for: HGBA1C, POCGLU  No results found for: HGBA1C  Lab Results   Component Value Date    TSH 1.270 07/06/2020    FREET4 1.05 03/13/2015       Blood Culture   Date Value Ref Range Status   07/05/2020 No growth at 24 hours  Preliminary   07/05/2020 No growth at 24 hours  Preliminary     No results found for: URINECX  No results found for: WOUNDCX  No results found for: STOOLCX  No results found for: RESPCX  Pain Management Panel     There is no flowsheet data to display.            ----------------------------------------------------------------------------------------------------------------------  Imaging Results (Last 24 Hours)     Procedure Component Value Units Date/Time    XR Chest 1 View [[id number]] Collected:  07/10/20 0757     Updated:  07/10/20 0800    Narrative:       EXAMINATION: XR CHEST 1 VW-      CLINICAL INDICATION:     Hypoxia; J18.9-Pneumonia, unspecified organism;  U07.1-COVID-19     TECHNIQUE: Single AP view of chest.      COMPARISON: 07/09/2020      FINDINGS:   There is bibasilar atelectasis.   Slightly worsening bilateral airspace disease.  Heart size is stable.  No pneumothorax.           Impression:       Slightly worsening bilateral airspace disease.     This report was finalized on 7/10/2020 7:58 AM by Dr. Quincy Acevedo MD.             ----------------------------------------------------------------------------------------------------------------------    Assessment/Plan       Assessment/Plan     ASSESSMENT:    1.  Pneumonia due to COVID-19 virus    PLAN:    Case discussed in depth with primary RN Kamryn.  Patient is more short of breath today, declined overnight.  Continues to cough  with blood-tinged sputum.  WBC normal.  CRP slightly worsening at 19.55.  Ferritin elevated at 419.3.  D-dimer elevated at 0.8.  Chest x-ray from 7/10/2020 reports slightly worsening bilateral airspace disease.  Patient being transferred to CCU as patient is continuing to require higher level of oxygen.     COVID-19 PCR positive.  Currently receiving Decadron 6 mg IV daily.     Chest x-ray on 7/5/2020 reveals no radiographic evidence of acute cardiac or pulmonary disease-per radiology.  CT chest on 7/5/2020 reveals no evidence of pulmonary embolism.  Likely underlying chronic lung disease with emphysematous changes at apices in the area of air trapping. Patchy airspace disease in the right middle lobe and lingula is nonspecific. Prominent mediastinal and right hilar lymph nodes not significantly changed. One AP window lymph node is mildly enlarged at 1.1 cm short axis dimension but unchanged-per radiology.      Patient receiving Remdesivir and received and completed convalescent plasma therapy. Consents were obtained.     Patient has persistent worsening is possibly worsening COVID/ARDS, worsening superimposed pneumonia, or CHF.  Recommend to continue Decadron and Budesonide.  For now we will continue escalating coverage of Merrem and doxycycline.  If further worsening may consider Actemra.  We will continue to follow closely.    Code Status:   Code Status and Medical Interventions:   Ordered at: 07/05/20 2049     Code Status:    CPR     Medical Interventions (Level of Support Prior to Arrest):    Full       ANNA Mari  07/10/20  10:58

## 2020-07-10 NOTE — PLAN OF CARE
Problem: Patient Care Overview  Goal: Plan of Care Review  Outcome: Ongoing (interventions implemented as appropriate)  Flowsheets  Taken 7/10/2020 0431  Progress: improving  Taken 7/10/2020 0200  Plan of Care Reviewed With: daughter;patient  Note:   Pt afebrile overnight. Encouraged pt to use IS. Pt on 4L/NC. Pt showered with 1 assist. O2 Sat remained in 90's afterward but pt did complain of SOA on exertion. Will continue to monitor.

## 2020-07-10 NOTE — PROGRESS NOTES
[facility]U PROGRESS NOTE     Patient Identification:  Name:  Siria Rueda  Age:  74 y.o.  Sex:  female  :  1945  MRN:  [id number]  Visit Number:  [id number]  ROOM: [address]     Primary Care Provider:  Cruzito Palumbo MD    Length of stay:  5     This was an audio and video enabled telemedicine encounter.    Subjective        Chief Compliant Follow up for the Shortness of breath and cough    Patient seen and examined this morning with KEZIA Harry and this afternoon with KEZIA Mccabe at the bedside by telemedicine. Continues to be very sick with shortness of breath, refractory cough and the chest tightness. MARTINEZ worsening. This morning while standing felt like she was pass out and felt very fatigued. Feels like she is smothering. Phlegm less blood tinged this morning. Denies any chest pain.  Had nausea and loose BM. Denies vomiting abdominal pain. Hypoxia worsening. On 6 L nasal cannula. Afebrile and no events overnight. Continues to very anxious.       Objective     Current Hospital Meds:    albuterol sulfate HFA 2 puff Inhalation Q6H   amitriptyline 25 mg Oral Nightly   aspirin 81 mg Oral Nightly   benzonatate 200 mg Oral Q8H   budesonide-formoterol 2 puff Inhalation BID - RT   cholecalciferol 50,000 Units Oral Weekly   dexamethasone 6 mg Intravenous Daily   diazePAM 3.75 mg Oral Nightly   doxycycline 100 mg Intravenous Q12H   enoxaparin 40 mg Subcutaneous Daily   famotidine 20 mg Oral BID AC   Hydrocortisone (Perianal)  Rectal BID   INV [id number] remdesivir in NS IVPB (from room temp reconstituted vial) 100 mg Intravenous Q24H   lactobacillus acidophilus 1 capsule Oral Daily   levothyroxine 25 mcg Oral Daily   lidocaine 5 mL Subcutaneous Once   magnesium oxide 400 mg Oral Daily   meropenem 1 g Intravenous Q8H   phenytoin 100 mg Oral Q PM   phenytoin 200 mg Oral QAM   pramipexole 1 mg Oral Nightly   psyllium 1 packet Oral Daily   sodium chloride 10 mL Intravenous Q12H   topiramate  100 mg Oral Q12H   venlafaxine XR 75 mg Oral Q PM        ----------------------------------------------------------------------------------------------------------------------  Vital Signs:  Temp:  [97.2 °F (36.2 °C)-98.7 °F (37.1 °C)] 98.7 °F (37.1 °C)  Heart Rate:  [75-92] 89  Resp:  [18-36] 28  BP: ()/(49-72) 132/56  SpO2:  [88 %-98 %] 94 %  on  Flow (L/min):  [4-15] 10;   Device (Oxygen Therapy): bubble;high-flow nasal cannula  Body mass index is 32.59 kg/m².    Wt Readings from Last 3 Encounters:   07/10/20 83.5 kg (184 lb)   11/16/19 76.5 kg (168 lb 11.2 oz)   07/10/19 78.5 kg (173 lb 1 oz)       Intake/Output Summary (Last 24 hours) at 7/10/2020 1556  Last data filed at 7/10/2020 1453  Gross per 24 hour   Intake 2028 ml   Output 300 ml   Net 1728 ml     Diet Regular; Cardiac  ----------------------------------------------------------------------------------------------------------------------  Physical exam(by telemedicine with KEZIA Santizo):  General: Anxious. in mild distress.  Well-developed and well-nourished.   HENT:  Head:  Normocephalic and atraumatic.  Mouth:  Moist mucous membranes.    Eyes:  Conjunctivae and EOM are normal.  Pupils are equal, round, and reactive to light.    Cardiovascular:  Normal rate  Pulmonary/Chest: + tachypnea, No respiratory distress, no wheezes, no crackles, diffuse coarse with decrease breath sounds and decrease air movement.  Abdomen:  Soft. No distension and no tenderness.   Neurological:  Alert and oriented to person, place, and time.  No cranial nerve deficit. No focal deficits. No facial droop.  No slurred speech.   Peripheral vascular:  no edema.  Genitourinary: no hailey  ----------------------------------------------------------------------------------------------------------------------  ----------------------------------------------------------------------------------------------------------------------  Results from last 7 days   Lab Units 07/10/20  6477  07/09/20  0533 07/08/20  0512 07/05/20  1532   CRP mg/dL 19.55* 18.31* 18.43*   < > 6.46*   LACTATE mmol/L  --   --   --   --  1.3   WBC 10*3/mm3 6.42 4.46 5.19   < > 7.09   HEMOGLOBIN g/dL 12.4 12.2 11.5*   < > 13.1   HEMATOCRIT % 40.2 38.9 37.4   < > 41.4   MCV fL 100.2* 97.5* 99.2*   < > 98.8*   MCHC g/dL 30.8* 31.4* 30.7*   < > 31.6   PLATELETS 10*3/mm3 118* 114* 112*   < > 147    < > = values in this interval not displayed.     Results from last 7 days   Lab Units 07/10/20  0531 07/09/20 0533 07/08/20 0512 07/06/20  0548   SODIUM mmol/L 138 140 140   < > 142   POTASSIUM mmol/L 4.0 3.5 3.9   < > 3.0*   MAGNESIUM mg/dL 2.0  --  2.0  --  1.9   CHLORIDE mmol/L 107 108* 107   < > 108*   CO2 mmol/L 17.5* 19.0* 18.3*   < > 17.7*   BUN mg/dL 21 19 17   < > 13   CREATININE mg/dL 0.65 0.72 0.71   < > 0.76   EGFR IF NONAFRICN AM mL/min/1.73 89 79 80   < > 74   CALCIUM mg/dL 8.3* 7.9* 8.2*   < > 8.2*   GLUCOSE mg/dL 92 98 103*   < > 107*   ALBUMIN g/dL 3.14*  --  3.49*  --  3.65   BILIRUBIN mg/dL 0.3  --  0.2  --  0.3   ALK PHOS U/L 135*  --  150*  --  179*   AST (SGOT) U/L 59*  --  43*  --  36*   ALT (SGPT) U/L 25  --  28  --  33    < > = values in this interval not displayed.   Estimated Creatinine Clearance: 63.1 mL/min (by C-G formula based on SCr of 0.65 mg/dL).  Results from last 7 days   Lab Units 07/10/20  0531 07/09/20  0533 07/08/20  0512 07/07/20  0136  07/05/20  1712   CK TOTAL U/L 1,142* 1,155* 462* 329*   < >  --    TROPONIN T ng/mL  --   --   --  <0.010  --  <0.010    < > = values in this interval not displayed.     No results found for: HGBA1C, POCGLU  No results found for: AMMONIA      No results found for: BLOODCXNo results found for: RESPCXNo results found for: URINECXNo results found for: WOUNDCXNo results found for: BODYFLDCXNo results found for: STOOLCX  I have personally looked at the labs and they are summarized  above.  ----------------------------------------------------------------------------------------------------------------------  Imaging Results (Last 24 Hours)     Procedure Component Value Units Date/Time    XR Chest 1 View [[id number]] Resulted:  07/10/20 1550     Updated:  07/10/20 1550    XR Chest 1 View [[id number]] Collected:  07/10/20 0757     Updated:  07/10/20 0800    Narrative:       EXAMINATION: XR CHEST 1 VW-      CLINICAL INDICATION:     Hypoxia; J18.9-Pneumonia, unspecified organism;  U07.1-COVID-19     TECHNIQUE: Single AP view of chest.      COMPARISON: 07/09/2020      FINDINGS:   There is bibasilar atelectasis.   Slightly worsening bilateral airspace disease.  Heart size is stable.  No pneumothorax.           Impression:       Slightly worsening bilateral airspace disease.     This report was finalized on 7/10/2020 7:58 AM by Dr. Quincy Acevedo MD.           I have personally reviewed the radiology images and read the final radiology report.    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  Continue with IV antibiotics meropenem doxycycline. IV rocephin dced. On IV Decadron daily for hypoxia.  on lactobacillus. Currently patient is afebrile and VSS.  CRP trending up and no leukocytosis. repeat Procalcitonin minimally elevated. LDH trending up, CK stable. Ferritin trending up. D-dimer minimally elevated. Normal fibrinogen.  Prelim blood cultures are negative. Respiratory panel PCR negative. Legionella urine antigen Is negative. Respiratory culture with normal madyson. Received and tolerated plasma therapy on 07/7. On Remdesivir (D-4).      Acute hypoxic respiratory failure, worsening. on 10 L BHFNC and on  NRB. repeat ABG this afternoon with pO2 improving on NRB. Chest xray 07/10 reviewed and shows significant worsening b/l infiltrates. ABG done this morning with hypoxia on 4 L NC. FIO2 increased to 6 L NC this morning than worsening to now 10 L BHFNC and on NRB. On Albuterol MDI and Symbicort.  Repeat ABG/Chest xray in am.     Hypokalemia, resolved. dc daily supplement.    COPD, continue with albuterol MDI every 6 hours and Symbicort inhalers. No duo nebs because of COVID-19 positive.     Thrombocytopenia, stable. Monitor.     Essential HTN, controlled.  Hold home Lasix nifedipine and lisinopril.    CAD, S/P PCI, stable.  Troponin negative.  Continue with home aspirin And Crestor dced since CPK is worsening.     Seizure disorder, continue with home phenytoin regimen.  phenytoin level within normal limit.    GIOVANNI, on CPAP, on home CPAP.    Depression/Anxiety, on home Valium and amitryptylin with dose decreased.   Continue with home Topamax, Effexor. Will add prn atarax.     Lovenox subcu for DVT prophylaxis.  Pepcid for GI prophylaxis.  Will place hart catheter since critical and for the strict I/O.   Bed rest.     Condition critical and Guarded prognosis since patient is progressively worsening and very high risk of intuabtion. Transfer to CCU.     Critical care time 40 minutes.     Management plan discussed in detail with patient, daughter Sidra over the phone and nursing.  All the questions were answered.     The patient is high risk due to the following diagnoses/reasons:  Sepsis  COVID 19 positive, R Pneumonia, Acute hypoxic respiratory failure    Isi Fowler MD  07/10/20  15:56

## 2020-07-10 NOTE — PLAN OF CARE
Problem: Patient Care Overview  Goal: Plan of Care Review  Outcome: Ongoing (interventions implemented as appropriate)  Flowsheets (Taken 7/10/2020 1800)  Progress: declining  Outcome Summary: pt in resp distress upon arrival, some improvement after adding BHFNC 10L and 100%NRB. very anxious. central line placed to LSC per Dr Lazaro. VSS at this time. WCTM

## 2020-07-11 ENCOUNTER — APPOINTMENT (OUTPATIENT)
Dept: GENERAL RADIOLOGY | Facility: HOSPITAL | Age: 75
End: 2020-07-11

## 2020-07-11 LAB
A-A DO2: 564.4 MMHG (ref 0–300)
ANION GAP SERPL CALCULATED.3IONS-SCNC: 12.5 MMOL/L (ref 5–15)
ARTERIAL PATENCY WRIST A: POSITIVE
ATMOSPHERIC PRESS: 726 MMHG
BASE EXCESS BLDA CALC-SCNC: -3 MMOL/L (ref 0–2)
BASOPHILS # BLD AUTO: 0.03 10*3/MM3 (ref 0–0.2)
BASOPHILS NFR BLD AUTO: 0.3 % (ref 0–1.5)
BDY SITE: ABNORMAL
BODY TEMPERATURE: 0 C
BUN SERPL-MCNC: 17 MG/DL (ref 8–23)
BUN/CREAT SERPL: 27.4 (ref 7–25)
CALCIUM SPEC-SCNC: 8.8 MG/DL (ref 8.6–10.5)
CHLORIDE SERPL-SCNC: 106 MMOL/L (ref 98–107)
CK SERPL-CCNC: 904 U/L (ref 20–180)
CO2 BLDA-SCNC: 22.3 MMOL/L (ref 22–33)
CO2 SERPL-SCNC: 21.5 MMOL/L (ref 22–29)
COHGB MFR BLD: <0.2 % (ref 0–5)
CREAT SERPL-MCNC: 0.62 MG/DL (ref 0.57–1)
CRP SERPL-MCNC: 25.12 MG/DL (ref 0–0.5)
DEPRECATED RDW RBC AUTO: 49.1 FL (ref 37–54)
EOSINOPHIL # BLD AUTO: 0.01 10*3/MM3 (ref 0–0.4)
EOSINOPHIL NFR BLD AUTO: 0.1 % (ref 0.3–6.2)
ERYTHROCYTE [DISTWIDTH] IN BLOOD BY AUTOMATED COUNT: 13.8 % (ref 12.3–15.4)
FERRITIN SERPL-MCNC: 445.9 NG/ML (ref 13–150)
FOLATE SERPL-MCNC: >20 NG/ML (ref 4.78–24.2)
GAS FLOW AIRWAY: 15 LPM
GFR SERPL CREATININE-BSD FRML MDRD: 94 ML/MIN/1.73
GLUCOSE SERPL-MCNC: 99 MG/DL (ref 65–99)
HCO3 BLDA-SCNC: 21.2 MMOL/L (ref 20–26)
HCT VFR BLD AUTO: 40 % (ref 34–46.6)
HCT VFR BLD CALC: 38.4 % (ref 38–51)
HGB BLD-MCNC: 12.9 G/DL (ref 12–15.9)
HGB BLDA-MCNC: 12.5 G/DL (ref 13.5–17.5)
IMM GRANULOCYTES # BLD AUTO: 0.05 10*3/MM3 (ref 0–0.05)
IMM GRANULOCYTES NFR BLD AUTO: 0.5 % (ref 0–0.5)
INHALED O2 CONCENTRATION: 100 %
LYMPHOCYTES # BLD AUTO: 2.8 10*3/MM3 (ref 0.7–3.1)
LYMPHOCYTES NFR BLD AUTO: 27.3 % (ref 19.6–45.3)
Lab: ABNORMAL
MCH RBC QN AUTO: 31.2 PG (ref 26.6–33)
MCHC RBC AUTO-ENTMCNC: 32.3 G/DL (ref 31.5–35.7)
MCV RBC AUTO: 96.6 FL (ref 79–97)
METHGB BLD QL: 0.3 % (ref 0–3)
MODALITY: ABNORMAL
MONOCYTES # BLD AUTO: 1.08 10*3/MM3 (ref 0.1–0.9)
MONOCYTES NFR BLD AUTO: 10.5 % (ref 5–12)
NEUTROPHILS NFR BLD AUTO: 6.27 10*3/MM3 (ref 1.7–7)
NEUTROPHILS NFR BLD AUTO: 61.3 % (ref 42.7–76)
NOTE: ABNORMAL
NRBC BLD AUTO-RTO: 0 /100 WBC (ref 0–0.2)
OXYHGB MFR BLDV: 94.3 % (ref 94–99)
PCO2 BLDA: 34.5 MM HG (ref 35–45)
PCO2 TEMP ADJ BLD: ABNORMAL MM[HG]
PH BLDA: 7.4 PH UNITS (ref 7.35–7.45)
PH, TEMP CORRECTED: ABNORMAL
PLATELET # BLD AUTO: 170 10*3/MM3 (ref 140–450)
PMV BLD AUTO: 10.3 FL (ref 6–12)
PO2 BLDA: 79.7 MM HG (ref 83–108)
PO2 TEMP ADJ BLD: ABNORMAL MM[HG]
POTASSIUM SERPL-SCNC: 4.3 MMOL/L (ref 3.5–5.2)
RBC # BLD AUTO: 4.14 10*6/MM3 (ref 3.77–5.28)
SAO2 % BLDCOA: 94.2 % (ref 94–99)
SODIUM SERPL-SCNC: 140 MMOL/L (ref 136–145)
VENTILATOR MODE: ABNORMAL
VIT B12 BLD-MCNC: >2000 PG/ML (ref 211–946)
WBC # BLD AUTO: 10.24 10*3/MM3 (ref 3.4–10.8)

## 2020-07-11 PROCEDURE — 71045 X-RAY EXAM CHEST 1 VIEW: CPT

## 2020-07-11 PROCEDURE — 82607 VITAMIN B-12: CPT | Performed by: HOSPITALIST

## 2020-07-11 PROCEDURE — 86140 C-REACTIVE PROTEIN: CPT | Performed by: HOSPITALIST

## 2020-07-11 PROCEDURE — 99233 SBSQ HOSP IP/OBS HIGH 50: CPT | Performed by: INTERNAL MEDICINE

## 2020-07-11 PROCEDURE — 82746 ASSAY OF FOLIC ACID SERUM: CPT | Performed by: HOSPITALIST

## 2020-07-11 PROCEDURE — 36600 WITHDRAWAL OF ARTERIAL BLOOD: CPT

## 2020-07-11 PROCEDURE — 25010000002 DEXAMETHASONE PER 1 MG: Performed by: HOSPITALIST

## 2020-07-11 PROCEDURE — 80048 BASIC METABOLIC PNL TOTAL CA: CPT | Performed by: HOSPITALIST

## 2020-07-11 PROCEDURE — 82805 BLOOD GASES W/O2 SATURATION: CPT

## 2020-07-11 PROCEDURE — 82375 ASSAY CARBOXYHB QUANT: CPT

## 2020-07-11 PROCEDURE — 25010000002 MEROPENEM: Performed by: HOSPITALIST

## 2020-07-11 PROCEDURE — 71045 X-RAY EXAM CHEST 1 VIEW: CPT | Performed by: RADIOLOGY

## 2020-07-11 PROCEDURE — 25010000002 ENOXAPARIN PER 10 MG: Performed by: HOSPITALIST

## 2020-07-11 PROCEDURE — 82728 ASSAY OF FERRITIN: CPT | Performed by: HOSPITALIST

## 2020-07-11 PROCEDURE — 85025 COMPLETE CBC W/AUTO DIFF WBC: CPT | Performed by: HOSPITALIST

## 2020-07-11 PROCEDURE — 94799 UNLISTED PULMONARY SVC/PX: CPT

## 2020-07-11 PROCEDURE — 83050 HGB METHEMOGLOBIN QUAN: CPT

## 2020-07-11 PROCEDURE — 82550 ASSAY OF CK (CPK): CPT | Performed by: HOSPITALIST

## 2020-07-11 RX ORDER — SODIUM CHLORIDE 0.9 % (FLUSH) 0.9 %
10 SYRINGE (ML) INJECTION EVERY 12 HOURS SCHEDULED
Status: DISCONTINUED | OUTPATIENT
Start: 2020-07-11 | End: 2020-07-14

## 2020-07-11 RX ORDER — SODIUM CHLORIDE 0.9 % (FLUSH) 0.9 %
10 SYRINGE (ML) INJECTION AS NEEDED
Status: DISCONTINUED | OUTPATIENT
Start: 2020-07-11 | End: 2020-07-14

## 2020-07-11 RX ORDER — AMITRIPTYLINE HYDROCHLORIDE 50 MG/1
50 TABLET, FILM COATED ORAL NIGHTLY
Status: DISCONTINUED | OUTPATIENT
Start: 2020-07-11 | End: 2020-07-14

## 2020-07-11 RX ORDER — SODIUM CHLORIDE 0.9 % (FLUSH) 0.9 %
10 SYRINGE (ML) INJECTION AS NEEDED
Status: DISCONTINUED | OUTPATIENT
Start: 2020-07-11 | End: 2020-07-17 | Stop reason: HOSPADM

## 2020-07-11 RX ORDER — SODIUM CHLORIDE 0.9 % (FLUSH) 0.9 %
20 SYRINGE (ML) INJECTION AS NEEDED
Status: DISCONTINUED | OUTPATIENT
Start: 2020-07-11 | End: 2020-07-14

## 2020-07-11 RX ADMIN — AMITRIPTYLINE HYDROCHLORIDE 50 MG: 50 TABLET, FILM COATED ORAL at 20:34

## 2020-07-11 RX ADMIN — SODIUM CHLORIDE, PRESERVATIVE FREE 10 ML: 5 INJECTION INTRAVENOUS at 20:33

## 2020-07-11 RX ADMIN — SODIUM CHLORIDE, PRESERVATIVE FREE 10 ML: 5 INJECTION INTRAVENOUS at 20:32

## 2020-07-11 RX ADMIN — ALBUTEROL SULFATE 2 PUFF: 90 AEROSOL, METERED RESPIRATORY (INHALATION) at 02:25

## 2020-07-11 RX ADMIN — PRAMIPEXOLE DIHYDROCHLORIDE 1 MG: 1 TABLET ORAL at 20:33

## 2020-07-11 RX ADMIN — PHENYTOIN SODIUM 100 MG: 100 CAPSULE, EXTENDED RELEASE ORAL at 17:06

## 2020-07-11 RX ADMIN — TOPIRAMATE 100 MG: 100 TABLET, FILM COATED ORAL at 09:53

## 2020-07-11 RX ADMIN — HYDROCORTISONE 2.5%: 25 CREAM TOPICAL at 09:54

## 2020-07-11 RX ADMIN — ACETAMINOPHEN 650 MG: 325 TABLET ORAL at 04:38

## 2020-07-11 RX ADMIN — DIAZEPAM 3.75 MG: 5 TABLET ORAL at 20:32

## 2020-07-11 RX ADMIN — SODIUM CHLORIDE, PRESERVATIVE FREE 10 ML: 5 INJECTION INTRAVENOUS at 20:34

## 2020-07-11 RX ADMIN — ASPIRIN 81 MG: 81 TABLET, COATED ORAL at 20:33

## 2020-07-11 RX ADMIN — TOPIRAMATE 100 MG: 100 TABLET, FILM COATED ORAL at 20:33

## 2020-07-11 RX ADMIN — PSYLLIUM HUSK 1 PACKET: 3.4 POWDER ORAL at 09:54

## 2020-07-11 RX ADMIN — SODIUM CHLORIDE, PRESERVATIVE FREE 10 ML: 5 INJECTION INTRAVENOUS at 20:35

## 2020-07-11 RX ADMIN — MAGNESIUM GLUCONATE 500 MG ORAL TABLET 400 MG: 500 TABLET ORAL at 09:53

## 2020-07-11 RX ADMIN — MEROPENEM 1 G: 1 INJECTION, POWDER, FOR SOLUTION INTRAVENOUS at 04:38

## 2020-07-11 RX ADMIN — BUDESONIDE AND FORMOTEROL FUMARATE DIHYDRATE 2 PUFF: 160; 4.5 AEROSOL RESPIRATORY (INHALATION) at 19:44

## 2020-07-11 RX ADMIN — BENZONATATE 200 MG: 100 CAPSULE ORAL at 20:33

## 2020-07-11 RX ADMIN — SODIUM CHLORIDE, PRESERVATIVE FREE 10 ML: 5 INJECTION INTRAVENOUS at 20:31

## 2020-07-11 RX ADMIN — DEXAMETHASONE SODIUM PHOSPHATE 6 MG: 4 INJECTION, SOLUTION INTRA-ARTICULAR; INTRALESIONAL; INTRAMUSCULAR; INTRAVENOUS; SOFT TISSUE at 09:57

## 2020-07-11 RX ADMIN — Medication 1 CAPSULE: at 09:53

## 2020-07-11 RX ADMIN — ALBUTEROL SULFATE 2 PUFF: 90 AEROSOL, METERED RESPIRATORY (INHALATION) at 19:44

## 2020-07-11 RX ADMIN — BENZONATATE 200 MG: 100 CAPSULE ORAL at 13:06

## 2020-07-11 RX ADMIN — VENLAFAXINE HYDROCHLORIDE 75 MG: 75 CAPSULE, EXTENDED RELEASE ORAL at 17:05

## 2020-07-11 RX ADMIN — BENZONATATE 200 MG: 100 CAPSULE ORAL at 04:40

## 2020-07-11 RX ADMIN — MEROPENEM 1 G: 1 INJECTION, POWDER, FOR SOLUTION INTRAVENOUS at 13:07

## 2020-07-11 RX ADMIN — BUDESONIDE AND FORMOTEROL FUMARATE DIHYDRATE 2 PUFF: 160; 4.5 AEROSOL RESPIRATORY (INHALATION) at 09:53

## 2020-07-11 RX ADMIN — HYDROCORTISONE 2.5%: 25 CREAM TOPICAL at 20:34

## 2020-07-11 RX ADMIN — LEVOTHYROXINE SODIUM 25 MCG: 25 TABLET ORAL at 09:53

## 2020-07-11 RX ADMIN — GUAIFENESIN AND CODEINE PHOSPHATE 5 ML: 10; 100 LIQUID ORAL at 02:31

## 2020-07-11 RX ADMIN — ENOXAPARIN SODIUM 40 MG: 40 INJECTION SUBCUTANEOUS at 09:54

## 2020-07-11 RX ADMIN — DOXYCYCLINE 100 MG: 100 INJECTION, POWDER, LYOPHILIZED, FOR SOLUTION INTRAVENOUS at 13:07

## 2020-07-11 RX ADMIN — PHENYTOIN SODIUM 200 MG: 100 CAPSULE, EXTENDED RELEASE ORAL at 04:41

## 2020-07-11 RX ADMIN — SODIUM CHLORIDE, PRESERVATIVE FREE 10 ML: 5 INJECTION INTRAVENOUS at 09:55

## 2020-07-11 RX ADMIN — FAMOTIDINE 20 MG: 20 TABLET ORAL at 17:06

## 2020-07-11 RX ADMIN — HYDROXYZINE 25 MG: 25 TABLET, FILM COATED ORAL at 17:05

## 2020-07-11 RX ADMIN — MEROPENEM 1 G: 1 INJECTION, POWDER, FOR SOLUTION INTRAVENOUS at 20:34

## 2020-07-11 RX ADMIN — FAMOTIDINE 20 MG: 20 TABLET ORAL at 04:40

## 2020-07-11 NOTE — NURSING NOTE
Progressive Mobility    Date: 07/11/20     Mobility Initiation Contradictions: Respiratory Rate <10, >32 breaths/min    Activity Performed: AROM    Patient: tolerated    Assisted by 1 person/persons    Device(s) used: none     Mariposa Cameron RN   07/11/20

## 2020-07-11 NOTE — NURSING NOTE
Progressive Mobility    Date: 07/11/20     Mobility Initiation Contradictions: Pulse Oximetry <90%, or increased oxygenation requirements    Day of Mobility  Activity Performed: pt is able to sit on side of bed.  Patient: tolerated  Assisted by 1    Device(s) used:serg Fontaine RN   07/11/20

## 2020-07-11 NOTE — PROGRESS NOTES
PROGRESS NOTE         Patient Identification:  Name:  Siria Rueda  Age:  74 y.o.  Sex:  female  :  1945  MRN:  [id number]  Visit Number:  [id number]  Primary Care Provider:  Cruzito Palumbo MD         LOS: 6 days       ----------------------------------------------------------------------------------------------------------------------  Subjective       Chief Complaints:    Shortness of Breath        Interval History:      Patient on 10 L bubble nasal cannula with 100% nonrebreather this morning.  Low-grade temp of 99.4.  WBC normal.  CRP elevated 25.12.  Ferritin elevated at 445.  No diarrhea reported.  Patient anxious with persistent cough.  Chest x-ray from 2020 reports stable bilateral airspace disease.    Review of Systems:    Constitutional: Positive for fever and fatigue.  Eyes: no eye drainage, itching or redness.  HEENT: no mouth sores, dysphagia or nose bleed.  Respiratory: Positive for shortness of breath and croupy cough.  Cardiovascular: no chest pain, no palpitations, no orthopnea.  Gastrointestinal: no nausea, vomiting or diarrhea. No abdominal pain, hematemesis or rectal bleeding.  Genitourinary: no dysuria or polyuria.  Hematologic/lymphatic: no lymph node abnormalities, no easy bruising or easy bleeding.  Musculoskeletal: Positive for myalgias.  Skin: No rash and no itching.  Neurological: no loss of consciousness, no seizure, no headache.  Behavioral/Psych: no depression or suicidal ideation.  Endocrine: no hot flashes.  Immunologic: negative.  ----------------------------------------------------------------------------------------------------------------------      Objective       Current [facility] Meds:    albuterol sulfate HFA 2 puff Inhalation Q6H   amitriptyline 25 mg Oral Nightly   aspirin 81 mg Oral Nightly   benzonatate 200 mg Oral Q8H   budesonide-formoterol 2 puff Inhalation BID - RT   cholecalciferol 50,000 Units Oral Weekly   dexamethasone 6 mg Intravenous Daily    diazePAM 3.75 mg Oral Nightly   doxycycline 100 mg Intravenous Q12H   enoxaparin 40 mg Subcutaneous Daily   famotidine 20 mg Oral BID AC   Hydrocortisone (Perianal)  Rectal BID   INV [id number] remdesivir in NS IVPB (from room temp reconstituted vial) 100 mg Intravenous Q24H   lactobacillus acidophilus 1 capsule Oral Daily   levothyroxine 25 mcg Oral Daily   lidocaine 5 mL Subcutaneous Once   magnesium oxide 400 mg Oral Daily   meropenem 1 g Intravenous Q8H   phenytoin 100 mg Oral Q PM   phenytoin 200 mg Oral QAM   pramipexole 1 mg Oral Nightly   psyllium 1 packet Oral Daily   sodium chloride 10 mL Intravenous Q12H   sodium chloride 10 mL Intravenous Q12H   sodium chloride 10 mL Intravenous Q12H   sodium chloride 10 mL Intravenous Q12H   sodium chloride 10 mL Intravenous Q12H   sodium chloride 10 mL Intravenous Q12H   sodium chloride 10 mL Intravenous Q12H   topiramate 100 mg Oral Q12H   venlafaxine XR 75 mg Oral Q PM        ----------------------------------------------------------------------------------------------------------------------    Vital Signs:  Temp:  [97.8 °F (36.6 °C)-99.4 °F (37.4 °C)] 98.8 °F (37.1 °C)  Heart Rate:  [72-95] 80  Resp:  [18-32] 20  BP: (102-159)/(53-84) 123/61  Mean Arterial Pressure (Non-Invasive) for the past 24 hrs (Last 3 readings):   Noninvasive MAP (mmHg)   07/11/20 0635 90   07/11/20 0620 85   07/11/20 0605 77     SpO2 Percentage    07/11/20 0605 07/11/20 0620 07/11/20 0635   SpO2: 95% 98% 95%     SpO2:  [82 %-100 %] 95 %  on  Flow (L/min):  [10] 10;   Device (Oxygen Therapy): bubble;high-flow nasal cannula;ventilator    Body mass index is 32.84 kg/m².  Wt Readings from Last 3 Encounters:   07/11/20 84.1 kg (185 lb 6.4 oz)   11/16/19 76.5 kg (168 lb 11.2 oz)   07/10/19 78.5 kg (173 lb 1 oz)        Intake/Output Summary (Last 24 hours) at 7/11/2020 1211  Last data filed at 7/11/2020 0200  Gross per 24 hour   Intake 1140 ml   Output 1950 ml   Net -810 ml     Diet Regular;  Cardiac  ----------------------------------------------------------------------------------------------------------------------      Physical Exam:    Deferred as patient is in COVID-19 isolation.       ----------------------------------------------------------------------------------------------------------------------  Results from last 7 days   Lab Units 07/11/20  0517 07/10/20  0531 07/09/20  0533  07/07/20  0136  07/05/20  1712   CK TOTAL U/L 904* 1,142* 1,155*   < > 329*   < >  --    TROPONIN T ng/mL  --   --   --   --  <0.010  --  <0.010    < > = values in this interval not displayed.     Results from last 7 days   Lab Units 07/08/20 0512 07/07/20 0136 07/05/20  1712   PROBNP pg/mL 188.7 122.2 102.3       Results from last 7 days   Lab Units 07/11/20  0426   PH, ARTERIAL pH units 7.397   PO2 ART mm Hg 79.7*   PCO2, ARTERIAL mm Hg 34.5*   HCO3 ART mmol/L 21.2     Results from last 7 days   Lab Units 07/11/20  0517 07/10/20  0531 07/09/20  0533  07/05/20  1532   CRP mg/dL 25.12* 19.55* 18.31*   < > 6.46*   LACTATE mmol/L  --   --   --   --  1.3   WBC 10*3/mm3 10.24 6.42 4.46   < > 7.09   HEMOGLOBIN g/dL 12.9 12.4 12.2   < > 13.1   HEMATOCRIT % 40.0 40.2 38.9   < > 41.4   MCV fL 96.6 100.2* 97.5*   < > 98.8*   MCHC g/dL 32.3 30.8* 31.4*   < > 31.6   PLATELETS 10*3/mm3 170 118* 114*   < > 147    < > = values in this interval not displayed.     Results from last 7 days   Lab Units 07/11/20  0517 07/10/20  0531 07/09/20  0533 07/08/20  0512  07/06/20  0548   SODIUM mmol/L 140 138 140 140   < > 142   POTASSIUM mmol/L 4.3 4.0 3.5 3.9   < > 3.0*   MAGNESIUM mg/dL  --  2.0  --  2.0  --  1.9   CHLORIDE mmol/L 106 107 108* 107   < > 108*   CO2 mmol/L 21.5* 17.5* 19.0* 18.3*   < > 17.7*   BUN mg/dL 17 21 19 17   < > 13   CREATININE mg/dL 0.62 0.65 0.72 0.71   < > 0.76   EGFR IF NONAFRICN AM mL/min/1.73 94 89 79 80   < > 74   CALCIUM mg/dL 8.8 8.3* 7.9* 8.2*   < > 8.2*   GLUCOSE mg/dL 99 92 98 103*   < > 107*   ALBUMIN  g/dL  --  3.14*  --  3.49*  --  3.65   BILIRUBIN mg/dL  --  0.3  --  0.2  --  0.3   ALK PHOS U/L  --  135*  --  150*  --  179*   AST (SGOT) U/L  --  59*  --  43*  --  36*   ALT (SGPT) U/L  --  25  --  28  --  33    < > = values in this interval not displayed.   Estimated Creatinine Clearance: 63.4 mL/min (by C-G formula based on SCr of 0.62 mg/dL).  No results found for: AMMONIA    No results found for: HGBA1C, POCGLU  No results found for: HGBA1C  Lab Results   Component Value Date    TSH 1.270 07/06/2020    FREET4 1.05 03/13/2015       Blood Culture   Date Value Ref Range Status   07/05/2020 No growth at 24 hours  Preliminary   07/05/2020 No growth at 24 hours  Preliminary     No results found for: URINECX  No results found for: WOUNDCX  No results found for: STOOLCX  No results found for: RESPCX  Pain Management Panel     There is no flowsheet data to display.            ----------------------------------------------------------------------------------------------------------------------  Imaging Results (Last 24 Hours)     Procedure Component Value Units Date/Time    XR Chest 1 View [[id number]] Collected:  07/11/20 1147     Updated:  07/11/20 1149    Narrative:       EXAMINATION: XR CHEST 1 VW-      CLINICAL INDICATION:     Hypoxia; J18.9-Pneumonia, unspecified organism;  U07.1-COVID-19     TECHNIQUE: Single AP view of chest.      COMPARISON: 07/10/2020      FINDINGS:   There is bibasilar atelectasis.   Bilateral airspace disease is grossly stable.     Heart size is stable.  No pneumothorax.           Impression:       Bilateral airspace disease is grossly stable.     This report was finalized on 7/11/2020 11:47 AM by Dr. Quincy Acevedo MD.       XR Chest 1 View [[id number]] Collected:  07/10/20 1640     Updated:  07/10/20 1643    Narrative:       EXAMINATION: XR CHEST 1 VW-      CLINICAL INDICATION:     central line placement; J18.9-Pneumonia,  unspecified organism; U07.1-COVID-19     TECHNIQUE: Single AP view of  chest.      COMPARISON: 07/10/2020      FINDINGS:   There is bibasilar atelectasis.   Bilateral airspace disease is again noted. Left central line with tip in  SVC. Support tube and lines are in unchanged position.   Heart size is stable.  No pneumothorax.        Impression:       Bilateral airspace disease is again noted. Left central line  with tip in SVC.     This report was finalized on 7/10/2020 4:41 PM by Dr. Quincy Acevedo MD.             ----------------------------------------------------------------------------------------------------------------------    Assessment/Plan       Assessment/Plan     ASSESSMENT:    1.  Pneumonia due to COVID-19 virus    PLAN:    Patient on 10 L bubble nasal cannula with 100% nonrebreather this morning.  Low-grade temp of 99.4.  WBC normal.  CRP elevated 25.12.  Ferritin elevated at 445.  No diarrhea reported.  Patient anxious with persistent cough.  Chest x-ray from 7/11/2020 reports stable bilateral airspace disease.    COVID-19 PCR positive.  Currently receiving Decadron 6 mg IV daily.     Chest x-ray on 7/5/2020 reveals no radiographic evidence of acute cardiac or pulmonary disease-per radiology.  CT chest on 7/5/2020 reveals no evidence of pulmonary embolism.  Likely underlying chronic lung disease with emphysematous changes at apices in the area of air trapping. Patchy airspace disease in the right middle lobe and lingula is nonspecific. Prominent mediastinal and right hilar lymph nodes not significantly changed. One AP window lymph node is mildly enlarged at 1.1 cm short axis dimension but unchanged-per radiology.      Patient received Remdesivir and received and completed convalescent plasma therapy. Consents were obtained.     Patient has persistent worsening is possibly worsening COVID/ARDS, worsening superimposed pneumonia, or CHF.  Recommend to continue Decadron and Budesonide.  For now we will continue coverage of Merrem and doxycycline.  If further worsening may  consider Actemra.  We will continue to follow closely.    Code Status:   Code Status and Medical Interventions:   Ordered at: 07/05/20 2049     Code Status:    CPR     Medical Interventions (Level of Support Prior to Arrest):    Full       Fany Carmona, ANNA  07/11/20  12:11

## 2020-07-11 NOTE — PROGRESS NOTES
[facility] HOSPITALIST PROGRESS NOTE     Patient Identification:  Name:  Siria Rueda  Age:  74 y.o.  Sex:  female  :  1945  MRN:  [id number]  Visit Number:  [id number]  Primary Care Provider:  Cruzito Palumbo MD    Length of stay:  6    Chief complaint:  74 y.o. old female admitted with Shortness of Breath    HPI:  74-year-old female admitted with COVID pneumonia.  CT scan showed right patchy pneumonia.  Patient has received convalescent plasma on 2020 and has been started on Remdesivir per ID recommendations in addition to Rocephin, doxycycline and Decadron.  Patient continued to have worsening and progressive hypoxemia and therefore was transferred to intensive care unit.  Patient      Subjective:    This was an audio and video enabled telemedicine encounter. Prabha RN was present at bedside and assisted during this encounter.  Patient is currently on Ventimask.  She states that she is not feeling good and states that she has not been able to breathe.  Patient is complaining of cough but denies any sputum production.  Patient is complaining of hurting all over.  She states that she has not been able to eat much this morning.            Current Hospital Meds:    albuterol sulfate HFA 2 puff Inhalation Q6H   amitriptyline 25 mg Oral Nightly   aspirin 81 mg Oral Nightly   benzonatate 200 mg Oral Q8H   budesonide-formoterol 2 puff Inhalation BID - RT   cholecalciferol 50,000 Units Oral Weekly   dexamethasone 6 mg Intravenous Daily   diazePAM 3.75 mg Oral Nightly   doxycycline 100 mg Intravenous Q12H   enoxaparin 40 mg Subcutaneous Daily   famotidine 20 mg Oral BID AC   Hydrocortisone (Perianal)  Rectal BID   INV [id number] remdesivir in NS IVPB (from room temp reconstituted vial) 100 mg Intravenous Q24H   lactobacillus acidophilus 1 capsule Oral Daily   levothyroxine 25 mcg Oral Daily   lidocaine 5 mL Subcutaneous Once   magnesium oxide 400 mg Oral Daily   meropenem 1 g Intravenous Q8H      phenytoin 100 mg Oral Q PM   phenytoin 200 mg Oral QAM   pramipexole 1 mg Oral Nightly   psyllium 1 packet Oral Daily   sodium chloride 10 mL Intravenous Q12H   sodium chloride 10 mL Intravenous Q12H   sodium chloride 10 mL Intravenous Q12H   sodium chloride 10 mL Intravenous Q12H   sodium chloride 10 mL Intravenous Q12H   sodium chloride 10 mL Intravenous Q12H   sodium chloride 10 mL Intravenous Q12H   topiramate 100 mg Oral Q12H   venlafaxine XR 75 mg Oral Q PM        ----------------------------------------------------------------------------------------------------------------------  Vital Signs:  Temp:  [97.8 °F (36.6 °C)-99.4 °F (37.4 °C)] 98.8 °F (37.1 °C)  Heart Rate:  [72-95] 80  Resp:  [18-32] 20  BP: (102-159)/(53-84) 123/61      07/10/20  0400 07/10/20  1003 07/11/20  0515   Weight: 81.6 kg (180 lb) 83.5 kg (184 lb) 84.1 kg (185 lb 6.4 oz)     Body mass index is 32.84 kg/m².    Intake/Output Summary (Last 24 hours) at 7/11/2020 0957  Last data filed at 7/11/2020 0200  Gross per 24 hour   Intake 1140 ml   Output 1950 ml   Net -810 ml     Diet Regular; Cardiac  ----------------------------------------------------------------------------------------------------------------------  This physical exam has been personally performed remotely in the unit aided by real-time audio/visual communication tools. KEZIA Armando present at bedside during this exam and assisted during exam. The use of a video visit has been reviewed with the patient and verbal informed consent has been obtained.     Physical Exam:  General: Patient appears awake, alert, and in mild distress due to tachypnea.  Head: Normocephalic, atraumatic  Eyes: EOMI. Conjunctivae and sclerae normal.  Ears: Ears appear intact with no abnormalities noted.   Neck: Trachea midline. No obvious JVD.  Heart: Tele reveals sinus rhythm  Lungs: Tachypneic, mild respiratory distress. No audible wheezing.  Abdomen: No obvious abdominal distension.  MS: Muscle tone  appears normal. No gross deformities.  Extremities: No clubbing, cyanosis or edema noted.  Skin: No visible bleeding, bruising, or rash.  Neurologic: Alert and oriented x3. No gross focal deficits.     ----------------------------------------------------------------------------------------------------------------------  Tele:    ----------------------------------------------------------------------------------------------------------------------  Results from last 7 days   Lab Units 07/11/20  0517 07/10/20  0531 07/09/20  0533  07/07/20  0136  07/05/20  1712   CK TOTAL U/L 904* 1,142* 1,155*   < > 329*   < >  --    TROPONIN T ng/mL  --   --   --   --  <0.010  --  <0.010    < > = values in this interval not displayed.     Results from last 7 days   Lab Units 07/11/20  0517 07/10/20  0531 07/09/20  0533  07/05/20  1532   CRP mg/dL 25.12* 19.55* 18.31*   < > 6.46*   LACTATE mmol/L  --   --   --   --  1.3   WBC 10*3/mm3 10.24 6.42 4.46   < > 7.09   HEMOGLOBIN g/dL 12.9 12.4 12.2   < > 13.1   HEMATOCRIT % 40.0 40.2 38.9   < > 41.4   MCV fL 96.6 100.2* 97.5*   < > 98.8*   MCHC g/dL 32.3 30.8* 31.4*   < > 31.6   PLATELETS 10*3/mm3 170 118* 114*   < > 147    < > = values in this interval not displayed.     Results from last 7 days   Lab Units 07/11/20  0426   PH, ARTERIAL pH units 7.397   PO2 ART mm Hg 79.7*   PCO2, ARTERIAL mm Hg 34.5*   HCO3 ART mmol/L 21.2     Results from last 7 days   Lab Units 07/11/20  0517 07/10/20  0531 07/09/20  0533 07/08/20  0512  07/06/20  0548   SODIUM mmol/L 140 138 140 140   < > 142   POTASSIUM mmol/L 4.3 4.0 3.5 3.9   < > 3.0*   MAGNESIUM mg/dL  --  2.0  --  2.0  --  1.9   CHLORIDE mmol/L 106 107 108* 107   < > 108*   CO2 mmol/L 21.5* 17.5* 19.0* 18.3*   < > 17.7*   BUN mg/dL 17 21 19 17   < > 13   CREATININE mg/dL 0.62 0.65 0.72 0.71   < > 0.76   EGFR IF NONAFRICN AM mL/min/1.73 94 89 79 80   < > 74   CALCIUM mg/dL 8.8 8.3* 7.9* 8.2*   < > 8.2*   GLUCOSE mg/dL 99 92 98 103*   < > 107*      ALBUMIN g/dL  --  3.14*  --  3.49*  --  3.65   BILIRUBIN mg/dL  --  0.3  --  0.2  --  0.3   ALK PHOS U/L  --  135*  --  150*  --  179*   AST (SGOT) U/L  --  59*  --  43*  --  36*   ALT (SGPT) U/L  --  25  --  28  --  33    < > = values in this interval not displayed.   Estimated Creatinine Clearance: 63.4 mL/min (by C-G formula based on SCr of 0.62 mg/dL).    No results found for: AMMONIA      Blood Culture   Date Value Ref Range Status   07/05/2020 No growth at 5 days  Final   07/05/2020 No growth at 5 days  Final     No results found for: URINECX  No results found for: WOUNDCX  No results found for: STOOLCX    I have personally looked at the labs and they are summarized above.  ----------------------------------------------------------------------------------------------------------------------  Imaging Results (Last 24 Hours)     Procedure Component Value Units Date/Time    XR Chest 1 View [[id number]] Resulted:  07/11/20 0805     Updated:  07/11/20 0805    XR Chest 1 View [[id number]] Collected:  07/10/20 1640     Updated:  07/10/20 1643    Narrative:       EXAMINATION: XR CHEST 1 VW-      CLINICAL INDICATION:     central line placement; J18.9-Pneumonia,  unspecified organism; U07.1-COVID-19     TECHNIQUE: Single AP view of chest.      COMPARISON: 07/10/2020      FINDINGS:   There is bibasilar atelectasis.   Bilateral airspace disease is again noted. Left central line with tip in  SVC. Support tube and lines are in unchanged position.   Heart size is stable.  No pneumothorax.        Impression:       Bilateral airspace disease is again noted. Left central line  with tip in SVC.     This report was finalized on 7/10/2020 4:41 PM by Dr. Quincy Acevedo MD.           ----------------------------------------------------------------------------------------------------------------------  Assessment and Plan:    -Sepsis secondary to covered pneumonia  Patient remains afebrile with stable vital signs.  CRP, ferritin  trending up.  CK and LDH remains elevated.  WBC remains WNL.  Fibrinogen level was within normal limit.  D-dimer was minimally elevated.  Chest x-ray showed bilateral airspace disease, unchanged since yesterday.  Patient is on meropenem and doxycycline and Remdesivir per ID recommendations.  ID on board and appreciate help from ID.  Continue IV Decadron.   Respiratory panel PCR negative. Legionella urine antigen Is negative. Respiratory culture with normal madyson.   Patient has received convalescent plasma on 7/7/2020.  Blood culture showed no growth so far.    -Acute hypoxemic respiratory failure  Patient remains on bubble high flow nasal cannula at 10 L/min and on nonrebreather.  ABG showed acceptable oxygenation per patient is 100% FiO2 and nonrebreather.  X-ray showed no significant change since yesterday.  Patient continues to have elevation of d-dimer.    -COPD  Continue albuterol MDI and Symbicort inhalers.    -Thrombocytopenia  Resolved.  Platelet count WNL today.    -Essential hypertension  Blood pressure well controlled.    -ASCVD with h/o PCI  Patient denies any chest pain.    -Seizure disorder  Continue seizure precaution.  Continue Dilantin.    -Adjustment disorder with depression/anxiety  Continue amitriptyline, Effexor and Valium.    Activity: Bedrest.  Patient has limited ambulation due to hypoxemia.  Nutrition: Regular diet  Fluids: None  DVT prophylaxis: Lovenox subcu  GI prophylaxis: Pepcid    The patient is high risk due to: Sepsis, hypoxemia, COVID pneumonia, COPD, thrombocytopenia, hypertension, CAD, seizure disorder.    I discussed the patient's findings and my recommendations with patient and nursing staff.    Ayush Jiang MD  07/11/20  09:57

## 2020-07-11 NOTE — PLAN OF CARE
Pt remains on 10L BHNC and 100% NR. Gets very anxious at times. Has had a very poor appetite. Stable at this time. Will continue to monitor.

## 2020-07-11 NOTE — PLAN OF CARE
Problem: Patient Care Overview  Goal: Plan of Care Review  Outcome: Ongoing (interventions implemented as appropriate)  Flowsheets (Taken 7/11/2020 0305)  Outcome Summary: Patient gets in resp distress during coughing. Tessalon pearles given, and cough syrup. remains on [id number] and 100% NRB. Patient becomes very anxious and tearful. VSS, UOP appropriate. Declined to bath when offered, and refused inhaler.     Problem: Fall Risk (Adult)  Goal: Identify Related Risk Factors and Signs and Symptoms  Outcome: Ongoing (interventions implemented as appropriate)     Problem: Fall Risk (Adult)  Goal: Absence of Fall  Outcome: Ongoing (interventions implemented as appropriate)  Flowsheets (Taken 7/11/2020 0305)  Absence of Fall: making progress toward outcome     Problem: Pain, Chronic (Adult)  Goal: Identify Related Risk Factors and Signs and Symptoms  Outcome: Ongoing (interventions implemented as appropriate)     Problem: Pain, Chronic (Adult)  Goal: Acceptable Pain/Comfort Level and Functional Ability  Outcome: Ongoing (interventions implemented as appropriate)  Flowsheets (Taken 7/11/2020 0305)  Acceptable Pain/Comfort Level and Functional Ability: making progress toward outcome

## 2020-07-12 LAB
A-A DO2: 584.8 MMHG (ref 0–300)
ANION GAP SERPL CALCULATED.3IONS-SCNC: 10.4 MMOL/L (ref 5–15)
ARTERIAL PATENCY WRIST A: POSITIVE
ATMOSPHERIC PRESS: 725 MMHG
BASE EXCESS BLDA CALC-SCNC: -4.6 MMOL/L (ref 0–2)
BASOPHILS # BLD AUTO: 0.01 10*3/MM3 (ref 0–0.2)
BASOPHILS NFR BLD AUTO: 0.2 % (ref 0–1.5)
BDY SITE: ABNORMAL
BODY TEMPERATURE: 0 C
BUN SERPL-MCNC: 18 MG/DL (ref 8–23)
BUN/CREAT SERPL: 36 (ref 7–25)
CALCIUM SPEC-SCNC: 8.3 MG/DL (ref 8.6–10.5)
CHLORIDE SERPL-SCNC: 109 MMOL/L (ref 98–107)
CK SERPL-CCNC: 344 U/L (ref 20–180)
CO2 BLDA-SCNC: 21.4 MMOL/L (ref 22–33)
CO2 SERPL-SCNC: 22.6 MMOL/L (ref 22–29)
COHGB MFR BLD: 0.2 % (ref 0–5)
CREAT SERPL-MCNC: 0.5 MG/DL (ref 0.57–1)
CRP SERPL-MCNC: 24.37 MG/DL (ref 0–0.5)
D DIMER PPP FEU-MCNC: >20 MCGFEU/ML (ref 0–0.5)
DEPRECATED RDW RBC AUTO: 48.6 FL (ref 37–54)
EOSINOPHIL # BLD AUTO: 0 10*3/MM3 (ref 0–0.4)
EOSINOPHIL NFR BLD AUTO: 0 % (ref 0.3–6.2)
ERYTHROCYTE [DISTWIDTH] IN BLOOD BY AUTOMATED COUNT: 13.6 % (ref 12.3–15.4)
FERRITIN SERPL-MCNC: 527.4 NG/ML (ref 13–150)
FIBRINOGEN PPP-MCNC: 313 MG/DL (ref 173–524)
GAS FLOW AIRWAY: 15 LPM
GFR SERPL CREATININE-BSD FRML MDRD: 121 ML/MIN/1.73
GLUCOSE SERPL-MCNC: 127 MG/DL (ref 65–99)
HCO3 BLDA-SCNC: 20.3 MMOL/L (ref 20–26)
HCT VFR BLD AUTO: 35.9 % (ref 34–46.6)
HCT VFR BLD CALC: 39.1 % (ref 38–51)
HGB BLD-MCNC: 11.5 G/DL (ref 12–15.9)
HGB BLDA-MCNC: 12.8 G/DL (ref 13.5–17.5)
IMM GRANULOCYTES # BLD AUTO: 0.03 10*3/MM3 (ref 0–0.05)
IMM GRANULOCYTES NFR BLD AUTO: 0.5 % (ref 0–0.5)
INHALED O2 CONCENTRATION: 100 %
LDH SERPL-CCNC: 456 U/L (ref 135–214)
LYMPHOCYTES # BLD AUTO: 1.45 10*3/MM3 (ref 0.7–3.1)
LYMPHOCYTES NFR BLD AUTO: 23.7 % (ref 19.6–45.3)
Lab: ABNORMAL
MCH RBC QN AUTO: 30.7 PG (ref 26.6–33)
MCHC RBC AUTO-ENTMCNC: 32 G/DL (ref 31.5–35.7)
MCV RBC AUTO: 96 FL (ref 79–97)
METHGB BLD QL: <-0.1 % (ref 0–3)
MODALITY: ABNORMAL
MONOCYTES # BLD AUTO: 0.61 10*3/MM3 (ref 0.1–0.9)
MONOCYTES NFR BLD AUTO: 10 % (ref 5–12)
NEUTROPHILS NFR BLD AUTO: 4.02 10*3/MM3 (ref 1.7–7)
NEUTROPHILS NFR BLD AUTO: 65.6 % (ref 42.7–76)
NOTE: ABNORMAL
NOTIFIED WHO: ABNORMAL
NRBC BLD AUTO-RTO: 0 /100 WBC (ref 0–0.2)
OXYHGB MFR BLDV: 88.7 % (ref 94–99)
PCO2 BLDA: 36.3 MM HG (ref 35–45)
PCO2 TEMP ADJ BLD: ABNORMAL MM[HG]
PH BLDA: 7.36 PH UNITS (ref 7.35–7.45)
PH, TEMP CORRECTED: ABNORMAL
PLATELET # BLD AUTO: 148 10*3/MM3 (ref 140–450)
PMV BLD AUTO: 10 FL (ref 6–12)
PO2 BLDA: 56.8 MM HG (ref 83–108)
PO2 TEMP ADJ BLD: ABNORMAL MM[HG]
POTASSIUM SERPL-SCNC: 4.1 MMOL/L (ref 3.5–5.2)
RBC # BLD AUTO: 3.74 10*6/MM3 (ref 3.77–5.28)
SAO2 % BLDCOA: 88 % (ref 94–99)
SODIUM SERPL-SCNC: 142 MMOL/L (ref 136–145)
VENTILATOR MODE: ABNORMAL
WBC # BLD AUTO: 6.12 10*3/MM3 (ref 3.4–10.8)

## 2020-07-12 PROCEDURE — 86140 C-REACTIVE PROTEIN: CPT | Performed by: HOSPITALIST

## 2020-07-12 PROCEDURE — 83050 HGB METHEMOGLOBIN QUAN: CPT

## 2020-07-12 PROCEDURE — 94799 UNLISTED PULMONARY SVC/PX: CPT

## 2020-07-12 PROCEDURE — 25010000002 ENOXAPARIN PER 10 MG: Performed by: INTERNAL MEDICINE

## 2020-07-12 PROCEDURE — 25010000002 MEROPENEM: Performed by: HOSPITALIST

## 2020-07-12 PROCEDURE — 36600 WITHDRAWAL OF ARTERIAL BLOOD: CPT

## 2020-07-12 PROCEDURE — 99233 SBSQ HOSP IP/OBS HIGH 50: CPT | Performed by: INTERNAL MEDICINE

## 2020-07-12 PROCEDURE — 85025 COMPLETE CBC W/AUTO DIFF WBC: CPT | Performed by: INTERNAL MEDICINE

## 2020-07-12 PROCEDURE — 85384 FIBRINOGEN ACTIVITY: CPT | Performed by: HOSPITALIST

## 2020-07-12 PROCEDURE — 82550 ASSAY OF CK (CPK): CPT | Performed by: HOSPITALIST

## 2020-07-12 PROCEDURE — 25010000002 ENOXAPARIN PER 10 MG: Performed by: HOSPITALIST

## 2020-07-12 PROCEDURE — 80048 BASIC METABOLIC PNL TOTAL CA: CPT | Performed by: INTERNAL MEDICINE

## 2020-07-12 PROCEDURE — 82375 ASSAY CARBOXYHB QUANT: CPT

## 2020-07-12 PROCEDURE — 25010000002 MEROPENEM PER 100 MG: Performed by: HOSPITALIST

## 2020-07-12 PROCEDURE — 82728 ASSAY OF FERRITIN: CPT | Performed by: HOSPITALIST

## 2020-07-12 PROCEDURE — 85379 FIBRIN DEGRADATION QUANT: CPT | Performed by: HOSPITALIST

## 2020-07-12 PROCEDURE — 83615 LACTATE (LD) (LDH) ENZYME: CPT | Performed by: HOSPITALIST

## 2020-07-12 PROCEDURE — 25010000002 DEXAMETHASONE PER 1 MG: Performed by: HOSPITALIST

## 2020-07-12 PROCEDURE — 82805 BLOOD GASES W/O2 SATURATION: CPT

## 2020-07-12 RX ORDER — BUMETANIDE 0.25 MG/ML
1 INJECTION INTRAMUSCULAR; INTRAVENOUS ONCE
Status: COMPLETED | OUTPATIENT
Start: 2020-07-12 | End: 2020-07-12

## 2020-07-12 RX ADMIN — VENLAFAXINE HYDROCHLORIDE 75 MG: 75 CAPSULE, EXTENDED RELEASE ORAL at 17:27

## 2020-07-12 RX ADMIN — DOXYCYCLINE 100 MG: 100 INJECTION, POWDER, LYOPHILIZED, FOR SOLUTION INTRAVENOUS at 23:44

## 2020-07-12 RX ADMIN — HYDROXYZINE 25 MG: 25 TABLET, FILM COATED ORAL at 17:27

## 2020-07-12 RX ADMIN — FAMOTIDINE 20 MG: 20 TABLET ORAL at 17:27

## 2020-07-12 RX ADMIN — MEROPENEM 1 G: 1 INJECTION, POWDER, FOR SOLUTION INTRAVENOUS at 12:06

## 2020-07-12 RX ADMIN — BENZONATATE 200 MG: 100 CAPSULE ORAL at 05:38

## 2020-07-12 RX ADMIN — TOPIRAMATE 100 MG: 100 TABLET, FILM COATED ORAL at 20:14

## 2020-07-12 RX ADMIN — GUAIFENESIN AND CODEINE PHOSPHATE 5 ML: 10; 100 LIQUID ORAL at 00:18

## 2020-07-12 RX ADMIN — SODIUM CHLORIDE, PRESERVATIVE FREE 10 ML: 5 INJECTION INTRAVENOUS at 08:02

## 2020-07-12 RX ADMIN — ALBUTEROL SULFATE 2 PUFF: 90 AEROSOL, METERED RESPIRATORY (INHALATION) at 03:34

## 2020-07-12 RX ADMIN — ALBUTEROL SULFATE 2 PUFF: 90 AEROSOL, METERED RESPIRATORY (INHALATION) at 08:01

## 2020-07-12 RX ADMIN — FAMOTIDINE 20 MG: 20 TABLET ORAL at 05:38

## 2020-07-12 RX ADMIN — DOXYCYCLINE 100 MG: 100 INJECTION, POWDER, LYOPHILIZED, FOR SOLUTION INTRAVENOUS at 12:06

## 2020-07-12 RX ADMIN — SODIUM CHLORIDE, PRESERVATIVE FREE 10 ML: 5 INJECTION INTRAVENOUS at 20:17

## 2020-07-12 RX ADMIN — PSYLLIUM HUSK 1 PACKET: 3.4 POWDER ORAL at 08:02

## 2020-07-12 RX ADMIN — ASPIRIN 81 MG: 81 TABLET, COATED ORAL at 20:14

## 2020-07-12 RX ADMIN — ENOXAPARIN SODIUM 40 MG: 40 INJECTION SUBCUTANEOUS at 12:05

## 2020-07-12 RX ADMIN — DOXYCYCLINE 100 MG: 100 INJECTION, POWDER, LYOPHILIZED, FOR SOLUTION INTRAVENOUS at 00:19

## 2020-07-12 RX ADMIN — TOPIRAMATE 100 MG: 100 TABLET, FILM COATED ORAL at 08:01

## 2020-07-12 RX ADMIN — ALBUTEROL SULFATE 2 PUFF: 90 AEROSOL, METERED RESPIRATORY (INHALATION) at 19:31

## 2020-07-12 RX ADMIN — ALBUTEROL SULFATE 2 PUFF: 90 AEROSOL, METERED RESPIRATORY (INHALATION) at 07:12

## 2020-07-12 RX ADMIN — PHENYTOIN SODIUM 200 MG: 100 CAPSULE, EXTENDED RELEASE ORAL at 05:38

## 2020-07-12 RX ADMIN — DEXAMETHASONE SODIUM PHOSPHATE 6 MG: 4 INJECTION, SOLUTION INTRA-ARTICULAR; INTRALESIONAL; INTRAMUSCULAR; INTRAVENOUS; SOFT TISSUE at 08:01

## 2020-07-12 RX ADMIN — MEROPENEM 1 G: 1 INJECTION, POWDER, FOR SOLUTION INTRAVENOUS at 20:14

## 2020-07-12 RX ADMIN — ENOXAPARIN SODIUM 80 MG: 80 INJECTION SUBCUTANEOUS at 20:14

## 2020-07-12 RX ADMIN — BUDESONIDE AND FORMOTEROL FUMARATE DIHYDRATE 2 PUFF: 160; 4.5 AEROSOL RESPIRATORY (INHALATION) at 07:11

## 2020-07-12 RX ADMIN — DIAZEPAM 3.75 MG: 5 TABLET ORAL at 20:21

## 2020-07-12 RX ADMIN — HYDROCORTISONE 2.5%: 25 CREAM TOPICAL at 20:16

## 2020-07-12 RX ADMIN — BUDESONIDE AND FORMOTEROL FUMARATE DIHYDRATE 2 PUFF: 160; 4.5 AEROSOL RESPIRATORY (INHALATION) at 19:31

## 2020-07-12 RX ADMIN — LEVOTHYROXINE SODIUM 25 MCG: 25 TABLET ORAL at 08:01

## 2020-07-12 RX ADMIN — ENOXAPARIN SODIUM 40 MG: 40 INJECTION SUBCUTANEOUS at 08:01

## 2020-07-12 RX ADMIN — HYDROXYZINE 25 MG: 25 TABLET, FILM COATED ORAL at 00:19

## 2020-07-12 RX ADMIN — HYDROCORTISONE 2.5%: 25 CREAM TOPICAL at 08:03

## 2020-07-12 RX ADMIN — ALBUTEROL SULFATE 2 PUFF: 90 AEROSOL, METERED RESPIRATORY (INHALATION) at 13:01

## 2020-07-12 RX ADMIN — BUMETANIDE 1 MG: 0.25 INJECTION INTRAMUSCULAR; INTRAVENOUS at 12:05

## 2020-07-12 RX ADMIN — GUAIFENESIN AND CODEINE PHOSPHATE 5 ML: 10; 100 LIQUID ORAL at 17:28

## 2020-07-12 RX ADMIN — PRAMIPEXOLE DIHYDROCHLORIDE 1 MG: 1 TABLET ORAL at 20:14

## 2020-07-12 RX ADMIN — Medication 1 CAPSULE: at 08:01

## 2020-07-12 RX ADMIN — PHENYTOIN SODIUM 100 MG: 100 CAPSULE, EXTENDED RELEASE ORAL at 17:27

## 2020-07-12 RX ADMIN — HYDROCODONE BITARTRATE AND ACETAMINOPHEN 1 TABLET: 7.5; 325 TABLET ORAL at 17:28

## 2020-07-12 RX ADMIN — MAGNESIUM GLUCONATE 500 MG ORAL TABLET 400 MG: 500 TABLET ORAL at 08:01

## 2020-07-12 RX ADMIN — MEROPENEM 1 G: 1 INJECTION, POWDER, FOR SOLUTION INTRAVENOUS at 05:38

## 2020-07-12 RX ADMIN — AMITRIPTYLINE HYDROCHLORIDE 50 MG: 50 TABLET, FILM COATED ORAL at 20:14

## 2020-07-12 RX ADMIN — SODIUM CHLORIDE, PRESERVATIVE FREE 10 ML: 5 INJECTION INTRAVENOUS at 20:14

## 2020-07-12 NOTE — PLAN OF CARE
Problem: Patient Care Overview  Goal: Plan of Care Review  Outcome: Ongoing (interventions implemented as appropriate)  Flowsheets (Taken 7/12/2020 0519)  Progress: no change  Plan of Care Reviewed With: patient  Outcome Summary: Patient gets in resp distress easily when awake during coughing/ becomes tearful. Tessalon pearles given along with prn cough syrup. Remains on BHFNC 10L at all times and 100% NRB when awake. Patient exhibits a lot of anxiety and has a hopeless outlook at this time. No appetitie. UOP appropriate.     Problem: Fall Risk (Adult)  Goal: Identify Related Risk Factors and Signs and Symptoms  Outcome: Ongoing (interventions implemented as appropriate)     Problem: Fall Risk (Adult)  Goal: Absence of Fall  Outcome: Ongoing (interventions implemented as appropriate)  Flowsheets (Taken 7/12/2020 0419)  Absence of Fall: achieves outcome     Problem: Pain, Chronic (Adult)  Goal: Identify Related Risk Factors and Signs and Symptoms  Outcome: Ongoing (interventions implemented as appropriate)

## 2020-07-12 NOTE — PLAN OF CARE
Remains on 10L/NCBHF and 100% NR. Was able to sleep without 100% NR as much today. Received Bumex and has had exelent urine output. In stable condition at this time. Will continue to monitor.

## 2020-07-12 NOTE — PROGRESS NOTES
[facility] HOSPITALIST PROGRESS NOTE     Patient Identification:  Name:  Siria Rueda  Age:  74 y.o.  Sex:  female  :  1945  MRN:  [id number]  Visit Number:  [id number]  Primary Care Provider:  Cruzito Palumbo MD    Length of stay:  7    Chief complaint:  74 y.o. old female admitted with Shortness of Breath    HPI:  74-year-old female admitted with COVID pneumonia.  CT scan showed right patchy pneumonia.  Patient has received convalescent plasma on 2020 and has been started on Remdesivir per ID recommendations in addition to Rocephin, doxycycline and Decadron.  Patient continued to have worsening and progressive hypoxemia and therefore was transferred to intensive care unit.  Patient      Subjective:    This was an audio and video enabled telemedicine encounter. Prabha RN was present at bedside and assisted during this encounter.  Patient states that she has been sleeping all day yesterday.  She states that her breathing remains unchanged since yesterday.  She continues to complain of cough and states she is bringing up some phlegm.  Patient states that she has lost her appetite and has not ate anything this morning.    Patient remains on high flow via nasal cannula and Ventimask.  Nursing staff reports that at night patient was maintaining her saturation without Ventimask but when she wakes up, patient gets anxious and starts desaturating requiring Ventimask.            Current Hospital Meds:    albuterol sulfate HFA 2 puff Inhalation Q6H   amitriptyline 50 mg Oral Nightly   aspirin 81 mg Oral Nightly   budesonide-formoterol 2 puff Inhalation BID - RT   cholecalciferol 50,000 Units Oral Weekly   dexamethasone 6 mg Intravenous Daily   diazePAM 3.75 mg Oral Nightly   doxycycline 100 mg Intravenous Q12H   enoxaparin 40 mg Subcutaneous Daily   famotidine 20 mg Oral BID AC   Hydrocortisone (Perianal)  Rectal BID   lactobacillus acidophilus 1 capsule Oral Daily   levothyroxine 25 mcg Oral Daily     lidocaine 5 mL Subcutaneous Once   magnesium oxide 400 mg Oral Daily   meropenem 1 g Intravenous Q8H   phenytoin 100 mg Oral Q PM   phenytoin 200 mg Oral QAM   pramipexole 1 mg Oral Nightly   psyllium 1 packet Oral Daily   sodium chloride 10 mL Intravenous Q12H   sodium chloride 10 mL Intravenous Q12H   sodium chloride 10 mL Intravenous Q12H   sodium chloride 10 mL Intravenous Q12H   sodium chloride 10 mL Intravenous Q12H   sodium chloride 10 mL Intravenous Q12H   sodium chloride 10 mL Intravenous Q12H   topiramate 100 mg Oral Q12H   venlafaxine XR 75 mg Oral Q PM        ----------------------------------------------------------------------------------------------------------------------  Vital Signs:  Temp:  [98 °F (36.7 °C)-98.8 °F (37.1 °C)] 98.3 °F (36.8 °C)  Heart Rate:  [] 109  Resp:  [16-26] 18  BP: ()/(47-97) 182/97      07/10/20  1003 07/11/20  0515 07/12/20  0355   Weight: 83.5 kg (184 lb) 84.1 kg (185 lb 6.4 oz) 82.6 kg (182 lb)     Body mass index is 32.24 kg/m².    Intake/Output Summary (Last 24 hours) at 7/12/2020 0942  Last data filed at 7/12/2020 0543  Gross per 24 hour   Intake 1150 ml   Output 1300 ml   Net -150 ml     Diet Regular; Cardiac  ----------------------------------------------------------------------------------------------------------------------  This physical exam has been personally performed remotely in the unit aided by real-time audio/visual communication tools. KEZIA Armando present at bedside during this exam and assisted during exam. The use of a video visit has been reviewed with the patient and verbal informed consent has been obtained.     Physical Exam:  General: Patient appears awake, alert, and in mild distress due to tachypnea.  Head: Normocephalic, atraumatic  Eyes: EOMI. Conjunctivae and sclerae normal.  Ears: Ears appear intact with no abnormalities noted.   Neck: Trachea midline. No obvious JVD.  Heart: Tele reveals sinus rhythm  Lungs: Tachypneic, mild  respiratory distress. No audible wheezing.  Abdomen: No obvious abdominal distension.  MS: Muscle tone appears normal. No gross deformities.  Extremities: No clubbing, cyanosis or edema noted.  Skin: No visible bleeding, bruising, or rash.  Neurologic: Alert and oriented x3. No gross focal deficits.     ----------------------------------------------------------------------------------------------------------------------  Tele:    ----------------------------------------------------------------------------------------------------------------------  Results from last 7 days   Lab Units 07/12/20  0035 07/11/20  0517 07/10/20  0531  07/07/20  0136  07/05/20  1712   CK TOTAL U/L 344* 904* 1,142*   < > 329*   < >  --    TROPONIN T ng/mL  --   --   --   --  <0.010  --  <0.010    < > = values in this interval not displayed.     Results from last 7 days   Lab Units 07/12/20  0035 07/11/20  0517 07/10/20  0531  07/05/20  1532   CRP mg/dL 24.37* 25.12* 19.55*   < > 6.46*   LACTATE mmol/L  --   --   --   --  1.3   WBC 10*3/mm3 6.12 10.24 6.42   < > 7.09   HEMOGLOBIN g/dL 11.5* 12.9 12.4   < > 13.1   HEMATOCRIT % 35.9 40.0 40.2   < > 41.4   MCV fL 96.0 96.6 100.2*   < > 98.8*   MCHC g/dL 32.0 32.3 30.8*   < > 31.6   PLATELETS 10*3/mm3 148 170 118*   < > 147    < > = values in this interval not displayed.     Results from last 7 days   Lab Units 07/12/20  0438   PH, ARTERIAL pH units 7.356   PO2 ART mm Hg 56.8*   PCO2, ARTERIAL mm Hg 36.3   HCO3 ART mmol/L 20.3     Results from last 7 days   Lab Units 07/12/20  0035 07/11/20  0517 07/10/20  0531  07/08/20  0512  07/06/20  0548   SODIUM mmol/L 142 140 138   < > 140   < > 142   POTASSIUM mmol/L 4.1 4.3 4.0   < > 3.9   < > 3.0*   MAGNESIUM mg/dL  --   --  2.0  --  2.0  --  1.9   CHLORIDE mmol/L 109* 106 107   < > 107   < > 108*   CO2 mmol/L 22.6 21.5* 17.5*   < > 18.3*   < > 17.7*   BUN mg/dL 18 17 21   < > 17   < > 13   CREATININE mg/dL 0.50* 0.62 0.65   < > 0.71   < > 0.76   EGFR  IF NONAFRICN AM mL/min/1.73 121 94 89   < > 80   < > 74   CALCIUM mg/dL 8.3* 8.8 8.3*   < > 8.2*   < > 8.2*   GLUCOSE mg/dL 127* 99 92   < > 103*   < > 107*   ALBUMIN g/dL  --   --  3.14*  --  3.49*  --  3.65   BILIRUBIN mg/dL  --   --  0.3  --  0.2  --  0.3   ALK PHOS U/L  --   --  135*  --  150*  --  179*   AST (SGOT) U/L  --   --  59*  --  43*  --  36*   ALT (SGPT) U/L  --   --  25  --  28  --  33    < > = values in this interval not displayed.   Estimated Creatinine Clearance: 62.8 mL/min (A) (by C-G formula based on SCr of 0.5 mg/dL (L)).    No results found for: AMMONIA      Blood Culture   Date Value Ref Range Status   07/05/2020 No growth at 5 days  Final   07/05/2020 No growth at 5 days  Final     No results found for: URINECX  No results found for: WOUNDCX  No results found for: STOOLCX    I have personally looked at the labs and they are summarized above.  ----------------------------------------------------------------------------------------------------------------------  Imaging Results (Last 24 Hours)     Procedure Component Value Units Date/Time    XR Chest 1 View [[id number]] Collected:  07/11/20 1147     Updated:  07/11/20 1149    Narrative:       EXAMINATION: XR CHEST 1 VW-      CLINICAL INDICATION:     Hypoxia; J18.9-Pneumonia, unspecified organism;  U07.1-COVID-19     TECHNIQUE: Single AP view of chest.      COMPARISON: 07/10/2020      FINDINGS:   There is bibasilar atelectasis.   Bilateral airspace disease is grossly stable.     Heart size is stable.  No pneumothorax.           Impression:       Bilateral airspace disease is grossly stable.     This report was finalized on 7/11/2020 11:47 AM by Dr. Quincy Acevedo MD.           ----------------------------------------------------------------------------------------------------------------------  Assessment and Plan:    -Sepsis secondary to COVID-19 pneumonia  Patient remains afebrile with stable vital signs.  Ferritin continues to trend up.  CRP,  CK and LDH remains elevated but decreasing.  WBC remains WNL.  Fibrinogen level was within normal limit.  D-dimer is markedly elevated and is greater than 20 today.  Chest x-ray showed bilateral airspace disease, unchanged since yesterday.  Patient is on meropenem and doxycycline and Remdesivir per ID recommendations.  ID on board and help is appreciated.  Continue IV Decadron.   Respiratory panel PCR negative. Legionella urine antigen Is negative. Respiratory culture with normal madyson.   Patient has received convalescent plasma on 7/7/2020.  Blood culture showed no growth so far.    -Acute hypoxemic respiratory failure  Unchanged since yesterday.  Patient continues to require high flow nasal cannula at 10 L/min and Ventimask.  D-dimer is increased and greater than 20 today.  I will change her Lovenox to therapeutic dose for anticoagulation.  Continue supplemental oxygen as needed to keep SPO2 >90%.  Patient is at high risk for intubation.  I will give patient dose of Bumex to help with some diuresis and hopefully improve oxygen requirement.  X-ray showed no significant change.    -COPD  Continue albuterol MDI and Symbicort inhalers.    -Thrombocytopenia  Resolved.  Platelet count WNL today.    -Essential hypertension  Blood pressure well controlled.    -ASCVD with h/o PCI  Patient denies any chest pain.    -Seizure disorder  Continue seizure precaution.  Continue Dilantin.    -Adjustment disorder with depression/anxiety  Continue amitriptyline, Effexor and Valium.  Amitriptyline dose was increased yesterday and patient has had improvement in generalized aches and pains    Activity: Bedrest.  Patient has limited ambulation due to hypoxemia.  Nutrition: Regular diet  Fluids: None  DVT prophylaxis: Lovenox subcu  GI prophylaxis: Pepcid    The patient is high risk due to: Sepsis, hypoxemia, COVID pneumonia, COPD, thrombocytopenia, hypertension, CAD, seizure disorder.    I discussed the patient's findings and my  recommendations with patient and nursing staff.    Ayush Jiang MD  07/12/20  09:33

## 2020-07-12 NOTE — NURSING NOTE
Progressive Mobility    Date: 07/12/20     Mobility Initiation Contradictions: Pulse Oximetry <90%, or increased oxygenation requirements    Day of Mobility  Activity Performed: pt is able to get up to side of bed.    Patient: tolerated    Assisted by 1    Device(s) used: serg Fontaine RN   07/12/20

## 2020-07-12 NOTE — NURSING NOTE
Progressive Mobility    Date: 07/12/20     Mobility Initiation Contradictions: Pulse Oximetry <90%, or increased oxygenation requirements    Activity Performed: AROM    Patient: tolerated    Assisted by 1 person/persons    Device(s) used: none    Mariposa Cameron RN   07/12/20

## 2020-07-12 NOTE — PROGRESS NOTES
PROGRESS NOTE         Patient Identification:  Name:  Siria Rueda  Age:  74 y.o.  Sex:  female  :  1945  MRN:  [id number]  Visit Number:  [id number]  Primary Care Provider:  Cruzito Palumbo MD         LOS: 7 days       ----------------------------------------------------------------------------------------------------------------------  Subjective       Chief Complaints:    Shortness of Breath        Interval History:      Patient continues on 10 L bubble nasal cannula with nonrebreather mask over.  Afebrile, no diarrhea.  WBC normal.  CRP improving at 24.37.  Ferritin slightly worsening at 527.  D-dimer greater than 20.  Patient reports productive cough today.    Review of Systems:    Constitutional: No fever. Positive fatigue.  Eyes: no eye drainage, itching or redness.  HEENT: no mouth sores, dysphagia or nose bleed.  Respiratory: Positive for shortness of breath and croupy cough.  Cardiovascular: no chest pain, no palpitations, no orthopnea.  Gastrointestinal: no nausea, vomiting or diarrhea. No abdominal pain, hematemesis or rectal bleeding.  Genitourinary: no dysuria or polyuria.  Hematologic/lymphatic: no lymph node abnormalities, no easy bruising or easy bleeding.  Musculoskeletal: Positive for myalgias.  Skin: No rash and no itching.  Neurological: no loss of consciousness, no seizure, no headache.  Behavioral/Psych: no depression or suicidal ideation.  Endocrine: no hot flashes.  Immunologic: negative.  ----------------------------------------------------------------------------------------------------------------------      Objective       Current Hospital Meds:    albuterol sulfate HFA 2 puff Inhalation Q6H   amitriptyline 50 mg Oral Nightly   aspirin 81 mg Oral Nightly   budesonide-formoterol 2 puff Inhalation BID - RT   cholecalciferol 50,000 Units Oral Weekly   dexamethasone 6 mg Intravenous Daily   diazePAM 3.75 mg Oral Nightly   doxycycline 100 mg Intravenous Q12H   enoxaparin 80  mg Subcutaneous Q12H   famotidine 20 mg Oral BID AC   Hydrocortisone (Perianal)  Rectal BID   lactobacillus acidophilus 1 capsule Oral Daily   levothyroxine 25 mcg Oral Daily   lidocaine 5 mL Subcutaneous Once   magnesium oxide 400 mg Oral Daily   meropenem 1 g Intravenous Q8H   phenytoin 100 mg Oral Q PM   phenytoin 200 mg Oral QAM   pramipexole 1 mg Oral Nightly   psyllium 1 packet Oral Daily   sodium chloride 10 mL Intravenous Q12H   sodium chloride 10 mL Intravenous Q12H   sodium chloride 10 mL Intravenous Q12H   sodium chloride 10 mL Intravenous Q12H   sodium chloride 10 mL Intravenous Q12H   sodium chloride 10 mL Intravenous Q12H   sodium chloride 10 mL Intravenous Q12H   topiramate 100 mg Oral Q12H   venlafaxine XR 75 mg Oral Q PM       Pharmacy to Dose enoxaparin (LOVENOX)      ----------------------------------------------------------------------------------------------------------------------    Vital Signs:  Temp:  [98 °F (36.7 °C)-98.3 °F (36.8 °C)] 98.3 °F (36.8 °C)  Heart Rate:  [] 87  Resp:  [16-26] 18  BP: ()/(47-97) 182/97  Mean Arterial Pressure (Non-Invasive) for the past 24 hrs (Last 3 readings):   Noninvasive MAP (mmHg)   07/12/20 0720 131   07/12/20 0605 79   07/12/20 0520 72     SpO2 Percentage    07/12/20 0605 07/12/20 0720 07/12/20 1059   SpO2: 96% (!) 84% 96%     SpO2:  [81 %-99 %] 96 %  on  Flow (L/min):  [10] 10;   Device (Oxygen Therapy): bubble;high-flow nasal cannula;humidified;nonrebreather mask    Body mass index is 32.24 kg/m².  Wt Readings from Last 3 Encounters:   07/12/20 82.6 kg (182 lb)   11/16/19 76.5 kg (168 lb 11.2 oz)   07/10/19 78.5 kg (173 lb 1 oz)        Intake/Output Summary (Last 24 hours) at 7/12/2020 1251  Last data filed at 7/12/2020 1205  Gross per 24 hour   Intake 1200 ml   Output 1050 ml   Net 150 ml     Diet Regular;  Cardiac  ----------------------------------------------------------------------------------------------------------------------      Physical Exam:    Deferred as patient is in COVID-19 isolation.       ----------------------------------------------------------------------------------------------------------------------  Results from last 7 days   Lab Units 07/12/20  0035 07/11/20  0517 07/10/20  0531  07/07/20  0136  07/05/20  1712   CK TOTAL U/L 344* 904* 1,142*   < > 329*   < >  --    TROPONIN T ng/mL  --   --   --   --  <0.010  --  <0.010    < > = values in this interval not displayed.     Results from last 7 days   Lab Units 07/08/20 0512 07/07/20 0136 07/05/20  1712   PROBNP pg/mL 188.7 122.2 102.3       Results from last 7 days   Lab Units 07/12/20  0438   PH, ARTERIAL pH units 7.356   PO2 ART mm Hg 56.8*   PCO2, ARTERIAL mm Hg 36.3   HCO3 ART mmol/L 20.3     Results from last 7 days   Lab Units 07/12/20  0035 07/11/20  0517 07/10/20  0531  07/05/20  1532   CRP mg/dL 24.37* 25.12* 19.55*   < > 6.46*   LACTATE mmol/L  --   --   --   --  1.3   WBC 10*3/mm3 6.12 10.24 6.42   < > 7.09   HEMOGLOBIN g/dL 11.5* 12.9 12.4   < > 13.1   HEMATOCRIT % 35.9 40.0 40.2   < > 41.4   MCV fL 96.0 96.6 100.2*   < > 98.8*   MCHC g/dL 32.0 32.3 30.8*   < > 31.6   PLATELETS 10*3/mm3 148 170 118*   < > 147    < > = values in this interval not displayed.     Results from last 7 days   Lab Units 07/12/20  0035 07/11/20  0517 07/10/20  0531  07/08/20  0512  07/06/20  0548   SODIUM mmol/L 142 140 138   < > 140   < > 142   POTASSIUM mmol/L 4.1 4.3 4.0   < > 3.9   < > 3.0*   MAGNESIUM mg/dL  --   --  2.0  --  2.0  --  1.9   CHLORIDE mmol/L 109* 106 107   < > 107   < > 108*   CO2 mmol/L 22.6 21.5* 17.5*   < > 18.3*   < > 17.7*   BUN mg/dL 18 17 21   < > 17   < > 13   CREATININE mg/dL 0.50* 0.62 0.65   < > 0.71   < > 0.76   EGFR IF NONAFRICN AM mL/min/1.73 121 94 89   < > 80   < > 74   CALCIUM mg/dL 8.3* 8.8 8.3*   < > 8.2*   < > 8.2*    GLUCOSE mg/dL 127* 99 92   < > 103*   < > 107*   ALBUMIN g/dL  --   --  3.14*  --  3.49*  --  3.65   BILIRUBIN mg/dL  --   --  0.3  --  0.2  --  0.3   ALK PHOS U/L  --   --  135*  --  150*  --  179*   AST (SGOT) U/L  --   --  59*  --  43*  --  36*   ALT (SGPT) U/L  --   --  25  --  28  --  33    < > = values in this interval not displayed.   Estimated Creatinine Clearance: 62.8 mL/min (A) (by C-G formula based on SCr of 0.5 mg/dL (L)).  No results found for: AMMONIA    No results found for: HGBA1C, POCGLU  No results found for: HGBA1C  Lab Results   Component Value Date    TSH 1.270 07/06/2020    FREET4 1.05 03/13/2015       Blood Culture   Date Value Ref Range Status   07/05/2020 No growth at 24 hours  Preliminary   07/05/2020 No growth at 24 hours  Preliminary     No results found for: URINECX  No results found for: WOUNDCX  No results found for: STOOLCX  No results found for: RESPCX  Pain Management Panel     There is no flowsheet data to display.            ----------------------------------------------------------------------------------------------------------------------  Imaging Results (Last 24 Hours)     ** No results found for the last 24 hours. **          ----------------------------------------------------------------------------------------------------------------------    Assessment/Plan       Assessment/Plan     ASSESSMENT:    1.  Pneumonia due to COVID-19 virus    PLAN:    Patient continues on 10 L bubble nasal cannula with nonrebreather mask over.  Afebrile, no diarrhea.  WBC normal.  CRP improving at 24.37.  Ferritin slightly worsening at 527.  D-dimer greater than 20.  Patient reports productive cough today.    Chest x-ray from 7/11/2020 reports stable bilateral airspace disease.    COVID-19 PCR positive.  Currently receiving Decadron 6 mg IV daily.     Chest x-ray on 7/5/2020 reveals no radiographic evidence of acute cardiac or pulmonary disease-per radiology.  CT chest on 7/5/2020 reveals no  evidence of pulmonary embolism.  Likely underlying chronic lung disease with emphysematous changes at apices in the area of air trapping. Patchy airspace disease in the right middle lobe and lingula is nonspecific. Prominent mediastinal and right hilar lymph nodes not significantly changed. One AP window lymph node is mildly enlarged at 1.1 cm short axis dimension but unchanged-per radiology.      Patient received Remdesivir and received and completed convalescent plasma therapy. Consents were obtained.     Patient has persistent worsening is possibly worsening COVID/ARDS, worsening superimposed pneumonia, or CHF.  Recommend to continue Decadron and Budesonide.  For now we will continue coverage of Merrem and doxycycline.  If further worsening may consider Actemra.  We will continue to follow closely.    Code Status:   Code Status and Medical Interventions:   Ordered at: 07/05/20 2049     Code Status:    CPR     Medical Interventions (Level of Support Prior to Arrest):    Full       ANNA Mari  07/12/20  12:51

## 2020-07-13 ENCOUNTER — APPOINTMENT (OUTPATIENT)
Dept: CARDIOLOGY | Facility: HOSPITAL | Age: 75
End: 2020-07-13

## 2020-07-13 ENCOUNTER — APPOINTMENT (OUTPATIENT)
Dept: GENERAL RADIOLOGY | Facility: HOSPITAL | Age: 75
End: 2020-07-13

## 2020-07-13 LAB
ANION GAP SERPL CALCULATED.3IONS-SCNC: 11.5 MMOL/L (ref 5–15)
BASOPHILS # BLD AUTO: 0.01 10*3/MM3 (ref 0–0.2)
BASOPHILS NFR BLD AUTO: 0.1 % (ref 0–1.5)
BH CV ECHO MEAS - % IVS THICK: -10.1 %
BH CV ECHO MEAS - % LVPW THICK: 15.5 %
BH CV ECHO MEAS - BSA(HAYCOCK): 1.9 M^2
BH CV ECHO MEAS - BSA: 1.9 M^2
BH CV ECHO MEAS - BZI_BMI: 32.2 KILOGRAMS/M^2
BH CV ECHO MEAS - BZI_METRIC_HEIGHT: 160 CM
BH CV ECHO MEAS - BZI_METRIC_WEIGHT: 82.6 KG
BH CV ECHO MEAS - EDV(CUBED): 75.2 ML
BH CV ECHO MEAS - EDV(MOD-SP4): 40.6 ML
BH CV ECHO MEAS - EDV(TEICH): 79.5 ML
BH CV ECHO MEAS - EF(CUBED): 63.5 %
BH CV ECHO MEAS - EF(MOD-SP4): 62.8 %
BH CV ECHO MEAS - EF(TEICH): 55.4 %
BH CV ECHO MEAS - ESV(CUBED): 27.4 ML
BH CV ECHO MEAS - ESV(MOD-SP4): 15.1 ML
BH CV ECHO MEAS - ESV(TEICH): 35.4 ML
BH CV ECHO MEAS - FS: 28.6 %
BH CV ECHO MEAS - IVS/LVPW: 1.1
BH CV ECHO MEAS - IVSD: 1.1 CM
BH CV ECHO MEAS - IVSS: 1 CM
BH CV ECHO MEAS - LV DIASTOLIC VOL/BSA (35-75): 21.9 ML/M^2
BH CV ECHO MEAS - LV MASS(C)D: 152.2 GRAMS
BH CV ECHO MEAS - LV MASS(C)DI: 81.9 GRAMS/M^2
BH CV ECHO MEAS - LV MASS(C)S: 95.1 GRAMS
BH CV ECHO MEAS - LV MASS(C)SI: 51.2 GRAMS/M^2
BH CV ECHO MEAS - LV SYSTOLIC VOL/BSA (12-30): 8.1 ML/M^2
BH CV ECHO MEAS - LVIDD: 4.2 CM
BH CV ECHO MEAS - LVIDS: 3 CM
BH CV ECHO MEAS - LVLD AP4: 5.6 CM
BH CV ECHO MEAS - LVLS AP4: 4.6 CM
BH CV ECHO MEAS - LVPWD: 1 CM
BH CV ECHO MEAS - LVPWS: 1.2 CM
BH CV ECHO MEAS - RAP SYSTOLE: 10 MMHG
BH CV ECHO MEAS - RVSP: 51.5 MMHG
BH CV ECHO MEAS - SI(CUBED): 25.7 ML/M^2
BH CV ECHO MEAS - SI(MOD-SP4): 13.7 ML/M^2
BH CV ECHO MEAS - SI(TEICH): 23.7 ML/M^2
BH CV ECHO MEAS - SV(CUBED): 47.7 ML
BH CV ECHO MEAS - SV(MOD-SP4): 25.5 ML
BH CV ECHO MEAS - SV(TEICH): 44 ML
BH CV ECHO MEAS - TR MAX VEL: 322 CM/SEC
BUN SERPL-MCNC: 23 MG/DL (ref 8–23)
BUN/CREAT SERPL: 41.8 (ref 7–25)
CALCIUM SPEC-SCNC: 8.5 MG/DL (ref 8.6–10.5)
CHLORIDE SERPL-SCNC: 106 MMOL/L (ref 98–107)
CK SERPL-CCNC: 230 U/L (ref 20–180)
CO2 SERPL-SCNC: 23.5 MMOL/L (ref 22–29)
CREAT SERPL-MCNC: 0.55 MG/DL (ref 0.57–1)
CRP SERPL-MCNC: 18.68 MG/DL (ref 0–0.5)
D-LACTATE SERPL-SCNC: 1.5 MMOL/L (ref 0.5–2)
DEPRECATED RDW RBC AUTO: 46.8 FL (ref 37–54)
EOSINOPHIL # BLD AUTO: 0.02 10*3/MM3 (ref 0–0.4)
EOSINOPHIL NFR BLD AUTO: 0.3 % (ref 0.3–6.2)
ERYTHROCYTE [DISTWIDTH] IN BLOOD BY AUTOMATED COUNT: 13.4 % (ref 12.3–15.4)
FERRITIN SERPL-MCNC: 569.9 NG/ML (ref 13–150)
GFR SERPL CREATININE-BSD FRML MDRD: 108 ML/MIN/1.73
GLUCOSE SERPL-MCNC: 109 MG/DL (ref 65–99)
HCT VFR BLD AUTO: 35.8 % (ref 34–46.6)
HGB BLD-MCNC: 11.5 G/DL (ref 12–15.9)
IMM GRANULOCYTES # BLD AUTO: 0.03 10*3/MM3 (ref 0–0.05)
IMM GRANULOCYTES NFR BLD AUTO: 0.4 % (ref 0–0.5)
LYMPHOCYTES # BLD AUTO: 1.69 10*3/MM3 (ref 0.7–3.1)
LYMPHOCYTES NFR BLD AUTO: 23.5 % (ref 19.6–45.3)
MAXIMAL PREDICTED HEART RATE: 146 BPM
MCH RBC QN AUTO: 30.5 PG (ref 26.6–33)
MCHC RBC AUTO-ENTMCNC: 32.1 G/DL (ref 31.5–35.7)
MCV RBC AUTO: 95 FL (ref 79–97)
MONOCYTES # BLD AUTO: 0.81 10*3/MM3 (ref 0.1–0.9)
MONOCYTES NFR BLD AUTO: 11.3 % (ref 5–12)
NEUTROPHILS NFR BLD AUTO: 4.64 10*3/MM3 (ref 1.7–7)
NEUTROPHILS NFR BLD AUTO: 64.4 % (ref 42.7–76)
NRBC BLD AUTO-RTO: 0 /100 WBC (ref 0–0.2)
PLATELET # BLD AUTO: 157 10*3/MM3 (ref 140–450)
PMV BLD AUTO: 10.3 FL (ref 6–12)
POTASSIUM SERPL-SCNC: 3.3 MMOL/L (ref 3.5–5.2)
PROCALCITONIN SERPL-MCNC: 0.13 NG/ML (ref 0–0.25)
RBC # BLD AUTO: 3.77 10*6/MM3 (ref 3.77–5.28)
SODIUM SERPL-SCNC: 141 MMOL/L (ref 136–145)
STRESS TARGET HR: 124 BPM
TROPONIN T SERPL-MCNC: <0.01 NG/ML (ref 0–0.03)
WBC # BLD AUTO: 7.2 10*3/MM3 (ref 3.4–10.8)

## 2020-07-13 PROCEDURE — 82550 ASSAY OF CK (CPK): CPT | Performed by: HOSPITALIST

## 2020-07-13 PROCEDURE — 84484 ASSAY OF TROPONIN QUANT: CPT | Performed by: PHYSICIAN ASSISTANT

## 2020-07-13 PROCEDURE — 99291 CRITICAL CARE FIRST HOUR: CPT | Performed by: INTERNAL MEDICINE

## 2020-07-13 PROCEDURE — 93325 DOPPLER ECHO COLOR FLOW MAPG: CPT

## 2020-07-13 PROCEDURE — 25010000002 DEXAMETHASONE PER 1 MG: Performed by: HOSPITALIST

## 2020-07-13 PROCEDURE — 93308 TTE F-UP OR LMTD: CPT | Performed by: INTERNAL MEDICINE

## 2020-07-13 PROCEDURE — 71045 X-RAY EXAM CHEST 1 VIEW: CPT

## 2020-07-13 PROCEDURE — 25010000002 FUROSEMIDE PER 20 MG: Performed by: PHYSICIAN ASSISTANT

## 2020-07-13 PROCEDURE — 82728 ASSAY OF FERRITIN: CPT | Performed by: HOSPITALIST

## 2020-07-13 PROCEDURE — 93308 TTE F-UP OR LMTD: CPT

## 2020-07-13 PROCEDURE — 99221 1ST HOSP IP/OBS SF/LOW 40: CPT | Performed by: PSYCHIATRY & NEUROLOGY

## 2020-07-13 PROCEDURE — 93321 DOPPLER ECHO F-UP/LMTD STD: CPT | Performed by: INTERNAL MEDICINE

## 2020-07-13 PROCEDURE — 25010000002 MEROPENEM: Performed by: HOSPITALIST

## 2020-07-13 PROCEDURE — 99233 SBSQ HOSP IP/OBS HIGH 50: CPT | Performed by: INTERNAL MEDICINE

## 2020-07-13 PROCEDURE — 25010000002 ENOXAPARIN PER 10 MG: Performed by: INTERNAL MEDICINE

## 2020-07-13 PROCEDURE — 93325 DOPPLER ECHO COLOR FLOW MAPG: CPT | Performed by: INTERNAL MEDICINE

## 2020-07-13 PROCEDURE — 85025 COMPLETE CBC W/AUTO DIFF WBC: CPT | Performed by: INTERNAL MEDICINE

## 2020-07-13 PROCEDURE — 94799 UNLISTED PULMONARY SVC/PX: CPT

## 2020-07-13 PROCEDURE — 84145 PROCALCITONIN (PCT): CPT | Performed by: PHYSICIAN ASSISTANT

## 2020-07-13 PROCEDURE — 71045 X-RAY EXAM CHEST 1 VIEW: CPT | Performed by: RADIOLOGY

## 2020-07-13 PROCEDURE — 93321 DOPPLER ECHO F-UP/LMTD STD: CPT

## 2020-07-13 PROCEDURE — 86140 C-REACTIVE PROTEIN: CPT | Performed by: HOSPITALIST

## 2020-07-13 PROCEDURE — 80048 BASIC METABOLIC PNL TOTAL CA: CPT | Performed by: INTERNAL MEDICINE

## 2020-07-13 PROCEDURE — 25010000002 MEROPENEM PER 100 MG: Performed by: HOSPITALIST

## 2020-07-13 PROCEDURE — 83605 ASSAY OF LACTIC ACID: CPT | Performed by: PHYSICIAN ASSISTANT

## 2020-07-13 RX ORDER — POTASSIUM CHLORIDE 750 MG/1
40 CAPSULE, EXTENDED RELEASE ORAL AS NEEDED
Status: DISCONTINUED | OUTPATIENT
Start: 2020-07-13 | End: 2020-07-17 | Stop reason: HOSPADM

## 2020-07-13 RX ORDER — POTASSIUM CHLORIDE 1.5 G/1.77G
40 POWDER, FOR SOLUTION ORAL AS NEEDED
Status: DISCONTINUED | OUTPATIENT
Start: 2020-07-13 | End: 2020-07-17 | Stop reason: HOSPADM

## 2020-07-13 RX ORDER — POTASSIUM CHLORIDE 1.5 G/1.77G
40 POWDER, FOR SOLUTION ORAL EVERY 4 HOURS
Status: COMPLETED | OUTPATIENT
Start: 2020-07-13 | End: 2020-07-14

## 2020-07-13 RX ORDER — FUROSEMIDE 10 MG/ML
20 INJECTION INTRAMUSCULAR; INTRAVENOUS EVERY 12 HOURS
Status: COMPLETED | OUTPATIENT
Start: 2020-07-13 | End: 2020-07-14

## 2020-07-13 RX ORDER — MAGNESIUM SULFATE HEPTAHYDRATE 40 MG/ML
2 INJECTION, SOLUTION INTRAVENOUS AS NEEDED
Status: DISCONTINUED | OUTPATIENT
Start: 2020-07-13 | End: 2020-07-17 | Stop reason: HOSPADM

## 2020-07-13 RX ORDER — POTASSIUM CHLORIDE 20 MEQ/1
40 TABLET, EXTENDED RELEASE ORAL EVERY 4 HOURS
Status: DISCONTINUED | OUTPATIENT
Start: 2020-07-13 | End: 2020-07-13

## 2020-07-13 RX ORDER — POTASSIUM CHLORIDE 7.45 MG/ML
10 INJECTION INTRAVENOUS
Status: DISCONTINUED | OUTPATIENT
Start: 2020-07-13 | End: 2020-07-17 | Stop reason: HOSPADM

## 2020-07-13 RX ORDER — MAGNESIUM SULFATE HEPTAHYDRATE 40 MG/ML
4 INJECTION, SOLUTION INTRAVENOUS AS NEEDED
Status: DISCONTINUED | OUTPATIENT
Start: 2020-07-13 | End: 2020-07-17 | Stop reason: HOSPADM

## 2020-07-13 RX ADMIN — MEROPENEM 1 G: 1 INJECTION, POWDER, FOR SOLUTION INTRAVENOUS at 20:57

## 2020-07-13 RX ADMIN — TOPIRAMATE 100 MG: 100 TABLET, FILM COATED ORAL at 20:56

## 2020-07-13 RX ADMIN — SODIUM CHLORIDE, PRESERVATIVE FREE 10 ML: 5 INJECTION INTRAVENOUS at 20:59

## 2020-07-13 RX ADMIN — MAGNESIUM GLUCONATE 500 MG ORAL TABLET 400 MG: 500 TABLET ORAL at 11:15

## 2020-07-13 RX ADMIN — HYDROXYZINE 25 MG: 25 TABLET, FILM COATED ORAL at 08:13

## 2020-07-13 RX ADMIN — FUROSEMIDE 20 MG: 10 INJECTION INTRAMUSCULAR; INTRAVENOUS at 11:15

## 2020-07-13 RX ADMIN — FAMOTIDINE 20 MG: 20 TABLET ORAL at 15:55

## 2020-07-13 RX ADMIN — AMITRIPTYLINE HYDROCHLORIDE 50 MG: 50 TABLET, FILM COATED ORAL at 20:54

## 2020-07-13 RX ADMIN — DIAZEPAM 3.75 MG: 5 TABLET ORAL at 20:55

## 2020-07-13 RX ADMIN — ALBUTEROL SULFATE 2 PUFF: 90 AEROSOL, METERED RESPIRATORY (INHALATION) at 15:45

## 2020-07-13 RX ADMIN — SODIUM CHLORIDE, PRESERVATIVE FREE 10 ML: 5 INJECTION INTRAVENOUS at 08:18

## 2020-07-13 RX ADMIN — MEROPENEM 1 G: 1 INJECTION, POWDER, FOR SOLUTION INTRAVENOUS at 05:49

## 2020-07-13 RX ADMIN — ALBUTEROL SULFATE 2 PUFF: 90 AEROSOL, METERED RESPIRATORY (INHALATION) at 01:10

## 2020-07-13 RX ADMIN — ENOXAPARIN SODIUM 80 MG: 80 INJECTION SUBCUTANEOUS at 20:57

## 2020-07-13 RX ADMIN — BUDESONIDE AND FORMOTEROL FUMARATE DIHYDRATE 2 PUFF: 160; 4.5 AEROSOL RESPIRATORY (INHALATION) at 19:38

## 2020-07-13 RX ADMIN — POTASSIUM CHLORIDE 40 MEQ: 1.5 POWDER, FOR SOLUTION ORAL at 20:57

## 2020-07-13 RX ADMIN — SODIUM CHLORIDE, PRESERVATIVE FREE 10 ML: 5 INJECTION INTRAVENOUS at 08:22

## 2020-07-13 RX ADMIN — SODIUM CHLORIDE, PRESERVATIVE FREE 10 ML: 5 INJECTION INTRAVENOUS at 08:21

## 2020-07-13 RX ADMIN — VENLAFAXINE HYDROCHLORIDE 75 MG: 75 CAPSULE, EXTENDED RELEASE ORAL at 17:25

## 2020-07-13 RX ADMIN — SODIUM CHLORIDE, PRESERVATIVE FREE 10 ML: 5 INJECTION INTRAVENOUS at 08:17

## 2020-07-13 RX ADMIN — ENOXAPARIN SODIUM 80 MG: 80 INJECTION SUBCUTANEOUS at 08:06

## 2020-07-13 RX ADMIN — OFLOXACIN 50000 UNITS: 300 TABLET, COATED ORAL at 08:14

## 2020-07-13 RX ADMIN — BUDESONIDE AND FORMOTEROL FUMARATE DIHYDRATE 2 PUFF: 160; 4.5 AEROSOL RESPIRATORY (INHALATION) at 08:03

## 2020-07-13 RX ADMIN — PSYLLIUM HUSK 1 PACKET: 3.4 POWDER ORAL at 08:08

## 2020-07-13 RX ADMIN — LEVOTHYROXINE SODIUM 25 MCG: 25 TABLET ORAL at 08:14

## 2020-07-13 RX ADMIN — PHENYTOIN SODIUM 100 MG: 100 CAPSULE, EXTENDED RELEASE ORAL at 15:55

## 2020-07-13 RX ADMIN — HYDROCORTISONE 2.5%: 25 CREAM TOPICAL at 20:57

## 2020-07-13 RX ADMIN — PRAMIPEXOLE DIHYDROCHLORIDE 1 MG: 1 TABLET ORAL at 20:56

## 2020-07-13 RX ADMIN — ASPIRIN 81 MG: 81 TABLET, COATED ORAL at 20:55

## 2020-07-13 RX ADMIN — GUAIFENESIN AND CODEINE PHOSPHATE 5 ML: 10; 100 LIQUID ORAL at 01:09

## 2020-07-13 RX ADMIN — ALBUTEROL SULFATE 2 PUFF: 90 AEROSOL, METERED RESPIRATORY (INHALATION) at 08:20

## 2020-07-13 RX ADMIN — HYDROXYZINE 25 MG: 25 TABLET, FILM COATED ORAL at 15:54

## 2020-07-13 RX ADMIN — SODIUM CHLORIDE, PRESERVATIVE FREE 10 ML: 5 INJECTION INTRAVENOUS at 20:58

## 2020-07-13 RX ADMIN — DOXYCYCLINE 100 MG: 100 INJECTION, POWDER, LYOPHILIZED, FOR SOLUTION INTRAVENOUS at 11:15

## 2020-07-13 RX ADMIN — MEROPENEM 1 G: 1 INJECTION, POWDER, FOR SOLUTION INTRAVENOUS at 12:29

## 2020-07-13 RX ADMIN — Medication 1 CAPSULE: at 08:14

## 2020-07-13 RX ADMIN — SODIUM CHLORIDE, PRESERVATIVE FREE 10 ML: 5 INJECTION INTRAVENOUS at 08:11

## 2020-07-13 RX ADMIN — TOPIRAMATE 100 MG: 100 TABLET, FILM COATED ORAL at 08:14

## 2020-07-13 RX ADMIN — SODIUM CHLORIDE, PRESERVATIVE FREE 10 ML: 5 INJECTION INTRAVENOUS at 11:16

## 2020-07-13 RX ADMIN — ALBUTEROL SULFATE 2 PUFF: 90 AEROSOL, METERED RESPIRATORY (INHALATION) at 19:38

## 2020-07-13 RX ADMIN — DEXAMETHASONE SODIUM PHOSPHATE 6 MG: 4 INJECTION, SOLUTION INTRA-ARTICULAR; INTRALESIONAL; INTRAMUSCULAR; INTRAVENOUS; SOFT TISSUE at 08:09

## 2020-07-13 RX ADMIN — HYDROCORTISONE 2.5%: 25 CREAM TOPICAL at 08:24

## 2020-07-13 RX ADMIN — FAMOTIDINE 20 MG: 20 TABLET ORAL at 05:51

## 2020-07-13 RX ADMIN — PHENYTOIN SODIUM 200 MG: 100 CAPSULE, EXTENDED RELEASE ORAL at 05:52

## 2020-07-13 NOTE — CONSULTS
Consults    Patient Care Team:  Cruzito Palumbo MD as PCP - General (Internal Medicine)  Radha Abarca APRN as PCP - Claims Attributed    Chief complaint: shortness of breath    Subjective     History of Present Illness     Mrs. Rueda is a 74 year old female with past medical history of stroke, COPD, obstructive sleep apnea on home CPAP, seizure history, hypothyroidism, hypertension, hyperlipidemia, CAD status post stent.  She presented to the ED on 7/5/2020 due to coughing, shortness of breath that has significantly worsened over the prior 3 days.  She reports cough and shortness of breath are typically noted at baseline but was concerned following worsening.  Other associated symptoms included myalgia, chills, diarrhea (with recent resolution).  No fever, nausea, vomiting, no loss of taste or smell.  She denies any known sick contacts.   Initial ABG on room air revealed pH 7.430.  PCO2 29.1, PO2 70.8, HCO3 19.3.  CMP was only notable for alkaline phosphatase and ALT elevation.  CRP is elevated at 6.46 with a lactate of 1.3.  Procalcitonin was initially nonelevated.  proBNP was noted at 102.3.  No leukocytosis noted on CBC.  D-dimer was initially elevated at 0.78.  CT PE protocol was obtained which was suggestive of underlying ILD with emphysematous changes most prominent at the lung apices.  Patchy consolidation noted in the right middle lobe and lingula with some dependent atelectasis.  Stable prominent mediastinal and right hilar lymph nodes were noted.  COVID-19 testing was performed and resulted as positive.  She was admitted for acute hypoxic respiratory failure and sepsis related to COVID-19 pneumonia, with management of other chronic conditions.  Since admission, chest x-ray has been notable for worsening of diffuse bilateral infiltrates.  ABG showed worsening hypoxia that resulted in increasing oxygenation requirements.  She is currently on 10 L bubble high flow nasal cannula with intermittent use of  nonrebreathing mask.  She reports a progressive increased smothering at rest and persistent congestive cough, but not producing a significant amount of sputum. No fever noted in the last 24 hours. She reports decreased oral intake due to appetite, but has been able to tolerate oral medications. She denies any nausea, vomiting, chest pain at present.   She reports a previous smoking history and quit 29 years ago. Only nebulizer treatment and inhalers are required for outpatient COPD management. She does not require use of supplemental oxygen therapy at baseline. She does use a home CPAP machine for sleep apnea history. History was obtained today directly from the patient with confirmation by the RN.       Review of Systems     Review of Systems - History obtained from chart review and the patient  Review of Systems - General ROS: negative for - chills or fever  Psychological ROS: positive - anxiety. negative for - depression  ENT ROS: negative for - nasal congestion or nasal discharge  Allergy and Immunology ROS: negative for - nasal congestion or postnasal drip  Endocrine ROS: negative for - temperature intolerance  Respiratory ROS: positive for - cough and shortness of breath  negative for - sputum changes  Cardiovascular ROS: negative for - chest pain or palpitations  Gastrointestinal ROS: negative for - nausea/vomiting  Musculoskeletal ROS: negative for - joint pain or joint swelling  Neurological ROS: no TIA or stroke symptoms      Objective      Vital Signs  Temp:  [97.6 °F (36.4 °C)-98.8 °F (37.1 °C)] 97.6 °F (36.4 °C)  Heart Rate:  [68-98] 87  Resp:  [16-30] 28  BP: (105-171)/(50-84) 123/50    Physical Exam     Physical exam limited due to COVID-19 isolation. Completed by direct visualization and telephone discussion due to isolation.   General: Elderly female.  Awake, alert. Seated in bed. No acute distress.   HENT: normocephalic. Atraumatic.   Cardiac: normal rate and rhythm noted on telemetry  Lungs:   Normal rate, effort. Symmetric rise and fall of the chest wall without accessory muscle use. On 10 L bubble high flow nasal cannula with intermittent use of non-rebreathing mask.   Musculoskeletal: no significant deformity, joint abnormality.  Neurologic: able to move all extremities bilaterally.   Psychiatric: alert, cooperative. oriented to person, place, time.       Results Review:    I reviewed the patient's new clinical results.    BMP today notable for mild hypokalemia, hypocalcemia.   CK elevation is trending downward.   Troponin level was non-elevated. Ferritin level remains elevated.   CRP elevation is trending downward.    D-dimer elevation worsening today.   CBC notable for only mildly reduction of hemoglobin.   Previous cultures from 7/5 were non-significant.   Chest xray 7/11 notable for diffuse bilateral infiltrates significantly worsened from initial admission.   EKG from 7/5 reviewed. QTC noted at 429 ms. Normal sinus rhythm noted.   COVID-19 testing positive from 7/5/2020.     Reviewed the previous echo from 6/10/2020. EF noted at 61-65%. Grade I a diastolic dysfunction noted. No suggestion of pulmonary hypertension.         Assessment/Plan     · Acute hypoxic respiratory failure due to bilateral component with mixed component of acute pulmonary edema  · Bilateral diffuse pneumonia due to COVID-19 positive  · Acute COPD exacerbation  · Obstructive sleep apnea  · Tobacco abuse history    Patient currently requiring 10 L bubble high flow nasal cannula. Intermittently requiring non-rebreathing mask. Titrate to maintain SPO2 at 90-92% or greater.  Patient uses CPAP machine at home for GIOVANNI.   Ordered CPAP for use at nighttime and as needed on 10 cm  Continue with current inhaler therapy of albuterol PRN, Symbicort  COVID-19 test positive on 7/5.   Legionella, respiratory culture, blood cultures, strep culture, influenza antigen, respiratory PCR, rapid strep screen were previously negative.   Ordered  diuresis with Lasix 20 mg IV BID for 2 doses.   Ordered potassium level checks every 8 hours for 3 occurrences with diuresis.   Ordered lactic acid level  Ordered Procalcitonin level  Ordered troponin level  Ordered chest xray  Ordered limited echo, as cardiac cause can contribute to pulmonary edema. Previous echo completed 6/10/2020.   On IV doxycycline, IV meropenem.   On IV dexamethasone 6 mg daily  Remdesivir and convalescent plasma infusion previously received.   Infectious disease team on board.   Will monitor PO intake, and switch PO seizure medications to IV if PO meds no long tolerated. Currently able to complete PO medication intake per RN although appetite is decreased.       DVT prophylaxis: Lovenox  GI prophylaxis: Famotidine.     CODE status is currently CPR with full interventions. Patient remains high risk for clinical decompensation in the setting of bilateral diffuse pneumonia with COVID-19 positive.       Purnima Bejarano PA-C  07/13/20  11:00    Scribed for Dr. Manrique by Purnima Bejarano PA-C    The clinical plan was discussed with   The clinical plan was discussed extensively with the nurse, and respiratory therapist.   Critical Care time spent in direct patient care: 38 minutes (excluding procedure time).  Patient is critically ill and is at higher risk for further mortality/morbidity.     ICayetano M.D. attest that the above note accurately reflects the work and decisions made by me. Patient was seen and evaluated by me, including history of present illness, physical exam, assessment, and treatment plan.  The above note was reviewed and edited by me.    Cayetano Manrique M.D  Pulmonary and Critical Care Medicine

## 2020-07-13 NOTE — PLAN OF CARE
Problem: Patient Care Overview  Goal: Plan of Care Review  Outcome: Ongoing (interventions implemented as appropriate)  Flowsheets (Taken 7/13/2020 0201)  Outcome Summary: doing well on 10l bhf, no distress tonight. rested well this shift  Goal: Individualization and Mutuality  Outcome: Ongoing (interventions implemented as appropriate)  Goal: Discharge Needs Assessment  Outcome: Ongoing (interventions implemented as appropriate)  Goal: Interprofessional Rounds/Family Conf  Outcome: Ongoing (interventions implemented as appropriate)     Problem: Fall Risk (Adult)  Goal: Identify Related Risk Factors and Signs and Symptoms  Outcome: Ongoing (interventions implemented as appropriate)  Goal: Absence of Fall  Outcome: Ongoing (interventions implemented as appropriate)     Problem: Pain, Chronic (Adult)  Goal: Identify Related Risk Factors and Signs and Symptoms  Outcome: Ongoing (interventions implemented as appropriate)  Goal: Acceptable Pain/Comfort Level and Functional Ability  Outcome: Ongoing (interventions implemented as appropriate)

## 2020-07-13 NOTE — PROGRESS NOTES
[facility] HOSPITALIST PROGRESS NOTE     Patient Identification:  Name:  Siria Rueda  Age:  74 y.o.  Sex:  female  :  1945  MRN:  [id number]  Visit Number:  [id number]  Primary Care Provider:  Cruzito Palumbo MD    Length of stay:  8    Chief complaint:  74 y.o. old female admitted with Shortness of Breath    HPI:  74-year-old female admitted with COVID pneumonia.  CT scan showed right patchy pneumonia.  Patient has received convalescent plasma on 2020 and has been started on Remdesivir per ID recommendations in addition to Rocephin, doxycycline and Decadron.  Patient continued to have worsening and progressive hypoxemia and therefore was transferred to intensive care unit.  Patient      Subjective:    This was an audio and video enabled telemedicine encounter. Kaylene MAST was present at bedside and assisted during this encounter.  Patient states that she still continues to have dyspnea.  She is complaining of cough but denies any sputum production.  Seeing staff reports that patient had good sleep last night and has been sleeping this morning as well.  Patient continues to have poor appetite and has not been eating or drinking much.    Patient remains on high flow via nasal cannula, patient is currently not requiring Ventimask.  Nursing staff reports that at night patient was maintaining her saturation without Ventimask but when she wakes up, patient gets anxious and starts desaturating requiring Ventimask.         Current Hospital Meds:    albuterol sulfate HFA 2 puff Inhalation Q6H   amitriptyline 50 mg Oral Nightly   aspirin 81 mg Oral Nightly   budesonide-formoterol 2 puff Inhalation BID - RT   cholecalciferol 50,000 Units Oral Weekly   dexamethasone 6 mg Intravenous Daily   diazePAM 3.75 mg Oral Nightly   doxycycline 100 mg Intravenous Q12H   enoxaparin 80 mg Subcutaneous Q12H   famotidine 20 mg Oral BID AC   furosemide 20 mg Intravenous Q12H   Hydrocortisone (Perianal)  Rectal BID      lactobacillus acidophilus 1 capsule Oral Daily   levothyroxine 25 mcg Oral Daily   lidocaine 5 mL Subcutaneous Once   magnesium oxide 400 mg Oral Daily   meropenem 1 g Intravenous Q8H   phenytoin 100 mg Oral Q PM   phenytoin 200 mg Oral QAM   pramipexole 1 mg Oral Nightly   psyllium 1 packet Oral Daily   sodium chloride 10 mL Intravenous Q12H   sodium chloride 10 mL Intravenous Q12H   sodium chloride 10 mL Intravenous Q12H   sodium chloride 10 mL Intravenous Q12H   sodium chloride 10 mL Intravenous Q12H   sodium chloride 10 mL Intravenous Q12H   sodium chloride 10 mL Intravenous Q12H   topiramate 100 mg Oral Q12H   venlafaxine XR 75 mg Oral Q PM       Pharmacy to Dose enoxaparin (LOVENOX)      ----------------------------------------------------------------------------------------------------------------------  Vital Signs:  Temp:  [97.6 °F (36.4 °C)-98.5 °F (36.9 °C)] 98 °F (36.7 °C)  Heart Rate:  [68-98] 76  Resp:  [16-30] 22  BP: (105-171)/(50-84) 110/55      07/10/20  1003 07/11/20  0515 07/12/20  0355   Weight: 83.5 kg (184 lb) 84.1 kg (185 lb 6.4 oz) 82.6 kg (182 lb)     Body mass index is 32.24 kg/m².    Intake/Output Summary (Last 24 hours) at 7/13/2020 1236  Last data filed at 7/13/2020 1229  Gross per 24 hour   Intake 1060 ml   Output 2650 ml   Net -1590 ml     Diet Regular; Cardiac, Daily Fluid Restriction; 1000 mL Fluid Per Day  ----------------------------------------------------------------------------------------------------------------------  This physical exam has been personally performed remotely in the unit aided by real-time audio/visual communication tools. KEZIA Armando present at bedside during this exam and assisted during exam. The use of a video visit has been reviewed with the patient and verbal informed consent has been obtained.     Physical Exam:  General: Patient appears awake, alert, and in mild distress due to tachypnea.  Head: Normocephalic, atraumatic  Eyes: EOMI. Conjunctivae and  sclerae normal.  Ears: Ears appear intact with no abnormalities noted.   Neck: Trachea midline. No obvious JVD.  Heart: Tele reveals sinus rhythm  Lungs: Tachypneic, mild respiratory distress. No audible wheezing.  Abdomen: No obvious abdominal distension.  MS: Muscle tone appears normal. No gross deformities.  Extremities: No clubbing, cyanosis or edema noted.  Skin: No visible bleeding, bruising, or rash.  Neurologic: Alert and oriented x3. No gross focal deficits.     ----------------------------------------------------------------------------------------------------------------------  Tele:    ----------------------------------------------------------------------------------------------------------------------  Results from last 7 days   Lab Units 07/13/20  0121 07/12/20 0035 07/11/20  0517  07/07/20  0136   CK TOTAL U/L 230* 344* 904*   < > 329*   TROPONIN T ng/mL <0.010  --   --   --  <0.010    < > = values in this interval not displayed.     Results from last 7 days   Lab Units 07/13/20  1130 07/13/20  0121 07/12/20 0035 07/11/20 0517   CRP mg/dL  --  18.68* 24.37* 25.12*   LACTATE mmol/L 1.5  --   --   --    WBC 10*3/mm3  --  7.20 6.12 10.24   HEMOGLOBIN g/dL  --  11.5* 11.5* 12.9   HEMATOCRIT %  --  35.8 35.9 40.0   MCV fL  --  95.0 96.0 96.6   MCHC g/dL  --  32.1 32.0 32.3   PLATELETS 10*3/mm3  --  157 148 170     Results from last 7 days   Lab Units 07/12/20  0438   PH, ARTERIAL pH units 7.356   PO2 ART mm Hg 56.8*   PCO2, ARTERIAL mm Hg 36.3   HCO3 ART mmol/L 20.3     Results from last 7 days   Lab Units 07/13/20  0121 07/12/20  0035 07/11/20  0517 07/10/20  0531  07/08/20  0512   SODIUM mmol/L 141 142 140 138   < > 140   POTASSIUM mmol/L 3.3* 4.1 4.3 4.0   < > 3.9   MAGNESIUM mg/dL  --   --   --  2.0  --  2.0   CHLORIDE mmol/L 106 109* 106 107   < > 107   CO2 mmol/L 23.5 22.6 21.5* 17.5*   < > 18.3*   BUN mg/dL 23 18 17 21   < > 17   CREATININE mg/dL 0.55* 0.50* 0.62 0.65   < > 0.71   EGFR IF  NONAFRICN AM mL/min/1.73 108 121 94 89   < > 80   CALCIUM mg/dL 8.5* 8.3* 8.8 8.3*   < > 8.2*   GLUCOSE mg/dL 109* 127* 99 92   < > 103*   ALBUMIN g/dL  --   --   --  3.14*  --  3.49*   BILIRUBIN mg/dL  --   --   --  0.3  --  0.2   ALK PHOS U/L  --   --   --  135*  --  150*   AST (SGOT) U/L  --   --   --  59*  --  43*   ALT (SGPT) U/L  --   --   --  25  --  28    < > = values in this interval not displayed.   Estimated Creatinine Clearance: 62.8 mL/min (A) (by C-G formula based on SCr of 0.55 mg/dL (L)).    No results found for: AMMONIA      Blood Culture   Date Value Ref Range Status   07/05/2020 No growth at 5 days  Final   07/05/2020 No growth at 5 days  Final     No results found for: URINECX  No results found for: WOUNDCX  No results found for: STOOLCX    I have personally looked at the labs and they are summarized above.  ----------------------------------------------------------------------------------------------------------------------  Imaging Results (Last 24 Hours)     Procedure Component Value Units Date/Time    XR Chest 1 View [[id number]] Collected:  07/13/20 1026     Updated:  07/13/20 1049    Narrative:       XR CHEST 1 VW-     CLINICAL INDICATION: dyspnea, covid19; J18.9-Pneumonia, unspecified  organism; U07.1-COVID-19        COMPARISON: 07/11/2020      TECHNIQUE: Single frontal view of the chest.     FINDINGS:     Interstitial and airspace disease bilaterally, slightly improved  The cardiac silhouette is normal. The pulmonary vasculature is  unremarkable.  There is no evidence of an acute osseous abnormality.   There are no suspicious-appearing parenchymal soft tissue nodules.          Impression:       Slight interval improvement     This report was finalized on 7/13/2020 10:26 AM by Dr. Jorge A Vizcaino MD.           ----------------------------------------------------------------------------------------------------------------------  Assessment and Plan:    -Sepsis secondary to COVID-19  pneumonia  Patient remains afebrile with stable vital signs.  Ferritin continues to trend up.  CRP, CK and LDH remains elevated but decreasing.  WBC remains WNL.  Fibrinogen level was within normal limit.  D-dimer is markedly elevated and is greater than 20 today.  Chest x-ray showed bilateral airspace disease, unchanged since yesterday.  Continue meropenem and doxycycline and Remdesivir per ID recommendations.  ID on board and help is appreciated.  Continue IV Decadron.   Respiratory panel PCR negative. Legionella urine antigen Is negative. Respiratory culture with normal madyson.   Patient has received convalescent plasma on 7/7/2020.  Blood culture showed no growth so far.    -Acute hypoxemic respiratory failure  Unchanged since yesterday.  Patient continues to require high flow nasal cannula at 10 L/min and Ventimask.  D-dimer elevated and patient has been started on anticoagulation with Lovenox.  Continue supplemental oxygen as needed to keep SPO2 >90%.  Patient is at high risk for intubation.  Patient had diuresed well with Bumex yesterday and will diurese her some more.  She has been given a dose of Lasix by pulmonology.  Dr. Manrique consulted for respiratory failure management.    -COPD  Continue albuterol MDI and Symbicort inhalers.    -Thrombocytopenia  Resolved.  Platelet count WNL today.    -Essential hypertension  Blood pressure well controlled.    -ASCVD with h/o PCI  Patient denies any chest pain.    -Seizure disorder  Continue seizure precaution.  Continue Dilantin.    -Adjustment disorder with depression/anxiety  Continue amitriptyline, Effexor and Valium.  Amitriptyline dose was increased yesterday and patient has had improvement in generalized aches and pains    Activity: Bedrest.  Patient has limited ambulation due to hypoxemia.  Nutrition: Regular diet  Fluids: None  DVT prophylaxis: Lovenox subcu  GI prophylaxis: Pepcid    The patient is high risk due to: Sepsis, hypoxemia, COVID pneumonia, COPD,  thrombocytopenia, hypertension, CAD, seizure disorder.    I discussed the patient's findings and my recommendations with patient and nursing staff.    Ayush Jiang MD  07/13/20  12:36

## 2020-07-13 NOTE — PROGRESS NOTES
PROGRESS NOTE         Patient Identification:  Name:  Siria Rueda  Age:  74 y.o.  Sex:  female  :  1945  MRN:  [id number]  Visit Number:  [id number]  Primary Care Provider:  Cruzito Palumbo MD         LOS: 8 days       ----------------------------------------------------------------------------------------------------------------------  Subjective       Chief Complaints:    Shortness of Breath        Interval History:      Case discussed with primary RN Kaylene.  Patient continues on 10 liters bubble nasal cannula with no apparent distress.  Only uses 100% nonrebreather mask as needed.  WBC normal.  CRP improving at 18.68.  Patient slept good overnight continues to have intermittent coughing spells and mild wheezing.  Ferritin slightly worsening at 569.    Review of Systems:    Constitutional: No fever. Positive fatigue.  Eyes: no eye drainage, itching or redness.  HEENT: no mouth sores, dysphagia or nose bleed.  Respiratory: Positive for shortness of breath and croupy cough.  Cardiovascular: no chest pain, no palpitations, no orthopnea.  Gastrointestinal: no nausea, vomiting or diarrhea. No abdominal pain, hematemesis or rectal bleeding.  Genitourinary: no dysuria or polyuria.  Hematologic/lymphatic: no lymph node abnormalities, no easy bruising or easy bleeding.  Musculoskeletal: Positive for myalgias.  Skin: No rash and no itching.  Neurological: no loss of consciousness, no seizure, no headache.  Behavioral/Psych: no depression or suicidal ideation.  Endocrine: no hot flashes.  Immunologic: negative.  ----------------------------------------------------------------------------------------------------------------------      Objective       Current [facility] Meds:    albuterol sulfate HFA 2 puff Inhalation Q6H   amitriptyline 50 mg Oral Nightly   aspirin 81 mg Oral Nightly   budesonide-formoterol 2 puff Inhalation BID - RT   cholecalciferol 50,000 Units Oral Weekly   dexamethasone 6 mg Intravenous  Daily   diazePAM 3.75 mg Oral Nightly   doxycycline 100 mg Intravenous Q12H   enoxaparin 80 mg Subcutaneous Q12H   famotidine 20 mg Oral BID AC   furosemide 20 mg Intravenous Q12H   Hydrocortisone (Perianal)  Rectal BID   lactobacillus acidophilus 1 capsule Oral Daily   levothyroxine 25 mcg Oral Daily   lidocaine 5 mL Subcutaneous Once   magnesium oxide 400 mg Oral Daily   meropenem 1 g Intravenous Q8H   phenytoin 100 mg Oral Q PM   phenytoin 200 mg Oral QAM   pramipexole 1 mg Oral Nightly   psyllium 1 packet Oral Daily   sodium chloride 10 mL Intravenous Q12H   sodium chloride 10 mL Intravenous Q12H   sodium chloride 10 mL Intravenous Q12H   sodium chloride 10 mL Intravenous Q12H   sodium chloride 10 mL Intravenous Q12H   sodium chloride 10 mL Intravenous Q12H   sodium chloride 10 mL Intravenous Q12H   topiramate 100 mg Oral Q12H   venlafaxine XR 75 mg Oral Q PM       Pharmacy to Dose enoxaparin (LOVENOX)      ----------------------------------------------------------------------------------------------------------------------    Vital Signs:  Temp:  [97.6 °F (36.4 °C)-98.8 °F (37.1 °C)] 97.6 °F (36.4 °C)  Heart Rate:  [68-98] 87  Resp:  [16-30] 28  BP: (105-171)/(50-84) 123/50  Mean Arterial Pressure (Non-Invasive) for the past 24 hrs (Last 3 readings):   Noninvasive MAP (mmHg)   07/13/20 1004 72   07/13/20 0903 108   07/13/20 0819 104     SpO2 Percentage    07/13/20 0819 07/13/20 0903 07/13/20 1004   SpO2: 92% (!) 85% (!) 89%     SpO2:  [82 %-100 %] 89 %  on  Flow (L/min):  [10] 10;   Device (Oxygen Therapy): bubble;high-flow nasal cannula    Body mass index is 32.24 kg/m².  Wt Readings from Last 3 Encounters:   07/12/20 82.6 kg (182 lb)   11/16/19 76.5 kg (168 lb 11.2 oz)   07/10/19 78.5 kg (173 lb 1 oz)        Intake/Output Summary (Last 24 hours) at 7/13/2020 1136  Last data filed at 7/13/2020 1115  Gross per 24 hour   Intake 1360 ml   Output 3050 ml   Net -1690 ml     Diet Regular; Cardiac, Daily Fluid  Restriction; 1000 mL Fluid Per Day  ----------------------------------------------------------------------------------------------------------------------      Physical Exam:    Deferred as patient is in COVID-19 isolation.       ----------------------------------------------------------------------------------------------------------------------  Results from last 7 days   Lab Units 07/13/20 0121 07/12/20 0035 07/11/20 0517 07/07/20  0136   CK TOTAL U/L 230* 344* 904*   < > 329*   TROPONIN T ng/mL <0.010  --   --   --  <0.010    < > = values in this interval not displayed.     Results from last 7 days   Lab Units 07/08/20  0512 07/07/20  0136   PROBNP pg/mL 188.7 122.2       Results from last 7 days   Lab Units 07/12/20  0438   PH, ARTERIAL pH units 7.356   PO2 ART mm Hg 56.8*   PCO2, ARTERIAL mm Hg 36.3   HCO3 ART mmol/L 20.3     Results from last 7 days   Lab Units 07/13/20 0121 07/12/20 0035 07/11/20 0517   CRP mg/dL 18.68* 24.37* 25.12*   WBC 10*3/mm3 7.20 6.12 10.24   HEMOGLOBIN g/dL 11.5* 11.5* 12.9   HEMATOCRIT % 35.8 35.9 40.0   MCV fL 95.0 96.0 96.6   MCHC g/dL 32.1 32.0 32.3   PLATELETS 10*3/mm3 157 148 170     Results from last 7 days   Lab Units 07/13/20  0121 07/12/20  0035 07/11/20  0517 07/10/20  0531  07/08/20  0512   SODIUM mmol/L 141 142 140 138   < > 140   POTASSIUM mmol/L 3.3* 4.1 4.3 4.0   < > 3.9   MAGNESIUM mg/dL  --   --   --  2.0  --  2.0   CHLORIDE mmol/L 106 109* 106 107   < > 107   CO2 mmol/L 23.5 22.6 21.5* 17.5*   < > 18.3*   BUN mg/dL 23 18 17 21   < > 17   CREATININE mg/dL 0.55* 0.50* 0.62 0.65   < > 0.71   EGFR IF NONAFRICN AM mL/min/1.73 108 121 94 89   < > 80   CALCIUM mg/dL 8.5* 8.3* 8.8 8.3*   < > 8.2*   GLUCOSE mg/dL 109* 127* 99 92   < > 103*   ALBUMIN g/dL  --   --   --  3.14*  --  3.49*   BILIRUBIN mg/dL  --   --   --  0.3  --  0.2   ALK PHOS U/L  --   --   --  135*  --  150*   AST (SGOT) U/L  --   --   --  59*  --  43*   ALT (SGPT) U/L  --   --   --  25  --  28    <  > = values in this interval not displayed.   Estimated Creatinine Clearance: 62.8 mL/min (A) (by C-G formula based on SCr of 0.55 mg/dL (L)).  No results found for: AMMONIA    No results found for: HGBA1C, POCGLU  No results found for: HGBA1C  Lab Results   Component Value Date    TSH 1.270 07/06/2020    FREET4 1.05 03/13/2015       Blood Culture   Date Value Ref Range Status   07/05/2020 No growth at 24 hours  Preliminary   07/05/2020 No growth at 24 hours  Preliminary     No results found for: URINECX  No results found for: WOUNDCX  No results found for: STOOLCX  No results found for: RESPCX  Pain Management Panel     There is no flowsheet data to display.            ----------------------------------------------------------------------------------------------------------------------  Imaging Results (Last 24 Hours)     Procedure Component Value Units Date/Time    XR Chest 1 View [[id number]] Collected:  07/13/20 1026     Updated:  07/13/20 1049    Narrative:       XR CHEST 1 VW-     CLINICAL INDICATION: dyspnea, covid19; J18.9-Pneumonia, unspecified  organism; U07.1-COVID-19        COMPARISON: 07/11/2020      TECHNIQUE: Single frontal view of the chest.     FINDINGS:     Interstitial and airspace disease bilaterally, slightly improved  The cardiac silhouette is normal. The pulmonary vasculature is  unremarkable.  There is no evidence of an acute osseous abnormality.   There are no suspicious-appearing parenchymal soft tissue nodules.          Impression:       Slight interval improvement     This report was finalized on 7/13/2020 10:26 AM by Dr. Jorge A Vizcaino MD.             ----------------------------------------------------------------------------------------------------------------------    Assessment/Plan       Assessment/Plan     ASSESSMENT:    1.  Pneumonia due to COVID-19 virus    PLAN:    Case discussed with primary KEZIA Maurice.  Patient continues on 10 liters bubble nasal cannula with no apparent distress.   Only uses 100% nonrebreather mask as needed.  WBC normal.  CRP improving at 18.68.  Patient slept good overnight continues to have intermittent coughing spells and mild wheezing.  Ferritin slightly worsening at 569.    Chest x-ray from 7/11/2020 reports stable bilateral airspace disease.    COVID-19 PCR positive.  Currently receiving Decadron 6 mg IV daily.     Chest x-ray on 7/5/2020 reveals no radiographic evidence of acute cardiac or pulmonary disease-per radiology.  CT chest on 7/5/2020 reveals no evidence of pulmonary embolism.  Likely underlying chronic lung disease with emphysematous changes at apices in the area of air trapping. Patchy airspace disease in the right middle lobe and lingula is nonspecific. Prominent mediastinal and right hilar lymph nodes not significantly changed. One AP window lymph node is mildly enlarged at 1.1 cm short axis dimension but unchanged-per radiology.      Patient received Remdesivir and received and completed convalescent plasma therapy. Consents were obtained.      Recommend to continue Decadron and Budesonide.  For now we will continue coverage of Merrem and doxycycline.  If further worsening may consider Actemra.  We will continue to follow closely.    Code Status:   Code Status and Medical Interventions:   Ordered at: 07/05/20 2049     Code Status:    CPR     Medical Interventions (Level of Support Prior to Arrest):    Full       ANNA Mari  07/13/20  11:36

## 2020-07-13 NOTE — CONSULTS
Referring Provider: MD Deidre  Reason for Consultation: mental evaluation    Chief complaint/Focus of Exam: mental evaluation    Subjective .     History of present illness:    Patient is a 74-year-old female admitted for management of pneumonia with COVID-19 infection.  Psychiatry was consulted for mental evaluation.  Patient is on amitriptyline 50 nightly, venlafaxine XR 75 mg daily and nightly Valium.  Patient denies any current psychiatric complaints.  She was oriented to person, time, place and situation.  She denies worsening mood, anxiety, hallucinations, confusion, SI.  She appeared appropriate and conversant, although denied any significant past treatment for depression or anxiety despite being on medications listed above.  She had no complaints and did not want to change any of her medications from a psychiatric standpoint at this time.    Review of Systems  Pertinent items are noted in HPI    History  Past Medical History:   Diagnosis Date   • Arthritis    • COPD (chronic obstructive pulmonary disease) (CMS/HCC)    • Coronary artery disease    • Disease of thyroid gland    • Elevated cholesterol    • GERD (gastroesophageal reflux disease)    • High cholesterol    • Hypertension    • Liver disease    • Seizures (CMS/HCC)    • Sleep apnea    • Stroke (CMS/HCC)    ,   Past Surgical History:   Procedure Laterality Date   • BLADDER SLING MODIFIED, ANTERIOR AND POSTERIOR VAGINAL REPAIR  2019   • BRAIN SURGERY     • CARDIAC CATHETERIZATION      x2   • CARDIAC SURGERY      heart stent placed   •  SECTION     • COLONOSCOPY N/A 2018    Procedure: COLONOSCOPY FOR SCREENING CPT CODE: ;  Surgeon: Jose Lazaro MD;  Location: [facility];  Service: Gastroenterology   • HEMORRHOIDECTOMY     ,   Family History   Problem Relation Age of Onset   • Heart disease Father    • Lung cancer Father    ,   Social History     Tobacco Use   • Smoking status: Former Smoker   • Smokeless tobacco: Never Used      Substance Use Topics   • Alcohol use: No   • Drug use: No   ,   Medications Prior to Admission   Medication Sig Dispense Refill Last Dose   • amitriptyline (ELAVIL) 50 MG tablet Take 50 mg by mouth Every Night.   7/4/2020 at Unknown time   • aspirin 81 MG EC tablet Take 81 mg by mouth Every Night.   7/4/2020 at Unknown time   • calcium polycarbophil (FIBERCON) 625 MG tablet Take 625 mg by mouth Daily.   7/5/2020 at Unknown time   • cyclobenzaprine (FLEXERIL) 10 MG tablet Take 10 mg by mouth Every Night.   7/4/2020 at Unknown time   • diazePAM (VALIUM) 5 MG tablet Take 5 mg by mouth Every Night.  0 7/4/2020 at Unknown time   • docusate sodium (COLACE) 250 MG capsule Take 250 mg by mouth 2 (Two) Times a Day As Needed for Constipation.   Past Week at Unknown time   • ezetimibe (ZETIA) 10 MG tablet Take 10 mg by mouth Daily.   7/5/2020 at Unknown time   • Fluticasone-Umeclidin-Vilant (Trelegy Ellipta) 100-62.5-25 MCG/INH aerosol powder  Inhale 1 puff Daily.   7/5/2020 at Unknown time   • furosemide (LASIX) 20 MG tablet Take 20 mg by mouth Daily.   7/4/2020   • lansoprazole (PREVACID) 30 MG capsule Take 30 mg by mouth Every Morning.  0 7/5/2020 at Unknown time   • levothyroxine (SYNTHROID) 25 MCG tablet Take 25 mcg by mouth Daily.   7/5/2020 at Unknown time   • magnesium oxide (MAGOX) 400 (241.3 Mg) MG tablet tablet Take 400 mg by mouth Daily.   7/5/2020 at Unknown time   • NIFEdipine (PROCARDIA) 10 MG capsule Take 10 mg by mouth Daily.   7/5/2020 at AM   • phenytoin (DILANTIN) 100 MG ER capsule Take 2 capsules by mouth Every Morning.   7/5/2020 at Unknown time   • phenytoin (DILANTIN) 100 MG ER capsule Take 100 mg by mouth Every Night.   7/4/2020 at Unknown time   • potassium chloride (K-DUR,KLOR-CON) 20 MEQ CR tablet Take 20 mEq by mouth Daily.   7/5/2020 at Unknown time   • pramipexole (MIRAPEX) 0.5 MG tablet Take 1 mg by mouth Every Night.   7/4/2020 at Unknown time   • rosuvastatin (CRESTOR) 10 MG tablet Take 10  mg by mouth Every Night.   7/4/2020 at Unknown time   • topiramate (TOPAMAX) 100 MG tablet Take 100 mg by mouth Every Morning.  0 7/5/2020 at Unknown time   • topiramate (TOPAMAX) 100 MG tablet Take 150 mg by mouth Every Night.   7/4/2020 at Unknown time   • venlafaxine XR (EFFEXOR-XR) 75 MG 24 hr capsule Take 75 mg by mouth Every Evening.   7/4/2020 at Unknown time   • vitamin D (ERGOCALCIFEROL) 1.25 MG (11703 UT) capsule capsule Take 50,000 Units by mouth 1 (One) Time Per Week. Mondays.   Past Week at Unknown time   • HYDROcodone-acetaminophen (NORCO) 7.5-325 MG per tablet Take 1 tablet by mouth 2 (Two) Times a Day As Needed for Moderate Pain .   Unknown at Unknown time   • lisinopril (PRINIVIL,ZESTRIL) 20 MG tablet Take 20 mg by mouth Daily.   11/15/2019 at am   • risedronate (ACTONEL) 150 MG tablet Take 150 mg by mouth Every 30 (Thirty) Days. with water on empty stomach, nothing by mouth or lie down for next 30 minutes.   Unknown at Unknown time   , Scheduled Meds:    albuterol sulfate HFA 2 puff Inhalation Q6H   amitriptyline 50 mg Oral Nightly   aspirin 81 mg Oral Nightly   budesonide-formoterol 2 puff Inhalation BID - RT   cholecalciferol 50,000 Units Oral Weekly   dexamethasone 6 mg Intravenous Daily   diazePAM 3.75 mg Oral Nightly   doxycycline 100 mg Intravenous Q12H   enoxaparin 80 mg Subcutaneous Q12H   famotidine 20 mg Oral BID AC   furosemide 20 mg Intravenous Q12H   Hydrocortisone (Perianal)  Rectal BID   lactobacillus acidophilus 1 capsule Oral Daily   levothyroxine 25 mcg Oral Daily   lidocaine 5 mL Subcutaneous Once   magnesium oxide 400 mg Oral Daily   meropenem 1 g Intravenous Q8H   phenytoin 100 mg Oral Q PM   phenytoin 200 mg Oral QAM   pramipexole 1 mg Oral Nightly   psyllium 1 packet Oral Daily   sodium chloride 10 mL Intravenous Q12H   sodium chloride 10 mL Intravenous Q12H   sodium chloride 10 mL Intravenous Q12H   sodium chloride 10 mL Intravenous Q12H   sodium chloride 10 mL Intravenous  Q12H   sodium chloride 10 mL Intravenous Q12H   sodium chloride 10 mL Intravenous Q12H   topiramate 100 mg Oral Q12H   venlafaxine XR 75 mg Oral Q PM   , Continuous Infusions:   , PRN Meds:  •  acetaminophen  •  cyclobenzaprine  •  docusate sodium  •  guaiFENesin-codeine  •  HYDROcodone-acetaminophen  •  hydrOXYzine  •  nitroglycerin  •  promethazine **OR** promethazine  •  [COMPLETED] Insert peripheral IV **AND** sodium chloride  •  sodium chloride  •  sodium chloride  •  sodium chloride  •  sodium chloride  •  sodium chloride and Allergies:  Sulfa antibiotics    Objective     Vital Signs   Temp:  [97.6 °F (36.4 °C)-98.5 °F (36.9 °C)] 98 °F (36.7 °C)  Heart Rate:  [68-98] 76  Resp:  [16-30] 22  BP: (105-171)/(50-84) 110/55    Mental Status Exam:  Hygiene:   fair  Cooperation:  Cooperative  Eye Contact:  Fair  Psychomotor Behavior:  Slow  Affect:  Restricted  Hopelessness: 2  Speech:  Normal  Thought Progress:  Goal directed and Linear  Thought Content:  Normal and Mood congruent  Suicidal:  None  Homicidal:  None  Hallucinations:  None  Delusion:  None  Memory:  Intact  Orientation:  Person, Place, Time and Situation  Reliability:  fair  Insight:  Fair  Judgement:  Fair  Impulse Control:  Good and Fair    Results Review:   I reviewed the patient's new clinical results.  I reviewed the patient's other test results and agree with the interpretation  I personally viewed and interpreted the patient's EKG/Telemetry data  Lab Results (last 24 hours)     Procedure Component Value Units Date/Time    Lactic Acid, Plasma [[id number]]  (Normal) Collected:  07/13/20 1130    Specimen:  Blood Updated:  07/13/20 1221     Lactate 1.5 mmol/L     Procalcitonin [[id number]] Collected:  07/13/20 1130    Specimen:  Blood Updated:  07/13/20 1203    Troponin [[id number]]  (Normal) Collected:  07/13/20 0121    Specimen:  Blood Updated:  07/13/20 1104     Troponin T <0.010 ng/mL     Narrative:       Troponin T Reference Range:  <= 0.03 ng/mL-    Negative for AMI  >0.03 ng/mL-     Abnormal for myocardial necrosis.  Clinicians would have to utilize clinical acumen, EKG, Troponin and serial changes to determine if it is an Acute Myocardial Infarction or myocardial injury due to an underlying chronic condition.       Results may be falsely decreased if patient taking Biotin.      Ferritin [[id number]]  (Abnormal) Collected:  07/13/20 0121    Specimen:  Blood Updated:  07/13/20 0202     Ferritin 569.90 ng/mL     Narrative:       Results may be falsely decreased if patient taking Biotin.      CK [[id number]]  (Abnormal) Collected:  07/13/20 0121    Specimen:  Blood Updated:  07/13/20 0156     Creatine Kinase 230 U/L     C-reactive Protein [[id number]]  (Abnormal) Collected:  07/13/20 0121    Specimen:  Blood Updated:  07/13/20 0156     C-Reactive Protein 18.68 mg/dL     Basic Metabolic Panel [[id number]]  (Abnormal) Collected:  07/13/20 0121    Specimen:  Blood Updated:  07/13/20 0156     Glucose 109 mg/dL      BUN 23 mg/dL      Creatinine 0.55 mg/dL      Sodium 141 mmol/L      Potassium 3.3 mmol/L      Chloride 106 mmol/L      CO2 23.5 mmol/L      Calcium 8.5 mg/dL      eGFR Non African Amer 108 mL/min/1.73      BUN/Creatinine Ratio 41.8     Anion Gap 11.5 mmol/L     Narrative:       GFR Normal >60  Chronic Kidney Disease <60  Kidney Failure <15      CBC & Differential [[id number]] Collected:  07/13/20 0121    Specimen:  Blood Updated:  07/13/20 0129    Narrative:       The following orders were created for panel order CBC & Differential.  Procedure                               Abnormality         Status                     ---------                               -----------         ------                     CBC Auto Differential[[id number]]        Abnormal            Final result                 Please view results for these tests on the individual orders.    CBC Auto Differential [[id number]]  (Abnormal) Collected:  07/13/20 0121    Specimen:  Blood Updated:   07/13/20 0129     WBC 7.20 10*3/mm3      RBC 3.77 10*6/mm3      Hemoglobin 11.5 g/dL      Hematocrit 35.8 %      MCV 95.0 fL      MCH 30.5 pg      MCHC 32.1 g/dL      RDW 13.4 %      RDW-SD 46.8 fl      MPV 10.3 fL      Platelets 157 10*3/mm3      Neutrophil % 64.4 %      Lymphocyte % 23.5 %      Monocyte % 11.3 %      Eosinophil % 0.3 %      Basophil % 0.1 %      Immature Grans % 0.4 %      Neutrophils, Absolute 4.64 10*3/mm3      Lymphocytes, Absolute 1.69 10*3/mm3      Monocytes, Absolute 0.81 10*3/mm3      Eosinophils, Absolute 0.02 10*3/mm3      Basophils, Absolute 0.01 10*3/mm3      Immature Grans, Absolute 0.03 10*3/mm3      nRBC 0.0 /100 WBC         Imaging Results (Last 24 Hours)     Procedure Component Value Units Date/Time    XR Chest 1 View [[id number]] Collected:  07/13/20 1026     Updated:  07/13/20 1049    Narrative:       XR CHEST 1 VW-     CLINICAL INDICATION: dyspnea, covid19; J18.9-Pneumonia, unspecified  organism; U07.1-COVID-19        COMPARISON: 07/11/2020      TECHNIQUE: Single frontal view of the chest.     FINDINGS:     Interstitial and airspace disease bilaterally, slightly improved  The cardiac silhouette is normal. The pulmonary vasculature is  unremarkable.  There is no evidence of an acute osseous abnormality.   There are no suspicious-appearing parenchymal soft tissue nodules.          Impression:       Slight interval improvement     This report was finalized on 7/13/2020 10:26 AM by Dr. Jorge A Vizcaino MD.               Assessment/Plan     Principal Problem:    Pneumonia due to COVID-19 virus     Patient is a 74-year-old female diagnosed with pneumonia and COVID-19 infection.  Psychiatry was consulted for mental evaluation.  Although patient denied a history of significant depression or anxiety treatment, she is on Effexor, Valium and amitriptyline.  Nevertheless, she was oriented x4 and appropriate on conversation.  She denied the option of adjusting any psychotropic medication.  She  appears appropriate to maintain her own decision-making capacity at this time.  If psychiatry is needed for any other reason, please notify us.    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

## 2020-07-13 NOTE — NURSING NOTE
Progressive Mobility    Date: 07/13/20     Mobility Initiation Contradictions: Respiratory Rate <10, >32 breaths/min and Pulse Oximetry <90%, or increased oxygenation requirements    Day of Mobility 07/13/20 Activity Performed: PROM dangle side of bed    Patient: tolerated with soa, desats    Assisted by 1 person/persons    Device(s) used: none      Kaylene Squires RN   07/13/20

## 2020-07-13 NOTE — PROGRESS NOTES
Discharge Planning Assessment   Harvinder     Patient Name: Siria Rueda  MRN: [id number]  Today's Date: 7/13/2020    Admit Date: 7/5/2020      Discharge Plan     Row Name 07/13/20 1055       Plan    Plan  Pt is on 10L BHF at this time.  Pt plans to return home at discharge. Pt receives [facility]. Pt has a CPAP and a nebulizer. [facility] [employer]  [phone] to be contacted at discharge. SS will follow and assist as needed.     Patient/Family in Agreement with Plan  yes          SUSANNE Miguel

## 2020-07-13 NOTE — PLAN OF CARE
Problem: Patient Care Overview  Goal: Plan of Care Review  Flowsheets (Taken 7/13/2020 1655)  Progress: no change  Outcome Summary: on 10L BHF, desats when coughs, uses 100% NRB, needs reinforcement.     Problem: Fall Risk (Adult)  Goal: Absence of Fall  Flowsheets (Taken 7/13/2020 1655)  Absence of Fall: making progress toward outcome     Problem: Pain, Chronic (Adult)  Goal: Acceptable Pain/Comfort Level and Functional Ability  Flowsheets (Taken 7/13/2020 1655)  Acceptable Pain/Comfort Level and Functional Ability: making progress toward outcome

## 2020-07-14 LAB
A-A DO2: 572.5 MMHG (ref 0–300)
ANION GAP SERPL CALCULATED.3IONS-SCNC: 10.8 MMOL/L (ref 5–15)
ARTERIAL PATENCY WRIST A: POSITIVE
ATMOSPHERIC PRESS: 727 MMHG
BASE EXCESS BLDA CALC-SCNC: 1.2 MMOL/L (ref 0–2)
BASOPHILS # BLD AUTO: 0.02 10*3/MM3 (ref 0–0.2)
BASOPHILS NFR BLD AUTO: 0.2 % (ref 0–1.5)
BDY SITE: ABNORMAL
BODY TEMPERATURE: 0 C
BUN SERPL-MCNC: 27 MG/DL (ref 8–23)
BUN/CREAT SERPL: 51.9 (ref 7–25)
CALCIUM SPEC-SCNC: 8.7 MG/DL (ref 8.6–10.5)
CHLORIDE SERPL-SCNC: 107 MMOL/L (ref 98–107)
CK SERPL-CCNC: 166 U/L (ref 20–180)
CO2 BLDA-SCNC: 25.7 MMOL/L (ref 22–33)
CO2 SERPL-SCNC: 24.2 MMOL/L (ref 22–29)
COHGB MFR BLD: <0.2 % (ref 0–5)
CREAT SERPL-MCNC: 0.52 MG/DL (ref 0.57–1)
CRP SERPL-MCNC: 13.32 MG/DL (ref 0–0.5)
D DIMER PPP FEU-MCNC: 10.3 MCGFEU/ML (ref 0–0.5)
DEPRECATED RDW RBC AUTO: 47.8 FL (ref 37–54)
EOSINOPHIL # BLD AUTO: 0.08 10*3/MM3 (ref 0–0.4)
EOSINOPHIL NFR BLD AUTO: 0.8 % (ref 0.3–6.2)
ERYTHROCYTE [DISTWIDTH] IN BLOOD BY AUTOMATED COUNT: 13.6 % (ref 12.3–15.4)
FERRITIN SERPL-MCNC: 485.4 NG/ML (ref 13–150)
FIBRINOGEN PPP-MCNC: 251 MG/DL (ref 173–524)
GAS FLOW AIRWAY: 15 LPM
GFR SERPL CREATININE-BSD FRML MDRD: 115 ML/MIN/1.73
GLUCOSE SERPL-MCNC: 91 MG/DL (ref 65–99)
HCO3 BLDA-SCNC: 24.6 MMOL/L (ref 20–26)
HCT VFR BLD AUTO: 37.2 % (ref 34–46.6)
HCT VFR BLD CALC: 39.8 % (ref 38–51)
HGB BLD-MCNC: 11.9 G/DL (ref 12–15.9)
HGB BLDA-MCNC: 13 G/DL (ref 13.5–17.5)
IMM GRANULOCYTES # BLD AUTO: 0.1 10*3/MM3 (ref 0–0.05)
IMM GRANULOCYTES NFR BLD AUTO: 1 % (ref 0–0.5)
INHALED O2 CONCENTRATION: 100 %
LDH SERPL-CCNC: 430 U/L (ref 135–214)
LYMPHOCYTES # BLD AUTO: 2.09 10*3/MM3 (ref 0.7–3.1)
LYMPHOCYTES NFR BLD AUTO: 20.7 % (ref 19.6–45.3)
Lab: ABNORMAL
MCH RBC QN AUTO: 30.4 PG (ref 26.6–33)
MCHC RBC AUTO-ENTMCNC: 32 G/DL (ref 31.5–35.7)
MCV RBC AUTO: 94.9 FL (ref 79–97)
METHGB BLD QL: 0.3 % (ref 0–3)
MODALITY: ABNORMAL
MONOCYTES # BLD AUTO: 1.06 10*3/MM3 (ref 0.1–0.9)
MONOCYTES NFR BLD AUTO: 10.5 % (ref 5–12)
NEUTROPHILS NFR BLD AUTO: 6.77 10*3/MM3 (ref 1.7–7)
NEUTROPHILS NFR BLD AUTO: 66.8 % (ref 42.7–76)
NOTE: ABNORMAL
NRBC BLD AUTO-RTO: 0 /100 WBC (ref 0–0.2)
OXYHGB MFR BLDV: 93.6 % (ref 94–99)
PCO2 BLDA: 34.4 MM HG (ref 35–45)
PCO2 TEMP ADJ BLD: ABNORMAL MM[HG]
PH BLDA: 7.46 PH UNITS (ref 7.35–7.45)
PH, TEMP CORRECTED: ABNORMAL
PLATELET # BLD AUTO: 195 10*3/MM3 (ref 140–450)
PMV BLD AUTO: 9.8 FL (ref 6–12)
PO2 BLDA: 73.5 MM HG (ref 83–108)
PO2 TEMP ADJ BLD: ABNORMAL MM[HG]
POTASSIUM SERPL-SCNC: 3.8 MMOL/L (ref 3.5–5.2)
POTASSIUM SERPL-SCNC: 4.4 MMOL/L (ref 3.5–5.2)
RBC # BLD AUTO: 3.92 10*6/MM3 (ref 3.77–5.28)
SAO2 % BLDCOA: 93.4 % (ref 94–99)
SODIUM SERPL-SCNC: 142 MMOL/L (ref 136–145)
VENTILATOR MODE: ABNORMAL
WBC # BLD AUTO: 10.12 10*3/MM3 (ref 3.4–10.8)

## 2020-07-14 PROCEDURE — 25010000002 FUROSEMIDE PER 20 MG: Performed by: INTERNAL MEDICINE

## 2020-07-14 PROCEDURE — 94799 UNLISTED PULMONARY SVC/PX: CPT

## 2020-07-14 PROCEDURE — 86140 C-REACTIVE PROTEIN: CPT | Performed by: HOSPITALIST

## 2020-07-14 PROCEDURE — 25010000002 MEROPENEM PER 100 MG: Performed by: HOSPITALIST

## 2020-07-14 PROCEDURE — 25010000002 FUROSEMIDE PER 20 MG: Performed by: PHYSICIAN ASSISTANT

## 2020-07-14 PROCEDURE — 82805 BLOOD GASES W/O2 SATURATION: CPT

## 2020-07-14 PROCEDURE — 99233 SBSQ HOSP IP/OBS HIGH 50: CPT | Performed by: INTERNAL MEDICINE

## 2020-07-14 PROCEDURE — 83050 HGB METHEMOGLOBIN QUAN: CPT

## 2020-07-14 PROCEDURE — 99291 CRITICAL CARE FIRST HOUR: CPT | Performed by: INTERNAL MEDICINE

## 2020-07-14 PROCEDURE — 80048 BASIC METABOLIC PNL TOTAL CA: CPT | Performed by: INTERNAL MEDICINE

## 2020-07-14 PROCEDURE — 82550 ASSAY OF CK (CPK): CPT | Performed by: HOSPITALIST

## 2020-07-14 PROCEDURE — 25010000002 HALOPERIDOL LACTATE PER 5 MG: Performed by: INTERNAL MEDICINE

## 2020-07-14 PROCEDURE — 85384 FIBRINOGEN ACTIVITY: CPT | Performed by: HOSPITALIST

## 2020-07-14 PROCEDURE — 83615 LACTATE (LD) (LDH) ENZYME: CPT | Performed by: HOSPITALIST

## 2020-07-14 PROCEDURE — 82728 ASSAY OF FERRITIN: CPT | Performed by: HOSPITALIST

## 2020-07-14 PROCEDURE — 85379 FIBRIN DEGRADATION QUANT: CPT | Performed by: HOSPITALIST

## 2020-07-14 PROCEDURE — 82375 ASSAY CARBOXYHB QUANT: CPT

## 2020-07-14 PROCEDURE — 85025 COMPLETE CBC W/AUTO DIFF WBC: CPT | Performed by: INTERNAL MEDICINE

## 2020-07-14 PROCEDURE — 25010000002 FUROSEMIDE PER 20 MG

## 2020-07-14 PROCEDURE — 36600 WITHDRAWAL OF ARTERIAL BLOOD: CPT

## 2020-07-14 PROCEDURE — 25010000002 ENOXAPARIN PER 10 MG: Performed by: INTERNAL MEDICINE

## 2020-07-14 PROCEDURE — 25010000002 DEXAMETHASONE PER 1 MG: Performed by: HOSPITALIST

## 2020-07-14 PROCEDURE — 84132 ASSAY OF SERUM POTASSIUM: CPT | Performed by: PHYSICIAN ASSISTANT

## 2020-07-14 RX ORDER — FUROSEMIDE 10 MG/ML
INJECTION INTRAMUSCULAR; INTRAVENOUS
Status: COMPLETED
Start: 2020-07-14 | End: 2020-07-14

## 2020-07-14 RX ORDER — FUROSEMIDE 10 MG/ML
20 INJECTION INTRAMUSCULAR; INTRAVENOUS EVERY 12 HOURS
Status: COMPLETED | OUTPATIENT
Start: 2020-07-14 | End: 2020-07-14

## 2020-07-14 RX ORDER — OLANZAPINE 2.5 MG/1
2.5 TABLET ORAL NIGHTLY
Status: DISCONTINUED | OUTPATIENT
Start: 2020-07-14 | End: 2020-07-17 | Stop reason: HOSPADM

## 2020-07-14 RX ORDER — HALOPERIDOL 5 MG/ML
2 INJECTION INTRAMUSCULAR ONCE
Status: COMPLETED | OUTPATIENT
Start: 2020-07-14 | End: 2020-07-14

## 2020-07-14 RX ORDER — NOREPINEPHRINE BIT/0.9 % NACL 8 MG/250ML
.02-.3 INFUSION BOTTLE (ML) INTRAVENOUS
Status: DISCONTINUED | OUTPATIENT
Start: 2020-07-14 | End: 2020-07-17

## 2020-07-14 RX ADMIN — HYDROCORTISONE 2.5%: 25 CREAM TOPICAL at 21:36

## 2020-07-14 RX ADMIN — HYDROXYZINE 25 MG: 25 TABLET, FILM COATED ORAL at 06:43

## 2020-07-14 RX ADMIN — SODIUM CHLORIDE, PRESERVATIVE FREE 10 ML: 5 INJECTION INTRAVENOUS at 08:53

## 2020-07-14 RX ADMIN — FUROSEMIDE 20 MG: 10 INJECTION INTRAMUSCULAR; INTRAVENOUS at 00:52

## 2020-07-14 RX ADMIN — DEXAMETHASONE SODIUM PHOSPHATE 6 MG: 4 INJECTION, SOLUTION INTRA-ARTICULAR; INTRALESIONAL; INTRAMUSCULAR; INTRAVENOUS; SOFT TISSUE at 08:50

## 2020-07-14 RX ADMIN — ALBUTEROL SULFATE 2 PUFF: 90 AEROSOL, METERED RESPIRATORY (INHALATION) at 19:54

## 2020-07-14 RX ADMIN — ENOXAPARIN SODIUM 80 MG: 80 INJECTION SUBCUTANEOUS at 08:51

## 2020-07-14 RX ADMIN — SODIUM CHLORIDE, PRESERVATIVE FREE 10 ML: 5 INJECTION INTRAVENOUS at 21:39

## 2020-07-14 RX ADMIN — PHENYTOIN SODIUM 200 MG: 100 CAPSULE, EXTENDED RELEASE ORAL at 06:44

## 2020-07-14 RX ADMIN — DOXYCYCLINE 100 MG: 100 INJECTION, POWDER, LYOPHILIZED, FOR SOLUTION INTRAVENOUS at 00:53

## 2020-07-14 RX ADMIN — MEROPENEM 1 G: 1 INJECTION, POWDER, FOR SOLUTION INTRAVENOUS at 05:44

## 2020-07-14 RX ADMIN — HALOPERIDOL LACTATE 2 MG: 5 INJECTION INTRAMUSCULAR at 18:46

## 2020-07-14 RX ADMIN — FUROSEMIDE 20 MG: 20 INJECTION, SOLUTION INTRAMUSCULAR; INTRAVENOUS at 08:52

## 2020-07-14 RX ADMIN — ENOXAPARIN SODIUM 80 MG: 80 INJECTION SUBCUTANEOUS at 21:35

## 2020-07-14 RX ADMIN — POTASSIUM CHLORIDE 40 MEQ: 1.5 POWDER, FOR SOLUTION ORAL at 00:53

## 2020-07-14 RX ADMIN — DOXYCYCLINE 100 MG: 100 INJECTION, POWDER, LYOPHILIZED, FOR SOLUTION INTRAVENOUS at 13:40

## 2020-07-14 RX ADMIN — SODIUM CHLORIDE, PRESERVATIVE FREE 10 ML: 5 INJECTION INTRAVENOUS at 08:52

## 2020-07-14 RX ADMIN — DIAZEPAM 3.75 MG: 5 TABLET ORAL at 21:35

## 2020-07-14 RX ADMIN — FUROSEMIDE 20 MG: 10 INJECTION INTRAMUSCULAR; INTRAVENOUS at 08:52

## 2020-07-14 RX ADMIN — MEROPENEM 1 G: 1 INJECTION, POWDER, FOR SOLUTION INTRAVENOUS at 13:40

## 2020-07-14 RX ADMIN — MEROPENEM 1 G: 1 INJECTION, POWDER, FOR SOLUTION INTRAVENOUS at 21:34

## 2020-07-14 RX ADMIN — SODIUM CHLORIDE, PRESERVATIVE FREE 10 ML: 5 INJECTION INTRAVENOUS at 21:40

## 2020-07-14 RX ADMIN — HYDROCORTISONE 2.5%: 25 CREAM TOPICAL at 08:53

## 2020-07-14 RX ADMIN — FUROSEMIDE 20 MG: 10 INJECTION INTRAMUSCULAR; INTRAVENOUS at 21:35

## 2020-07-14 RX ADMIN — ALBUTEROL SULFATE 2 PUFF: 90 AEROSOL, METERED RESPIRATORY (INHALATION) at 00:52

## 2020-07-14 RX ADMIN — BUDESONIDE AND FORMOTEROL FUMARATE DIHYDRATE 2 PUFF: 160; 4.5 AEROSOL RESPIRATORY (INHALATION) at 19:54

## 2020-07-14 NOTE — PROGRESS NOTES
PROGRESS NOTE         Patient Identification:  Name:  Siria Rueda  Age:  74 y.o.  Sex:  female  :  1945  MRN:  [id number]  Visit Number:  [id number]  Primary Care Provider:  Cruzito Palumbo MD         LOS: 9 days       ----------------------------------------------------------------------------------------------------------------------  Subjective       Chief Complaints:    Shortness of Breath        Interval History:      Patient oxygen saturation dropped overnight into the 80s and nasal cannula was increased to 15 L bubble high flow continues with nonrebreather as needed.  WBC normal.  CRP improving at 13.32.  Procalcitonin normal.  Ferritin improving at 485.  D-dimer improving at 10.30.  Chest x-ray from 2020 reports slight interval improvement.    Review of Systems:    Constitutional: No fever. Positive fatigue.  Eyes: no eye drainage, itching or redness.  HEENT: no mouth sores, dysphagia or nose bleed.  Respiratory: Positive for shortness of breath and croupy cough.  Cardiovascular: no chest pain, no palpitations, no orthopnea.  Gastrointestinal: no nausea, vomiting or diarrhea. No abdominal pain, hematemesis or rectal bleeding.  Genitourinary: no dysuria or polyuria.  Hematologic/lymphatic: no lymph node abnormalities, no easy bruising or easy bleeding.  Musculoskeletal: Positive for myalgias.  Skin: No rash and no itching.  Neurological: no loss of consciousness, no seizure, no headache.  Behavioral/Psych: no depression or suicidal ideation.  Endocrine: no hot flashes.  Immunologic: negative.  ----------------------------------------------------------------------------------------------------------------------      Objective       Current [facility] Meds:    albuterol sulfate HFA 2 puff Inhalation Q6H   amitriptyline 50 mg Oral Nightly   aspirin 81 mg Oral Nightly   budesonide-formoterol 2 puff Inhalation BID - RT   cholecalciferol 50,000 Units Oral Weekly   dexamethasone 6 mg  Intravenous Daily   diazePAM 3.75 mg Oral Nightly   doxycycline 100 mg Intravenous Q12H   enoxaparin 80 mg Subcutaneous Q12H   famotidine 20 mg Oral BID AC   furosemide 20 mg Intravenous Q12H   Hydrocortisone (Perianal)  Rectal BID   lactobacillus acidophilus 1 capsule Oral Daily   levothyroxine 25 mcg Oral Daily   lidocaine 5 mL Subcutaneous Once   magnesium oxide 400 mg Oral Daily   meropenem 1 g Intravenous Q8H   phenytoin 100 mg Oral Q PM   phenytoin 200 mg Oral QAM   pramipexole 1 mg Oral Nightly   psyllium 1 packet Oral Daily   sodium chloride 10 mL Intravenous Q12H   sodium chloride 10 mL Intravenous Q12H   sodium chloride 10 mL Intravenous Q12H   sodium chloride 10 mL Intravenous Q12H   sodium chloride 10 mL Intravenous Q12H   sodium chloride 10 mL Intravenous Q12H   sodium chloride 10 mL Intravenous Q12H   topiramate 100 mg Oral Q12H   venlafaxine XR 75 mg Oral Q PM        ----------------------------------------------------------------------------------------------------------------------    Vital Signs:  Temp:  [97.9 °F (36.6 °C)-98.8 °F (37.1 °C)] 98.3 °F (36.8 °C)  Heart Rate:  [75-99] 98  Resp:  [22-35] 23  BP: ()/(38-83) 136/83  Mean Arterial Pressure (Non-Invasive) for the past 24 hrs (Last 3 readings):   Noninvasive MAP (mmHg)   07/14/20 0905 91   07/14/20 0845 81   07/14/20 0700 73     SpO2 Percentage    07/14/20 0700 07/14/20 0845 07/14/20 0905   SpO2: 100% 94% 92%     SpO2:  [87 %-100 %] 92 %  on  Flow (L/min):  [15] 15;   Device (Oxygen Therapy): bubble;high-flow nasal cannula;nonrebreather mask    Body mass index is 31.05 kg/m².  Wt Readings from Last 3 Encounters:   07/14/20 79.5 kg (175 lb 4.8 oz)   11/16/19 76.5 kg (168 lb 11.2 oz)   07/10/19 78.5 kg (173 lb 1 oz)        Intake/Output Summary (Last 24 hours) at 7/14/2020 1005  Last data filed at 7/14/2020 0600  Gross per 24 hour   Intake 1200 ml   Output 2265 ml   Net -1065 ml     Diet Regular; Cardiac, Daily Fluid Restriction; 1000 mL  Fluid Per Day  ----------------------------------------------------------------------------------------------------------------------      Physical Exam:    Deferred as patient is in COVID-19 isolation.       ----------------------------------------------------------------------------------------------------------------------  Results from last 7 days   Lab Units 07/14/20  0113 07/13/20  0121 07/12/20  0035   CK TOTAL U/L 166 230* 344*   TROPONIN T ng/mL  --  <0.010  --      Results from last 7 days   Lab Units 07/08/20  0512   PROBNP pg/mL 188.7       Results from last 7 days   Lab Units 07/14/20  0304   PH, ARTERIAL pH units 7.463*   PO2 ART mm Hg 73.5*   PCO2, ARTERIAL mm Hg 34.4*   HCO3 ART mmol/L 24.6     Results from last 7 days   Lab Units 07/14/20  0113 07/13/20  1130 07/13/20  0121 07/12/20  0035   CRP mg/dL 13.32*  --  18.68* 24.37*   LACTATE mmol/L  --  1.5  --   --    WBC 10*3/mm3 10.12  --  7.20 6.12   HEMOGLOBIN g/dL 11.9*  --  11.5* 11.5*   HEMATOCRIT % 37.2  --  35.8 35.9   MCV fL 94.9  --  95.0 96.0   MCHC g/dL 32.0  --  32.1 32.0   PLATELETS 10*3/mm3 195  --  157 148     Results from last 7 days   Lab Units 07/14/20  0915 07/14/20  0113 07/13/20  0121 07/12/20  0035  07/10/20  0531  07/08/20  0512   SODIUM mmol/L  --  142 141 142   < > 138   < > 140   POTASSIUM mmol/L 3.8 4.4 3.3* 4.1   < > 4.0   < > 3.9   MAGNESIUM mg/dL  --   --   --   --   --  2.0  --  2.0   CHLORIDE mmol/L  --  107 106 109*   < > 107   < > 107   CO2 mmol/L  --  24.2 23.5 22.6   < > 17.5*   < > 18.3*   BUN mg/dL  --  27* 23 18   < > 21   < > 17   CREATININE mg/dL  --  0.52* 0.55* 0.50*   < > 0.65   < > 0.71   EGFR IF NONAFRICN AM mL/min/1.73  --  115 108 121   < > 89   < > 80   CALCIUM mg/dL  --  8.7 8.5* 8.3*   < > 8.3*   < > 8.2*   GLUCOSE mg/dL  --  91 109* 127*   < > 92   < > 103*   ALBUMIN g/dL  --   --   --   --   --  3.14*  --  3.49*   BILIRUBIN mg/dL  --   --   --   --   --  0.3  --  0.2   ALK PHOS U/L  --   --   --   --    --  135*  --  150*   AST (SGOT) U/L  --   --   --   --   --  59*  --  43*   ALT (SGPT) U/L  --   --   --   --   --  25  --  28    < > = values in this interval not displayed.   Estimated Creatinine Clearance: 61.6 mL/min (A) (by C-G formula based on SCr of 0.52 mg/dL (L)).  No results found for: AMMONIA    No results found for: HGBA1C, POCGLU  No results found for: HGBA1C  Lab Results   Component Value Date    TSH 1.270 07/06/2020    FREET4 1.05 03/13/2015       Blood Culture   Date Value Ref Range Status   07/05/2020 No growth at 24 hours  Preliminary   07/05/2020 No growth at 24 hours  Preliminary     No results found for: URINECX  No results found for: WOUNDCX  No results found for: STOOLCX  No results found for: RESPCX  Pain Management Panel     There is no flowsheet data to display.            ----------------------------------------------------------------------------------------------------------------------  Imaging Results (Last 24 Hours)     Procedure Component Value Units Date/Time    XR Chest 1 View [[id number]] Collected:  07/13/20 1026     Updated:  07/13/20 1049    Narrative:       XR CHEST 1 VW-     CLINICAL INDICATION: dyspnea, covid19; J18.9-Pneumonia, unspecified  organism; U07.1-COVID-19        COMPARISON: 07/11/2020      TECHNIQUE: Single frontal view of the chest.     FINDINGS:     Interstitial and airspace disease bilaterally, slightly improved  The cardiac silhouette is normal. The pulmonary vasculature is  unremarkable.  There is no evidence of an acute osseous abnormality.   There are no suspicious-appearing parenchymal soft tissue nodules.          Impression:       Slight interval improvement     This report was finalized on 7/13/2020 10:26 AM by Dr. Jorge A Vizcaino MD.             ----------------------------------------------------------------------------------------------------------------------    Assessment/Plan       Assessment/Plan     ASSESSMENT:    1.  Pneumonia due to COVID-19  virus    PLAN:    Patient oxygen saturation dropped overnight into the 80s and nasal cannula was increased to 15 L bubble high flow continues with nonrebreather as needed.  WBC normal.  CRP improving at 13.32.  Procalcitonin normal.  Ferritin improving at 485.  D-dimer improving at 10.30.  Chest x-ray from 7/13/2020 reports slight interval improvement.    Chest x-ray from 7/11/2020 reports stable bilateral airspace disease.    COVID-19 PCR positive.  Currently receiving Decadron 6 mg IV daily.     Chest x-ray on 7/5/2020 reveals no radiographic evidence of acute cardiac or pulmonary disease-per radiology.  CT chest on 7/5/2020 reveals no evidence of pulmonary embolism.  Likely underlying chronic lung disease with emphysematous changes at apices in the area of air trapping. Patchy airspace disease in the right middle lobe and lingula is nonspecific. Prominent mediastinal and right hilar lymph nodes not significantly changed. One AP window lymph node is mildly enlarged at 1.1 cm short axis dimension but unchanged-per radiology.      Patient received Remdesivir and received and completed convalescent plasma therapy. Consents were obtained.      Recommend to continue Decadron and Budesonide.  For now we will continue coverage of Merrem and doxycycline.  If further worsening may consider Actemra.  We will continue to follow closely.    Code Status:   Code Status and Medical Interventions:   Ordered at: 07/05/20 2049     Code Status:    CPR     Medical Interventions (Level of Support Prior to Arrest):    Full       ANNA Mari  07/14/20  10:05

## 2020-07-14 NOTE — PLAN OF CARE
Problem: Patient Care Overview  Goal: Plan of Care Review  Outcome: Ongoing (interventions implemented as appropriate)  Goal: Individualization and Mutuality  Outcome: Ongoing (interventions implemented as appropriate)  Goal: Discharge Needs Assessment  Outcome: Ongoing (interventions implemented as appropriate)  Goal: Interprofessional Rounds/Family Conf  Outcome: Ongoing (interventions implemented as appropriate)     Problem: Fall Risk (Adult)  Goal: Identify Related Risk Factors and Signs and Symptoms  Outcome: Ongoing (interventions implemented as appropriate)  Goal: Absence of Fall  Outcome: Ongoing (interventions implemented as appropriate)     Problem: Pain, Chronic (Adult)  Goal: Identify Related Risk Factors and Signs and Symptoms  Outcome: Ongoing (interventions implemented as appropriate)  Goal: Acceptable Pain/Comfort Level and Functional Ability  Outcome: Ongoing (interventions implemented as appropriate)     Problem: Skin Injury Risk (Adult)  Goal: Identify Related Risk Factors and Signs and Symptoms  Outcome: Ongoing (interventions implemented as appropriate)  Goal: Skin Health and Integrity  Outcome: Ongoing (interventions implemented as appropriate)    Pt remains on 15L bubble hiflow with 100% NRB.  Intermittent soft pressures, NSR on the monitor.  Pt has been confused and at times agitated today trying to get out of bed and taking off O2, pulling at lines and begging to die.  Pt pulled out [facility] this evening, MD aware.  Adequate UOP. Pt did not eat anything today and refused all PO meds.  Daughter and pt's friend Neelam updated today on pt's status.  WCTM closely.

## 2020-07-14 NOTE — PROGRESS NOTES
" LOS: 9 days     Chief Complaint:  Pulmonology is following for shortness of breath    Subjective     Interval History:     Mrs. Rueda did require increase of supplemental oxygen from 10 L bubble high flow nasal cannula to 15 L overnight night following desaturation.  Nursing staff reports that she remains notable for shortness of breath, anxiety, and intermittent confusion.  No fever noted overnight.  Significant urine output was noted following diuresis yesterday.      Review of Systems:     Review of Systems - Review of systems limited. History obtained from chart review and the patient  General ROS: positive for - fatigue. Negative - fever, chills.   Respiratory ROS: positive for - cough and shortness of breath  Cardiovascular ROS: negative for - chest pain, palpitations  Gastrointestinal ROS: negative for - abdominal pain or nausea/vomiting        Objective     Vital Signs  BP 99/51 (BP Location: Right arm, Patient Position: Lying)   Pulse 78   Temp 98.3 °F (36.8 °C) (Axillary)   Resp 22   Ht 160 cm (63\")   Wt 79.5 kg (175 lb 4.8 oz)   SpO2 94%   BMI 31.05 kg/m²      Physical Exam:    Physical exam limited due to COVID-19 isolation, direct visualization.   General:  Appears to be resting in bed comfortably.  No acute distress.   HENT: normocephalic. Atraumatic.   Cardiac: normal rate and rhythm noted on telemetry  Lungs:  Mild tachypnea noted by monitoring, normal effort. Symmetric rise and fall of the chest wall without accessory muscle use. On bubble high flow nasal cannula.   Musculoskeletal: no visible deformity, joint abnormality.                    Results Review:   I reviewed the patient's new clinical results.  I reviewed the patient's new imaging results and agree with the interpretation.    Reviewed the ABG from this morning.  PO2 improved to 73.5 on the same amount of 15 L.  Mild respiratory alkalosis noted.  BMP today notable for stable creatinine with increase of BUN after diuresis " yesterday.  GFR remains stable.  Interval downtrending noted of ferritin elevation.  CRP also shows interval downtrending.  D-dimer showed significant downtrending but remains elevated.  No leukocytosis, thrombocytopenia on CBC.  Anemia remains mild, stable.    No pending cultures currently.    Chest x-ray yesterday showed interval improvement of bilateral diffuse infiltrates.    Limited echo yesterday showed EF of 61-65%, no aortic abnormalities, mild mitral valve regurgitation present, mild tricuspid regurgitation present, moderately elevated right systolic pressure signaling moderate pulmonary hypertension.  This was a new finding as compared to the echo from 6/10/2020.        Medication Review:   Scheduled Medications:    albuterol sulfate HFA 2 puff Inhalation Q6H   amitriptyline 50 mg Oral Nightly   aspirin 81 mg Oral Nightly   budesonide-formoterol 2 puff Inhalation BID - RT   cholecalciferol 50,000 Units Oral Weekly   dexamethasone 6 mg Intravenous Daily   diazePAM 3.75 mg Oral Nightly   doxycycline 100 mg Intravenous Q12H   enoxaparin 80 mg Subcutaneous Q12H   famotidine 20 mg Oral BID AC   furosemide 20 mg Intravenous Q12H   haloperidol lactate 2 mg Intravenous Once   Hydrocortisone (Perianal)  Rectal BID   lactobacillus acidophilus 1 capsule Oral Daily   levothyroxine 25 mcg Oral Daily   lidocaine 5 mL Subcutaneous Once   magnesium oxide 400 mg Oral Daily   meropenem 1 g Intravenous Q8H   phenytoin 100 mg Oral Q PM   phenytoin 200 mg Oral QAM   pramipexole 1 mg Oral Nightly   psyllium 1 packet Oral Daily   sodium chloride 10 mL Intravenous Q12H   sodium chloride 10 mL Intravenous Q12H   sodium chloride 10 mL Intravenous Q12H   sodium chloride 10 mL Intravenous Q12H   sodium chloride 10 mL Intravenous Q12H   sodium chloride 10 mL Intravenous Q12H   sodium chloride 10 mL Intravenous Q12H   topiramate 100 mg Oral Q12H   venlafaxine XR 75 mg Oral Q PM     Continuous infusions:       · Acute hypoxic  respiratory failure due to bilateral complete pneumonia with mixed component of acute pulmonary edema  · Bilateral diffuse pneumonia due to COVID-19 positive  · Moderate pulmonary hypertension  · Acute COPD exacerbation  · Obstructive sleep apnea  · Tobacco abuse history      Patient currently requiring increased FiO2 of 15 L. ABG reviewed. Titrate to maintain SPO2 at 90% or greater. Continue with as needed use of non-rebreathing mask as needed.   Continue with CPAP use 10 cm as needed and at nighttime.   Continue with current inhaler therapy of Albuterol PRN, Symbicort.   COVID-19 testing was positive on 7/5.   Legionella, respiratory culture, blood cultures, strep culture, influenza antigen, respiratory PCR, rapid strep screen were previously negative.   Ordered diuresis again today with Lasix 20 mg IV BID for 2 doses only.   Chest xray showed interval improvement yesterday of bilateral diffuse infiltrates.   Ordered alteplase for central line occlusion as needed per RN request, per central line occlusion protocol.  Pulmonary hypertension noted on limited echo, likely related to acute pulmonary impairment and volume overload in the setting of bilateral diffuse pneumonia.   On Albuterol inhaler scheduled, Symbicort inhaler scheduled  On Doxycyline IV, Meropenem IV  On Dexamethasone 6 mg IV daily  Remdesivir and convalescent plasma infusion previously received.   Infectious disease team on board.   Recommend switching PO seizure medications to IV as needed if patient becomes unable to tolerate PO medication intake. Although appetite had been decreased, she had been able to tolerate PO medication intake thus far.         DVT prophylaxis: Lovenox  GI prophylaxis: Famotidine    CODE status is currently CPR with full interventions. Patient remains high risk for clinical decompensation in the setting of bilateral diffuse pneumonia with COVID-19 positive.       Purnima Bejarano PA-C  07/14/20  12:15      Scribed for   Hilaria by Purnima Bejarano PA-C    The clinical plan was discussed with .  The clinical plan was discussed extensively with the nurse, and respiratory therapist.   Critical Care time spent in direct patient care: 34 minutes (excluding procedure time).  Patient is critically ill and is at higher risk for further mortality/morbidity.     I, Cayetano Manrique M.D. attest that the above note accurately reflects the work and decisions made by me. Patient was seen and evaluated by me, including history of present illness, physical exam, assessment, and treatment plan.  The above note was reviewed and edited by me.    Cayetano Manrique M.D  Pulmonary and Critical Care Medicine

## 2020-07-14 NOTE — PLAN OF CARE
Pt stable overnight.  When pt removes oxygen sats drop to low 80s. Most of the night pt wore hiflo bubble at 15l and a nrb mask. This combo kept pt sat around 93%.  This is what patient was on for oxygen therapy when I arrived at 1900. Pt confusion waxes and wanes.  She is pale and her face is flushed.  Pt has very congested, croupy cough with no sputum production. She c/o feeling her chest is going to cave in.  She is very short of breath with minimal movement, talking, or drinking and taking meds.  UOP is adequate and urine is concentrated.  Pt did try to get out of bed multiple times and also removed pox sensor and bp cuff.  Pt redirects easily. Will continue to monitor and follow plan of care as ordered.

## 2020-07-15 ENCOUNTER — APPOINTMENT (OUTPATIENT)
Dept: GENERAL RADIOLOGY | Facility: HOSPITAL | Age: 75
End: 2020-07-15

## 2020-07-15 ENCOUNTER — APPOINTMENT (OUTPATIENT)
Dept: INFUSION THERAPY | Facility: HOSPITAL | Age: 75
End: 2020-07-15

## 2020-07-15 ENCOUNTER — APPOINTMENT (OUTPATIENT)
Dept: CT IMAGING | Facility: HOSPITAL | Age: 75
End: 2020-07-15

## 2020-07-15 LAB
A-A DO2: 523.8 MMHG (ref 0–300)
A-A DO2: 545.6 MMHG (ref 0–300)
ALBUMIN SERPL-MCNC: 2.95 G/DL (ref 3.5–5.2)
ALBUMIN/GLOB SERPL: 1.1 G/DL
ALP SERPL-CCNC: 225 U/L (ref 39–117)
ALT SERPL W P-5'-P-CCNC: 20 U/L (ref 1–33)
ANION GAP SERPL CALCULATED.3IONS-SCNC: 16.4 MMOL/L (ref 5–15)
ARTERIAL PATENCY WRIST A: ABNORMAL
ARTERIAL PATENCY WRIST A: ABNORMAL
AST SERPL-CCNC: 40 U/L (ref 1–32)
ATMOSPHERIC PRESS: 729 MMHG
ATMOSPHERIC PRESS: 729 MMHG
BASE EXCESS BLDA CALC-SCNC: -2.7 MMOL/L (ref 0–2)
BASE EXCESS BLDA CALC-SCNC: -5.4 MMOL/L (ref 0–2)
BASOPHILS # BLD AUTO: 0.1 10*3/MM3 (ref 0–0.2)
BASOPHILS NFR BLD AUTO: 0.4 % (ref 0–1.5)
BDY SITE: ABNORMAL
BDY SITE: ABNORMAL
BILIRUB SERPL-MCNC: 0.8 MG/DL (ref 0–1.2)
BODY TEMPERATURE: 0 C
BODY TEMPERATURE: 37 C
BUN SERPL-MCNC: 28 MG/DL (ref 8–23)
BUN/CREAT SERPL: 41.2 (ref 7–25)
CALCIUM SPEC-SCNC: 8.3 MG/DL (ref 8.6–10.5)
CHLORIDE SERPL-SCNC: 107 MMOL/L (ref 98–107)
CK SERPL-CCNC: 134 U/L (ref 20–180)
CO2 BLDA-SCNC: 22.1 MMOL/L (ref 22–33)
CO2 BLDA-SCNC: 26.9 MMOL/L (ref 22–33)
CO2 SERPL-SCNC: 21.6 MMOL/L (ref 22–29)
COHGB MFR BLD: <0.2 % (ref 0–5)
COHGB MFR BLD: <0.2 % (ref 0–5)
CREAT SERPL-MCNC: 0.68 MG/DL (ref 0.57–1)
CRP SERPL-MCNC: 21.88 MG/DL (ref 0–0.5)
D DIMER PPP FEU-MCNC: 10.51 MCGFEU/ML (ref 0–0.5)
D-LACTATE SERPL-SCNC: 2.4 MMOL/L (ref 0.5–2)
D-LACTATE SERPL-SCNC: 2.8 MMOL/L (ref 0.5–2)
DEPRECATED RDW RBC AUTO: 49.7 FL (ref 37–54)
EOSINOPHIL # BLD AUTO: 0.16 10*3/MM3 (ref 0–0.4)
EOSINOPHIL NFR BLD AUTO: 0.7 % (ref 0.3–6.2)
ERYTHROCYTE [DISTWIDTH] IN BLOOD BY AUTOMATED COUNT: 13.9 % (ref 12.3–15.4)
FERRITIN SERPL-MCNC: 441.1 NG/ML (ref 13–150)
FIBRINOGEN PPP-MCNC: 324 MG/DL (ref 173–524)
GFR SERPL CREATININE-BSD FRML MDRD: 85 ML/MIN/1.73
GGT SERPL-CCNC: 187 U/L (ref 5–36)
GLOBULIN UR ELPH-MCNC: 2.8 GM/DL
GLUCOSE SERPL-MCNC: 139 MG/DL (ref 65–99)
HCO3 BLDA-SCNC: 20.8 MMOL/L (ref 20–26)
HCO3 BLDA-SCNC: 25.2 MMOL/L (ref 20–26)
HCT VFR BLD AUTO: 43 % (ref 34–46.6)
HCT VFR BLD CALC: 44.1 % (ref 38–51)
HCT VFR BLD CALC: 44.8 % (ref 38–51)
HGB BLD-MCNC: 13.5 G/DL (ref 12–15.9)
HGB BLDA-MCNC: 14.4 G/DL (ref 13.5–17.5)
HGB BLDA-MCNC: 14.6 G/DL (ref 13.5–17.5)
IMM GRANULOCYTES # BLD AUTO: 0.41 10*3/MM3 (ref 0–0.05)
IMM GRANULOCYTES NFR BLD AUTO: 1.8 % (ref 0–0.5)
INHALED O2 CONCENTRATION: 100 %
INHALED O2 CONCENTRATION: 100 %
LACTATE HOLD SPECIMEN: NORMAL
LDH SERPL-CCNC: 495 U/L (ref 135–214)
LYMPHOCYTES # BLD AUTO: 4.93 10*3/MM3 (ref 0.7–3.1)
LYMPHOCYTES NFR BLD AUTO: 21.8 % (ref 19.6–45.3)
Lab: ABNORMAL
Lab: ABNORMAL
MAGNESIUM SERPL-MCNC: 2 MG/DL (ref 1.6–2.4)
MCH RBC QN AUTO: 30.3 PG (ref 26.6–33)
MCHC RBC AUTO-ENTMCNC: 31.4 G/DL (ref 31.5–35.7)
MCV RBC AUTO: 96.6 FL (ref 79–97)
METHGB BLD QL: 0.6 % (ref 0–3)
METHGB BLD QL: 0.7 % (ref 0–3)
MODALITY: ABNORMAL
MODALITY: ABNORMAL
MONOCYTES # BLD AUTO: 2.29 10*3/MM3 (ref 0.1–0.9)
MONOCYTES NFR BLD AUTO: 10.1 % (ref 5–12)
NEUTROPHILS NFR BLD AUTO: 14.71 10*3/MM3 (ref 1.7–7)
NEUTROPHILS NFR BLD AUTO: 65.2 % (ref 42.7–76)
NOTE: ABNORMAL
NOTE: ABNORMAL
NOTIFIED WHO: ABNORMAL
NRBC BLD AUTO-RTO: 0.1 /100 WBC (ref 0–0.2)
OXYHGB MFR BLDV: 91.5 % (ref 94–99)
OXYHGB MFR BLDV: 96.3 % (ref 94–99)
PCO2 BLDA: 42.1 MM HG (ref 35–45)
PCO2 BLDA: 55.3 MM HG (ref 35–45)
PCO2 TEMP ADJ BLD: 55.3 MM HG (ref 35–45)
PCO2 TEMP ADJ BLD: ABNORMAL MM[HG]
PEEP RESPIRATORY: 8 CM[H2O]
PEEP RESPIRATORY: 8 CM[H2O]
PH BLDA: 7.27 PH UNITS (ref 7.35–7.45)
PH BLDA: 7.3 PH UNITS (ref 7.35–7.45)
PH, TEMP CORRECTED: 7.27 PH UNITS
PH, TEMP CORRECTED: ABNORMAL
PHOSPHATE SERPL-MCNC: 4.5 MG/DL (ref 2.5–4.5)
PLATELET # BLD AUTO: 316 10*3/MM3 (ref 140–450)
PMV BLD AUTO: 9.6 FL (ref 6–12)
PO2 BLDA: 116 MM HG (ref 83–108)
PO2 BLDA: 81.2 MM HG (ref 83–108)
PO2 TEMP ADJ BLD: 81.2 MM HG (ref 83–108)
PO2 TEMP ADJ BLD: ABNORMAL MM[HG]
POTASSIUM SERPL-SCNC: 4.2 MMOL/L (ref 3.5–5.2)
PROT SERPL-MCNC: 5.7 G/DL (ref 6–8.5)
RBC # BLD AUTO: 4.45 10*6/MM3 (ref 3.77–5.28)
SAO2 % BLDCOA: 91.7 % (ref 94–99)
SAO2 % BLDCOA: 97.1 % (ref 94–99)
SET MECH RESP RATE: 20
SET MECH RESP RATE: 24
SODIUM SERPL-SCNC: 145 MMOL/L (ref 136–145)
TROPONIN T SERPL-MCNC: 0.64 NG/ML (ref 0–0.03)
VENTILATOR MODE: ABNORMAL
VENTILATOR MODE: ABNORMAL
VT ON VENT VENT: 450 ML
VT ON VENT VENT: 480 ML
WBC # BLD AUTO: 22.6 10*3/MM3 (ref 3.4–10.8)

## 2020-07-15 PROCEDURE — 94799 UNLISTED PULMONARY SVC/PX: CPT

## 2020-07-15 PROCEDURE — 86140 C-REACTIVE PROTEIN: CPT | Performed by: HOSPITALIST

## 2020-07-15 PROCEDURE — 25010000002 MEROPENEM: Performed by: HOSPITALIST

## 2020-07-15 PROCEDURE — 70450 CT HEAD/BRAIN W/O DYE: CPT

## 2020-07-15 PROCEDURE — 83615 LACTATE (LD) (LDH) ENZYME: CPT | Performed by: INTERNAL MEDICINE

## 2020-07-15 PROCEDURE — 71045 X-RAY EXAM CHEST 1 VIEW: CPT

## 2020-07-15 PROCEDURE — 36556 INSERT NON-TUNNEL CV CATH: CPT | Performed by: SURGERY

## 2020-07-15 PROCEDURE — 94002 VENT MGMT INPAT INIT DAY: CPT

## 2020-07-15 PROCEDURE — 36600 WITHDRAWAL OF ARTERIAL BLOOD: CPT

## 2020-07-15 PROCEDURE — 87205 SMEAR GRAM STAIN: CPT | Performed by: PHYSICIAN ASSISTANT

## 2020-07-15 PROCEDURE — 71045 X-RAY EXAM CHEST 1 VIEW: CPT | Performed by: RADIOLOGY

## 2020-07-15 PROCEDURE — 0W9930Z DRAINAGE OF RIGHT PLEURAL CAVITY WITH DRAINAGE DEVICE, PERCUTANEOUS APPROACH: ICD-10-PCS | Performed by: SURGERY

## 2020-07-15 PROCEDURE — 87070 CULTURE OTHR SPECIMN AEROBIC: CPT | Performed by: PHYSICIAN ASSISTANT

## 2020-07-15 PROCEDURE — 85025 COMPLETE CBC W/AUTO DIFF WBC: CPT | Performed by: INTERNAL MEDICINE

## 2020-07-15 PROCEDURE — 94003 VENT MGMT INPAT SUBQ DAY: CPT

## 2020-07-15 PROCEDURE — 83735 ASSAY OF MAGNESIUM: CPT | Performed by: INTERNAL MEDICINE

## 2020-07-15 PROCEDURE — 82977 ASSAY OF GGT: CPT | Performed by: INTERNAL MEDICINE

## 2020-07-15 PROCEDURE — 83050 HGB METHEMOGLOBIN QUAN: CPT

## 2020-07-15 PROCEDURE — 31500 INSERT EMERGENCY AIRWAY: CPT | Performed by: INTERNAL MEDICINE

## 2020-07-15 PROCEDURE — 25010000002 DEXAMETHASONE PER 1 MG: Performed by: HOSPITALIST

## 2020-07-15 PROCEDURE — 25010000002 LORAZEPAM PER 2 MG

## 2020-07-15 PROCEDURE — 25010000002 LEVETRIRACETAM PER 10 MG: Performed by: INTERNAL MEDICINE

## 2020-07-15 PROCEDURE — 85384 FIBRINOGEN ACTIVITY: CPT | Performed by: INTERNAL MEDICINE

## 2020-07-15 PROCEDURE — 95819 EEG AWAKE AND ASLEEP: CPT

## 2020-07-15 PROCEDURE — 25010000003 PHENYTOIN PER 50 MG: Performed by: INTERNAL MEDICINE

## 2020-07-15 PROCEDURE — 82375 ASSAY CARBOXYHB QUANT: CPT

## 2020-07-15 PROCEDURE — 32551 INSERTION OF CHEST TUBE: CPT | Performed by: SURGERY

## 2020-07-15 PROCEDURE — 85379 FIBRIN DEGRADATION QUANT: CPT | Performed by: INTERNAL MEDICINE

## 2020-07-15 PROCEDURE — 93005 ELECTROCARDIOGRAM TRACING: CPT | Performed by: NURSE PRACTITIONER

## 2020-07-15 PROCEDURE — 84100 ASSAY OF PHOSPHORUS: CPT | Performed by: INTERNAL MEDICINE

## 2020-07-15 PROCEDURE — 80053 COMPREHEN METABOLIC PANEL: CPT | Performed by: INTERNAL MEDICINE

## 2020-07-15 PROCEDURE — 94640 AIRWAY INHALATION TREATMENT: CPT

## 2020-07-15 PROCEDURE — 5A1945Z RESPIRATORY VENTILATION, 24-96 CONSECUTIVE HOURS: ICD-10-PCS | Performed by: INTERNAL MEDICINE

## 2020-07-15 PROCEDURE — 82728 ASSAY OF FERRITIN: CPT | Performed by: HOSPITALIST

## 2020-07-15 PROCEDURE — 99291 CRITICAL CARE FIRST HOUR: CPT | Performed by: INTERNAL MEDICINE

## 2020-07-15 PROCEDURE — 05HP33Z INSERTION OF INFUSION DEVICE INTO RIGHT EXTERNAL JUGULAR VEIN, PERCUTANEOUS APPROACH: ICD-10-PCS | Performed by: INTERNAL MEDICINE

## 2020-07-15 PROCEDURE — 31500 INSERT EMERGENCY AIRWAY: CPT

## 2020-07-15 PROCEDURE — 93010 ELECTROCARDIOGRAM REPORT: CPT | Performed by: INTERNAL MEDICINE

## 2020-07-15 PROCEDURE — 84484 ASSAY OF TROPONIN QUANT: CPT | Performed by: PHYSICIAN ASSISTANT

## 2020-07-15 PROCEDURE — 25010000002 MEROPENEM PER 100 MG: Performed by: HOSPITALIST

## 2020-07-15 PROCEDURE — 25010000002 PROPOFOL 10 MG/ML EMULSION

## 2020-07-15 PROCEDURE — 83605 ASSAY OF LACTIC ACID: CPT | Performed by: INTERNAL MEDICINE

## 2020-07-15 PROCEDURE — 82805 BLOOD GASES W/O2 SATURATION: CPT

## 2020-07-15 PROCEDURE — 02HV33Z INSERTION OF INFUSION DEVICE INTO SUPERIOR VENA CAVA, PERCUTANEOUS APPROACH: ICD-10-PCS | Performed by: SURGERY

## 2020-07-15 PROCEDURE — 99292 CRITICAL CARE ADDL 30 MIN: CPT | Performed by: INTERNAL MEDICINE

## 2020-07-15 PROCEDURE — 0BH17EZ INSERTION OF ENDOTRACHEAL AIRWAY INTO TRACHEA, VIA NATURAL OR ARTIFICIAL OPENING: ICD-10-PCS | Performed by: INTERNAL MEDICINE

## 2020-07-15 PROCEDURE — 82550 ASSAY OF CK (CPK): CPT | Performed by: HOSPITALIST

## 2020-07-15 PROCEDURE — 25010000002 ENOXAPARIN PER 10 MG: Performed by: INTERNAL MEDICINE

## 2020-07-15 PROCEDURE — 25010000002 PROPOFOL 10 MG/ML EMULSION: Performed by: INTERNAL MEDICINE

## 2020-07-15 RX ORDER — SODIUM CHLORIDE 0.9 % (FLUSH) 0.9 %
20 SYRINGE (ML) INJECTION AS NEEDED
Status: DISCONTINUED | OUTPATIENT
Start: 2020-07-15 | End: 2020-07-17 | Stop reason: HOSPADM

## 2020-07-15 RX ORDER — FENTANYL CITRATE/PF 100MCG/2ML
SYRINGE (ML) INTRAVENOUS
Status: COMPLETED
Start: 2020-07-15 | End: 2020-07-15

## 2020-07-15 RX ORDER — CHLORHEXIDINE GLUCONATE 0.12 MG/ML
15 RINSE ORAL EVERY 12 HOURS SCHEDULED
Status: DISCONTINUED | OUTPATIENT
Start: 2020-07-15 | End: 2020-07-17 | Stop reason: HOSPADM

## 2020-07-15 RX ORDER — FENTANYL CITRATE/PF 100MCG/2ML
SYRINGE (ML) INTRAVENOUS
Status: DISCONTINUED | OUTPATIENT
Start: 2020-07-15 | End: 2020-07-17 | Stop reason: HOSPADM

## 2020-07-15 RX ORDER — PROPOFOL 10 MG/ML
VIAL (ML) INTRAVENOUS
Status: COMPLETED
Start: 2020-07-15 | End: 2020-07-15

## 2020-07-15 RX ORDER — SODIUM CHLORIDE 0.9 % (FLUSH) 0.9 %
10 SYRINGE (ML) INJECTION EVERY 12 HOURS SCHEDULED
Status: DISCONTINUED | OUTPATIENT
Start: 2020-07-15 | End: 2020-07-17 | Stop reason: HOSPADM

## 2020-07-15 RX ORDER — LORAZEPAM 2 MG/ML
INJECTION INTRAMUSCULAR
Status: COMPLETED
Start: 2020-07-15 | End: 2020-07-15

## 2020-07-15 RX ORDER — PANTOPRAZOLE SODIUM 40 MG/10ML
40 INJECTION, POWDER, LYOPHILIZED, FOR SOLUTION INTRAVENOUS
Status: DISCONTINUED | OUTPATIENT
Start: 2020-07-15 | End: 2020-07-17 | Stop reason: HOSPADM

## 2020-07-15 RX ORDER — SODIUM CHLORIDE, SODIUM LACTATE, POTASSIUM CHLORIDE, CALCIUM CHLORIDE 600; 310; 30; 20 MG/100ML; MG/100ML; MG/100ML; MG/100ML
125 INJECTION, SOLUTION INTRAVENOUS CONTINUOUS
Status: DISCONTINUED | OUTPATIENT
Start: 2020-07-15 | End: 2020-07-16

## 2020-07-15 RX ORDER — SODIUM CHLORIDE 0.9 % (FLUSH) 0.9 %
10 SYRINGE (ML) INJECTION AS NEEDED
Status: DISCONTINUED | OUTPATIENT
Start: 2020-07-15 | End: 2020-07-17 | Stop reason: HOSPADM

## 2020-07-15 RX ORDER — SODIUM CHLORIDE 9 MG/ML
125 INJECTION, SOLUTION INTRAVENOUS CONTINUOUS
Status: DISCONTINUED | OUTPATIENT
Start: 2020-07-15 | End: 2020-07-15

## 2020-07-15 RX ORDER — ROSUVASTATIN CALCIUM 20 MG/1
20 TABLET, COATED ORAL NIGHTLY
Status: DISCONTINUED | OUTPATIENT
Start: 2020-07-15 | End: 2020-07-17 | Stop reason: HOSPADM

## 2020-07-15 RX ORDER — BUDESONIDE 0.5 MG/2ML
0.5 INHALANT ORAL
Status: DISCONTINUED | OUTPATIENT
Start: 2020-07-15 | End: 2020-07-17 | Stop reason: HOSPADM

## 2020-07-15 RX ADMIN — LEVETIRACETAM 1000 MG: 100 INJECTION, SOLUTION, CONCENTRATE INTRAVENOUS at 09:21

## 2020-07-15 RX ADMIN — PROPOFOL 30 MCG/KG/MIN: 10 INJECTION, EMULSION INTRAVENOUS at 09:50

## 2020-07-15 RX ADMIN — SODIUM CHLORIDE 1000 ML: 9 INJECTION, SOLUTION INTRAVENOUS at 03:46

## 2020-07-15 RX ADMIN — PHENYTOIN SODIUM 100 MG: 50 INJECTION INTRAMUSCULAR; INTRAVENOUS at 05:25

## 2020-07-15 RX ADMIN — ENOXAPARIN SODIUM 80 MG: 80 INJECTION SUBCUTANEOUS at 21:37

## 2020-07-15 RX ADMIN — NOREPINEPHRINE BITARTRATE 0.3 MCG/KG/MIN: 1 INJECTION, SOLUTION, CONCENTRATE INTRAVENOUS at 09:50

## 2020-07-15 RX ADMIN — NOREPINEPHRINE BITARTRATE 0.02 MCG/KG/MIN: 1 INJECTION, SOLUTION, CONCENTRATE INTRAVENOUS at 03:19

## 2020-07-15 RX ADMIN — TOPIRAMATE 100 MG: 100 TABLET, FILM COATED ORAL at 09:39

## 2020-07-15 RX ADMIN — PROPOFOL 30 MCG/KG/MIN: 10 INJECTION, EMULSION INTRAVENOUS at 04:19

## 2020-07-15 RX ADMIN — PROPOFOL 50 MCG/KG/MIN: 10 INJECTION, EMULSION INTRAVENOUS at 01:00

## 2020-07-15 RX ADMIN — OLANZAPINE 2.5 MG: 2.5 TABLET ORAL at 21:37

## 2020-07-15 RX ADMIN — PRAMIPEXOLE DIHYDROCHLORIDE 1 MG: 1 TABLET ORAL at 21:37

## 2020-07-15 RX ADMIN — DEXAMETHASONE SODIUM PHOSPHATE 6 MG: 4 INJECTION, SOLUTION INTRA-ARTICULAR; INTRALESIONAL; INTRAMUSCULAR; INTRAVENOUS; SOFT TISSUE at 09:21

## 2020-07-15 RX ADMIN — TOPIRAMATE 100 MG: 100 TABLET, FILM COATED ORAL at 21:37

## 2020-07-15 RX ADMIN — PROPOFOL 30 MCG/KG/MIN: 10 INJECTION, EMULSION INTRAVENOUS at 16:53

## 2020-07-15 RX ADMIN — CHLORHEXIDINE GLUCONATE 15 ML: 1.2 RINSE ORAL at 09:23

## 2020-07-15 RX ADMIN — PHENYTOIN SODIUM 100 MG: 50 INJECTION INTRAMUSCULAR; INTRAVENOUS at 12:14

## 2020-07-15 RX ADMIN — SODIUM CHLORIDE, PRESERVATIVE FREE 10 ML: 5 INJECTION INTRAVENOUS at 09:23

## 2020-07-15 RX ADMIN — VENLAFAXINE HYDROCHLORIDE 75 MG: 75 CAPSULE, EXTENDED RELEASE ORAL at 16:54

## 2020-07-15 RX ADMIN — LEVOTHYROXINE SODIUM 25 MCG: 25 TABLET ORAL at 09:21

## 2020-07-15 RX ADMIN — PSYLLIUM HUSK 1 PACKET: 3.4 POWDER ORAL at 09:21

## 2020-07-15 RX ADMIN — LEVETIRACETAM 1000 MG: 100 INJECTION, SOLUTION, CONCENTRATE INTRAVENOUS at 21:38

## 2020-07-15 RX ADMIN — PROPOFOL 30 MCG/KG/MIN: 10 INJECTION, EMULSION INTRAVENOUS at 21:38

## 2020-07-15 RX ADMIN — PANTOPRAZOLE SODIUM 40 MG: 40 INJECTION, POWDER, FOR SOLUTION INTRAVENOUS at 09:22

## 2020-07-15 RX ADMIN — CHLORHEXIDINE GLUCONATE 15 ML: 1.2 RINSE ORAL at 21:43

## 2020-07-15 RX ADMIN — ASPIRIN 81 MG: 81 TABLET, COATED ORAL at 21:42

## 2020-07-15 RX ADMIN — MEROPENEM 1 G: 1 INJECTION, POWDER, FOR SOLUTION INTRAVENOUS at 12:14

## 2020-07-15 RX ADMIN — DOXYCYCLINE 100 MG: 100 INJECTION, POWDER, LYOPHILIZED, FOR SOLUTION INTRAVENOUS at 12:14

## 2020-07-15 RX ADMIN — NOREPINEPHRINE BITARTRATE 0.28 MCG/KG/MIN: 1 INJECTION, SOLUTION, CONCENTRATE INTRAVENOUS at 21:49

## 2020-07-15 RX ADMIN — Medication: at 12:12

## 2020-07-15 RX ADMIN — Medication: at 22:01

## 2020-07-15 RX ADMIN — BUDESONIDE 0.5 MG: 0.5 INHALANT RESPIRATORY (INHALATION) at 19:22

## 2020-07-15 RX ADMIN — SODIUM CHLORIDE, POTASSIUM CHLORIDE, SODIUM LACTATE AND CALCIUM CHLORIDE 125 ML/HR: 600; 310; 30; 20 INJECTION, SOLUTION INTRAVENOUS at 21:50

## 2020-07-15 RX ADMIN — Medication 1 CAPSULE: at 09:21

## 2020-07-15 RX ADMIN — NOREPINEPHRINE BITARTRATE 0.28 MCG/KG/MIN: 1 INJECTION, SOLUTION, CONCENTRATE INTRAVENOUS at 16:53

## 2020-07-15 RX ADMIN — SODIUM CHLORIDE, POTASSIUM CHLORIDE, SODIUM LACTATE AND CALCIUM CHLORIDE 125 ML/HR: 600; 310; 30; 20 INJECTION, SOLUTION INTRAVENOUS at 09:21

## 2020-07-15 RX ADMIN — LEVETIRACETAM 1000 MG: 100 INJECTION, SOLUTION, CONCENTRATE INTRAVENOUS at 03:18

## 2020-07-15 RX ADMIN — PHENYTOIN SODIUM 100 MG: 50 INJECTION INTRAMUSCULAR; INTRAVENOUS at 21:38

## 2020-07-15 RX ADMIN — Medication: at 03:24

## 2020-07-15 RX ADMIN — BUDESONIDE 0.5 MG: 0.5 INHALANT RESPIRATORY (INHALATION) at 03:52

## 2020-07-15 RX ADMIN — MEROPENEM 1 G: 1 INJECTION, POWDER, FOR SOLUTION INTRAVENOUS at 21:39

## 2020-07-15 RX ADMIN — ROSUVASTATIN CALCIUM 20 MG: 20 TABLET, FILM COATED ORAL at 21:37

## 2020-07-15 RX ADMIN — BUDESONIDE 0.5 MG: 0.5 INHALANT RESPIRATORY (INHALATION) at 11:00

## 2020-07-15 RX ADMIN — DOXYCYCLINE 100 MG: 100 INJECTION, POWDER, LYOPHILIZED, FOR SOLUTION INTRAVENOUS at 03:19

## 2020-07-15 RX ADMIN — LORAZEPAM: 2 INJECTION, SOLUTION INTRAMUSCULAR; INTRAVENOUS at 03:48

## 2020-07-15 RX ADMIN — SODIUM CHLORIDE 125 ML/HR: 9 INJECTION, SOLUTION INTRAVENOUS at 03:50

## 2020-07-15 RX ADMIN — CHLORHEXIDINE GLUCONATE 15 ML: 1.2 RINSE ORAL at 03:38

## 2020-07-15 RX ADMIN — MEROPENEM 1 G: 1 INJECTION, POWDER, FOR SOLUTION INTRAVENOUS at 04:19

## 2020-07-15 RX ADMIN — SODIUM CHLORIDE, PRESERVATIVE FREE 10 ML: 5 INJECTION INTRAVENOUS at 09:22

## 2020-07-15 NOTE — PROGRESS NOTES
Nutrition Services    Patient Name:  Siria Rueda  YOB: 1945  MRN: [id number]  Admit Date:  7/5/2020     Nutrition consult received for TF assessment. Noted that pt is receiving propofol and providing 378 kcals at this time.     Recommend Peptamen Intense VHP to goal rate of 45 mL/hr with 10 mL/hr water flushes. This regimen will provide 1080 kcals w/o propofol, 1458 w/ propofol, 100g protein, and 1147 mL total water.     If pt to be manually proned, recommend TF to stay at 10 mL/hr while in supine position only.   Will continue to follow and adjust as needed.     Electronically signed by:  Lesly Mcgregor  07/15/20 11:23

## 2020-07-15 NOTE — PLAN OF CARE
Problem: Patient Care Overview  Goal: Plan of Care Review  Outcome: Ongoing (interventions implemented as appropriate)  Flowsheets  Taken 7/15/2020 0321 by Sidra Carrera RN  Progress: declining  Outcome Summary: pt. intubated and has a chest tube now r/t pneumo  Taken 7/14/2020 1400 by Payal Barnes RN  Plan of Care Reviewed With: patient;daughter  Goal: Individualization and Mutuality  Outcome: Ongoing (interventions implemented as appropriate)  Goal: Discharge Needs Assessment  Outcome: Ongoing (interventions implemented as appropriate)  Flowsheets  Taken 7/6/2020 1530 by Rose Kirk  Transportation Anticipated: family or friend will provide  Patient/Family Anticipates Transition to: home with family  Taken 7/8/2020 0128 by Sidra Carrera RN  Concerns to be Addressed: no discharge needs identified  Readmission Within the Last 30 Days: no previous admission in last 30 days  Goal: Interprofessional Rounds/Family Conf  Outcome: Ongoing (interventions implemented as appropriate)  Flowsheets (Taken 7/6/2020 0318 by Any Renteria RN)  Participants: patient     Problem: Fall Risk (Adult)  Goal: Identify Related Risk Factors and Signs and Symptoms  Outcome: Ongoing (interventions implemented as appropriate)  Flowsheets  Taken 7/6/2020 0318 by Any Renteria RN  Related Risk Factors (Fall Risk): age-related changes;environment unfamiliar  Taken 7/15/2020 0321 by Sidra Carrera RN  Signs and Symptoms (Fall Risk): presence of risk factors  Goal: Absence of Fall  Outcome: Ongoing (interventions implemented as appropriate)  Flowsheets (Taken 7/15/2020 0321)  Absence of Fall: making progress toward outcome     Problem: Pain, Chronic (Adult)  Goal: Identify Related Risk Factors and Signs and Symptoms  Outcome: Ongoing (interventions implemented as appropriate)  Flowsheets (Taken 7/8/2020 0124)  Related Risk Factors (Chronic Pain): disease process  Signs and Symptoms (Chronic Pain):  fatigue/weakness  Goal: Acceptable Pain/Comfort Level and Functional Ability  Outcome: Ongoing (interventions implemented as appropriate)  Flowsheets (Taken 7/13/2020 1655 by Kaylene Squires RN)  Acceptable Pain/Comfort Level and Functional Ability: making progress toward outcome     Problem: Skin Injury Risk (Adult)  Goal: Identify Related Risk Factors and Signs and Symptoms  Outcome: Ongoing (interventions implemented as appropriate)  Flowsheets (Taken 7/15/2020 0321)  Related Risk Factors (Skin Injury Risk): advanced age; hospitalization prolonged; mobility impaired; medical devices; medication; infection  Goal: Skin Health and Integrity  Outcome: Ongoing (interventions implemented as appropriate)  Flowsheets (Taken 7/15/2020 0321)  Skin Health and Integrity: making progress toward outcome     Problem: Ventilation, Mechanical Invasive (Adult)  Goal: Signs and Symptoms of Listed Potential Problems Will be Absent, Minimized or Managed (Ventilation, Mechanical Invasive)  Outcome: Ongoing (interventions implemented as appropriate)  Flowsheets (Taken 7/15/2020 0321)  Problems Assessed (Mechanical Ventilation, Invasive): all  Problems Present (Mech Vent, Invasive): none

## 2020-07-15 NOTE — PROCEDURES
"Intubation  Date/Time: 7/15/2020 1:13 AM  Performed by: Jay Hickey MD  Authorized by: Jay Hickey MD   Consent: The procedure was performed in an emergent situation.  Patient identity confirmed: provided demographic data and arm band  Time out: Immediately prior to procedure a \"time out\" was called to verify the correct patient, procedure, equipment, support staff and site/side marked as required.  Indications: respiratory failure,  airway protection and  hypoxemia  Intubation method: video-assisted  Patient status: sedated  Preoxygenation: nonrebreather mask  Sedatives: propofol and etomidate  Paralytic: succinylcholine  Laryngoscope size: Del Cid 3  Tube type: cuffed  Number of attempts: 2  Ventilation between attempts: Non rebreather.  Cricoid pressure: no  Cords visualized: yes  Post-procedure assessment: CO2 detector  Breath sounds: equal  Cuff inflated: yes  ETT to lip: 23 cm  ETT to teeth: 22 cm  Tube secured with: ETT alegria  Chest x-ray interpreted by me.  Chest x-ray findings: endotracheal tube in appropriate position  Comments: The post intubation CXR shows a right sided pneumothorax.        "

## 2020-07-15 NOTE — PROGRESS NOTES
Discharge Planning Assessment   [place]     Patient Name: Siria Rueda  MRN: [id number]  Today's Date: 7/15/2020    Admit Date: 7/5/2020      Discharge Plan     Row Name 07/15/20 1208       Plan    Plan  Pt is now intubated. Pt was discussed in multi-disciplinary rounds on this day. Pt may need to be transfered to a higher level of care. SS will follow and assist as needed.     Patient/Family in Agreement with Plan  yes          SUSANNE Miguel

## 2020-07-15 NOTE — PROGRESS NOTES
[facility] HOSPITALIST PROGRESS NOTE     Patient Identification:  Name:  Siria Rueda  Age:  74 y.o.  Sex:  female  :  1945  MRN:  [id number]  Visit Number:  [id number]  Primary Care Provider:  Cruzito Palumbo MD    Length of stay:  10    Chief complaint:  74 y.o. old female admitted with Shortness of Breath    HPI:  74-year-old female admitted with COVID pneumonia.  CT scan showed right patchy pneumonia.  Patient has received convalescent plasma on 2020 and has been started on Remdesivir per ID recommendations in addition to Rocephin, doxycycline and Decadron.  Patient continued to have worsening and progressive hypoxemia and therefore was transferred to intensive care unit.  Patient      Subjective:    This was an audio and video enabled telemedicine encounter.  Hoa MAST was present at bedside and assisted during this encounter.  Events last night noted.  Discussed with Dr. Hickey who stated that patient had sudden onset of hypoxemia and cyanosis with worsening confusion.  Patient therefore had to be intubated and put on mechanical ventilation.  Patient also was found to have small spontaneous pneumothorax and required thoracostomy and Cook catheter placement.  Patient remains intubated and on vent.  Currently patient is 100% FiO2.  Patient unable to help with HPI.  Discussed with the nursing staff who reported no other issues at this time.    Vent settings:  FiO2 (%):  [100 %] 100 %  S RR:  [20-26] 26  PEEP/CPAP (cm H2O):  [5 cm H20-8 cm H20] 8 cm H20  MAP (cm H2O):  [9.7-15] 15           Current Hospital Meds:    aspirin 81 mg Oral Nightly   budesonide 0.5 mg Nebulization BID - RT   chlorhexidine 15 mL Mouth/Throat Q12H   dexamethasone 6 mg Intravenous Daily   doxycycline 100 mg Intravenous Q12H   enoxaparin 80 mg Subcutaneous Q12H   lactobacillus acidophilus 1 capsule Oral Daily   levETIRAcetam 1,000 mg Intravenous Q12H   levothyroxine 25 mcg Oral Daily   lidocaine 5 mL Subcutaneous  Once   meropenem 1 g Intravenous Q8H   OLANZapine 2.5 mg Oral Nightly   pantoprazole 40 mg Intravenous Q24H   phenytoin (DILANTIN) IVPB 100 mg Intravenous Q8H   pramipexole 1 mg Oral Nightly   psyllium 1 packet Oral Daily   sodium chloride 10 mL Intravenous Q12H   sodium chloride 10 mL Intravenous Q12H   sodium chloride 10 mL Intravenous Q12H   sodium chloride 10 mL Intravenous Q12H   topiramate 100 mg Oral Q12H   venlafaxine XR 75 mg Oral Q PM       fentaNYL Citrate     lactated ringers 125 mL/hr Last Rate: 125 mL/hr (07/15/20 0921)   norepinephrine 0.02-0.3 mcg/kg/min Last Rate: 0.3 mcg/kg/min (07/15/20 0950)   Pharmacy Consult     propofol 5-50 mcg/kg/min Last Rate: 30 mcg/kg/min (07/15/20 0950)   vasopressin (PITRESSIN) infusion 0.03 Units/min Last Rate: Stopped (07/15/20 0744)     ----------------------------------------------------------------------------------------------------------------------  Vital Signs:  Temp:  [97.9 °F (36.6 °C)-100.3 °F (37.9 °C)] 98.3 °F (36.8 °C)  Heart Rate:  [] 105  Resp:  [20-29] 24  BP: ()/() 94/70  FiO2 (%):  [100 %] 100 %      07/12/20  0355 07/14/20  0600 07/15/20  0520   Weight: 82.6 kg (182 lb) 79.5 kg (175 lb 4.8 oz) 75.4 kg (166 lb 4.8 oz)     Body mass index is 29.46 kg/m².    Intake/Output Summary (Last 24 hours) at 7/15/2020 1040  Last data filed at 7/15/2020 0923  Gross per 24 hour   Intake 3818.57 ml   Output 1750 ml   Net 2068.57 ml     NPO Diet  ----------------------------------------------------------------------------------------------------------------------  This physical exam has been personally performed remotely in the unit aided by real-time audio/visual communication tools. KEZIA Armando present at bedside during this exam and assisted during exam. The use of a video visit has been reviewed with the patient and verbal informed consent has been obtained.     Physical Exam:  General: Patient appears awake, alert, and in mild distress due to  tachypnea.  Head: Normocephalic, atraumatic  Eyes: EOMI. Conjunctivae and sclerae normal.  Ears: Ears appear intact with no abnormalities noted.   Neck: Trachea midline. No obvious JVD.  Heart: Tele reveals sinus rhythm  Lungs: Tachypneic, mild respiratory distress. No audible wheezing.  Abdomen: No obvious abdominal distension.  MS: Muscle tone appears normal. No gross deformities.  Extremities: No clubbing, cyanosis or edema noted.  Skin: No visible bleeding, bruising, or rash.  Neurologic: Awake and alert but confused.  No focal neurological deficits.    ----------------------------------------------------------------------------------------------------------------------  Tele:    ----------------------------------------------------------------------------------------------------------------------  Results from last 7 days   Lab Units 07/15/20  0738 07/15/20  0736 07/14/20  0113 07/13/20  0121   CK TOTAL U/L 134  --  166 230*   TROPONIN T ng/mL  --  0.635*  --  <0.010     Results from last 7 days   Lab Units 07/15/20  0738 07/15/20  0737 07/15/20  0736 07/14/20  0113 07/13/20  1130 07/13/20  0121   CRP mg/dL 21.88*  --   --  13.32*  --  18.68*   LACTATE mmol/L  --   --  2.8*  --  1.5  --    WBC 10*3/mm3  --  22.60*  --  10.12  --  7.20   HEMOGLOBIN g/dL  --  13.5  --  11.9*  --  11.5*   HEMATOCRIT %  --  43.0  --  37.2  --  35.8   MCV fL  --  96.6  --  94.9  --  95.0   MCHC g/dL  --  31.4*  --  32.0  --  32.1   PLATELETS 10*3/mm3  --  316  --  195  --  157     Results from last 7 days   Lab Units 07/15/20  0508   PH, ARTERIAL pH units 7.302*   PO2 ART mm Hg 116.0*   PCO2, ARTERIAL mm Hg 42.1   HCO3 ART mmol/L 20.8     Results from last 7 days   Lab Units 07/15/20  0736 07/14/20  0915 07/14/20  0113 07/13/20  0121  07/10/20  0531   SODIUM mmol/L 145  --  142 141   < > 138   POTASSIUM mmol/L 4.2 3.8 4.4 3.3*   < > 4.0   MAGNESIUM mg/dL 2.0  --   --   --   --  2.0   CHLORIDE mmol/L 107  --  107 106   < > 107   CO2  mmol/L 21.6*  --  24.2 23.5   < > 17.5*   BUN mg/dL 28*  --  27* 23   < > 21   CREATININE mg/dL 0.68  --  0.52* 0.55*   < > 0.65   EGFR IF NONAFRICN AM mL/min/1.73 85  --  115 108   < > 89   CALCIUM mg/dL 8.3*  --  8.7 8.5*   < > 8.3*   GLUCOSE mg/dL 139*  --  91 109*   < > 92   ALBUMIN g/dL 2.95*  --   --   --   --  3.14*   BILIRUBIN mg/dL 0.8  --   --   --   --  0.3   ALK PHOS U/L 225*  --   --   --   --  135*   AST (SGOT) U/L 40*  --   --   --   --  59*   ALT (SGPT) U/L 20  --   --   --   --  25    < > = values in this interval not displayed.   Estimated Creatinine Clearance: 60 mL/min (by C-G formula based on SCr of 0.68 mg/dL).    No results found for: AMMONIA      Blood Culture   Date Value Ref Range Status   07/05/2020 No growth at 5 days  Final   07/05/2020 No growth at 5 days  Final     No results found for: URINECX  No results found for: WOUNDCX  No results found for: STOOLCX    I have personally looked at the labs and they are summarized above.  ----------------------------------------------------------------------------------------------------------------------  Imaging Results (Last 24 Hours)     Procedure Component Value Units Date/Time    CT Head Without Contrast [[id number]] Collected:  07/15/20 0502     Updated:  07/15/20 0512    Narrative:       CT Head WO    HISTORY:   Seizure after intubation.    TECHNIQUE:   Axial unenhanced head CT with multiplanar reformats. Radiation dose reduction techniques included automated exposure control or exposure modulation based on body size. Count of known CT and cardiac nuc med studies performed in previous 12 months: 2.     COMPARISON:   None.    FINDINGS:   Patient is status post left frontal craniotomy. There is encephalomalacia in the left frontal lobe. There is some ex vacuo dilatation of the anterior horn of the left lateral ventricle.  No definite acute infarct or hemorrhage is seen. There are no  masses. Atherosclerotic calcifications are present in the  carotid siphons and vertebral arteries. There is no skull fracture. Paranasal sinuses appear clear. Endotracheal tube is present.      Impression:         1. No definite acute intracranial findings.  2. Left frontal craniotomy with left frontal encephalomalacia.    Signer Name: Cristi Fields MD   Signed: 7/15/2020 5:09 AM   Workstation Name: [facility]    Radiology [facility]    XR Chest 1 View [[id number]] Collected:  07/15/20 0423     Updated:  07/15/20 0425    Narrative:       CR Chest 1 Vw    INDICATION:   Line placement.     COMPARISON:    Earlier same day    FINDINGS:  Single portable AP view(s) of the chest.    ET tube in the mid trachea. New right subclavian line tip in the SVC. Right-sided chest tube remains in place. Right pneumothorax is unchanged. No left-sided pneumothorax is seen. Heart size is normal. Bilateral infiltrates are stable.       Impression:       Right subclavian line tip in the SVC. Otherwise, no significant change.    Signer Name: Cristi Fields MD   Signed: 7/15/2020 4:23 AM   Workstation Name: [facility]    Radiology [facility]    XR Chest 1 View [[id number]] Collected:  07/15/20 0249     Updated:  07/15/20 0251    Narrative:       CR Chest 1 Vw    INDICATION:   Chest tube placement for pneumothorax.     COMPARISON:    Earlier same day    FINDINGS:  Single portable AP view(s) of the chest.    New small bore right chest tube is in place. There is a persistent small right apical pneumothorax, slightly improved. No left-sided pneumothorax is seen. The remainder of the exam is unchanged.       Impression:       New small right chest tube with slight interval improvement in a small right apical pneumothorax.    Signer Name: Cristi Fields MD   Signed: 7/15/2020 2:49 AM   Workstation Name: [facility]    Radiology [facility]    XR Chest 1 View [[id number]] Collected:  07/15/20 0205     Updated:  07/15/20 0207    Narrative:       CR Chest 1  Vw    INDICATION:   Pneumonia. COVID 19. Hypoxia.     COMPARISON:    7/13/2020    FINDINGS:  Single portable AP view(s) of the chest.    Heart size is normal. Endotracheal tube in the mid trachea in good position. NG tube in the stomach. Coarse bilateral infiltrates are similar to prior. There is a new small right apical pneumothorax measuring about 10%. No midline shift. No left-sided  pneumothorax is seen.       Impression:       New small right pneumothorax with no midline shift.    NOTIFICATION: Critical Value/emergent results were called by telephone at the time of interpretation on 7/15/2020 2:02 AM to Dr. Hickey who verbally acknowledged these results.    Signer Name: Cristi Fields MD   Signed: 7/15/2020 2:05 AM   Workstation Name: [email or web address]    [facility]        ----------------------------------------------------------------------------------------------------------------------  Assessment and Plan:    -Sepsis secondary with septic shock due to COVID-19 pneumonia  Patient remains afebrile.  She has had low blood pressure last and has been started on norepinephrine.  Blood pressure remained low and vasopressin was added.    Ferritin continues to trend down, CRP has gone up to 21 today, d-dimer remains elevated and unchanged, WBC has gone up to 22 today and lactic acid has gone up to 2.8 today, all inflammatory markers have worsened likely due to acute events overnight versus worsening of her sepsis.  Chest x-ray showed persistent bilateral airspace disease, unchanged since before.  Continue meropenem and doxycycline per ID recommendations.  Patient has received Remdesivir and convalescent plasma.    Continue IV Decadron.   Respiratory panel PCR negative. Legionella urine antigen Is negative. Respiratory culture with normal madyson.   Patient has received convalescent plasma on 7/7/2020.  Blood culture showed no growth so far.    -Acute hypoxemic respiratory failure  Worsened since  yesterday.  Patient is intubated and on vent and currently on her percent FiO2.  Chest x-ray reviewed and shows no significant change from before other than apical pneumothorax which is new and may have caused the decompensation last night.  Continue patient on ventilatory support.  Pulmonology on board and managing ventilator setting.    D-dimer was elevated and remains unchanged, patient is on anticoagulation with Lovenox and will continue.  Patient had received intermittent Bumex and Lasix for diuresis and had diuresed well earlier this week but currently will hold any further diuresis due to low blood pressure.  Discussed with Dr. Manrique who recommended proning the patient.    -Pneumothorax  Status post thoracostomy.  General surgery on board and appreciate help of Dr. Irene.    -COPD  Continue albuterol MDI and Symbicort inhalers.    -Thrombocytopenia  Resolved.  Platelet count WNL today.    -Essential hypertension  Blood pressure has been low and requiring vasopressor support to keep MEP >65 mmHg.    -ASCVD with h/o PCI  Patient had been chest pain-free.  Troponins have slightly gone up today.  Patient is sedated and unable to the history.  Cardiology has been consulted by Dr. Manrique.  Troponin elevation likely secondary to hypoxemia.  Continue patient on aspirin.    -Seizure disorder  Patient had a seizure last night.  Dilantin has been converted to IV and patient started on Keppra.  CT scan of the head showed no acute abnormality.  EEG pending.  Continue seizure precautions..    -Adjustment disorder with depression/anxiety  Currently sedated.  Will resume her amitriptyline and Effexor once patient is extubated and awake.    Activity: Bedrest.   Nutrition: N.p.o.  Will start patient on tube feeding.  Fluids: None  DVT prophylaxis: Lovenox subcu  GI prophylaxis: Pepcid    The patient is high risk due to: Sepsis, hypoxemia, COVID pneumonia, COPD, thrombocytopenia, hypertension, CAD, seizure disorder.    I  discussed the patient's findings and my recommendations with patient and nursing staff.    Ayush Jiang MD  07/15/20  10:40

## 2020-07-15 NOTE — NURSING NOTE
Pt sitting on side of bed, sat in the 60s, pt moaning. Pt placed back in bed, placed on 100% NRB with 15L bubble high flow. Pt face purple and legs mottled. Dr. Hickey notified and decision to intubate made.     0041 12ml etomidate given  0042 6ml succ given  0043 intubated by Dr. Hickey 23cm at lip        0054 pt starts seizing.   0055 2mg versed and 2mg ativan given  0058 10ml propofol given      0115 10ml vec given

## 2020-07-15 NOTE — PROGRESS NOTES
[facility]    Patient Identification:  Name:  Siria Rueda  Age:  74 y.o.  Sex:  female  :  1945  MRN:  [id number]  Visit Number:  [id number]  Primary Care Provider:  Cruzito Palumbo MD    Date of admission:  2020    CODE STATUS:    Code Status and Medical Interventions:   Ordered at: 20     Code Status:    CPR     Medical Interventions (Level of Support Prior to Arrest):    Full     ----------------------------------------------------------------------------------------------------------------------  Assessment & Plan      -Worsening acute hypoxic respiratory failure due to COVID-19  -Newly found right sided spontaneous pneumothorax  -Sepsis that was present on admission due to bilateral infiltrates due to COVID-19 +/- bacterial pneumonia  -Hypotension, suspect that the patient is now in septic shock  -Epilepsy with seizure that occurred after intubation  -Pulmonary hypertension that is new as of this admission when compared to an echocardiogram obtained in 2020  -Coronary artery disease status post previous stent  -GIOVANNI  -Tobacco smoking addiction    The patient was emergently intubated as she has worsening mental status, could not follow commands, had mottling throughout, was hypoxic despite a non-rebreather, and did not appear to be able to protect her airway.  Standard COVID-19 precautions were used to intubate the patient.  The initial vent settings were AC with a tidal volume of 450, PEEP of 8, FiO2 of 100%, respiratory rate of 20.  However, the ABG demonstrated acidemia and thus we have increased the tidal volume to 480 mL and increased respiratory rate to 24.  We will repeat an ABG about 1 hour after the vent changes.  After we have stabilized the patient, we may place her in the prone position.    As for the newly found spontaneous pneumothorax, Dr. Irene with general surgery placed a Cook catheter.  The chest tube is currently placed  on suction and there does not appear to be an air leak.  We will repeat a chest x-ray later today to see how the pneumothorax is progressing.    Shortly after intubation, the patient started seizing.  Please note that the nurses Bruce and Susana were at bedside.  Nurse Susana was the primary nurse assigned to the patient.  She stated that the patient did not take her oral seizure medications earlier today.  Thus, I have changed her oral Dilantin to IV and I have loaded the patient with 1000 mg of Keppra.  I have ordered an EEG.  A BIS monitor will be applied as well.  For sedation, we are using propofol and fentanyl, with propofol chosen as this also may help with preventing any further seizures.  I have also ordered a stat head CT.    Follow-up was writing this note, the patient's blood pressures were noted to be dropping.  Levophed was started.  However, blood pressures continued to drop; on review of the patient's medications, she did receive 2 doses of Lasix yesterday.  Thus, we will give a 1 L normal saline bolus, start normal saline at 125 mL/h, and add vasopressin, especially if the mean arterial blood pressure is less than 65 mmHg.    VTE Prophylaxis:   Pharmalogical Order History:     Ordered     Dose Route Frequency Stop    07/12/20 1113  enoxaparin (LOVENOX) syringe 80 mg      80 mg SC Every 12 Hours --    07/12/20 1113  enoxaparin (LOVENOX) syringe 40 mg      40 mg SC Once 07/12/20 1205    07/12/20 1100  Pharmacy to Dose enoxaparin (LOVENOX)  Status:  Discontinued      -- XX Continuous PRN 07/13/20 1304    07/10/20 0848  enoxaparin (LOVENOX) syringe 40 mg  Status:  Discontinued      40 mg SC Daily 07/12/20 1113    07/07/20 1450  enoxaparin (LOVENOX) syringe 40 mg  Status:  Discontinued      40 mg SC Daily 07/09/20 2205    07/07/20 1449  enoxaparin (LOVENOX) syringe 40 mg  Status:  Discontinued      40 mg SC Nightly 07/07/20 1450    07/06/20 0817  heparin (porcine) 5000 UNIT/ML injection 5,000 Units   Status:  Discontinued      5,000 Units SC Every 8 Hours Scheduled 07/07/20 1449    07/05/20 2252  heparin (porcine) 5000 UNIT/ML injection 5,000 Units  Status:  Discontinued      5,000 Units SC Every 12 Hours Scheduled 07/06/20 0817        3 hours of critical care provided. Time includes preparation of documents, medical consultations, review of old records, ordering and review of labs/studies, discussion with staff and family and direct bedside care. Critical care was necessary as patient was at high risk for life-threatening deterioration due to acute hypoxic respiratory failure and spontaneous pneumothorax.  ----------------------------------------------------------------------------------------------------------------------  Chief complaint:    Chief Complaint   Patient presents with   • Shortness of Breath       Subjective:  75 yo female admitted on 7/5/2020 with a few days of coughing; she was found to have bilateral infiltrates and was found to have COVID-19.  She was admitted to the progressive care unit, where she received convalescent plasma and Remdesivir as well as doxycycline, Rocephin, and Decadron.  However, on 7/10/2020, she was moved to the critical care unit due to worsening hypoxia.  I was called to see the patient as she had a sudden onset of hypoxia.  The patient was sitting up on the side of the bed and stated that she could not breathe.  She then was trying to get out of bed.  Patient seemed very confused.  Bedside nurse Susana was at bedside and stated that the patient had worsening confusion since the start of her shift a few hours ago.  The patient had blue lips and had a mottled face as well as legs.  The patient was not oriented and as result I was unable to obtain any history from her.  ---------------------------------------------------------------------------------------------------------------------   Allergies:  Sulfa  antibiotics  ---------------------------------------------------------------------------------------------------------------------     Ventilator settings:  FiO2 (%):  [100 %] 100 %  S RR:  [20-24] 24  PEEP/CPAP (cm H2O):  [5 cm H20-8 cm H20] 8 cm H20  MAP (cm H2O):  [10-12] 12    Results from last 7 days   Lab Units 07/15/20  0246 07/14/20  0304 07/12/20  0438   PH, ARTERIAL pH units 7.267* 7.463* 7.356   PO2 ART mm Hg 81.2* 73.5* 56.8*   PCO2, ARTERIAL mm Hg 55.3* 34.4* 36.3   HCO3 ART mmol/L 25.2 24.6 20.3     -------------------------------------------------------------------------------------------------------------------  Current Hospital Meds:    aspirin 81 mg Oral Nightly   budesonide 0.5 mg Nebulization BID - RT   chlorhexidine 15 mL Mouth/Throat Q12H   dexamethasone 6 mg Intravenous Daily   doxycycline 100 mg Intravenous Q12H   enoxaparin 80 mg Subcutaneous Q12H   lactobacillus acidophilus 1 capsule Oral Daily   levETIRAcetam 1,000 mg Intravenous Q12H   levothyroxine 25 mcg Oral Daily   lidocaine 5 mL Subcutaneous Once   meropenem 1 g Intravenous Q8H   OLANZapine 2.5 mg Oral Nightly   pantoprazole 40 mg Intravenous Q24H   phenytoin (DILANTIN) IVPB 100 mg Intravenous Q8H   pramipexole 1 mg Oral Nightly   psyllium 1 packet Oral Daily   sodium chloride 1,000 mL Intravenous Once   sodium chloride 10 mL Intravenous Q12H   topiramate 100 mg Oral Q12H   venlafaxine XR 75 mg Oral Q PM       fentaNYL Citrate     norepinephrine 0.02-0.3 mcg/kg/min Last Rate: 0.02 mcg/kg/min (07/15/20 8722)   Pharmacy Consult     propofol 5-50 mcg/kg/min Last Rate: 30 mcg/kg/min (07/15/20 2104)   sodium chloride 125 mL/hr Last Rate: 125 mL/hr (07/15/20 4439)   vasopressin (PITRESSIN) infusion 0.03 Units/min        Current Antimicrobial Therapy:  Anti-Infectives (From admission, onward)    Ordered     Dose/Rate Route Frequency Start Stop    07/09/20 1428  meropenem (MERREM) 1 g/100 mL 0.9% NS VTB (mbp)  Review   Ordering Provider:   Isi Fowler MD    1 g  over 3 Hours Intravenous Every 8 Hours 07/09/20 2100 07/16/20 2059    07/09/20 1428  meropenem (MERREM) 1 g/100 mL 0.9% NS VTB (mbp)     Ordering Provider:  Fany Carmona APRN    1 g  over 30 Minutes Intravenous Once 07/09/20 1515 07/09/20 1624    07/05/20 2252  doxycycline (VIBRAMYCIN) 100 mg/100 mL 0.9% NS MBP     Fany Carmona APRN reviewed the order on 07/11/20 1211.   Ordering Provider:  Fany Carmona APRN    100 mg Intravenous Every 12 Hours 07/06/20 0000 07/18/20 1210    07/05/20 1851  cefTRIAXone (ROCEPHIN) 1 g/100 mL 0.9% NS (MBP)     Ordering Provider:  Andrea Antonio APRN    1 g  over 30 Minutes Intravenous Once 07/05/20 1853 07/05/20 2034    07/05/20 1857  doxycycline (MONODOX) 100 MG capsule     Ordering Provider:  Andrea Antonio APRN    100 mg Oral 2 Times Daily 07/05/20 0000 07/15/20 2359          Current Diuretic Therapy:  Diuretics (From admission, onward)    Ordered     Dose/Rate Route Frequency Start Stop    07/14/20 0832  furosemide (LASIX) injection 20 mg     Ordering Provider:  Cayetano Manrique MD    20 mg Intravenous Every 12 Hours 07/14/20 1000 07/14/20 2135    07/13/20 1006  furosemide (LASIX) injection 20 mg     Ordering Provider:  Purnima Bejarano PA-C    20 mg Intravenous Every 12 Hours 07/13/20 1100 07/14/20 0052    07/12/20 0934  bumetanide (BUMEX) injection 1 mg     Ordering Provider:  Ayush Jiang MD    1 mg Intravenous Once 07/12/20 1100 07/12/20 1205    07/08/20 1417  furosemide (LASIX) injection 20 mg     Ordering Provider:  Isi Fowler MD    20 mg Intravenous Once 07/08/20 1500 07/08/20 1544          Diet:  NPO Diet    Tube Feeds:  Patient isn't on Tube Feeding  -------------------------------------------------------------------------------------------------------------------  Vital Signs:  Temp:  [97.9 °F (36.6 °C)-100.3 °F (37.9 °C)] 99 °F (37.2 °C)  Heart Rate:  [] 107  Resp:  [20-29] 20  BP:  ()/() 74/36  FiO2 (%):  [100 %] 100 %      07/11/20  0515 07/12/20  0355 07/14/20  0600   Weight: 84.1 kg (185 lb 6.4 oz) 82.6 kg (182 lb) 79.5 kg (175 lb 4.8 oz)     Body mass index is 31.05 kg/m².    Intake & Output (last 3 days)       07/12 0701 - 07/13 0700 07/13 0701 - 07/14 0700 07/14 0701 - 07/15 0700    P.O. 620 940 150    IV Piggyback     Total Intake(mL/kg) 1120 (13.6) 1440 (18.1) 1450 (18.2)    Urine (mL/kg/hr) 3075 (1.6) 2340 (1.2) 1350 (0.7)    Stool 0 0     Total Output 3075 2340 1350    Net -1955 -900 +100           Stool Unmeasured Occurrence 0 x 0 x         -------------------------------------------------------------------------------------------------------------------  Physical Exam   Constitutional: She appears well-developed. She appears listless. She appears distressed.   HENT:   Head: Normocephalic and atraumatic.   Right Ear: External ear normal.   Left Ear: External ear normal.   Nose: Nose normal.   Eyes: Pupils are equal, round, and reactive to light. EOM are normal. Right eye exhibits no discharge. Left eye exhibits no discharge. No scleral icterus.   Neck: No JVD present. No tracheal deviation present.   Cardiovascular: Normal rate.   No murmur heard.  Pulmonary/Chest: She is in respiratory distress. She has wheezes. She has no rales.   Slightly decreased breath sounds on the right side.   Abdominal: Soft. Bowel sounds are normal. She exhibits no distension.   Genitourinary:   Genitourinary Comments: Normal external female genitalia.  Lee catheter in place with yellow and clear urine in the collection container.   Musculoskeletal: She exhibits edema. She exhibits no deformity.   Neurological: She appears listless.   She is not able to follow commands.  She tries to speak but you cannot understand the words coming out of her mouth.   Skin: Capillary refill takes less than 2 seconds. No rash noted. She is diaphoretic. There is pallor.   Mottling of the knees.   Feet are pale and cold to touch.   Psychiatric: Her speech is slurred. She is not withdrawn.     -------------------------------------------------------------------------------------------------------------------  Tele:  Normal sinus rhythm with heart rates 80s to 90s.  I have personally reviewed/looked at the telemetry strips.    Last echocardiogram:  Results for orders placed during the hospital encounter of 07/05/20   Adult Transthoracic Echo Limited W/ Cont if Necessary Per Protocol    Narrative · Normal left ventricular cavity size and wall thickness noted. All left   ventricular wall segments contract normally.  · Estimated EF appears to be in the range of 61 - 65%.  · The aortic valve is structurally normal. No aortic valve regurgitation   is present. No aortic valve stenosis is present.  · The mitral valve is normal in structure. Mild mitral valve regurgitation   is present. No significant mitral valve stenosis is present.  · The tricuspid valve is normal. Mild tricuspid valve regurgitation is   present. Estimated right ventricular systolic pressure from tricuspid   regurgitation is moderately elevated (45-55 mmHg).  · There is no evidence of pericardial effusion.  · Comments: Patient seem to have developed pulmonary hypertension since   the last study on 6/10/2020.        I have personally read the ECHO final report.  -------------------------------------------------------------------------------------------------------------------  Labs:  Results from last 7 days   Lab Units 07/14/20  0113 07/13/20  1130 07/13/20  0121 07/12/20  0035  07/10/20  0531   CRP mg/dL 13.32*  --  18.68* 24.37*   < > 19.55*   LACTATE mmol/L  --  1.5  --   --   --   --    PROCALCITONIN ng/mL  --  0.13  --   --   --  0.55*   WBC 10*3/mm3 10.12  --  7.20 6.12   < > 6.42   HEMOGLOBIN g/dL 11.9*  --  11.5* 11.5*   < > 12.4   HEMATOCRIT % 37.2  --  35.8 35.9   < > 40.2   MCV fL 94.9  --  95.0 96.0   < > 100.2*   MCHC g/dL 32.0  --  32.1  32.0   < > 30.8*   PLATELETS 10*3/mm3 195  --  157 148   < > 118*    < > = values in this interval not displayed.     Results from last 7 days   Lab Units 07/14/20  0915 07/14/20  0113 07/13/20  0121 07/12/20  0035  07/10/20  0531  07/08/20  0512   SODIUM mmol/L  --  142 141 142   < > 138   < > 140   POTASSIUM mmol/L 3.8 4.4 3.3* 4.1   < > 4.0   < > 3.9   MAGNESIUM mg/dL  --   --   --   --   --  2.0  --  2.0   CHLORIDE mmol/L  --  107 106 109*   < > 107   < > 107   CO2 mmol/L  --  24.2 23.5 22.6   < > 17.5*   < > 18.3*   BUN mg/dL  --  27* 23 18   < > 21   < > 17   CREATININE mg/dL  --  0.52* 0.55* 0.50*   < > 0.65   < > 0.71   EGFR IF NONAFRICN AM mL/min/1.73  --  115 108 121   < > 89   < > 80   CALCIUM mg/dL  --  8.7 8.5* 8.3*   < > 8.3*   < > 8.2*   GLUCOSE mg/dL  --  91 109* 127*   < > 92   < > 103*   ALBUMIN g/dL  --   --   --   --   --  3.14*  --  3.49*   BILIRUBIN mg/dL  --   --   --   --   --  0.3  --  0.2   ALK PHOS U/L  --   --   --   --   --  135*  --  150*   AST (SGOT) U/L  --   --   --   --   --  59*  --  43*   ALT (SGPT) U/L  --   --   --   --   --  25  --  28    < > = values in this interval not displayed.   Estimated Creatinine Clearance: 61.6 mL/min (A) (by C-G formula based on SCr of 0.52 mg/dL (L)).    Lab Results   Component Value Date    COLORU Yellow 08/23/2018    CLARITYU Clear 08/23/2018    SPECGRAV 1.010 08/23/2018    PHUR 7.0 08/23/2018    KETONESU Negative 08/23/2018    NITRITE Negative 08/23/2018    LEUKOCYTESUR Negative 08/23/2018    BILIRUBINUR Negative 08/23/2018    UROBILINOGEN Normal 08/23/2018     Microbiology Results (last 10 days)     Procedure Component Value - Date/Time    Respiratory Culture - Sputum, Cough [[id number]] Collected:  07/07/20 1150    Lab Status:  Final result Specimen:  Sputum from Cough Updated:  07/09/20 1039     Respiratory Culture Scant growth (1+) Normal Respiratory Valorie     Gram Stain Moderate (3+) WBCs seen      Moderate (3+) Epithelial cells seen      Few  (2+) Gram positive bacilli      Rare (1+) Gram negative bacilli      Occasional Gram positive cocci    Legionella Antigen, Urine - Urine, Urine, Clean Catch [[id number]]  (Normal) Collected:  07/07/20 0647    Lab Status:  Final result Specimen:  Urine, Clean Catch Updated:  07/07/20 1035     LEGIONELLA ANTIGEN, URINE Negative    Narrative:       Presumptive negative for L. pneumophilia serogroup 1 antigen, suggesting no recent or current infection.    COVID-19, [facility]OTT IN-HOUSE,NP Swab (NO TRANSPORT MEDIA) 2 HR TAT - Swab, Nasopharynx [[id number]]  (Abnormal) Collected:  07/05/20 1931    Lab Status:  Final result Specimen:  Swab from Nasopharynx Updated:  07/05/20 2001     COVID19 Detected    Narrative:       Fact sheet for providers: https://www.fda.gov/media/893443/download     Fact sheet for patients: https://www.fda.gov/media/923937/download    Blood Culture - Blood, Arm, Right [[id number]] Collected:  07/05/20 1532    Lab Status:  Final result Specimen:  Blood from Arm, Right Updated:  07/10/20 1545     Blood Culture No growth at 5 days    Blood Culture - Blood, Arm, Left [[id number]] Collected:  07/05/20 1500    Lab Status:  Final result Specimen:  Blood from Arm, Left Updated:  07/10/20 1545     Blood Culture No growth at 5 days    Rapid Strep A Screen - Swab, Throat [[id number]]  (Normal) Collected:  07/05/20 1355    Lab Status:  Final result Specimen:  Swab from Throat Updated:  07/05/20 1411     Strep A Ag Negative    Influenza Antigen, Rapid - Swab, Nasopharynx [[id number]]  (Normal) Collected:  07/05/20 1355    Lab Status:  Final result Specimen:  Swab from Nasopharynx Updated:  07/05/20 1415     Influenza A Ag, EIA Negative     Influenza B Ag, EIA Negative    Beta Strep Culture, Throat - Swab, Throat [[id number]]  (Normal) Collected:  07/05/20 1355    Lab Status:  Final result Specimen:  Swab from Throat Updated:  07/07/20 0952     Throat Culture, Beta Strep No Beta Hemolytic Streptococcus Isolated     Narrative:       Group A Strep incidence is low in adults. Positive culture for Beta hemolytic Streptococcus species can reflect colonization and not true infection. Please correlate clinically.    Respiratory Panel, PCR - Swab, Nasopharynx [[id number]]  (Normal) Collected:  07/05/20 0430    Lab Status:  Final result Specimen:  Swab from Nasopharynx Updated:  07/06/20 1028     ADENOVIRUS, PCR Not Detected     Coronavirus 229E Not Detected     Coronavirus HKU1 Not Detected     Coronavirus NL63 Not Detected     Coronavirus OC43 Not Detected     Human Metapneumovirus Not Detected     Human Rhinovirus/Enterovirus Not Detected     Influenza B PCR Not Detected     Parainfluenza Virus 1 Not Detected     Parainfluenza Virus 2 Not Detected     Parainfluenza Virus 3 Not Detected     Parainfluenza Virus 4 Not Detected     Bordetella pertussis pcr Not Detected     Influenza A H1 2009 PCR Not Detected     Chlamydophila pneumoniae PCR Not Detected     Mycoplasma pneumo by PCR Not Detected     Influenza A PCR Not Detected     Influenza A H3 Not Detected     Influenza A H1 Not Detected     RSV, PCR Not Detected    Narrative:       The coronavirus on the RVP is NOT COVID-19 and is NOT indicative of infection with COVID-19.         I have personally looked at the labs and they are summarized above.  -------------------------------------------------------------------------------------------------------------------  Detailed radiology reports for the last 24 hours:  Imaging Results (Last 24 Hours)     Procedure Component Value Units Date/Time    XR Chest 1 View [[id number]] Collected:  07/15/20 0249     Updated:  07/15/20 0251    Narrative:       CR Chest 1 Vw    INDICATION:   Chest tube placement for pneumothorax.     COMPARISON:    Earlier same day    FINDINGS:  Single portable AP view(s) of the chest.    New small bore right chest tube is in place. There is a persistent small right apical pneumothorax, slightly improved. No left-sided  pneumothorax is seen. The remainder of the exam is unchanged.       Impression:       New small right chest tube with slight interval improvement in a small right apical pneumothorax.    Signer Name: Cristi Fields MD   Signed: 7/15/2020 2:49 AM   Workstation [facility]: [facility]    [facility]    XR Chest 1 View [[id number]] Collected:  07/15/20 0205     Updated:  07/15/20 0207    Narrative:       CR Chest 1 Vw    INDICATION:   Pneumonia. COVID 19. Hypoxia.     COMPARISON:    7/13/2020    FINDINGS:  Single portable AP view(s) of the chest.    Heart size is normal. Endotracheal tube in the mid trachea in good position. NG tube in the stomach. Coarse bilateral infiltrates are similar to prior. There is a new small right apical pneumothorax measuring about 10%. No midline shift. No left-sided  pneumothorax is seen.       Impression:       New small right pneumothorax with no midline shift.    NOTIFICATION: Critical Value/emergent results were called by telephone at the time of interpretation on 7/15/2020 2:02 AM to Dr. Hickey who verbally acknowledged these results.    Signer Name: Cristi Fields MD   Signed: 7/15/2020 2:05 AM   Workstation Name: [facility]    [facility]        I have personally looked at the radiology images and I have read the available final reports.  -------------------------------------------------------------------------------------------------------------------    Jay Hickey MD  [facility]  07/15/20  03:53

## 2020-07-15 NOTE — PROGRESS NOTES
[facility] HOSPITALIST PROGRESS NOTE     Patient Identification:  Name:  Siria Rueda  Age:  74 y.o.  Sex:  female  :  1945  MRN:  [id number]  Visit Number:  [id number]  Primary Care Provider:  Cruzito Palumbo MD    Length of stay:  9    Chief complaint:  74 y.o. old female admitted with Shortness of Breath    HPI:  74-year-old female admitted with COVID pneumonia.  CT scan showed right patchy pneumonia.  Patient has received convalescent plasma on 2020 and has been started on Remdesivir per ID recommendations in addition to Rocephin, doxycycline and Decadron.  Patient continued to have worsening and progressive hypoxemia and therefore was transferred to intensive care unit.  Patient      Subjective:    This was an audio and video enabled telemedicine encounter.  Payal MAST was present at bedside and assisted during this encounter.  Patient still continues to complain of dyspnea.  Continues to have cough without sputum production.  Nursing staff reports that patient has been confused today.  Patient has been trying to pull on her central line and pulling off her oxygen.  Patient however calmed down once patient was put back on nonrebreather.           Current Hospital Meds:    albuterol sulfate HFA 2 puff Inhalation Q6H   aspirin 81 mg Oral Nightly   budesonide-formoterol 2 puff Inhalation BID - RT   cholecalciferol 50,000 Units Oral Weekly   dexamethasone 6 mg Intravenous Daily   diazePAM 3.75 mg Oral Nightly   doxycycline 100 mg Intravenous Q12H   enoxaparin 80 mg Subcutaneous Q12H   famotidine 20 mg Oral BID AC   Hydrocortisone (Perianal)  Rectal BID   lactobacillus acidophilus 1 capsule Oral Daily   levothyroxine 25 mcg Oral Daily   lidocaine 5 mL Subcutaneous Once   magnesium oxide 400 mg Oral Daily   meropenem 1 g Intravenous Q8H   OLANZapine 2.5 mg Oral Nightly   phenytoin 100 mg Oral Q PM   phenytoin 200 mg Oral QAM   pramipexole 1 mg Oral Nightly   psyllium 1 packet Oral Daily      sodium chloride 10 mL Intravenous Q12H   topiramate 100 mg Oral Q12H   venlafaxine XR 75 mg Oral Q PM       norepinephrine 0.02-0.3 mcg/kg/min Last Rate: Stopped (07/14/20 1543)     ----------------------------------------------------------------------------------------------------------------------  Vital Signs:  Temp:  [97.9 °F (36.6 °C)-98.8 °F (37.1 °C)] 98.1 °F (36.7 °C)  Heart Rate:  [76-99] 97  Resp:  [20-31] 24  BP: ()/(38-83) 93/80      07/11/20  0515 07/12/20  0355 07/14/20  0600   Weight: 84.1 kg (185 lb 6.4 oz) 82.6 kg (182 lb) 79.5 kg (175 lb 4.8 oz)     Body mass index is 31.05 kg/m².    Intake/Output Summary (Last 24 hours) at 7/14/2020 2138  Last data filed at 7/14/2020 1800  Gross per 24 hour   Intake 600 ml   Output 2180 ml   Net -1580 ml     Diet Regular; Cardiac, Daily Fluid Restriction; 1000 mL Fluid Per Day  ----------------------------------------------------------------------------------------------------------------------  This physical exam has been personally performed remotely in the unit aided by real-time audio/visual communication tools. KEZIA Armando present at bedside during this exam and assisted during exam. The use of a video visit has been reviewed with the patient and verbal informed consent has been obtained.     Physical Exam:  General: Patient appears awake, alert, and in mild distress due to tachypnea.  Head: Normocephalic, atraumatic  Eyes: EOMI. Conjunctivae and sclerae normal.  Ears: Ears appear intact with no abnormalities noted.   Neck: Trachea midline. No obvious JVD.  Heart: Tele reveals sinus rhythm  Lungs: Tachypneic, mild respiratory distress. No audible wheezing.  Abdomen: No obvious abdominal distension.  MS: Muscle tone appears normal. No gross deformities.  Extremities: No clubbing, cyanosis or edema noted.  Skin: No visible bleeding, bruising, or rash.  Neurologic: Awake and alert but confused.  No focal neurological  deficits.    ----------------------------------------------------------------------------------------------------------------------  Tele:    ----------------------------------------------------------------------------------------------------------------------  Results from last 7 days   Lab Units 07/14/20  0113 07/13/20  0121 07/12/20  0035   CK TOTAL U/L 166 230* 344*   TROPONIN T ng/mL  --  <0.010  --      Results from last 7 days   Lab Units 07/14/20  0113 07/13/20  1130 07/13/20  0121 07/12/20  0035   CRP mg/dL 13.32*  --  18.68* 24.37*   LACTATE mmol/L  --  1.5  --   --    WBC 10*3/mm3 10.12  --  7.20 6.12   HEMOGLOBIN g/dL 11.9*  --  11.5* 11.5*   HEMATOCRIT % 37.2  --  35.8 35.9   MCV fL 94.9  --  95.0 96.0   MCHC g/dL 32.0  --  32.1 32.0   PLATELETS 10*3/mm3 195  --  157 148     Results from last 7 days   Lab Units 07/14/20  0304   PH, ARTERIAL pH units 7.463*   PO2 ART mm Hg 73.5*   PCO2, ARTERIAL mm Hg 34.4*   HCO3 ART mmol/L 24.6     Results from last 7 days   Lab Units 07/14/20  0915 07/14/20  0113 07/13/20  0121 07/12/20  0035  07/10/20  0531  07/08/20  0512   SODIUM mmol/L  --  142 141 142   < > 138   < > 140   POTASSIUM mmol/L 3.8 4.4 3.3* 4.1   < > 4.0   < > 3.9   MAGNESIUM mg/dL  --   --   --   --   --  2.0  --  2.0   CHLORIDE mmol/L  --  107 106 109*   < > 107   < > 107   CO2 mmol/L  --  24.2 23.5 22.6   < > 17.5*   < > 18.3*   BUN mg/dL  --  27* 23 18   < > 21   < > 17   CREATININE mg/dL  --  0.52* 0.55* 0.50*   < > 0.65   < > 0.71   EGFR IF NONAFRICN AM mL/min/1.73  --  115 108 121   < > 89   < > 80   CALCIUM mg/dL  --  8.7 8.5* 8.3*   < > 8.3*   < > 8.2*   GLUCOSE mg/dL  --  91 109* 127*   < > 92   < > 103*   ALBUMIN g/dL  --   --   --   --   --  3.14*  --  3.49*   BILIRUBIN mg/dL  --   --   --   --   --  0.3  --  0.2   ALK PHOS U/L  --   --   --   --   --  135*  --  150*   AST (SGOT) U/L  --   --   --   --   --  59*  --  43*   ALT (SGPT) U/L  --   --   --   --   --  25  --  28    < > = values  in this interval not displayed.   Estimated Creatinine Clearance: 61.6 mL/min (A) (by C-G formula based on SCr of 0.52 mg/dL (L)).    No results found for: AMMONIA      Blood Culture   Date Value Ref Range Status   07/05/2020 No growth at 5 days  Final   07/05/2020 No growth at 5 days  Final     No results found for: URINECX  No results found for: WOUNDCX  No results found for: STOOLCX    I have personally looked at the labs and they are summarized above.  ----------------------------------------------------------------------------------------------------------------------  Imaging Results (Last 24 Hours)     ** No results found for the last 24 hours. **        ----------------------------------------------------------------------------------------------------------------------  Assessment and Plan:    -Sepsis secondary to COVID-19 pneumonia  Patient remains afebrile with stable vital signs.  She continues to have elevation inflammatory markers though improved since yesterday.  Ferritin, CRP, LDH are improving.  WBC remains WNL.  Fibrinogen level was within normal limit.  D-dimer is markedly elevated and is greater than 20 today.  Chest x-ray showed bilateral airspace disease, unchanged since yesterday.  Continue meropenem and doxycycline and Remdesivir per ID recommendations.  ID on board and help is appreciated.  Continue IV Decadron.   Respiratory panel PCR negative. Legionella urine antigen Is negative. Respiratory culture with normal madyson.   Patient has received convalescent plasma on 7/7/2020.  Blood culture showed no growth so far.    -Acute hypoxemic respiratory failure  Unchanged since yesterday.  Patient continues to require high flow nasal cannula at 10 L/min and Ventimask.  D-dimer elevated improved since yesterday.  Continue to coagulation with Lovenox.  Continue supplemental oxygen as needed to keep SPO2 >90%.  Patient is at high risk for intubation.  Patient has diuresed well with Bumex and Lasix.   Blood pressure is low today therefore will hold any further diuretics.      -COPD  Continue albuterol MDI and Symbicort inhalers.    -Thrombocytopenia  Resolved.  Platelet count WNL today.    -Essential hypertension  Blood pressure well controlled.    -ASCVD with h/o PCI  Patient denies any chest pain.    -Seizure disorder  Continue seizure precaution.  Continue Dilantin.    -Adjustment disorder with depression/anxiety  Continue amitriptyline, Effexor and Valium.  Amitriptyline dose was increased yesterday and patient has had improvement in generalized aches and pains    Activity: Bedrest.  Patient has limited ambulation due to hypoxemia.  Nutrition: Regular diet  Fluids: None  DVT prophylaxis: Lovenox subcu  GI prophylaxis: Pepcid    The patient is high risk due to: Sepsis, hypoxemia, COVID pneumonia, COPD, thrombocytopenia, hypertension, CAD, seizure disorder.    I discussed the patient's findings and my recommendations with patient and nursing staff.    Ayush Jiang MD  07/14/20  21:38

## 2020-07-15 NOTE — PROCEDURES
"Validation General Procedure  Date/Time: 7/15/2020 1:25 AM  Performed by: Jay Hickey MD  Authorized by: Jay Hickey MD   Consent: The procedure was performed in an emergent situation.  Patient identity confirmed: arm band and provided demographic data  Time out: Immediately prior to procedure a \"time out\" was called to verify the correct patient, procedure, equipment, support staff and site/side marked as required.  Local anesthesia used: no    Anesthesia:  Local anesthesia used: no  Patient tolerance: Patient tolerated the procedure well with no immediate complications  Comments: The skin was prepped and the a right external jugular vein was accessed with one stick; a flash of blood was seen and then the IV was advanced with the needle stationary.  The IV was easy to flush and no swelling was seen at the insertion site.        "

## 2020-07-15 NOTE — CONSULTS
Inpatient Cardiology Consult  Consult performed by: Mary Anne Sue APRN  Consult ordered by: Purnima Bejarano PA-C        Date of Admit: 7/5/2020  Date of Consult: 07/15/20  No ref. provider found  Siria Rueda  1945    Consulting Physician: Miguel Holder MD    Cardiology consultation    Reason for consultation:  Troponin elevation      History of Present Illness    Subjective     Chief Complaint   Patient presents with   • Shortness of Breath       Siria Rueda is a 74 y.o. female with past medical history significant for coronary artery disease, status post stenting x1, COPD, obstructive sleep apnea, compliant with CPAP, seizure disorder, and history of tobacco use.  She initially presented to the ED with complaints of worsening cough and shortness of breath.  The patient was diagnosed with severe sepsis secondary to COVID-19 pneumonia and was admitted to the progressive care unit for further evaluation and management.  9 days into her hospitalization she appeared to be doing well until she acutely decompensated, desatted to the 50s and required intubation.  Her blood pressure dropped, troponin was ordered and found to be positive.  Cardiology was consulted due to elevated troponin.    Pt is in Covid 19 isolation and was not directly examined. Information was gleaned from the chart.  I spoke directly to Dr Manrique and to Hoa MAST.      Cardiac risk factors:arteriosclerotic heart disease, hypercholesterolemia, hypertension and advanced age    Last Echo: 7/13/2020    Interpretation Summary     · Normal left ventricular cavity size and wall thickness noted. All left ventricular wall segments contract normally.  · Estimated EF appears to be in the range of 61 - 65%.  · The aortic valve is structurally normal. No aortic valve regurgitation is present. No aortic valve stenosis is present.  · The mitral valve is normal in structure. Mild mitral valve regurgitation is present. No significant mitral  valve stenosis is present.  · The tricuspid valve is normal. Mild tricuspid valve regurgitation is present. Estimated right ventricular systolic pressure from tricuspid regurgitation is moderately elevated (45-55 mmHg).  · There is no evidence of pericardial effusion.  · Comments: Patient seem to have developed pulmonary hypertension since the last study on 6/10/2020.        Last Stress: NA    Last Cath: NA      Past Medical History:   Diagnosis Date   • Arthritis    • COPD (chronic obstructive pulmonary disease) (CMS/HCC)    • Coronary artery disease    • Disease of thyroid gland    • Elevated cholesterol    • GERD (gastroesophageal reflux disease)    • High cholesterol    • Hypertension    • Liver disease    • Seizures (CMS/HCC)    • Sleep apnea    • Stroke (CMS/HCC)      Past Surgical History:   Procedure Laterality Date   • BLADDER SLING MODIFIED, ANTERIOR AND POSTERIOR VAGINAL REPAIR  2019   • BRAIN SURGERY     • CARDIAC CATHETERIZATION      x2   • CARDIAC SURGERY      heart stent placed   •  SECTION     • COLONOSCOPY N/A 2018    Procedure: COLONOSCOPY FOR SCREENING CPT CODE: ;  Surgeon: Jose Lazaro MD;  Location: [facility];  Service: Gastroenterology   • HEMORRHOIDECTOMY       Family History   Problem Relation Age of Onset   • Heart disease Father    • Lung cancer Father      Social History     Tobacco Use   • Smoking status: Former Smoker   • Smokeless tobacco: Never Used   Substance Use Topics   • Alcohol use: No   • Drug use: No     Medications Prior to Admission   Medication Sig Dispense Refill Last Dose   • amitriptyline (ELAVIL) 50 MG tablet Take 50 mg by mouth Every Night.   2020 at Unknown time   • aspirin 81 MG EC tablet Take 81 mg by mouth Every Night.   2020 at Unknown time   • calcium polycarbophil (FIBERCON) 625 MG tablet Take 625 mg by mouth Daily.   2020 at Unknown time   • cyclobenzaprine (FLEXERIL) 10 MG tablet Take 10 mg by mouth Every Night.    7/4/2020 at Unknown time   • diazePAM (VALIUM) 5 MG tablet Take 5 mg by mouth Every Night.  0 7/4/2020 at Unknown time   • docusate sodium (COLACE) 250 MG capsule Take 250 mg by mouth 2 (Two) Times a Day As Needed for Constipation.   Past Week at Unknown time   • ezetimibe (ZETIA) 10 MG tablet Take 10 mg by mouth Daily.   7/5/2020 at Unknown time   • Fluticasone-Umeclidin-Vilant (Trelegy Ellipta) 100-62.5-25 MCG/INH aerosol powder  Inhale 1 puff Daily.   7/5/2020 at Unknown time   • furosemide (LASIX) 20 MG tablet Take 20 mg by mouth Daily.   7/4/2020   • lansoprazole (PREVACID) 30 MG capsule Take 30 mg by mouth Every Morning.  0 7/5/2020 at Unknown time   • levothyroxine (SYNTHROID) 25 MCG tablet Take 25 mcg by mouth Daily.   7/5/2020 at Unknown time   • magnesium oxide (MAGOX) 400 (241.3 Mg) MG tablet tablet Take 400 mg by mouth Daily.   7/5/2020 at Unknown time   • NIFEdipine (PROCARDIA) 10 MG capsule Take 10 mg by mouth Daily.   7/5/2020 at AM   • phenytoin (DILANTIN) 100 MG ER capsule Take 2 capsules by mouth Every Morning.   7/5/2020 at Unknown time   • phenytoin (DILANTIN) 100 MG ER capsule Take 100 mg by mouth Every Night.   7/4/2020 at Unknown time   • potassium chloride (K-DUR,KLOR-CON) 20 MEQ CR tablet Take 20 mEq by mouth Daily.   7/5/2020 at Unknown time   • pramipexole (MIRAPEX) 0.5 MG tablet Take 1 mg by mouth Every Night.   7/4/2020 at Unknown time   • rosuvastatin (CRESTOR) 10 MG tablet Take 10 mg by mouth Every Night.   7/4/2020 at Unknown time   • topiramate (TOPAMAX) 100 MG tablet Take 100 mg by mouth Every Morning.  0 7/5/2020 at Unknown time   • topiramate (TOPAMAX) 100 MG tablet Take 150 mg by mouth Every Night.   7/4/2020 at Unknown time   • venlafaxine XR (EFFEXOR-XR) 75 MG 24 hr capsule Take 75 mg by mouth Every Evening.   7/4/2020 at Unknown time   • vitamin D (ERGOCALCIFEROL) 1.25 MG (60877 UT) capsule capsule Take 50,000 Units by mouth 1 (One) Time Per Week. Mondays.   Past Week at  Unknown time   • HYDROcodone-acetaminophen (NORCO) 7.5-325 MG per tablet Take 1 tablet by mouth 2 (Two) Times a Day As Needed for Moderate Pain .   Unknown at Unknown time   • lisinopril (PRINIVIL,ZESTRIL) 20 MG tablet Take 20 mg by mouth Daily.   11/15/2019 at am   • risedronate (ACTONEL) 150 MG tablet Take 150 mg by mouth Every 30 (Thirty) Days. with water on empty stomach, nothing by mouth or lie down for next 30 minutes.   Unknown at Unknown time     Allergies:  Sulfa antibiotics    Review of Systems   Unable to perform ROS: Intubated     Objective      Vital Signs  Temp:  [97.9 °F (36.6 °C)-100.3 °F (37.9 °C)] 98.3 °F (36.8 °C)  Heart Rate:  [] 105  Resp:  [20-29] 24  BP: ()/() 94/70  FiO2 (%):  [100 %] 100 %  Body mass index is 29.46 kg/m².    Intake/Output Summary (Last 24 hours) at 7/15/2020 1156  Last data filed at 7/15/2020 0923  Gross per 24 hour   Intake 3818.57 ml   Output 1750 ml   Net 2068.57 ml       Physical Exam    Pt is in Covid 19 isolation, no physical exam was completed.  The patient is intubated and sedated.  FiO2 100%.  She is on Levophed.      Telemetry:  Sinus 100s    Results Review:   I reviewed the patient's new clinical results.  Results from last 7 days   Lab Units 07/15/20  0738 07/15/20  0736 07/14/20  0113 07/13/20  0121 07/12/20  0035 07/11/20  0517   CK TOTAL U/L 134  --  166 230* 344* 904*   TROPONIN T ng/mL  --  0.635*  --  <0.010  --   --      Results from last 7 days   Lab Units 07/15/20  0737 07/14/20  0113 07/13/20  0121 07/12/20  0035 07/11/20  0517 07/10/20  0531 07/09/20  0533   WBC 10*3/mm3 22.60* 10.12 7.20 6.12 10.24 6.42 4.46   HEMOGLOBIN g/dL 13.5 11.9* 11.5* 11.5* 12.9 12.4 12.2   PLATELETS 10*3/mm3 316 195 157 148 170 118* 114*     Results from last 7 days   Lab Units 07/15/20  0736 07/14/20  0915 07/14/20  0113 07/13/20  0121 07/12/20  0035 07/11/20  0517 07/10/20  0531 07/09/20  0533   SODIUM mmol/L 145  --  142 141 142 140 138 140   POTASSIUM  mmol/L 4.2 3.8 4.4 3.3* 4.1 4.3 4.0 3.5   CHLORIDE mmol/L 107  --  107 106 109* 106 107 108*   CO2 mmol/L 21.6*  --  24.2 23.5 22.6 21.5* 17.5* 19.0*   BUN mg/dL 28*  --  27* 23 18 17 21 19   CREATININE mg/dL 0.68  --  0.52* 0.55* 0.50* 0.62 0.65 0.72   CALCIUM mg/dL 8.3*  --  8.7 8.5* 8.3* 8.8 8.3* 7.9*   GLUCOSE mg/dL 139*  --  91 109* 127* 99 92 98   ALT (SGPT) U/L 20  --   --   --   --   --  25  --    AST (SGOT) U/L 40*  --   --   --   --   --  59*  --      Lab Results   Component Value Date    INR 1.04 01/10/2019     Lab Results   Component Value Date    MG 2.0 07/15/2020    MG 2.0 07/10/2020    MG 2.0 07/08/2020     Lab Results   Component Value Date    TSH 1.270 07/06/2020      No results found for: BNP     EKG:         Imaging Results (Last 72 Hours)     Procedure Component Value Units Date/Time    CT Head Without Contrast [[id number]] Collected:  07/15/20 0509     Updated:  07/15/20 0512    Narrative:       CT Head WO    HISTORY:   Seizure after intubation.    TECHNIQUE:   Axial unenhanced head CT with multiplanar reformats. Radiation dose reduction techniques included automated exposure control or exposure modulation based on body size. Count of known CT and cardiac nuc med studies performed in previous 12 months: 2.     COMPARISON:   None.    FINDINGS:   Patient is status post left frontal craniotomy. There is encephalomalacia in the left frontal lobe. There is some ex vacuo dilatation of the anterior horn of the left lateral ventricle.  No definite acute infarct or hemorrhage is seen. There are no  masses. Atherosclerotic calcifications are present in the carotid siphons and vertebral arteries. There is no skull fracture. Paranasal sinuses appear clear. Endotracheal tube is present.      Impression:         1. No definite acute intracranial findings.  2. Left frontal craniotomy with left frontal encephalomalacia.    Signer Name: Cristi Fields MD   Signed: 7/15/2020 5:09 AM   Workstation Name: [facility]     [facility]    XR Chest 1 View [[id number]] Collected:  07/15/20 0423     Updated:  07/15/20 0425    Narrative:       CR Chest 1 Vw    INDICATION:   Line placement.     COMPARISON:    Earlier same day    FINDINGS:  Single portable AP view(s) of the chest.    ET tube in the mid trachea. New right subclavian line tip in the SVC. Right-sided chest tube remains in place. Right pneumothorax is unchanged. No left-sided pneumothorax is seen. Heart size is normal. Bilateral infiltrates are stable.       Impression:       Right subclavian line tip in the SVC. Otherwise, no significant change.    Signer Name: Cristi Fields MD   Signed: 7/15/2020 4:23 AM   Workstation Name: [facility]    [facility]    XR Chest 1 View [[id number]] Collected:  07/15/20 0249     Updated:  07/15/20 0251    Narrative:       CR Chest 1 Vw    INDICATION:   Chest tube placement for pneumothorax.     COMPARISON:    Earlier same day    FINDINGS:  Single portable AP view(s) of the chest.    New small bore right chest tube is in place. There is a persistent small right apical pneumothorax, slightly improved. No left-sided pneumothorax is seen. The remainder of the exam is unchanged.       Impression:       New small right chest tube with slight interval improvement in a small right apical pneumothorax.    Signer Name: Cristi Fields MD   Signed: 7/15/2020 2:49 AM   Workstation Name: [facility]    [facility]    XR Chest 1 View [[id number]] Collected:  07/15/20 0205     Updated:  07/15/20 0207    Narrative:       CR Chest 1 Vw    INDICATION:   Pneumonia. COVID 19. Hypoxia.     COMPARISON:    7/13/2020    FINDINGS:  Single portable AP view(s) of the chest.    Heart size is normal. Endotracheal tube in the mid trachea in good position. NG tube in the stomach. Coarse bilateral infiltrates are similar to prior. There is a new small right apical pneumothorax measuring about 10%. No  midline shift. No left-sided  pneumothorax is seen.       Impression:       New small right pneumothorax with no midline shift.    NOTIFICATION: Critical Value/emergent results were called by telephone at the time of interpretation on 7/15/2020 2:02 AM to Dr. Hickey who verbally acknowledged these results.    Signer Name: Cristi Fields MD   Signed: 7/15/2020 2:05 AM   Workstation Name: [facility]    [facility]    XR Chest 1 View [[id number]] Collected:  07/13/20 1026     Updated:  07/13/20 1049    Narrative:       XR CHEST 1 VW-     CLINICAL INDICATION: dyspnea, covid19; J18.9-Pneumonia, unspecified  organism; U07.1-COVID-19        COMPARISON: 07/11/2020      TECHNIQUE: Single frontal view of the chest.     FINDINGS:     Interstitial and airspace disease bilaterally, slightly improved  The cardiac silhouette is normal. The pulmonary vasculature is  unremarkable.  There is no evidence of an acute osseous abnormality.   There are no suspicious-appearing parenchymal soft tissue nodules.          Impression:       Slight interval improvement     This report was finalized on 7/13/2020 10:26 AM by Dr. Jorge A Vizcaino MD.            Assessment:  1. Elevated troponin in the setting of Covid 19 sepsis with acute pnuemothorax and O2 saturations down to 50%.  2. Covid 19 sepsis with septic shock  3. Acute hypoxic respiratory failure  4. Acute pneumothorax  5. COPD  6. Thrombocytopenia  7. CAD, s/p PCI (details unknown)  8. Essential hypertension      Recommendations:  1. Elevated troponin likely secondary to demand ischemia in the setting of acute pneumothorax with hypoxia which is best correlated to a Type 2 MI.  However, since patient is not able to tell us if she has had or is having chest pain and she has history of CAD, an ischemic evaluation is recommended.  This can be completed when she is clinically stable.  In the meantime, I will get and EKG to evaluate for ischemia changes.  2. Pt has been  placed on ASA and statin.  Metoprolol has been held secondary to hypotension.          Thank you very much for asking us to be involved in this patient's care.  We will follow along with you.    Mary Anne Sue, ANNA   07/15/20  11:56 AM

## 2020-07-15 NOTE — PROGRESS NOTES
LOS: 10 days   Patient Care Team:  Cruzito Palumbo MD as PCP - General (Internal Medicine)  Radha Abarca APRN as PCP - Claims Attributed    Chief Complaint: shortness of breath    Subjective     History of Present Illness     Patient required mechanical ventilation in the early hours this morning due to hypoxia with non-rebreathing mask and high FIO2, along with mental status decline and skin discoloration. Following intubation, imaging revealed spontaneous pneumothorax. Cook catheter was placed. Seizure activity also developed. She was given loading dose of IV Keppra. Norepinephrine currently required for pressure support. Currently sedated with propofol and fentanyl.  Settings this morning include rate 24, tidal point 480, FiO2 100%, PEEP 8.    Subjective    Unable to obtain review of systems due to intubation and sedation.       Objective     Vital Signs  Temp:  [97.9 °F (36.6 °C)-100.3 °F (37.9 °C)] 98.3 °F (36.8 °C)  Heart Rate:  [] 106  Resp:  [20-29] 24  BP: ()/() 104/30  FiO2 (%):  [100 %] 100 %  Body mass index is 29.46 kg/m².    Intake/Output Summary (Last 24 hours) at 7/15/2020 0835  Last data filed at 7/15/2020 0600  Gross per 24 hour   Intake 3395.65 ml   Output 1750 ml   Net 1645.65 ml     No intake/output data recorded.    Objective     Physical exam limited due to COVID-19 isolation.   General: Intubated.  Sedated.  Chest tube in place.  HENT: normocephalic. Atraumatic.   Cardiac: normal rate and rhythm noted on telemetry  Lungs: Intubated.  Appears agreeable with ventilator settings.  Chest tube in place.  Musculoskeletal: no significant deformity, joint abnormality.   Neurologic: Sedated.      Results Review:     I reviewed the patient's new clinical results.    ABG this morning was reviewed.  Notable for acidosis with PCO2 and HCO3 within normal limits.  PO2 noted at 116 on 100% FiO2.  CMP today notable for decrease in bicarbonate level singling metabolic acidosis with anion  gap elevation.  Creatinine and BUN remained stable.  Hypocalcemia present in setting of hypoalbuminemia.  Transaminitis noted.  Interval downtrending noted of ferritin elevation.  CRP showed interval worsening today with lactate this morning noted at 2.8.  D-dimer remains persistently elevated.  CBC shows acute leukocytosis.  No anemia or platelet abnormality.  ANC has also trended upward.    CT of the head without contrast was overall nonsignificant.  Chest x-ray post chest tube placement notable for persistent right pneumothorax.  Chest tube is positioned in the lower lung field of the right side.    WBC WBC   Date Value Ref Range Status   07/15/2020 22.60 (H) 3.40 - 10.80 10*3/mm3 Final   07/14/2020 10.12 3.40 - 10.80 10*3/mm3 Final   07/13/2020 7.20 3.40 - 10.80 10*3/mm3 Final      HGB Hemoglobin   Date Value Ref Range Status   07/15/2020 13.5 12.0 - 15.9 g/dL Final   07/14/2020 11.9 (L) 12.0 - 15.9 g/dL Final   07/13/2020 11.5 (L) 12.0 - 15.9 g/dL Final      HCT Hematocrit   Date Value Ref Range Status   07/15/2020 43.0 34.0 - 46.6 % Final   07/14/2020 37.2 34.0 - 46.6 % Final   07/13/2020 35.8 34.0 - 46.6 % Final      Platlets No results found for: LABPLAT     PT/INR:  No results found for: PROTIME/No results found for: INR    Sodium Sodium   Date Value Ref Range Status   07/15/2020 145 136 - 145 mmol/L Final   07/14/2020 142 136 - 145 mmol/L Final   07/13/2020 141 136 - 145 mmol/L Final      Potassium Potassium   Date Value Ref Range Status   07/15/2020 4.2 3.5 - 5.2 mmol/L Final   07/14/2020 3.8 3.5 - 5.2 mmol/L Final   07/14/2020 4.4 3.5 - 5.2 mmol/L Final   07/13/2020 3.3 (L) 3.5 - 5.2 mmol/L Final      Chloride Chloride   Date Value Ref Range Status   07/15/2020 107 98 - 107 mmol/L Final   07/14/2020 107 98 - 107 mmol/L Final   07/13/2020 106 98 - 107 mmol/L Final      Bicarbonate No results found for: PLASMABICARB   BUN BUN   Date Value Ref Range Status   07/15/2020 28 (H) 8 - 23 mg/dL Final   07/14/2020  27 (H) 8 - 23 mg/dL Final   07/13/2020 23 8 - 23 mg/dL Final      Creatinine Creatinine   Date Value Ref Range Status   07/15/2020 0.68 0.57 - 1.00 mg/dL Final   07/14/2020 0.52 (L) 0.57 - 1.00 mg/dL Final   07/13/2020 0.55 (L) 0.57 - 1.00 mg/dL Final      Calcium Calcium   Date Value Ref Range Status   07/15/2020 8.3 (L) 8.6 - 10.5 mg/dL Final   07/14/2020 8.7 8.6 - 10.5 mg/dL Final   07/13/2020 8.5 (L) 8.6 - 10.5 mg/dL Final      Magnesium Magnesium   Date Value Ref Range Status   07/15/2020 2.0 1.6 - 2.4 mg/dL Final        pH pH, Arterial   Date Value Ref Range Status   07/15/2020 7.302 (L) 7.350 - 7.450 pH units Final     Comment:     84 Value below reference range   07/15/2020 7.267 (L) 7.350 - 7.450 pH units Final     Comment:     84 Value below reference range   07/14/2020 7.463 (H) 7.350 - 7.450 pH units Final     Comment:     83 Value above reference range      pO2 pO2, Arterial   Date Value Ref Range Status   07/15/2020 116.0 (H) 83.0 - 108.0 mm Hg Final     Comment:     83 Value above reference range   07/15/2020 81.2 (L) 83.0 - 108.0 mm Hg Final   07/14/2020 73.5 (L) 83.0 - 108.0 mm Hg Final     Comment:     84 Value below reference range      pCO2 pCO2, Arterial   Date Value Ref Range Status   07/15/2020 42.1 35.0 - 45.0 mm Hg Final   07/15/2020 55.3 (H) 35.0 - 45.0 mm Hg Final     Comment:     83 Value above reference range   07/14/2020 34.4 (L) 35.0 - 45.0 mm Hg Final     Comment:     84 Value below reference range      HCO3 HCO3, Arterial   Date Value Ref Range Status   07/15/2020 20.8 20.0 - 26.0 mmol/L Final   07/15/2020 25.2 20.0 - 26.0 mmol/L Final   07/14/2020 24.6 20.0 - 26.0 mmol/L Final          aspirin 81 mg Oral Nightly   budesonide 0.5 mg Nebulization BID - RT   chlorhexidine 15 mL Mouth/Throat Q12H   dexamethasone 6 mg Intravenous Daily   doxycycline 100 mg Intravenous Q12H   enoxaparin 80 mg Subcutaneous Q12H   lactobacillus acidophilus 1 capsule Oral Daily   levETIRAcetam 1,000 mg  Intravenous Q12H   levothyroxine 25 mcg Oral Daily   lidocaine 5 mL Subcutaneous Once   meropenem 1 g Intravenous Q8H   OLANZapine 2.5 mg Oral Nightly   pantoprazole 40 mg Intravenous Q24H   phenytoin (DILANTIN) IVPB 100 mg Intravenous Q8H   pramipexole 1 mg Oral Nightly   psyllium 1 packet Oral Daily   sodium chloride 10 mL Intravenous Q12H   sodium chloride 10 mL Intravenous Q12H   sodium chloride 10 mL Intravenous Q12H   sodium chloride 10 mL Intravenous Q12H   topiramate 100 mg Oral Q12H   venlafaxine XR 75 mg Oral Q PM       fentaNYL Citrate     norepinephrine 0.02-0.3 mcg/kg/min Last Rate: 0.3 mcg/kg/min (07/15/20 7945)   Pharmacy Consult     propofol 5-50 mcg/kg/min Last Rate: 30 mcg/kg/min (07/15/20 4539)   sodium chloride 125 mL/hr Last Rate: 125 mL/hr (07/15/20 9620)   vasopressin (PITRESSIN) infusion 0.03 Units/min Last Rate: Stopped (07/15/20 2771)       Medication Review: I reviewed the current medications.    Assessment/Plan     Patient Active Problem List   Diagnosis Code   • Elevated alkaline phosphatase level R74.8   • Encounter for screening for malignant neoplasm of colon Z12.11   • Pneumonia of both lungs due to infectious organism J18.9   • Pneumonia due to COVID-19 virus U07.1, J12.89       Assessment & Plan         Central nervous system  History of seizures with concern for recent seizure activity    Plan:  -Patient is currently on propofol, fentanyl; RASS goal: 0 to -1. Patient does not have biot's type of breathing pattern while on opioids.   -Daily interruption or lightening of sedation with spontaneous awakening trial and spontaneous breathing trial if possible.   -Avoid nighttime disturbances and light exposure during night to maintain normal circadian rhythms to avoid hypoactive or hyperactive delirium.  -Stat EEG ordered to assess for nonconvulsive seizures  -Patient was given loading dose of Keppra following concern for seizure activity this morning per attending.       Cardiovascular  system:  Septic shock  Pulmonary pretension  Troponin leak    Plan:  -Goal CVP around 10 mmHg  -Vasopressor: Norepinephrine.  Recommended to start vasopressin due to maximum dose required norepinephrine,  Targeting mean arterial pressure of 65 mmHg   -  Results for orders placed during the hospital encounter of 07/05/20   Adult Transthoracic Echo Limited W/ Cont if Necessary Per Protocol    Narrative · Normal left ventricular cavity size and wall thickness noted. All left   ventricular wall segments contract normally.  · Estimated EF appears to be in the range of 61 - 65%.  · The aortic valve is structurally normal. No aortic valve regurgitation   is present. No aortic valve stenosis is present.  · The mitral valve is normal in structure. Mild mitral valve regurgitation   is present. No significant mitral valve stenosis is present.  · The tricuspid valve is normal. Mild tricuspid valve regurgitation is   present. Estimated right ventricular systolic pressure from tricuspid   regurgitation is moderately elevated (45-55 mmHg).  · There is no evidence of pericardial effusion.  · Comments: Patient seem to have developed pulmonary hypertension since   the last study on 6/10/2020.        -Pulmonary pretension noted on recent echo likely related to acute pulmonary impairment and volume overload in setting of bilateral diffuse pneumonia  -Ordered troponin level.  As this was elevated, cardiology services were consulted  -On aspirin, rosuvastatin          Respiratory system:  Acute hypoxic respiratory failure requiring mechanical ventilation  Right spontaneous pneumothorax status post chest tube placement  Bilateral diffuse pneumonia due to COVID-19  Mixed component of acute pulmonary edema  Moderate pulmonary pretension  Acute COPD exacerbation  Obstructive sleep apnea  Tobacco abuse history        Plan:  -Mode of mechanical ventilation: Descending ramp flow assist control volume-cycled mode of mechanical ventilation.      Vent Settings       Vt (Set, L): 0.48 L  Resp Rate (Set): 24     FiO2 (%): 100 %  PEEP/CPAP (cm H2O): 8 cm H20    Minute Ventilation (L/min) (Obs): 12.7 L/min  Resp Rate (Observed) Vent: 25     I:E Ratio (Obs): 1:1.80    PIP Observed (cm H2O): 25 cm H2O        Patient Lines/Drains/Airways Status    Active Airway     Name:   Placement date:   Placement time:   Site:   Days:    ETT    07/15/20    0043     less than 1              -Ventilator rate was increased to 26.     - I have reviewed the ventilator graphics. Flow time scalar was reviewed and auto PEEP is negative, volume time scalar was reviewed and leak is negative. Pressure volume loop was assessed.  Auto triggering is negative.  Missed triggering is negative.  Double triggering is negative.  Active expiration is negative.     -Suctioning of oral secretions as per protocol  -Maintaining internal cuff pressures within recommended range.  Recommend careful monitoring of the internal cuff pressure during transport of the patient outside ICU.  -Oral care with chlorhexidine  -Keeping the patient at semi-recumbent position.  Head of the bed elevated at 35 degrees.  -On lateral rotation therapy  -Consider vaccination with pneumococcal and influenza vaccine at hospital discharge  -On dexamethasone 6 mg IV daily  -On doxycycline, meropenem  -Remdesivir and convalescent plasma effusions were previously received.  -Infectious disease team on board.  -Discussed with attending, recommended repositioning of the chest tube to the upper lung field in the setting of pneumothorax.  -After chest tube repositioning, we will attempt manual pronation.  -Ordered ET tube culture  -On Pulmicort nebs         Liver, gallbladder, Pancreas and gastrointestinal system:  Transaminitis    Plan:  -Ordered GGT level  -Ordered nutrition consult to start tube feeds.  -Pantoprazole for GI ulcer prophylaxis.  -On Metamucil    Dietary Orders (From admission, onward)     Start     Ordered     07/15/20 0138  NPO Diet  Diet Effective Now      07/15/20 0138                     Genitourinary system:   Metabolic acidosis    Plan     Intake/Output Summary (Last 24 hours) at 7/15/2020 0844  Last data filed at 7/15/2020 0600  Gross per 24 hour   Intake 3395.65 ml   Output 1750 ml   Net 1645.65 ml     -Avoiding nephrotoxic agent.  -Following serum creatinine and GFR closely with urine output daily  -Normal saline solution was changed to Ringer's lactate in the setting of acidosis.         Endocrine system:  Hypothyroidism    Plan:  - Goal serum glucose level is  140-180 mg/dL while in ICU.    - I strongly recommend starting insulin drip if 2 consecutive serum glucose levels are greater than 200 mg/dL.   -On levothyroxine 25 mcg daily          Infectious disease system:  Bilateral diffuse pneumonia due to COVID-19    No results found for: ACANTHNAEG, AFBCX, BPERTUSSISCX, BLOODCX  No results found for: BCIDPCR, CXREFLEX, CSFCX, CULTURETIS  No results found for: CULTURES, HSVCX, URCX  No results found for: EYECULTURE, GCCX, LABHSV  No results found for: LEGIONELLA, MRSACX, MUMPSCX, MYCOPLASCX  No results found for: NOCARDIACX, STOOLCX  No results found for: THROATCX, UNSTIMCULT, URINECX, CULTURE, VZVCULTUR  No results found for: VIRALCULTU, WOUNDCX     Plan:  - Current antibiotics: Doxycycline, meropenem  Previously received Remdesivir and convalescent plasma infusion  - Cultures:   Ordered respiratory ET tube culture.  No other pending cultures at present.  Infectious disease team on board         Hematological system:  Leukocytosis in the setting of sepsis    Plan   - I recommend PRBC transfusion if hemoglobin is less than 7 g/dL unless active bleeding or cardiac ischemia.  Mechanical Order History:      Ordered        07/10/20 0848  Place Sequential Compression Device  Once         07/10/20 0848  Maintain Sequential Compression Device  Continuous,   Status:  Canceled                 Pharmalogical Order History:      Ordered     Dose Route Frequency Stop    07/12/20 1113  enoxaparin (LOVENOX) syringe 80 mg      80 mg SC Every 12 Hours --    07/12/20 1113  enoxaparin (LOVENOX) syringe 40 mg      40 mg SC Once 07/12/20 1205    07/12/20 1100  Pharmacy to Dose enoxaparin (LOVENOX)  Status:  Discontinued      -- XX Continuous PRN 07/13/20 1304    07/10/20 0848  enoxaparin (LOVENOX) syringe 40 mg  Status:  Discontinued      40 mg SC Daily 07/12/20 1113    07/07/20 1450  enoxaparin (LOVENOX) syringe 40 mg  Status:  Discontinued      40 mg SC Daily 07/09/20 2205    07/07/20 1449  enoxaparin (LOVENOX) syringe 40 mg  Status:  Discontinued      40 mg SC Nightly 07/07/20 1450    07/06/20 0817  heparin (porcine) 5000 UNIT/ML injection 5,000 Units  Status:  Discontinued      5,000 Units SC Every 8 Hours Scheduled 07/07/20 1449    07/05/20 2252  heparin (porcine) 5000 UNIT/ML injection 5,000 Units  Status:  Discontinued      5,000 Units SC Every 12 Hours Scheduled 07/06/20 0817        - On DVT prophylaxis.  I recommend holding chemical anticoagulation if platelet count is less than 50,000.       Rheumatological and dermatological system:    Plan:  - Turn the patient every 2 hours.         Activity:  - I recommend aggressive PT/OT while in hospital to avoid critical care neuropathy/myopathy.       I have updated [facility] of the plan of care.    Purnima Bejarano PA-C  07/15/20  08:35    Scribed for Dr. Manrique by Purnima Bejarano PA-C    The clinical plan was discussed with   The clinical plan was discussed extensively with the nurse, and respiratory therapist.   Critical Care time spent in direct patient care: 38 minutes (excluding procedure time).  Patient is critically ill and is at higher risk for further mortality/morbidity.     ICayetano M.D. attest that the above note accurately reflects the work and decisions made by me. Patient was seen and evaluated by me, including history of present illness, physical exam,  assessment, and treatment plan.  The above note was reviewed and edited by me.    Cayetano Manrique M.D  Pulmonary and Critical Care Medicine

## 2020-07-15 NOTE — PLAN OF CARE
Problem: Patient Care Overview  Goal: Plan of Care Review  Outcome: Ongoing (interventions implemented as appropriate)  Flowsheets  Taken 7/15/2020 1552  Progress: no change  Outcome Summary: pt stable on vent at this time on pressors and sedation, chest tube remains in place at this time, TF to be started this shift, VSS  Taken 7/15/2020 0800  Plan of Care Reviewed With: daughter

## 2020-07-16 ENCOUNTER — APPOINTMENT (OUTPATIENT)
Dept: GENERAL RADIOLOGY | Facility: HOSPITAL | Age: 75
End: 2020-07-16

## 2020-07-16 LAB
A-A DO2: 329.4 MMHG (ref 0–300)
A-A DO2: 500 MMHG (ref 0–300)
ARTERIAL PATENCY WRIST A: ABNORMAL
ARTERIAL PATENCY WRIST A: POSITIVE
ATMOSPHERIC PRESS: 728 MMHG
ATMOSPHERIC PRESS: 730 MMHG
BASE EXCESS BLDA CALC-SCNC: -2.9 MMOL/L (ref 0–2)
BASE EXCESS BLDA CALC-SCNC: -3.4 MMOL/L (ref 0–2)
BASOPHILS # BLD AUTO: 0.1 10*3/MM3 (ref 0–0.2)
BASOPHILS NFR BLD AUTO: 0.5 % (ref 0–1.5)
BDY SITE: ABNORMAL
BDY SITE: ABNORMAL
BODY TEMPERATURE: 0 C
BODY TEMPERATURE: 0 C
CK SERPL-CCNC: 63 U/L (ref 20–180)
CO2 BLDA-SCNC: 24.1 MMOL/L (ref 22–33)
CO2 BLDA-SCNC: 24.8 MMOL/L (ref 22–33)
COHGB MFR BLD: <0.2 % (ref 0–5)
COHGB MFR BLD: <0.2 % (ref 0–5)
D-LACTATE SERPL-SCNC: 2 MMOL/L (ref 0.5–2)
DEPRECATED RDW RBC AUTO: 51.6 FL (ref 37–54)
EOSINOPHIL # BLD AUTO: 0.29 10*3/MM3 (ref 0–0.4)
EOSINOPHIL NFR BLD AUTO: 1.6 % (ref 0.3–6.2)
ERYTHROCYTE [DISTWIDTH] IN BLOOD BY AUTOMATED COUNT: 14.1 % (ref 12.3–15.4)
FERRITIN SERPL-MCNC: 506.4 NG/ML (ref 13–150)
HCO3 BLDA-SCNC: 22.8 MMOL/L (ref 20–26)
HCO3 BLDA-SCNC: 23.4 MMOL/L (ref 20–26)
HCT VFR BLD AUTO: 38.9 % (ref 34–46.6)
HCT VFR BLD CALC: 38.8 % (ref 38–51)
HCT VFR BLD CALC: 40.4 % (ref 38–51)
HGB BLD-MCNC: 12 G/DL (ref 12–15.9)
HGB BLDA-MCNC: 12.7 G/DL (ref 13.5–17.5)
HGB BLDA-MCNC: 13.2 G/DL (ref 13.5–17.5)
IMM GRANULOCYTES # BLD AUTO: 0.33 10*3/MM3 (ref 0–0.05)
IMM GRANULOCYTES NFR BLD AUTO: 1.8 % (ref 0–0.5)
INHALED O2 CONCENTRATION: 100 %
INHALED O2 CONCENTRATION: 80 %
LYMPHOCYTES # BLD AUTO: 2.17 10*3/MM3 (ref 0.7–3.1)
LYMPHOCYTES NFR BLD AUTO: 11.6 % (ref 19.6–45.3)
Lab: ABNORMAL
Lab: ABNORMAL
MCH RBC QN AUTO: 30.3 PG (ref 26.6–33)
MCHC RBC AUTO-ENTMCNC: 30.8 G/DL (ref 31.5–35.7)
MCV RBC AUTO: 98.2 FL (ref 79–97)
METHGB BLD QL: 0.8 % (ref 0–3)
METHGB BLD QL: 1 % (ref 0–3)
MODALITY: ABNORMAL
MODALITY: ABNORMAL
MONOCYTES # BLD AUTO: 1.75 10*3/MM3 (ref 0.1–0.9)
MONOCYTES NFR BLD AUTO: 9.4 % (ref 5–12)
NEUTROPHILS NFR BLD AUTO: 14.01 10*3/MM3 (ref 1.7–7)
NEUTROPHILS NFR BLD AUTO: 75.1 % (ref 42.7–76)
NOTE: ABNORMAL
NOTE: ABNORMAL
NOTIFIED WHO: ABNORMAL
NRBC BLD AUTO-RTO: 0 /100 WBC (ref 0–0.2)
OXYHGB MFR BLDV: 97.5 % (ref 94–99)
OXYHGB MFR BLDV: 97.7 % (ref 94–99)
PCO2 BLDA: 44.2 MM HG (ref 35–45)
PCO2 BLDA: 45.8 MM HG (ref 35–45)
PCO2 TEMP ADJ BLD: ABNORMAL MM[HG]
PCO2 TEMP ADJ BLD: ABNORMAL MM[HG]
PEEP RESPIRATORY: 8 CM[H2O]
PEEP RESPIRATORY: 8 CM[H2O]
PH BLDA: 7.32 PH UNITS (ref 7.35–7.45)
PH BLDA: 7.32 PH UNITS (ref 7.35–7.45)
PH, TEMP CORRECTED: ABNORMAL
PH, TEMP CORRECTED: ABNORMAL
PHENYTOIN SERPL-MCNC: 19.9 MCG/ML (ref 10–20)
PLATELET # BLD AUTO: 174 10*3/MM3 (ref 140–450)
PMV BLD AUTO: 10.1 FL (ref 6–12)
PO2 BLDA: 137 MM HG (ref 83–108)
PO2 BLDA: 170 MM HG (ref 83–108)
PO2 TEMP ADJ BLD: ABNORMAL MM[HG]
PO2 TEMP ADJ BLD: ABNORMAL MM[HG]
RBC # BLD AUTO: 3.96 10*6/MM3 (ref 3.77–5.28)
SAO2 % BLDCOA: 98.2 % (ref 94–99)
SAO2 % BLDCOA: 98.8 % (ref 94–99)
SET MECH RESP RATE: 26
SET MECH RESP RATE: 26
TROPONIN T SERPL-MCNC: 0.1 NG/ML (ref 0–0.03)
VENTILATOR MODE: ABNORMAL
VENTILATOR MODE: ABNORMAL
VT ON VENT VENT: 480 ML
VT ON VENT VENT: 480 ML
WBC # BLD AUTO: 18.65 10*3/MM3 (ref 3.4–10.8)

## 2020-07-16 PROCEDURE — 80185 ASSAY OF PHENYTOIN TOTAL: CPT | Performed by: PHYSICIAN ASSISTANT

## 2020-07-16 PROCEDURE — 71045 X-RAY EXAM CHEST 1 VIEW: CPT

## 2020-07-16 PROCEDURE — 82375 ASSAY CARBOXYHB QUANT: CPT

## 2020-07-16 PROCEDURE — 25010000002 LEVETRIRACETAM PER 10 MG: Performed by: INTERNAL MEDICINE

## 2020-07-16 PROCEDURE — 83050 HGB METHEMOGLOBIN QUAN: CPT

## 2020-07-16 PROCEDURE — 94799 UNLISTED PULMONARY SVC/PX: CPT

## 2020-07-16 PROCEDURE — 94003 VENT MGMT INPAT SUBQ DAY: CPT

## 2020-07-16 PROCEDURE — 85025 COMPLETE CBC W/AUTO DIFF WBC: CPT | Performed by: INTERNAL MEDICINE

## 2020-07-16 PROCEDURE — 82550 ASSAY OF CK (CPK): CPT | Performed by: HOSPITALIST

## 2020-07-16 PROCEDURE — 71045 X-RAY EXAM CHEST 1 VIEW: CPT | Performed by: RADIOLOGY

## 2020-07-16 PROCEDURE — 25010000002 PROPOFOL 10 MG/ML EMULSION: Performed by: INTERNAL MEDICINE

## 2020-07-16 PROCEDURE — 25010000002 MEROPENEM: Performed by: HOSPITALIST

## 2020-07-16 PROCEDURE — 99232 SBSQ HOSP IP/OBS MODERATE 35: CPT | Performed by: INTERNAL MEDICINE

## 2020-07-16 PROCEDURE — 82805 BLOOD GASES W/O2 SATURATION: CPT

## 2020-07-16 PROCEDURE — 83605 ASSAY OF LACTIC ACID: CPT | Performed by: PHYSICIAN ASSISTANT

## 2020-07-16 PROCEDURE — 25010000002 DEXAMETHASONE PER 1 MG: Performed by: HOSPITALIST

## 2020-07-16 PROCEDURE — 25010000002 ENOXAPARIN PER 10 MG: Performed by: INTERNAL MEDICINE

## 2020-07-16 PROCEDURE — 84484 ASSAY OF TROPONIN QUANT: CPT | Performed by: INTERNAL MEDICINE

## 2020-07-16 PROCEDURE — 99292 CRITICAL CARE ADDL 30 MIN: CPT | Performed by: INTERNAL MEDICINE

## 2020-07-16 PROCEDURE — 36600 WITHDRAWAL OF ARTERIAL BLOOD: CPT

## 2020-07-16 PROCEDURE — 25010000003 PHENYTOIN PER 50 MG: Performed by: INTERNAL MEDICINE

## 2020-07-16 PROCEDURE — 99291 CRITICAL CARE FIRST HOUR: CPT | Performed by: INTERNAL MEDICINE

## 2020-07-16 PROCEDURE — 82728 ASSAY OF FERRITIN: CPT | Performed by: HOSPITALIST

## 2020-07-16 PROCEDURE — 99024 POSTOP FOLLOW-UP VISIT: CPT | Performed by: SURGERY

## 2020-07-16 RX ORDER — WATER 1000 ML/1000ML
INJECTION, SOLUTION INTRAVENOUS
Status: DISPENSED
Start: 2020-07-16 | End: 2020-07-16

## 2020-07-16 RX ORDER — VECURONIUM BROMIDE 1 MG/ML
100 INJECTION, POWDER, LYOPHILIZED, FOR SOLUTION INTRAVENOUS ONCE
Status: COMPLETED | OUTPATIENT
Start: 2020-07-16 | End: 2020-07-16

## 2020-07-16 RX ADMIN — MINERAL OIL AND PETROLATUM: 150; 830 OINTMENT OPHTHALMIC at 16:34

## 2020-07-16 RX ADMIN — MINERAL OIL AND PETROLATUM: 150; 830 OINTMENT OPHTHALMIC at 11:05

## 2020-07-16 RX ADMIN — PANTOPRAZOLE SODIUM 40 MG: 40 INJECTION, POWDER, FOR SOLUTION INTRAVENOUS at 08:59

## 2020-07-16 RX ADMIN — CHLORHEXIDINE GLUCONATE 15 ML: 1.2 RINSE ORAL at 09:36

## 2020-07-16 RX ADMIN — MEROPENEM 1 G: 1 INJECTION, POWDER, FOR SOLUTION INTRAVENOUS at 13:23

## 2020-07-16 RX ADMIN — PHENYTOIN SODIUM 100 MG: 50 INJECTION INTRAMUSCULAR; INTRAVENOUS at 13:23

## 2020-07-16 RX ADMIN — ROSUVASTATIN CALCIUM 20 MG: 20 TABLET, FILM COATED ORAL at 20:41

## 2020-07-16 RX ADMIN — PROPOFOL 10 MCG/KG/MIN: 10 INJECTION, EMULSION INTRAVENOUS at 23:32

## 2020-07-16 RX ADMIN — OLANZAPINE 2.5 MG: 2.5 TABLET ORAL at 20:42

## 2020-07-16 RX ADMIN — PRAMIPEXOLE DIHYDROCHLORIDE 1 MG: 1 TABLET ORAL at 20:42

## 2020-07-16 RX ADMIN — PHENYTOIN SODIUM 100 MG: 50 INJECTION INTRAMUSCULAR; INTRAVENOUS at 20:53

## 2020-07-16 RX ADMIN — Medication: at 22:33

## 2020-07-16 RX ADMIN — LEVETIRACETAM 1000 MG: 100 INJECTION, SOLUTION, CONCENTRATE INTRAVENOUS at 08:56

## 2020-07-16 RX ADMIN — SODIUM CHLORIDE, PRESERVATIVE FREE 10 ML: 5 INJECTION INTRAVENOUS at 09:00

## 2020-07-16 RX ADMIN — Medication: at 09:01

## 2020-07-16 RX ADMIN — MINERAL OIL AND PETROLATUM: 150; 830 OINTMENT OPHTHALMIC at 23:29

## 2020-07-16 RX ADMIN — NOREPINEPHRINE BITARTRATE 0.28 MCG/KG/MIN: 1 INJECTION, SOLUTION, CONCENTRATE INTRAVENOUS at 03:30

## 2020-07-16 RX ADMIN — NOREPINEPHRINE BITARTRATE 0.2 MCG/KG/MIN: 1 INJECTION, SOLUTION, CONCENTRATE INTRAVENOUS at 08:59

## 2020-07-16 RX ADMIN — DOXYCYCLINE 100 MG: 100 INJECTION, POWDER, LYOPHILIZED, FOR SOLUTION INTRAVENOUS at 23:29

## 2020-07-16 RX ADMIN — PSYLLIUM HUSK 1 PACKET: 3.4 POWDER ORAL at 11:05

## 2020-07-16 RX ADMIN — MINERAL OIL AND PETROLATUM: 150; 830 OINTMENT OPHTHALMIC at 20:44

## 2020-07-16 RX ADMIN — VECURONIUM BROMIDE 7.6 MG: 1 INJECTION, POWDER, LYOPHILIZED, FOR SOLUTION INTRAVENOUS at 10:28

## 2020-07-16 RX ADMIN — MEROPENEM 1 G: 1 INJECTION, POWDER, FOR SOLUTION INTRAVENOUS at 05:22

## 2020-07-16 RX ADMIN — PROPOFOL 30 MCG/KG/MIN: 10 INJECTION, EMULSION INTRAVENOUS at 00:14

## 2020-07-16 RX ADMIN — VENLAFAXINE HYDROCHLORIDE 75 MG: 75 CAPSULE, EXTENDED RELEASE ORAL at 16:34

## 2020-07-16 RX ADMIN — ENOXAPARIN SODIUM 80 MG: 80 INJECTION SUBCUTANEOUS at 20:41

## 2020-07-16 RX ADMIN — DOXYCYCLINE 100 MG: 100 INJECTION, POWDER, LYOPHILIZED, FOR SOLUTION INTRAVENOUS at 11:05

## 2020-07-16 RX ADMIN — SODIUM CHLORIDE, PRESERVATIVE FREE 10 ML: 5 INJECTION INTRAVENOUS at 20:48

## 2020-07-16 RX ADMIN — DEXAMETHASONE SODIUM PHOSPHATE 6 MG: 4 INJECTION, SOLUTION INTRA-ARTICULAR; INTRALESIONAL; INTRAMUSCULAR; INTRAVENOUS; SOFT TISSUE at 08:59

## 2020-07-16 RX ADMIN — CHLORHEXIDINE GLUCONATE 15 ML: 1.2 RINSE ORAL at 20:51

## 2020-07-16 RX ADMIN — TOPIRAMATE 100 MG: 100 TABLET, FILM COATED ORAL at 09:00

## 2020-07-16 RX ADMIN — BUDESONIDE 0.5 MG: 0.5 INHALANT RESPIRATORY (INHALATION) at 07:12

## 2020-07-16 RX ADMIN — Medication 1 CAPSULE: at 09:00

## 2020-07-16 RX ADMIN — PHENYTOIN SODIUM 100 MG: 50 INJECTION INTRAMUSCULAR; INTRAVENOUS at 05:22

## 2020-07-16 RX ADMIN — TOPIRAMATE 100 MG: 100 TABLET, FILM COATED ORAL at 20:42

## 2020-07-16 RX ADMIN — BUDESONIDE 0.5 MG: 0.5 INHALANT RESPIRATORY (INHALATION) at 20:14

## 2020-07-16 RX ADMIN — VECURONIUM BROMIDE 0.6 MCG/KG/MIN: 10 INJECTION, POWDER, LYOPHILIZED, FOR SOLUTION INTRAVENOUS at 10:57

## 2020-07-16 RX ADMIN — DOXYCYCLINE 100 MG: 100 INJECTION, POWDER, LYOPHILIZED, FOR SOLUTION INTRAVENOUS at 00:19

## 2020-07-16 RX ADMIN — SODIUM CHLORIDE, PRESERVATIVE FREE 10 ML: 5 INJECTION INTRAVENOUS at 20:49

## 2020-07-16 RX ADMIN — PROPOFOL 30 MCG/KG/MIN: 10 INJECTION, EMULSION INTRAVENOUS at 10:29

## 2020-07-16 RX ADMIN — LEVOTHYROXINE SODIUM 25 MCG: 25 TABLET ORAL at 09:00

## 2020-07-16 RX ADMIN — LEVETIRACETAM 1000 MG: 100 INJECTION, SOLUTION, CONCENTRATE INTRAVENOUS at 20:42

## 2020-07-16 RX ADMIN — ASPIRIN 81 MG: 81 TABLET, COATED ORAL at 20:42

## 2020-07-16 RX ADMIN — SODIUM CHLORIDE, POTASSIUM CHLORIDE, SODIUM LACTATE AND CALCIUM CHLORIDE 125 ML/HR: 600; 310; 30; 20 INJECTION, SOLUTION INTRAVENOUS at 08:55

## 2020-07-16 NOTE — PROGRESS NOTES
[facility] HOSPITALIST PROGRESS NOTE     Patient Identification:  Name:  Siria Rueda  Age:  74 y.o.  Sex:  female  :  1945  MRN:  [id number]  Visit Number:  [id number]  Primary Care Provider:  Cruzito Palumbo MD    Length of stay:  11    Chief complaint:  74 y.o. old female admitted with Shortness of Breath    HPI:  74-year-old female admitted with COVID pneumonia.  CT scan showed right patchy pneumonia.  Patient has received convalescent plasma on 2020 and has been started on Remdesivir per ID recommendations in addition to Rocephin, doxycycline and Decadron.  Patient continued to have worsening and progressive hypoxemia and therefore was transferred to intensive care unit.  Patient was intubated and put on ventilator on 7/15/2020 due to desaturation.  She was also found to have a pneumothorax and had thoracostomy.      Subjective:    This was an audio and video enabled telemedicine encounter.  Hoa MAST was present at bedside and assisted during this encounter.  Patient remains intubated and on ventilator.  Patient was started on manual proning yesterday.  Patient has had some improvement in her hypoxemia and FiO2 down to 80% today.  Patient was not started on tube feeding last night since she was pronated, she will be started on tube feed this morning.  Patient continues to remain on norepinephrine and vasopressin with the goal to wean down norepinephrine.  Patient has been started on vecuronium.  No other issues reported by the nursing staff.    Vent settings:  FiO2 (%):  [100 %] 100 %  S RR:  [26] 26  PEEP/CPAP (cm H2O):  [8 cm H20] 8 cm H20  MAP (cm H2O):  [15-16] 15           Current Hospital Meds:    sterile water (preservative free)      artificial tears  Both Eyes Q4H   aspirin 81 mg Oral Nightly   budesonide 0.5 mg Nebulization BID - RT   chlorhexidine 15 mL Mouth/Throat Q12H   dexamethasone 6 mg Intravenous Daily   doxycycline 100 mg Intravenous Q12H   enoxaparin 80 mg  Subcutaneous Q12H   lactobacillus acidophilus 1 capsule Oral Daily   levETIRAcetam 1,000 mg Intravenous Q12H   levothyroxine 25 mcg Oral Daily   lidocaine 5 mL Subcutaneous Once   meropenem 1 g Intravenous Q8H   OLANZapine 2.5 mg Oral Nightly   pantoprazole 40 mg Intravenous Q24H   phenytoin (DILANTIN) IVPB 100 mg Intravenous Q8H   pramipexole 1 mg Oral Nightly   psyllium 1 packet Oral Daily   rosuvastatin 20 mg Oral Nightly   sodium chloride 10 mL Intravenous Q12H   sodium chloride 10 mL Intravenous Q12H   sodium chloride 10 mL Intravenous Q12H   sodium chloride 10 mL Intravenous Q12H   topiramate 100 mg Oral Q12H   venlafaxine XR 75 mg Oral Q PM       fentaNYL Citrate     norepinephrine 0.02-0.3 mcg/kg/min Last Rate: 0.16 mcg/kg/min (07/16/20 0907)   Pharmacy Consult     propofol 5-50 mcg/kg/min Last Rate: 30 mcg/kg/min (07/16/20 1029)   vasopressin (PITRESSIN) infusion 0.03 Units/min Last Rate: 0.03 Units/min (07/16/20 0850)   vecuronium (NORCURON) infusion 0.6-1.2 mcg/kg/min      ----------------------------------------------------------------------------------------------------------------------  Vital Signs:  Temp:  [97.9 °F (36.6 °C)-99.3 °F (37.4 °C)] 98.3 °F (36.8 °C)  Heart Rate:  [] 94  Resp:  [26-28] 26  BP: ()/(44-76) 110/55  FiO2 (%):  [100 %] 100 %      07/14/20  0600 07/15/20  0520 07/16/20  0500   Weight: 79.5 kg (175 lb 4.8 oz) 75.4 kg (166 lb 4.8 oz) 75.8 kg (167 lb)     Body mass index is 29.58 kg/m².    Intake/Output Summary (Last 24 hours) at 7/16/2020 1039  Last data filed at 7/16/2020 0522  Gross per 24 hour   Intake 3593.68 ml   Output 800 ml   Net 2793.68 ml     NPO Diet  ----------------------------------------------------------------------------------------------------------------------  This physical exam has been personally performed remotely in the unit aided by real-time audio/visual communication tools.  Nursing staff was present at bedside during this exam and assisted  during exam. The use of a video visit has been reviewed with the patient and verbal informed consent has been obtained.     Physical Exam:  General: Patient appears awake, alert, and in mild distress due to tachypnea.  Head: Normocephalic, atraumatic  Eyes: Closed.  Ears: Ears appear intact with no abnormalities noted.   Neck: Trachea midline. No obvious JVD.  Heart: Tele reveals sinus rhythm  Lungs: Tachypneic, mild respiratory distress. No audible wheezing.  Abdomen: No obvious abdominal distension.  MS: Muscle tone appears normal. No gross deformities.  Extremities: No clubbing, cyanosis or edema noted.  Skin: No visible bleeding, bruising, or rash.  Neurologic: Sedated.    ----------------------------------------------------------------------------------------------------------------------  Tele:    ----------------------------------------------------------------------------------------------------------------------  Results from last 7 days   Lab Units 07/15/20  0738 07/15/20  0736 07/14/20  0113 07/13/20  0121   CK TOTAL U/L 134  --  166 230*   TROPONIN T ng/mL  --  0.635*  --  <0.010     Results from last 7 days   Lab Units 07/15/20  1641 07/15/20  0738 07/15/20  0737 07/15/20  0736 07/14/20  0113 07/13/20  1130 07/13/20  0121   CRP mg/dL  --  21.88*  --   --  13.32*  --  18.68*   LACTATE mmol/L 2.4*  --   --  2.8*  --  1.5  --    WBC 10*3/mm3  --   --  22.60*  --  10.12  --  7.20   HEMOGLOBIN g/dL  --   --  13.5  --  11.9*  --  11.5*   HEMATOCRIT %  --   --  43.0  --  37.2  --  35.8   MCV fL  --   --  96.6  --  94.9  --  95.0   MCHC g/dL  --   --  31.4*  --  32.0  --  32.1   PLATELETS 10*3/mm3  --   --  316  --  195  --  157     Results from last 7 days   Lab Units 07/16/20  0519   PH, ARTERIAL pH units 7.320*   PO2 ART mm Hg 137.0*   PCO2, ARTERIAL mm Hg 44.2   HCO3 ART mmol/L 22.8     Results from last 7 days   Lab Units 07/15/20  0736 07/14/20  0915 07/14/20  0113 07/13/20  0121  07/10/20  0531   SODIUM  mmol/L 145  --  142 141   < > 138   POTASSIUM mmol/L 4.2 3.8 4.4 3.3*   < > 4.0   MAGNESIUM mg/dL 2.0  --   --   --   --  2.0   CHLORIDE mmol/L 107  --  107 106   < > 107   CO2 mmol/L 21.6*  --  24.2 23.5   < > 17.5*   BUN mg/dL 28*  --  27* 23   < > 21   CREATININE mg/dL 0.68  --  0.52* 0.55*   < > 0.65   EGFR IF NONAFRICN AM mL/min/1.73 85  --  115 108   < > 89   CALCIUM mg/dL 8.3*  --  8.7 8.5*   < > 8.3*   GLUCOSE mg/dL 139*  --  91 109*   < > 92   ALBUMIN g/dL 2.95*  --   --   --   --  3.14*   BILIRUBIN mg/dL 0.8  --   --   --   --  0.3   ALK PHOS U/L 225*  --   --   --   --  135*   AST (SGOT) U/L 40*  --   --   --   --  59*   ALT (SGPT) U/L 20  --   --   --   --  25    < > = values in this interval not displayed.   Estimated Creatinine Clearance: 60.2 mL/min (by C-G formula based on SCr of 0.68 mg/dL).    No results found for: AMMONIA      Blood Culture   Date Value Ref Range Status   07/05/2020 No growth at 5 days  Final   07/05/2020 No growth at 5 days  Final     No results found for: URINECX  No results found for: WOUNDCX  No results found for: STOOLCX    I have personally looked at the labs and they are summarized above.  ----------------------------------------------------------------------------------------------------------------------  Imaging Results (Last 24 Hours)     Procedure Component Value Units Date/Time    XR Chest 1 View [[id number]] Collected:  07/16/20 0755     Updated:  07/16/20 0758    Narrative:       XR CHEST 1 VW-     CLINICAL INDICATION: check chest tube placement; J18.9-Pneumonia,  unspecified organism; U07.1-COVID-19; U07.1-COVID-19; J12.89-Other viral  pneumonia; J96.01-Acute respiratory failure with hypoxia        COMPARISON: 07/15/2020      TECHNIQUE: Single frontal view of the chest.     FINDINGS:     Chest tube placement similar to the previous exam.  Diffuse patchy airspace disease again noted  There is no evidence of an acute osseous abnormality.   There are no  suspicious-appearing parenchymal soft tissue nodules.          Impression:       Little overall change     This report was finalized on 7/16/2020 7:56 AM by Dr. Jorge A Vizcaino MD.       XR Chest 1 View [[id number]] Collected:  07/15/20 1849     Updated:  07/15/20 1858    Narrative:       XR CHEST 1 VW-     CLINICAL INDICATION: pneumo, check chest tube placement;  J18.9-Pneumonia, unspecified organism; U07.1-COVID-19; U07.1-COVID-19;  J12.89-Other viral pneumonia; J96.01-Acute respiratory failure with  hypoxia        COMPARISON: 07/15/2020      TECHNIQUE: Single frontal view of the chest.     FINDINGS:     Chest tube remains in place.  Patchy airspace disease  The cardiac silhouette is normal. The pulmonary vasculature is  unremarkable.  There is no evidence of an acute osseous abnormality.   There are no suspicious-appearing parenchymal soft tissue nodules.          Impression:       No significant interval line placement     This report was finalized on 7/15/2020 6:56 PM by Dr. Jorge A Vizcaino MD.           ----------------------------------------------------------------------------------------------------------------------  Assessment and Plan:    -Sepsis secondary with septic shock due to COVID-19 pneumonia  Patient remains afebrile.  Blood pressure still remains low and patient continues to require norepinephrine immunosuppression to keep MAP >65 mmHg.    Inflammatory markers remain elevated.  Ferritin, CRP, d-dimer and WBC remain elevated, lactic acid is down to 2.0 today from 2.8 yesterday.    Chest x-ray showed persistent bilateral airspace disease, unchanged since before.  Continue meropenem and doxycycline per ID recommendations.  Patient has received Remdesivir and convalescent plasma.    Continue IV Decadron.   Respiratory panel PCR negative. Legionella urine antigen Is negative. Respiratory culture with normal madyson.   Patient has received convalescent plasma on 7/7/2020.  Blood culture showed no growth so  far.    -Acute hypoxemic respiratory failure  Slightly improved since yesterday.  Patient is down to 80% FiO2.  We will continue manual pronation per recommendations from Dr. Manrique.     Pulmonology on board and managing ventilator setting.    D-dimer was elevated and remains unchanged, patient is on anticoagulation with Lovenox and will continue.  Patient had received intermittent Bumex and Lasix for diuresis and had diuresed well earlier this week but currently will hold any further diuresis due to low blood pressure.    -Pneumothorax  Status post thoracostomy.  General surgery on board and appreciate help of Dr. Irene.    -COPD  Continue albuterol MDI and Symbicort inhalers.    -Thrombocytopenia  Resolved.  Platelet count WNL today.    -Essential hypertension  Blood pressure has been low and requiring vasopressor support to keep MEP >65 mmHg.    -ASCVD with h/o PCI  Patient had been chest pain-free.  Troponins have slightly gone up today.  Patient is sedated and unable to the history.  Cardiology has been consulted by Dr. Manrique.  Troponin elevation likely secondary to hypoxemia.  Continue patient on aspirin.    -Seizure disorder  Patient had a seizure last night.  Dilantin has been converted to IV and patient started on Keppra.  CT scan of the head showed no acute abnormality.  EEG pending.  Continue seizure precautions..    -Adjustment disorder with depression/anxiety  Currently sedated.  Will resume her amitriptyline and Effexor once patient is extubated and awake.    Activity: Bedrest.   Nutrition: N.p.o.  Will start patient on tube feeding.  Fluids: None  DVT prophylaxis: Lovenox subcu  GI prophylaxis: Pepcid    The patient is high risk due to: Sepsis, hypoxemia, COVID pneumonia, COPD, thrombocytopenia, hypertension, CAD, seizure disorder.    I discussed the patient's findings and my recommendations with patient and nursing staff.    Ayush Jiang MD  07/16/20  10:39

## 2020-07-16 NOTE — PLAN OF CARE
Patient remains intubated and sedated, patient remains on levophed. No changes overnight.           Problem: Patient Care Overview  Goal: Plan of Care Review  Outcome: Ongoing (interventions implemented as appropriate)  Goal: Individualization and Mutuality  Outcome: Ongoing (interventions implemented as appropriate)  Goal: Discharge Needs Assessment  Outcome: Ongoing (interventions implemented as appropriate)  Goal: Interprofessional Rounds/Family Conf  Outcome: Ongoing (interventions implemented as appropriate)     Problem: Fall Risk (Adult)  Goal: Absence of Fall  Description  Patient will demonstrate the desired outcomes by discharge/transition of care.  Outcome: Ongoing (interventions implemented as appropriate)     Problem: Pain, Chronic (Adult)  Goal: Acceptable Pain/Comfort Level and Functional Ability  Description  Patient will demonstrate the desired outcomes by discharge/transition of care.  Outcome: Ongoing (interventions implemented as appropriate)     Problem: Skin Injury Risk (Adult)  Goal: Skin Health and Integrity  Description  Patient will demonstrate the desired outcomes by discharge/transition of care.  Outcome: Ongoing (interventions implemented as appropriate)     Problem: Ventilation, Mechanical Invasive (Adult)  Description  Prevent and manage potential problems includin. artificial airway-induced skin/tissue breakdown  2. gastritis/stress ulcer  3. immobility  4. inability to wean  5. malnutrition  6. mechanical dysfunction  7. situational response  8. ventilator-induced lung injury  Goal: Signs and Symptoms of Listed Potential Problems Will be Absent, Minimized or Managed (Ventilation, Mechanical Invasive)  Description  Signs and symptoms of listed potential problems will be absent, minimized or managed by discharge/transition of care (reference Ventilation, Mechanical Invasive (Adult) CPG).  Outcome: Ongoing (interventions implemented as appropriate)

## 2020-07-16 NOTE — OP NOTE
Siria Rueda        Operative Progress Note:    Surgeon and Assistant: Dr. Irene    Pre-Operative Diagnosis: Respiratory failure second to cold virus    Post-Operative Diagnosis: Respiratory failure secondary to code 5    Procedure(s): Placement right subclavian triple-lumen cath    Type of Anesthesia Administered: 1% Xylocaine    Estimated Blood Loss: Minimal    Blood Products: None    Specimen Obtained/Removed:  None    Complication(s):  None    Graft/Implant/Prosthetics/Implanted Device/Transplants: Triple-lumen catheter right subclavian  Indication: Patient is a 74-year-old white female    Findings: Normal anatomy    Operative Report:  The patient was placed in Trendelenburg position.  Right subclavian area was prepped and draped in usual sterile fashion.  1% Xylocaine was infiltrated.  A Triple lumen Arrow Angiocath was placed by modified Seldinger technique.  It was sutured in place in the routine fashion.  The lines were flushed with saline followed by heparinized saline.  A sterile dressing was applied and the procedure was terminated.  Chest x-ray post-procedure is pending at the time of this dictation.  The patient tolerated the procedure well.      Electronically Signed by: Saud Irene MD        Dictated Utilizing Dragon Dictation

## 2020-07-16 NOTE — PROGRESS NOTES
LOS: 11 days   Patient Care Team:  Cruzito Palumbo MD as PCP - General (Internal Medicine)  Radha Abarca APRN as PCP - Claims Attributed    Chief Complaint: shortness of breath    Subjective     History of Present Illness     Patient remains on mechanical ventilator with settings of rate 26, tidal volume 480, FiO2 100, PEEP 8. EEG yesterday was negative for epileptiform activity. She continues on anticonvulsive medications to prevent further seizure activity. Chest tube remains in place for recent pneumothorax.      Subjective    Unable to obtain review of systems due to intubation and sedation, and COVID-19 isolation.     Objective     Vital Signs  Temp:  [97.9 °F (36.6 °C)-99.3 °F (37.4 °C)] 98.3 °F (36.8 °C)  Heart Rate:  [] 97  Resp:  [26-28] 26  BP: ()/(39-78) 116/58  FiO2 (%):  [100 %] 100 %  Body mass index is 29.58 kg/m².    Intake/Output Summary (Last 24 hours) at 7/16/2020 0823  Last data filed at 7/16/2020 0522  Gross per 24 hour   Intake 4016.6 ml   Output 800 ml   Net 3216.6 ml     No intake/output data recorded.    Objective     Physical exam limited to video visualization assisted by the RN due to COVID-19 isolation.   General:  Sedated. Intubated. Appears to be compliant with the ventilator settings. Graphics were visualized. No autopeep.   HENT: normocephalic. Atraumatic.   Cardiac: normal rate and rhythm noted on telemetry  Lungs:  Intubated. Appears compliant with ventilator settings. No autopeep. Peak pressure noted at 27. Plateau pressure noted at 21. Chest tube remains in place of the right side.   Musculoskeletal: no significant deformity, joint abnormality.  Neurologic: Sedated appropriately.       Results Review:     I reviewed the patient's new clinical results.     ABG this morning shows acidosis with PCO2 and HCO3 within normal limits.  PCO2 is elevated.  CMP reviewed from yesterday, signaled metabolic acidosis.   GGT was elevated yesterday.   Ferritin level trended upward  today.   Troponin level also showing interval increase with normal CK level.   Lactate yesterday was elevated at 2.4.     CBC today shows interval improvement of leukocytosis. No anemia or platelet abnormality.     Respiratory culture from 7/15 is pending, currently negative.     Chest xray today report and imaging reviewed. Chest tube placement persists of the mid lung field. Diffuse patchy infiltrates persist. Pneumothorax appears resolved.     EEG yesterday was negative for epileptiform activity.  CT head yesterday showed no acute intracranial normality.notable for encephalomalacia of the left frontal lobe and posterior left frontal lobe craniotomy.    WBC WBC   Date Value Ref Range Status   07/15/2020 22.60 (H) 3.40 - 10.80 10*3/mm3 Final   07/14/2020 10.12 3.40 - 10.80 10*3/mm3 Final      HGB Hemoglobin   Date Value Ref Range Status   07/15/2020 13.5 12.0 - 15.9 g/dL Final   07/14/2020 11.9 (L) 12.0 - 15.9 g/dL Final      HCT Hematocrit   Date Value Ref Range Status   07/15/2020 43.0 34.0 - 46.6 % Final   07/14/2020 37.2 34.0 - 46.6 % Final      Platlets No results found for: LABPLAT     PT/INR:  No results found for: PROTIME/No results found for: INR    Sodium Sodium   Date Value Ref Range Status   07/15/2020 145 136 - 145 mmol/L Final   07/14/2020 142 136 - 145 mmol/L Final      Potassium Potassium   Date Value Ref Range Status   07/15/2020 4.2 3.5 - 5.2 mmol/L Final   07/14/2020 3.8 3.5 - 5.2 mmol/L Final   07/14/2020 4.4 3.5 - 5.2 mmol/L Final      Chloride Chloride   Date Value Ref Range Status   07/15/2020 107 98 - 107 mmol/L Final   07/14/2020 107 98 - 107 mmol/L Final      Bicarbonate No results found for: PLASMABICARB   BUN BUN   Date Value Ref Range Status   07/15/2020 28 (H) 8 - 23 mg/dL Final   07/14/2020 27 (H) 8 - 23 mg/dL Final      Creatinine Creatinine   Date Value Ref Range Status   07/15/2020 0.68 0.57 - 1.00 mg/dL Final   07/14/2020 0.52 (L) 0.57 - 1.00 mg/dL Final      Calcium Calcium    Date Value Ref Range Status   07/15/2020 8.3 (L) 8.6 - 10.5 mg/dL Final   07/14/2020 8.7 8.6 - 10.5 mg/dL Final      Magnesium Magnesium   Date Value Ref Range Status   07/15/2020 2.0 1.6 - 2.4 mg/dL Final        pH pH, Arterial   Date Value Ref Range Status   07/16/2020 7.320 (L) 7.350 - 7.450 pH units Final     Comment:     84 Value below reference range   07/15/2020 7.302 (L) 7.350 - 7.450 pH units Final     Comment:     84 Value below reference range   07/15/2020 7.267 (L) 7.350 - 7.450 pH units Final     Comment:     84 Value below reference range   07/14/2020 7.463 (H) 7.350 - 7.450 pH units Final     Comment:     83 Value above reference range      pO2 pO2, Arterial   Date Value Ref Range Status   07/16/2020 137.0 (H) 83.0 - 108.0 mm Hg Final     Comment:     83 Value above reference range   07/15/2020 116.0 (H) 83.0 - 108.0 mm Hg Final     Comment:     83 Value above reference range   07/15/2020 81.2 (L) 83.0 - 108.0 mm Hg Final   07/14/2020 73.5 (L) 83.0 - 108.0 mm Hg Final     Comment:     84 Value below reference range      pCO2 pCO2, Arterial   Date Value Ref Range Status   07/16/2020 44.2 35.0 - 45.0 mm Hg Final   07/15/2020 42.1 35.0 - 45.0 mm Hg Final   07/15/2020 55.3 (H) 35.0 - 45.0 mm Hg Final     Comment:     83 Value above reference range   07/14/2020 34.4 (L) 35.0 - 45.0 mm Hg Final     Comment:     84 Value below reference range      HCO3 HCO3, Arterial   Date Value Ref Range Status   07/16/2020 22.8 20.0 - 26.0 mmol/L Final   07/15/2020 20.8 20.0 - 26.0 mmol/L Final   07/15/2020 25.2 20.0 - 26.0 mmol/L Final   07/14/2020 24.6 20.0 - 26.0 mmol/L Final          aspirin 81 mg Oral Nightly   budesonide 0.5 mg Nebulization BID - RT   chlorhexidine 15 mL Mouth/Throat Q12H   dexamethasone 6 mg Intravenous Daily   doxycycline 100 mg Intravenous Q12H   enoxaparin 80 mg Subcutaneous Q12H   lactobacillus acidophilus 1 capsule Oral Daily   levETIRAcetam 1,000 mg Intravenous Q12H   levothyroxine 25 mcg  Oral Daily   lidocaine 5 mL Subcutaneous Once   meropenem 1 g Intravenous Q8H   OLANZapine 2.5 mg Oral Nightly   pantoprazole 40 mg Intravenous Q24H   phenytoin (DILANTIN) IVPB 100 mg Intravenous Q8H   pramipexole 1 mg Oral Nightly   psyllium 1 packet Oral Daily   rosuvastatin 20 mg Oral Nightly   sodium chloride 10 mL Intravenous Q12H   sodium chloride 10 mL Intravenous Q12H   sodium chloride 10 mL Intravenous Q12H   sodium chloride 10 mL Intravenous Q12H   topiramate 100 mg Oral Q12H   venlafaxine XR 75 mg Oral Q PM       fentaNYL Citrate     lactated ringers 125 mL/hr Last Rate: 125 mL/hr (07/15/20 2150)   norepinephrine 0.02-0.3 mcg/kg/min Last Rate: 0.28 mcg/kg/min (07/16/20 0330)   Pharmacy Consult     propofol 5-50 mcg/kg/min Last Rate: 30 mcg/kg/min (07/16/20 0014)   vasopressin (PITRESSIN) infusion 0.03 Units/min Last Rate: Stopped (07/15/20 0744)       Medication Review: I have reviewed the current medications.     Assessment/Plan     Patient Active Problem List   Diagnosis Code   • Elevated alkaline phosphatase level R74.8   • Encounter for screening for malignant neoplasm of colon Z12.11   • Pneumonia of both lungs due to infectious organism J18.9   • Pneumonia due to COVID-19 virus U07.1, J12.89       Assessment & Plan          Central nervous system  History of seizures with concern for recent seizure activity    Plan:  -Patient is currently on  Propofol, fentanyl; RASS goal: 0 to -1. Patient does not have biot's type of breathing pattern while on opioids.   -Avoid nighttime disturbances and light exposure during night to maintain normal circadian rhythms to avoid hypoactive or hyperactive delirium.  - EEG from 7/15 showed no epileptiform activity.   -On Keppra IV, Phenytoin IV.   -Ordered phenytoin level.        Cardiovascular system:  Septic shock  Pulmonary hypertension  Troponin Leak    Plan:  - Goal CVP around 10 mmHg  -Vasopressor: norepinephrine,  Targeting mean arterial pressure of 65 mmHg    Will start vasopressin and titrate down norepinephrine as tolerated.     Results for orders placed during the hospital encounter of 07/05/20   Adult Transthoracic Echo Limited W/ Cont if Necessary Per Protocol    Narrative · Normal left ventricular cavity size and wall thickness noted. All left   ventricular wall segments contract normally.  · Estimated EF appears to be in the range of 61 - 65%.  · The aortic valve is structurally normal. No aortic valve regurgitation   is present. No aortic valve stenosis is present.  · The mitral valve is normal in structure. Mild mitral valve regurgitation   is present. No significant mitral valve stenosis is present.  · The tricuspid valve is normal. Mild tricuspid valve regurgitation is   present. Estimated right ventricular systolic pressure from tricuspid   regurgitation is moderately elevated (45-55 mmHg).  · There is no evidence of pericardial effusion.  · Comments: Patient seem to have developed pulmonary hypertension since   the last study on 6/10/2020.        -Ordered lactic acid every 8 hours, previously elevated.   -Cardiology consulted yesterday for troponin elevation. Suspected that elevation was likely related to demand ischemia, but can complete ischemic evaluation once clinical status improves.   -On aspirin, rosuvastatin          Respiratory system:  Acute hypoxic respiratory failure requiring mechanical ventilation  Right spontaneous pneumothorax status post chest tube placement  Bilateral diffuse pneumonia due to COVID-19  Mixed component of acute pulmonary edema  Moderate pulmonary hypertension  Acute COPD exacerbation  Obstructive sleep apnea  Tobacco abuse history  Severe ARDS      Plan:  -Mode of mechanical ventilation: Descending ramp flow assist control volume-cycled mode of mechanical ventilation.     Vent Settings       Vt (Set, L): 0.48 L  Resp Rate (Set): 26     FiO2 (%): 100 %  PEEP/CPAP (cm H2O): 8 cm H20    Minute Ventilation (L/min) (Obs): 12.8  L/min  Resp Rate (Observed) Vent: 26     I:E Ratio (Obs): 1:1.60    PIP Observed (cm H2O): 25 cm H2O        Patient Lines/Drains/Airways Status    Active Airway     Name:   Placement date:   Placement time:   Site:   Days:    ETT    07/15/20    0043     1                -Decreasing the FIO2 to 80%. Will repeat the ABG in 2 hours.      - I have reviewed the ventilator graphics. Flow time scalar was reviewed and auto PEEP is negative, volume time scalar was reviewed and leak is negative. Pressure volume loop was assessed.  Auto triggering is negative.  Missed triggering is negative.  Double triggering is negative.  Active expiration is negative.  Peak pressure is 27.  Plateau pressure is 21.        -Suctioning of oral secretions as per protocol  -Maintaining internal cuff pressures within recommended range.  Recommend careful monitoring of the internal cuff pressure during transport of the patient outside ICU.  -Oral care with chlorhexidine  -Keeping the patient at semi-recumbent position.  Head of the bed elevated at 35 degrees.  -On lateral rotation therapy  -Mouth care with oral chlorhexidine   -Consider vaccination with pneumococcal and influenza vaccine at hospital discharge  -On dexamethasone 6 mg IV daily  -Received Remdesivir and convalescent plasma infusion.   -On Doxycyline, Meropenem.   -On pulmicort nebs.   -Infectious disease team on board for antimicrobial recommendations.   Chest tube placement adjusted yesterday as it was positioned on xray in the lower lung field. Xray appears to show resolution of pneumothorax today. We will attempt manual pronation. Ordered for 14 hours in the supine position with pronation for 10 hours.   She was started on paralytic agent.   Manual prone protocol active.          Liver, gallbladder, Pancreas and gastrointestinal system:  Transaminitis    Plan:  - GGT was elevated with elevation of LFTs.   Possibly related to underlying fibrosis, as biopsy in 2019 notable for  chronic inflammation and fibrosis.   -  On pantoprazole for GI ulcer prophylaxis.      Dietary Orders (From admission, onward)     Start     Ordered    07/15/20 1240  Peptamen Intense VHP (Vital HP); Tube Feeding Type: Continuous; Continuous Tube Feeding Start Rate (mL/hr): 15; Then Advance Rate By (mL/hr): 10; Every __ Hours: 4; To Goal Rate of (mL/hr): 45; Water Flush Amount (mL): 10  Diet Effective Now     Comments:  If manually proned change TF rate to 10 ml/hr and feed only in supine position.   Question Answer Comment   Tube Feeding Formula: Peptamen Intense VHP (Vital HP)    Tube Feeding Type Continuous    Continuous Tube Feeding Start Rate (mL/hr) 15    Then Advance Rate By (mL/hr) 10    Every __ Hours 4    To Goal Rate of (mL/hr) 45    Water Flush Amount (mL) 10    Water Flush Frequency Every 1 Hour        07/15/20 1242    07/15/20 0138  NPO Diet  Diet Effective Now      07/15/20 0138                     Genitourinary system:   Metabolic acidosis    Plan     Intake/Output Summary (Last 24 hours) at 7/16/2020 0823  Last data filed at 7/16/2020 0522  Gross per 24 hour   Intake 4016.6 ml   Output 800 ml   Net 3216.6 ml     -Avoiding nephrotoxic agent.  -Following serum creatinine and GFR closely with urine output daily.  PH showing interval improvement today after ringer's lactate infusion given yesterday.          Endocrine system:  Hypothyroidism    Plan:  - Goal serum glucose level is  140-180 mg/dL while in ICU.    - I strongly recommend starting insulin drip if 2 consecutive serum glucose levels are greater than 200 mg/dL.   - On levothyroxine 25 mcg daily          Infectious disease system:  Bilateral diffuse pneumonia due to COVID-19    No results found for: ACANTHNAEG, AFBCX, BPERTUSSISCX, BLOODCX  No results found for: BCIDPCR, CXREFLEX, CSFCX, CULTURETIS  No results found for: CULTURES, HSVCX, URCX  No results found for: EYECULTURE, GCCX, LABHSV  No results found for: LEGIONELLA, MRSACX, MUMPSCX,  MYCOPLASCX  No results found for: NOCARDIACX, STOOLCX  No results found for: THROATCX, UNSTIMCULT, URINECX, CULTURE, VZVCULTUR  No results found for: VIRALCULTU, WOUNDCX     Plan:  - Current antibiotics: Doxycyline, Meropenem  Received Remdesivir and convalescent plasma infusion.   - Cultures:   Respiratory culture is pending, currently negative.   As patient is currently on broad spectrum antibiotics, bronchoscopy yield would likely be low. There is also higher risk versus benefit of bronchoscopy at this time due to current COVID-19. We will follow current respiratory culture as adjust medications as needed.           Hematological system:  Leukocytosis in the setting of sepsis    Plan   - I recommend PRBC transfusion if hemoglobin is less than 7 g/dL unless active bleeding or cardiac ischemia.  Mechanical Order History:      Ordered        07/10/20 0848  Place Sequential Compression Device  Once         07/10/20 0848  Maintain Sequential Compression Device  Continuous,   Status:  Canceled                 Pharmalogical Order History:     Ordered     Dose Route Frequency Stop    07/12/20 1113  enoxaparin (LOVENOX) syringe 80 mg      80 mg SC Every 12 Hours --    07/12/20 1113  enoxaparin (LOVENOX) syringe 40 mg      40 mg SC Once 07/12/20 1205    07/12/20 1100  Pharmacy to Dose enoxaparin (LOVENOX)  Status:  Discontinued      -- XX Continuous PRN 07/13/20 1304    07/10/20 0848  enoxaparin (LOVENOX) syringe 40 mg  Status:  Discontinued      40 mg SC Daily 07/12/20 1113    07/07/20 1450  enoxaparin (LOVENOX) syringe 40 mg  Status:  Discontinued      40 mg SC Daily 07/09/20 2205    07/07/20 1449  enoxaparin (LOVENOX) syringe 40 mg  Status:  Discontinued      40 mg SC Nightly 07/07/20 1450    07/06/20 0817  heparin (porcine) 5000 UNIT/ML injection 5,000 Units  Status:  Discontinued      5,000 Units SC Every 8 Hours Scheduled 07/07/20 1449    07/05/20 2252  heparin (porcine) 5000 UNIT/ML injection 5,000 Units  Status:   Discontinued      5,000 Units SC Every 12 Hours Scheduled 07/06/20 0817        - On DVT prophylaxis.  I recommend holding chemical anticoagulation if platelet count is less than 50,000.       Rheumatological and dermatological system:    Plan:  - Turn the patient every 2 hours         Activity:  - I recommend aggressive PT/OT while in hospital to avoid critical care neuropathy/myopathy.       I have updated [facility] of the plan of care.       Purnima Bejarano PA-C  07/16/20  08:23    Scribed for Dr. Manrique by Purnima Bejarano PA-C      The clinical plan was discussed with .  The clinical plan was discussed extensively with the nurse, and respiratory therapist.   Critical Care time spent in direct patient care: 86 minutes (excluding procedure time).  Patient is critically ill and is at higher risk for further mortality/morbidity.     I, Cayetano Manrique M.D. attest that the above note accurately reflects the work and decisions made by me. Patient was seen and evaluated by me, including history of present illness, physical exam, assessment, and treatment plan.  The above note was reviewed and edited by me.    Cayetano Manrique M.D  Pulmonary and Critical Care Medicine

## 2020-07-16 NOTE — OP NOTE
Siria Rueda        Operative Progress Note:    Surgeon and Assistant: Dr. Irene    Pre-Operative Diagnosis: Right pneumothorax    Post-Operative Diagnosis: Right pneumothorax    Procedure(s): Placement right chest tube    Type of Anesthesia Administered: 1% Xylocaine with patient sedated while on ventilator    Estimated Blood Loss: Minimal    Blood Products: None    Specimen Obtained/Removed:  None    Complication(s):  None    Graft/Implant/Prosthetics/Implanted Device/Transplants: #14 Korean percutaneous drainage tube as a chest tube    Indication: Patient is a 74-year-old white female who has respiratory failure related to COVID virus    Findings: Normal anatomy    Operative Report:  Right chest was prepped and draped in usual sterile fashion while patient was in the ICU.  1% Xylocaine was infiltrated right chest at anterior axillary line nipple level.  A #14 [place] percutaneous drainage tube was placed by Seldinger technique without difficulty.  Sterile dressing applied after suturing the the tube in place.  Patient taught procedure very well.      Electronically Signed by: Saud Irene MD        Dictated Utilizing [employer]

## 2020-07-16 NOTE — PROGRESS NOTES
PROGRESS NOTE         Patient Identification:  Name:  Siria Rueda  Age:  74 y.o.  Sex:  female  :  1945  MRN:  [id number]  Visit Number:  [id number]  Primary Care Provider:  Cruzito Palumbo MD         LOS: 11 days       ----------------------------------------------------------------------------------------------------------------------  Subjective       Chief Complaints:    Shortness of Breath        Interval History:      Patient remains intubated and sedated at 100% FiO2.  Vent to be weaned to 80% per RN.  Case discussed with Hoa MAST who states patient is going to be placed in prone position.  T-max of 100.1.  WBC improved at 18.65.  Chest x-ray on 2020 revealed little overall change.    Review of Systems:    Unable to obtain.  Intubated and sedated.  ----------------------------------------------------------------------------------------------------------------------      Objective       Current Hospital Meds:    artificial tears  Both Eyes Q4H   aspirin 81 mg Oral Nightly   budesonide 0.5 mg Nebulization BID - RT   chlorhexidine 15 mL Mouth/Throat Q12H   dexamethasone 6 mg Intravenous Daily   doxycycline 100 mg Intravenous Q12H   enoxaparin 80 mg Subcutaneous Q12H   lactobacillus acidophilus 1 capsule Oral Daily   levETIRAcetam 1,000 mg Intravenous Q12H   levothyroxine 25 mcg Oral Daily   lidocaine 5 mL Subcutaneous Once   meropenem 1 g Intravenous Q8H   OLANZapine 2.5 mg Oral Nightly   pantoprazole 40 mg Intravenous Q24H   phenytoin (DILANTIN) IVPB 100 mg Intravenous Q8H   pramipexole 1 mg Oral Nightly   psyllium 1 packet Oral Daily   rosuvastatin 20 mg Oral Nightly   sodium chloride 10 mL Intravenous Q12H   sodium chloride 10 mL Intravenous Q12H   sodium chloride 10 mL Intravenous Q12H   sodium chloride 10 mL Intravenous Q12H   sterile water (preservative free)      topiramate 100 mg Oral Q12H   venlafaxine XR 75 mg Oral Q PM       fentaNYL Citrate     norepinephrine 0.02-0.3  mcg/kg/min Last Rate: 0.14 mcg/kg/min (07/16/20 1053)   Pharmacy Consult     propofol 5-50 mcg/kg/min Last Rate: 30 mcg/kg/min (07/16/20 1029)   vasopressin (PITRESSIN) infusion 0.03 Units/min Last Rate: 0.03 Units/min (07/16/20 0850)   vecuronium (NORCURON) infusion 0.6-1.2 mcg/kg/min Last Rate: 0.6 mcg/kg/min (07/16/20 1057)     ----------------------------------------------------------------------------------------------------------------------    Vital Signs:  Temp:  [97.9 °F (36.6 °C)-99.3 °F (37.4 °C)] 98.3 °F (36.8 °C)  Heart Rate:  [] 95  Resp:  [26-28] 26  BP: ()/(40-76) 106/40  FiO2 (%):  [80 %-100 %] 80 %  Mean Arterial Pressure (Non-Invasive) for the past 24 hrs (Last 3 readings):   Noninvasive MAP (mmHg)   07/16/20 1100 61   07/16/20 1000 72   07/16/20 0945 74     SpO2 Percentage    07/16/20 1000 07/16/20 1040 07/16/20 1100   SpO2: 99% 100% 100%     SpO2:  [99 %-100 %] 100 %  on   ;   Device (Oxygen Therapy): ventilator    Body mass index is 29.58 kg/m².  Wt Readings from Last 3 Encounters:   07/16/20 75.8 kg (167 lb)   11/16/19 76.5 kg (168 lb 11.2 oz)   07/10/19 78.5 kg (173 lb 1 oz)        Intake/Output Summary (Last 24 hours) at 7/16/2020 1158  Last data filed at 7/16/2020 1105  Gross per 24 hour   Intake 3693.68 ml   Output 800 ml   Net 2893.68 ml     NPO Diet  ----------------------------------------------------------------------------------------------------------------------      Physical Exam:    Deferred as patient is in COVID-19 isolation.       ----------------------------------------------------------------------------------------------------------------------  Results from last 7 days   Lab Units 07/16/20  1006 07/15/20  0738 07/15/20  0736 07/14/20  0113 07/13/20  0121   CK TOTAL U/L 63 134  --  166 230*   TROPONIN T ng/mL  --   --  0.635*  --  <0.010           Results from last 7 days   Lab Units 07/16/20  0519   PH, ARTERIAL pH units 7.320*   PO2 ART mm Hg 137.0*   PCO2,  ARTERIAL mm Hg 44.2   HCO3 ART mmol/L 22.8     Results from last 7 days   Lab Units 07/16/20  1006 07/15/20  1641 07/15/20  0738 07/15/20  0737 07/15/20  0736 07/14/20  0113 07/13/20  1130 07/13/20  0121   CRP mg/dL  --   --  21.88*  --   --  13.32*  --  18.68*   LACTATE mmol/L  --  2.4*  --   --  2.8*  --  1.5  --    WBC 10*3/mm3 18.65*  --   --  22.60*  --  10.12  --  7.20   HEMOGLOBIN g/dL 12.0  --   --  13.5  --  11.9*  --  11.5*   HEMATOCRIT % 38.9  --   --  43.0  --  37.2  --  35.8   MCV fL 98.2*  --   --  96.6  --  94.9  --  95.0   MCHC g/dL 30.8*  --   --  31.4*  --  32.0  --  32.1   PLATELETS 10*3/mm3 174  --   --  316  --  195  --  157     Results from last 7 days   Lab Units 07/15/20  0736 07/14/20  0915 07/14/20  0113 07/13/20  0121  07/10/20  0531   SODIUM mmol/L 145  --  142 141   < > 138   POTASSIUM mmol/L 4.2 3.8 4.4 3.3*   < > 4.0   MAGNESIUM mg/dL 2.0  --   --   --   --  2.0   CHLORIDE mmol/L 107  --  107 106   < > 107   CO2 mmol/L 21.6*  --  24.2 23.5   < > 17.5*   BUN mg/dL 28*  --  27* 23   < > 21   CREATININE mg/dL 0.68  --  0.52* 0.55*   < > 0.65   EGFR IF NONAFRICN AM mL/min/1.73 85  --  115 108   < > 89   CALCIUM mg/dL 8.3*  --  8.7 8.5*   < > 8.3*   GLUCOSE mg/dL 139*  --  91 109*   < > 92   ALBUMIN g/dL 2.95*  --   --   --   --  3.14*   BILIRUBIN mg/dL 0.8  --   --   --   --  0.3   ALK PHOS U/L 225*  --   --   --   --  135*   AST (SGOT) U/L 40*  --   --   --   --  59*   ALT (SGPT) U/L 20  --   --   --   --  25    < > = values in this interval not displayed.   Estimated Creatinine Clearance: 60.2 mL/min (by C-G formula based on SCr of 0.68 mg/dL).  No results found for: AMMONIA    No results found for: HGBA1C, POCGLU  No results found for: HGBA1C  Lab Results   Component Value Date    TSH 1.270 07/06/2020    FREET4 1.05 03/13/2015       Blood Culture   Date Value Ref Range Status   07/05/2020 No growth at 24 hours  Preliminary   07/05/2020 No growth at 24 hours  Preliminary     No results  found for: URINECX  No results found for: WOUNDCX  No results found for: STOOLCX  No results found for: RESPCX  Pain Management Panel     There is no flowsheet data to display.            ----------------------------------------------------------------------------------------------------------------------  Imaging Results (Last 24 Hours)     Procedure Component Value Units Date/Time    XR Chest 1 View [[id number]] Collected:  07/16/20 0755     Updated:  07/16/20 0758    Narrative:       XR CHEST 1 VW-     CLINICAL INDICATION: check chest tube placement; J18.9-Pneumonia,  unspecified organism; U07.1-COVID-19; U07.1-COVID-19; J12.89-Other viral  pneumonia; J96.01-Acute respiratory failure with hypoxia        COMPARISON: 07/15/2020      TECHNIQUE: Single frontal view of the chest.     FINDINGS:     Chest tube placement similar to the previous exam.  Diffuse patchy airspace disease again noted  There is no evidence of an acute osseous abnormality.   There are no suspicious-appearing parenchymal soft tissue nodules.          Impression:       Little overall change     This report was finalized on 7/16/2020 7:56 AM by Dr. Jorge A Vizcanio MD.       XR Chest 1 View [[id number]] Collected:  07/15/20 1849     Updated:  07/15/20 1858    Narrative:       XR CHEST 1 VW-     CLINICAL INDICATION: pneumo, check chest tube placement;  J18.9-Pneumonia, unspecified organism; U07.1-COVID-19; U07.1-COVID-19;  J12.89-Other viral pneumonia; J96.01-Acute respiratory failure with  hypoxia        COMPARISON: 07/15/2020      TECHNIQUE: Single frontal view of the chest.     FINDINGS:     Chest tube remains in place.  Patchy airspace disease  The cardiac silhouette is normal. The pulmonary vasculature is  unremarkable.  There is no evidence of an acute osseous abnormality.   There are no suspicious-appearing parenchymal soft tissue nodules.          Impression:       No significant interval line placement     This report was finalized on 7/15/2020  6:56 PM by Dr. Jorge A Vizcaino MD.             ----------------------------------------------------------------------------------------------------------------------    Assessment/Plan       Assessment/Plan     ASSESSMENT:    1.  Pneumonia due to COVID-19 virus    PLAN:    Patient remains intubated and sedated at 100% FiO2.  Vent to be weaned to 80% per RN.  Case discussed with Hoa MAST who states patient is going to be placed in prone position.  T-max of 100.1.  WBC improved at 18.65.  Chest x-ray on 7/16/2020 revealed little overall change.    COVID-19 PCR positive.       CT chest on 7/5/2020 reveals no evidence of pulmonary embolism.  Likely underlying chronic lung disease with emphysematous changes at apices in the area of air trapping. Patchy airspace disease in the right middle lobe and lingula is nonspecific. Prominent mediastinal and right hilar lymph nodes not significantly changed. One AP window lymph node is mildly enlarged at 1.1 cm short axis dimension but unchanged-per radiology.      Patient received Remdesivir and received and completed convalescent plasma therapy. Consents were obtained.     Recommend bronchoscopy with BAL cultures for better diagnostic of fungal or bacterial etiology.  If BAL is negative will consider Actemra.  Is unlikely that this far out patient is worsening COVID but rather worsening superimposed pneumonia.      We will continue Merrem and doxycycline for now.  We will continue to follow closely.    Code Status:   Code Status and Medical Interventions:   Ordered at: 07/05/20 2049     Code Status:    CPR     Medical Interventions (Level of Support Prior to Arrest):    Full       Fidelia Irene, APRN  07/16/20  11:58

## 2020-07-16 NOTE — PROGRESS NOTES
Chest x-ray shows resolution of the pneumothorax without evidence of air leak.    We will get chest tube out tomorrow

## 2020-07-17 ENCOUNTER — APPOINTMENT (OUTPATIENT)
Dept: ULTRASOUND IMAGING | Facility: HOSPITAL | Age: 75
End: 2020-07-17

## 2020-07-17 ENCOUNTER — APPOINTMENT (OUTPATIENT)
Dept: GENERAL RADIOLOGY | Facility: HOSPITAL | Age: 75
End: 2020-07-17

## 2020-07-17 VITALS
HEART RATE: 79 BPM | WEIGHT: 169 LBS | BODY MASS INDEX: 29.95 KG/M2 | HEIGHT: 63 IN | RESPIRATION RATE: 26 BRPM | SYSTOLIC BLOOD PRESSURE: 109 MMHG | DIASTOLIC BLOOD PRESSURE: 44 MMHG | TEMPERATURE: 99.3 F | OXYGEN SATURATION: 95 %

## 2020-07-17 LAB
A-A DO2: 278.4 MMHG (ref 0–300)
ARTERIAL PATENCY WRIST A: ABNORMAL
ATMOSPHERIC PRESS: 731 MMHG
BACTERIA SPEC RESP CULT: NORMAL
BASE EXCESS BLDA CALC-SCNC: -1.8 MMOL/L (ref 0–2)
BASOPHILS # BLD AUTO: 0.09 10*3/MM3 (ref 0–0.2)
BASOPHILS NFR BLD AUTO: 0.4 % (ref 0–1.5)
BDY SITE: ABNORMAL
BODY TEMPERATURE: 0 C
CK SERPL-CCNC: 118 U/L (ref 20–180)
CO2 BLDA-SCNC: 25.8 MMOL/L (ref 22–33)
COHGB MFR BLD: 0.3 % (ref 0–5)
CRP SERPL-MCNC: 32.03 MG/DL (ref 0–0.5)
D-LACTATE SERPL-SCNC: 2.3 MMOL/L (ref 0.5–2)
D-LACTATE SERPL-SCNC: 2.5 MMOL/L (ref 0.5–2)
DEPRECATED RDW RBC AUTO: 51.1 FL (ref 37–54)
EOSINOPHIL # BLD AUTO: 0.29 10*3/MM3 (ref 0–0.4)
EOSINOPHIL NFR BLD AUTO: 1.3 % (ref 0.3–6.2)
ERYTHROCYTE [DISTWIDTH] IN BLOOD BY AUTOMATED COUNT: 14 % (ref 12.3–15.4)
FERRITIN SERPL-MCNC: 493.1 NG/ML (ref 13–150)
GRAM STN SPEC: NORMAL
HCO3 BLDA-SCNC: 24.4 MMOL/L (ref 20–26)
HCT VFR BLD AUTO: 39 % (ref 34–46.6)
HCT VFR BLD CALC: 36.8 % (ref 38–51)
HGB BLD-MCNC: 12.1 G/DL (ref 12–15.9)
HGB BLDA-MCNC: 12 G/DL (ref 13.5–17.5)
IMM GRANULOCYTES # BLD AUTO: 0.37 10*3/MM3 (ref 0–0.05)
IMM GRANULOCYTES NFR BLD AUTO: 1.6 % (ref 0–0.5)
INHALED O2 CONCENTRATION: 60 %
LYMPHOCYTES # BLD AUTO: 3.16 10*3/MM3 (ref 0.7–3.1)
LYMPHOCYTES NFR BLD AUTO: 13.8 % (ref 19.6–45.3)
Lab: ABNORMAL
MCH RBC QN AUTO: 30.4 PG (ref 26.6–33)
MCHC RBC AUTO-ENTMCNC: 31 G/DL (ref 31.5–35.7)
MCV RBC AUTO: 98 FL (ref 79–97)
METHGB BLD QL: 0.6 % (ref 0–3)
MODALITY: ABNORMAL
MONOCYTES # BLD AUTO: 2.28 10*3/MM3 (ref 0.1–0.9)
MONOCYTES NFR BLD AUTO: 10 % (ref 5–12)
NEUTROPHILS NFR BLD AUTO: 16.63 10*3/MM3 (ref 1.7–7)
NEUTROPHILS NFR BLD AUTO: 72.9 % (ref 42.7–76)
NOTE: ABNORMAL
NOTIFIED BY: ABNORMAL
NOTIFIED WHO: ABNORMAL
NRBC BLD AUTO-RTO: 0 /100 WBC (ref 0–0.2)
OXYHGB MFR BLDV: 94.6 % (ref 94–99)
PCO2 BLDA: 46.2 MM HG (ref 35–45)
PCO2 TEMP ADJ BLD: ABNORMAL MM[HG]
PEEP RESPIRATORY: 8 CM[H2O]
PH BLDA: 7.33 PH UNITS (ref 7.35–7.45)
PH, TEMP CORRECTED: ABNORMAL
PLATELET # BLD AUTO: 229 10*3/MM3 (ref 140–450)
PMV BLD AUTO: 9.6 FL (ref 6–12)
PO2 BLDA: 82.8 MM HG (ref 83–108)
PO2 TEMP ADJ BLD: ABNORMAL MM[HG]
RBC # BLD AUTO: 3.98 10*6/MM3 (ref 3.77–5.28)
SAO2 % BLDCOA: 95.5 % (ref 94–99)
SET MECH RESP RATE: 26
VENTILATOR MODE: ABNORMAL
VT ON VENT VENT: 480 ML
WBC # BLD AUTO: 22.82 10*3/MM3 (ref 3.4–10.8)

## 2020-07-17 PROCEDURE — 82805 BLOOD GASES W/O2 SATURATION: CPT

## 2020-07-17 PROCEDURE — 94799 UNLISTED PULMONARY SVC/PX: CPT

## 2020-07-17 PROCEDURE — 36600 WITHDRAWAL OF ARTERIAL BLOOD: CPT

## 2020-07-17 PROCEDURE — 86140 C-REACTIVE PROTEIN: CPT | Performed by: HOSPITALIST

## 2020-07-17 PROCEDURE — 93971 EXTREMITY STUDY: CPT | Performed by: RADIOLOGY

## 2020-07-17 PROCEDURE — 85025 COMPLETE CBC W/AUTO DIFF WBC: CPT | Performed by: INTERNAL MEDICINE

## 2020-07-17 PROCEDURE — 93926 LOWER EXTREMITY STUDY: CPT

## 2020-07-17 PROCEDURE — 99239 HOSP IP/OBS DSCHRG MGMT >30: CPT | Performed by: INTERNAL MEDICINE

## 2020-07-17 PROCEDURE — 71045 X-RAY EXAM CHEST 1 VIEW: CPT | Performed by: RADIOLOGY

## 2020-07-17 PROCEDURE — 82375 ASSAY CARBOXYHB QUANT: CPT

## 2020-07-17 PROCEDURE — 25010000002 PHENYLEPHRINE 10 MG/ML SOLUTION: Performed by: INTERNAL MEDICINE

## 2020-07-17 PROCEDURE — 82550 ASSAY OF CK (CPK): CPT | Performed by: HOSPITALIST

## 2020-07-17 PROCEDURE — 71045 X-RAY EXAM CHEST 1 VIEW: CPT

## 2020-07-17 PROCEDURE — 25010000003 PHENYTOIN PER 50 MG: Performed by: INTERNAL MEDICINE

## 2020-07-17 PROCEDURE — 99291 CRITICAL CARE FIRST HOUR: CPT | Performed by: INTERNAL MEDICINE

## 2020-07-17 PROCEDURE — 94003 VENT MGMT INPAT SUBQ DAY: CPT

## 2020-07-17 PROCEDURE — 25010000002 LEVETRIRACETAM PER 10 MG: Performed by: INTERNAL MEDICINE

## 2020-07-17 PROCEDURE — 93971 EXTREMITY STUDY: CPT

## 2020-07-17 PROCEDURE — 93010 ELECTROCARDIOGRAM REPORT: CPT | Performed by: INTERNAL MEDICINE

## 2020-07-17 PROCEDURE — 25010000002 ENOXAPARIN PER 10 MG: Performed by: INTERNAL MEDICINE

## 2020-07-17 PROCEDURE — 82728 ASSAY OF FERRITIN: CPT | Performed by: HOSPITALIST

## 2020-07-17 PROCEDURE — 83605 ASSAY OF LACTIC ACID: CPT | Performed by: PHYSICIAN ASSISTANT

## 2020-07-17 PROCEDURE — 25010000002 MICAFUNGIN SODIUM 100 MG RECONSTITUTED SOLUTION 1 EACH VIAL: Performed by: NURSE PRACTITIONER

## 2020-07-17 PROCEDURE — 83050 HGB METHEMOGLOBIN QUAN: CPT

## 2020-07-17 PROCEDURE — 93926 LOWER EXTREMITY STUDY: CPT | Performed by: RADIOLOGY

## 2020-07-17 PROCEDURE — 25010000002 DEXAMETHASONE PER 1 MG: Performed by: HOSPITALIST

## 2020-07-17 PROCEDURE — 93005 ELECTROCARDIOGRAM TRACING: CPT | Performed by: INTERNAL MEDICINE

## 2020-07-17 PROCEDURE — 25010000002 MEROPENEM: Performed by: NURSE PRACTITIONER

## 2020-07-17 RX ORDER — PHENYLEPHRINE HCL IN 0.9% NACL 0.5 MG/5ML
.5-3 SYRINGE (ML) INTRAVENOUS
Status: DISCONTINUED | OUTPATIENT
Start: 2020-07-17 | End: 2020-07-17 | Stop reason: HOSPADM

## 2020-07-17 RX ORDER — MAGNESIUM SULFATE HEPTAHYDRATE 40 MG/ML
2 INJECTION, SOLUTION INTRAVENOUS AS NEEDED
Start: 2020-07-17

## 2020-07-17 RX ORDER — ACETAMINOPHEN 325 MG/1
650 TABLET ORAL EVERY 6 HOURS PRN
Start: 2020-07-17

## 2020-07-17 RX ORDER — PANTOPRAZOLE SODIUM 40 MG/10ML
40 INJECTION, POWDER, LYOPHILIZED, FOR SOLUTION INTRAVENOUS
Start: 2020-07-18

## 2020-07-17 RX ORDER — DEXAMETHASONE SODIUM PHOSPHATE 4 MG/ML
6 INJECTION, SOLUTION INTRA-ARTICULAR; INTRALESIONAL; INTRAMUSCULAR; INTRAVENOUS; SOFT TISSUE DAILY
Start: 2020-07-18

## 2020-07-17 RX ORDER — ROSUVASTATIN CALCIUM 20 MG/1
20 TABLET, COATED ORAL NIGHTLY
Start: 2020-07-17

## 2020-07-17 RX ORDER — TOPIRAMATE 100 MG/1
100 TABLET, FILM COATED ORAL EVERY 12 HOURS SCHEDULED
Start: 2020-07-17

## 2020-07-17 RX ORDER — LEVOTHYROXINE SODIUM 0.03 MG/1
25 TABLET ORAL DAILY
Start: 2020-07-18

## 2020-07-17 RX ORDER — PHENYLEPHRINE HCL IN 0.9% NACL 0.5 MG/5ML
.5-3 SYRINGE (ML) INTRAVENOUS
Start: 2020-07-17

## 2020-07-17 RX ORDER — OLANZAPINE 2.5 MG/1
2.5 TABLET ORAL NIGHTLY
Start: 2020-07-17

## 2020-07-17 RX ORDER — VENLAFAXINE HYDROCHLORIDE 75 MG/1
75 CAPSULE, EXTENDED RELEASE ORAL EVERY EVENING
Start: 2020-07-17

## 2020-07-17 RX ORDER — ASPIRIN 81 MG/1
81 TABLET ORAL NIGHTLY
Start: 2020-07-17

## 2020-07-17 RX ORDER — PRAMIPEXOLE DIHYDROCHLORIDE 1 MG/1
1 TABLET ORAL NIGHTLY
Start: 2020-07-17

## 2020-07-17 RX ORDER — LIDOCAINE HYDROCHLORIDE 10 MG/ML
5 INJECTION, SOLUTION INFILTRATION; PERINEURAL ONCE
Qty: 5 ML | Refills: 0
Start: 2020-07-17 | End: 2020-07-17

## 2020-07-17 RX ORDER — BUDESONIDE 0.5 MG/2ML
0.5 INHALANT ORAL
Start: 2020-07-17

## 2020-07-17 RX ORDER — CHLORHEXIDINE GLUCONATE 0.12 MG/ML
15 RINSE ORAL EVERY 12 HOURS SCHEDULED
Start: 2020-07-17

## 2020-07-17 RX ORDER — ACETYLCYSTEINE 200 MG/ML
3 SOLUTION ORAL; RESPIRATORY (INHALATION) EVERY 12 HOURS SCHEDULED
Start: 2020-07-18

## 2020-07-17 RX ORDER — ACETYLCYSTEINE 200 MG/ML
3 SOLUTION ORAL; RESPIRATORY (INHALATION) EVERY 12 HOURS SCHEDULED
Status: DISCONTINUED | OUTPATIENT
Start: 2020-07-17 | End: 2020-07-17 | Stop reason: HOSPADM

## 2020-07-17 RX ORDER — L.ACID,PARA/B.BIFIDUM/S.THERM 8B CELL
1 CAPSULE ORAL DAILY
Start: 2020-07-18

## 2020-07-17 RX ORDER — SODIUM CHLORIDE 9 MG/ML
100 INJECTION, SOLUTION INTRAVENOUS CONTINUOUS
Status: DISCONTINUED | OUTPATIENT
Start: 2020-07-17 | End: 2020-07-17

## 2020-07-17 RX ORDER — METOPROLOL TARTRATE 5 MG/5ML
INJECTION INTRAVENOUS
Status: COMPLETED
Start: 2020-07-17 | End: 2020-07-17

## 2020-07-17 RX ORDER — FENTANYL CITRATE/PF 100MCG/2ML
SYRINGE (ML) INTRAVENOUS
Start: 2020-07-17

## 2020-07-17 RX ORDER — METOPROLOL TARTRATE 5 MG/5ML
5 INJECTION INTRAVENOUS ONCE
Status: COMPLETED | OUTPATIENT
Start: 2020-07-17 | End: 2020-07-17

## 2020-07-17 RX ADMIN — VASOPRESSIN 0.03 UNITS/MIN: 20 INJECTION INTRAVENOUS at 15:41

## 2020-07-17 RX ADMIN — CHLORHEXIDINE GLUCONATE 15 ML: 1.2 RINSE ORAL at 09:44

## 2020-07-17 RX ADMIN — METOPROLOL TARTRATE 5 MG: 5 INJECTION INTRAVENOUS at 01:54

## 2020-07-17 RX ADMIN — DOXYCYCLINE 100 MG: 100 INJECTION, POWDER, LYOPHILIZED, FOR SOLUTION INTRAVENOUS at 13:35

## 2020-07-17 RX ADMIN — Medication: at 09:33

## 2020-07-17 RX ADMIN — MEROPENEM 1 G: 1 INJECTION, POWDER, FOR SOLUTION INTRAVENOUS at 09:29

## 2020-07-17 RX ADMIN — PHENYTOIN SODIUM 100 MG: 50 INJECTION INTRAMUSCULAR; INTRAVENOUS at 04:04

## 2020-07-17 RX ADMIN — NOREPINEPHRINE BITARTRATE 0.14 MCG/KG/MIN: 1 INJECTION, SOLUTION, CONCENTRATE INTRAVENOUS at 00:37

## 2020-07-17 RX ADMIN — METOPROLOL TARTRATE 5 MG: 1 INJECTION, SOLUTION INTRAVENOUS at 01:54

## 2020-07-17 RX ADMIN — PANTOPRAZOLE SODIUM 40 MG: 40 INJECTION, POWDER, FOR SOLUTION INTRAVENOUS at 09:29

## 2020-07-17 RX ADMIN — PHENYLEPHRINE HYDROCHLORIDE 3 MCG/KG/MIN: 10 INJECTION INTRAVENOUS at 05:21

## 2020-07-17 RX ADMIN — SODIUM CHLORIDE, PRESERVATIVE FREE 10 ML: 5 INJECTION INTRAVENOUS at 09:31

## 2020-07-17 RX ADMIN — PHENYLEPHRINE HYDROCHLORIDE 0.5 MCG/KG/MIN: 10 INJECTION INTRAVENOUS at 02:19

## 2020-07-17 RX ADMIN — SODIUM CHLORIDE, PRESERVATIVE FREE 10 ML: 5 INJECTION INTRAVENOUS at 09:32

## 2020-07-17 RX ADMIN — LEVETIRACETAM 1000 MG: 100 INJECTION, SOLUTION, CONCENTRATE INTRAVENOUS at 09:29

## 2020-07-17 RX ADMIN — SODIUM CHLORIDE, PRESERVATIVE FREE 10 ML: 5 INJECTION INTRAVENOUS at 09:29

## 2020-07-17 RX ADMIN — PHENYTOIN SODIUM 100 MG: 50 INJECTION INTRAMUSCULAR; INTRAVENOUS at 13:35

## 2020-07-17 RX ADMIN — SODIUM CHLORIDE 100 ML/HR: 9 INJECTION, SOLUTION INTRAVENOUS at 05:24

## 2020-07-17 RX ADMIN — ENOXAPARIN SODIUM 80 MG: 80 INJECTION SUBCUTANEOUS at 09:29

## 2020-07-17 RX ADMIN — ACETYLCYSTEINE 3 ML: 200 SOLUTION ORAL; RESPIRATORY (INHALATION) at 12:40

## 2020-07-17 RX ADMIN — TOPIRAMATE 100 MG: 100 TABLET, FILM COATED ORAL at 09:32

## 2020-07-17 RX ADMIN — MINERAL OIL AND PETROLATUM: 150; 830 OINTMENT OPHTHALMIC at 15:42

## 2020-07-17 RX ADMIN — BUDESONIDE 0.5 MG: 0.5 INHALANT RESPIRATORY (INHALATION) at 07:34

## 2020-07-17 RX ADMIN — PHENYLEPHRINE HYDROCHLORIDE 2.1 MCG/KG/MIN: 10 INJECTION INTRAVENOUS at 15:42

## 2020-07-17 RX ADMIN — PSYLLIUM HUSK 1 PACKET: 3.4 POWDER ORAL at 09:29

## 2020-07-17 RX ADMIN — PHENYLEPHRINE HYDROCHLORIDE 2.7 MCG/KG/MIN: 10 INJECTION INTRAVENOUS at 09:40

## 2020-07-17 RX ADMIN — Medication 1 CAPSULE: at 09:31

## 2020-07-17 RX ADMIN — MICAFUNGIN SODIUM 100 MG: 20 INJECTION, POWDER, LYOPHILIZED, FOR SOLUTION INTRAVENOUS at 15:41

## 2020-07-17 RX ADMIN — VASOPRESSIN 0.03 UNITS/MIN: 20 INJECTION INTRAVENOUS at 05:21

## 2020-07-17 RX ADMIN — LEVOTHYROXINE SODIUM 25 MCG: 25 TABLET ORAL at 09:29

## 2020-07-17 RX ADMIN — DEXAMETHASONE SODIUM PHOSPHATE 6 MG: 4 INJECTION, SOLUTION INTRA-ARTICULAR; INTRALESIONAL; INTRAMUSCULAR; INTRAVENOUS; SOFT TISSUE at 09:29

## 2020-07-17 RX ADMIN — MINERAL OIL AND PETROLATUM: 150; 830 OINTMENT OPHTHALMIC at 13:35

## 2020-07-17 RX ADMIN — MINERAL OIL AND PETROLATUM: 150; 830 OINTMENT OPHTHALMIC at 09:30

## 2020-07-17 NOTE — PROGRESS NOTES
Discharge Planning Assessment   Harvinder     Patient Name: Siria Rueda  MRN: [id number]  Today's Date: 7/17/2020    Admit Date: 7/5/2020    Discharge Plan     Row Name 07/17/20 1640       Plan    Final Discharge Disposition Code  02 - short term hospital for  care    Final Note  Pt is being discharged to [facility] on this date. No other needs identified.           Viviane Jeter

## 2020-07-17 NOTE — SIGNIFICANT NOTE
Discussed with Dr Manrique regarding pt having new R lower limb skin discoloration and poor capillary refill.   Subsequently STAT arterial duplex was done which showed occlusion of R pop artery and monophasic flow above, likely acute on chronic.   Pt would need urgent vascular surgery evaluation due to Acute limb ischemia.   Pt has been on full anticoagulation with SC Lovenox.   D/w with Dr Manrique that pt would need urgent vascular surgery evaluation due to Acute limb ischemia.

## 2020-07-17 NOTE — PROGRESS NOTES
Patient has developed acute right limb ischemia with arterial Doppler showing popliteal and posterior tibial artery occlusion.  I called and discussed with patient's daughter regarding patient's limb ischemia and finding of her arterial Doppler ultrasound.  I explained to her the need for vascular surgery and plan for transfer.  Patient's daughter is agreeable.  I called [facility] and was advised to transfer to [facility].  I called you to [facility] and discussed with Dr. Reza, intensivist on-call who has kindly accepted to transfer the patient to her care.

## 2020-07-17 NOTE — NURSING NOTE
Daughter Sidra made aware pt on way to  and she needs to  patients belongings in [employer] because they couldn't be transferred with patient via [employer] [facility].

## 2020-07-17 NOTE — PROGRESS NOTES
PROGRESS NOTE         Patient Identification:  Name:  Siria Rueda  Age:  74 y.o.  Sex:  female  :  1945  MRN:  [id number]  Visit Number:  [id number]  Primary Care Provider:  Cruzito Palumbo MD         LOS: 12 days       ----------------------------------------------------------------------------------------------------------------------  Subjective       Chief Complaints:    Shortness of Breath        Interval History:      Patient intubated on 100% FiO2.  WBC worse at 22.82.  Lactic acid 2.3.  CRP trending up to 32.03.  Ferritin 493.  Case discussed with Hoa MAST who states patient is off of sedation today.  She was noted to have thick, blood-tinged secretions from vent.    Review of Systems:    Unable to obtain.  Intubated.  ----------------------------------------------------------------------------------------------------------------------      Objective       [facility] Meds:    acetylcysteine 3 mL Endotracheal Q12H   artificial tears  Both Eyes Q4H   aspirin 81 mg Oral Nightly   budesonide 0.5 mg Nebulization BID - RT   chlorhexidine 15 mL Mouth/Throat Q12H   dexamethasone 6 mg Intravenous Daily   doxycycline 100 mg Intravenous Q12H   enoxaparin 80 mg Subcutaneous Q12H   lactobacillus acidophilus 1 capsule Oral Daily   levETIRAcetam 1,000 mg Intravenous Q12H   levothyroxine 25 mcg Oral Daily   lidocaine 5 mL Subcutaneous Once   meropenem 1 g Intravenous Q8H   OLANZapine 2.5 mg Oral Nightly   pantoprazole 40 mg Intravenous Q24H   phenytoin (DILANTIN) IVPB 100 mg Intravenous Q8H   pramipexole 1 mg Oral Nightly   psyllium 1 packet Oral Daily   rosuvastatin 20 mg Oral Nightly   sodium chloride 10 mL Intravenous Q12H   sodium chloride 10 mL Intravenous Q12H   sodium chloride 10 mL Intravenous Q12H   sodium chloride 10 mL Intravenous Q12H   topiramate 100 mg Oral Q12H   venlafaxine XR 75 mg Oral Q PM       fentaNYL Citrate     Pharmacy Consult     phenylephrine 0.5-3 mcg/kg/min Last Rate:  2.7 mcg/kg/min (07/17/20 0940)   propofol 5-50 mcg/kg/min Last Rate: Stopped (07/17/20 0412)   vasopressin (PITRESSIN) infusion 0.03 Units/min Last Rate: 0.03 Units/min (07/17/20 0521)   vecuronium (NORCURON) infusion 0.6-1.2 mcg/kg/min Last Rate: 0.6 mcg/kg/min (07/17/20 0950)     ----------------------------------------------------------------------------------------------------------------------    Vital Signs:  Temp:  [97.8 °F (36.6 °C)-100.3 °F (37.9 °C)] 100.3 °F (37.9 °C)  Heart Rate:  [] 97  Resp:  [20-26] 26  BP: ()/() 156/62  FiO2 (%):  [55 %-60 %] 55 %  Mean Arterial Pressure (Non-Invasive) for the past 24 hrs (Last 3 readings):   Noninvasive MAP (mmHg)   07/17/20 1145 102   07/17/20 1130 99   07/17/20 1115 86     SpO2 Percentage    07/17/20 1115 07/17/20 1130 07/17/20 1145   SpO2: 96% 98% 98%     SpO2:  [95 %-100 %] 98 %  on   ;   Device (Oxygen Therapy): ventilator    Body mass index is 29.94 kg/m².  Wt Readings from Last 3 Encounters:   07/17/20 76.7 kg (169 lb)   11/16/19 76.5 kg (168 lb 11.2 oz)   07/10/19 78.5 kg (173 lb 1 oz)        Intake/Output Summary (Last 24 hours) at 7/17/2020 1216  Last data filed at 7/17/2020 0929  Gross per 24 hour   Intake 1418.23 ml   Output 785 ml   Net 633.23 ml     NPO Diet  ----------------------------------------------------------------------------------------------------------------------      Physical Exam:    Deferred as patient is in COVID-19 isolation.       ----------------------------------------------------------------------------------------------------------------------  Results from last 7 days   Lab Units 07/17/20  0220 07/16/20  1006 07/15/20  0738 07/15/20  0736  07/13/20  0121   CK TOTAL U/L 118 63 134  --    < > 230*   TROPONIN T ng/mL  --  0.105*  --  0.635*  --  <0.010    < > = values in this interval not displayed.           Results from last 7 days   Lab Units 07/17/20  0446   PH, ARTERIAL pH units 7.331*   PO2 ART mm Hg 82.8*      PCO2, ARTERIAL mm Hg 46.2*   HCO3 ART mmol/L 24.4     Results from last 7 days   Lab Units 07/17/20  0937 07/17/20  0220 07/16/20  1807 07/16/20  1006  07/15/20  0738 07/15/20  0737  07/14/20  0113   CRP mg/dL  --  32.03*  --   --   --  21.88*  --   --  13.32*   LACTATE mmol/L 2.5* 2.3* 2.0  --    < >  --   --    < >  --    WBC 10*3/mm3  --  22.82*  --  18.65*  --   --  22.60*  --  10.12   HEMOGLOBIN g/dL  --  12.1  --  12.0  --   --  13.5  --  11.9*   HEMATOCRIT %  --  39.0  --  38.9  --   --  43.0  --  37.2   MCV fL  --  98.0*  --  98.2*  --   --  96.6  --  94.9   MCHC g/dL  --  31.0*  --  30.8*  --   --  31.4*  --  32.0   PLATELETS 10*3/mm3  --  229  --  174  --   --  316  --  195    < > = values in this interval not displayed.     Results from last 7 days   Lab Units 07/15/20  0736 07/14/20  0915 07/14/20  0113 07/13/20  0121   SODIUM mmol/L 145  --  142 141   POTASSIUM mmol/L 4.2 3.8 4.4 3.3*   MAGNESIUM mg/dL 2.0  --   --   --    CHLORIDE mmol/L 107  --  107 106   CO2 mmol/L 21.6*  --  24.2 23.5   BUN mg/dL 28*  --  27* 23   CREATININE mg/dL 0.68  --  0.52* 0.55*   EGFR IF NONAFRICN AM mL/min/1.73 85  --  115 108   CALCIUM mg/dL 8.3*  --  8.7 8.5*   GLUCOSE mg/dL 139*  --  91 109*   ALBUMIN g/dL 2.95*  --   --   --    BILIRUBIN mg/dL 0.8  --   --   --    ALK PHOS U/L 225*  --   --   --    AST (SGOT) U/L 40*  --   --   --    ALT (SGPT) U/L 20  --   --   --    Estimated Creatinine Clearance: 60.5 mL/min (by C-G formula based on SCr of 0.68 mg/dL).  No results found for: AMMONIA    No results found for: HGBA1C, POCGLU  No results found for: HGBA1C  Lab Results   Component Value Date    TSH 1.270 07/06/2020    FREET4 1.05 03/13/2015       Blood Culture   Date Value Ref Range Status   07/05/2020 No growth at 24 hours  Preliminary   07/05/2020 No growth at 24 hours  Preliminary     No results found for: URINECX  No results found for: WOUNDCX  No results found for: STOOLCX  No results found for: RESPCX  Pain  Management Panel     There is no flowsheet data to display.            ----------------------------------------------------------------------------------------------------------------------  Imaging Results (Last 24 Hours)     Procedure Component Value Units Date/Time    US Venous Doppler Lower Extremity Right (duplex) [[id number]] Collected:  07/17/20 1139     Updated:  07/17/20 1143    Narrative:       US VENOUS DOPPLER LOWER EXTREMITY RIGHT (DUPLEX)-     CLINICAL INDICATION: temperature difference of the LE's. Risk of  hypercoagulable state in the setting of Covid.; J18.9-Pneumonia,  unspecified organism; U07.1-COVID-19; U07.1-COVID-19; J12.89-Other viral  pneumonia; J96.01-Acute respiratory failure with hypoxia        COMPARISON: None available      TECHNIQUE: Color Doppler imaging was used with compression and  augmentation to evaluate the right lower extremity deep venous system.     FINDINGS:   There is patent spontaneous flow from the common femoral vein through  the posterior tibial veins.  There was no internal clot or area of noncompressibility in the right  lower extremity.  Normal augmentation was elicited where applicable.          Impression:       No DVT in the right lower extremity on today's exam.      This report was finalized on 7/17/2020 11:40 AM by Dr. Jorge A Vizcaino MD.       US Arterial Doppler Lower Extremity Right [[id number]] Resulted:  07/17/20 1058     Updated:  07/17/20 1135    XR Chest 1 View [[id number]] Collected:  07/17/20 1003     Updated:  07/17/20 1026    Narrative:       XR CHEST 1 VW-     CLINICAL INDICATION: bilateral pneumonia, Covid positive;  J18.9-Pneumonia, unspecified organism; U07.1-COVID-19; U07.1-COVID-19;  J12.89-Other viral pneumonia; J96.01-Acute respiratory failure with  hypoxia        COMPARISON: 07/16/2020      TECHNIQUE: Single frontal view of the chest.     FINDINGS:     Right-sided thoracostomy tube appears to be changed in position. The tip  may reside outside  of the pleural cavity. Recommend clinical  correlation.  Diffuse patchy airspace disease again noted.  There is no evidence of an acute osseous abnormality.   There are no suspicious-appearing parenchymal soft tissue nodules.          Impression:       Change in position of the right thoracostomy tube. The tip appears to  reside outside of the pleural cavity.     This report was finalized on 7/17/2020 10:04 AM by Dr. Jorge A Vizcaino MD.             ----------------------------------------------------------------------------------------------------------------------    Assessment/Plan       Assessment/Plan     ASSESSMENT:    1.  Pneumonia due to COVID-19 virus    PLAN:    Patient intubated on 100% FiO2.  WBC worse at 22.82.  Lactic acid 2.3.  CRP trending up to 32.03.  Ferritin 493.  Case discussed with Hoa MAST who states patient is off of sedation today.  She was noted to have thick, blood-tinged secretions from vent.    COVID-19 PCR positive.       CT chest on 7/5/2020 reveals no evidence of pulmonary embolism.  Likely underlying chronic lung disease with emphysematous changes at apices in the area of air trapping. Patchy airspace disease in the right middle lobe and lingula is nonspecific. Prominent mediastinal and right hilar lymph nodes not significantly changed. One AP window lymph node is mildly enlarged at 1.1 cm short axis dimension but unchanged-per radiology.      Patient received Remdesivir and received and completed convalescent plasma therapy. Consents were obtained.     Recommend bronchoscopy with BAL cultures for better diagnostic of fungal or bacterial etiology.  If BAL is negative will consider Actemra.  Is unlikely that this far out patient is worsening COVID but rather worsening superimposed pneumonia.      We will continue Merrem and doxycycline for now.  We have added micafungin 100 mg IV every 24 hours for empiric coverage in the setting of possible fungal or bacterial pneumonia.  We will  continue to follow closely.    Code Status:   Code Status and Medical Interventions:   Ordered at: 07/05/20 2049     Code Status:    CPR     Medical Interventions (Level of Support Prior to Arrest):    Full       Fidelia Irene, ANNA  07/17/20  12:16

## 2020-07-17 NOTE — PROGRESS NOTES
Discharge Planning Assessment  MELIA Blanton     Patient Name: Siria Rueda  MRN: [id number]  Today's Date: 7/17/2020    Admit Date: 7/5/2020      Discharge Plan     Row Name 07/17/20 1224       Plan    Plan  Pt was admitted on 7/5/20. Pt is intubated at this time. SS will continue to follow and assist with discharge planning.    Patient/Family in Agreement with Plan  yes              Viviane Jeter

## 2020-07-17 NOTE — DISCHARGE SUMMARY
[facility] Medicine Services  DISCHARGE SUMMARY    Patient Identification:  Name:  Siria Rueda  Age:  74 y.o.  Sex:  female  :  1945  MRN:  [id number]  Visit Number:  [id number]    Date of Admission: 2020  Date of Discharge:  2020    PCP: Cruzito Palumbo MD      Admission/Discharge Diagnoses     Discharge Diagnoses:  · Septic shock due to COVID-19 pneumonia  · Acute right limb ischemia due to popliteal and posterior tibial artery occlusion  · Acute hypoxemic respiratory failure  · Spontaneous pneumothorax  · COPD  · ASCVD with history of PCI  · Seizure disorder    Consults/Procedures     Consults:   Consults     Date and Time Order Name Status Description    7/15/2020 0952 Inpatient Cardiology Consult Completed     7/15/2020 0135 Inpatient General Surgery Consult      7/15/2020 0134 Inpatient General Surgery Consult for Line Placement      2020 0935 Inpatient Pulmonology Consult      2020 0030 Inpatient Psychiatrist Consult Completed     7/10/2020 1020 Inpatient General Surgery Consult      2020 0030 Inpatient Infectious Diseases Consult Completed     2020 204 Hospitalist (on-call MD unless specified)            Procedures Performed:    07/15 0125 Validation General Procedure  07/15 0112 Intubation    History of Presenting Illness   Patient is a 74 y.o. female presented to [facility] complaining of shortness of breath.  Please see the admitting history and physical for further details.    Hospital Course   Siria Rueda is a 74 y.o. female with PMH significant for COPD, history of tobacco abuse, obstructive sleep apnea, compliant with CPAP, coronary artery disease status post previous coronary artery stenting x1 and seizure disorder (last seizure 2013) presented to the emergency department complaining of progressive shortness of air and cough. Pt was diagnosed with sepsis and resp failure and was admitted for further management.     CT  scan showed patchy right-sided pneumonia.  Patient was started on Rocephin and doxycycline as well as Decadron.  She was started on supplemental oxygen via nasal cannula.  Infectious disease was consulted who recommended addition of Remdesivir and conversant plasma.  Patient received conventional plasma on 7/7/2020.  Patient however continued to have worsening hypoxemia and worsening chest x-ray and eventually was put on bubble high flow and nonrebreather.  She was transferred intensive care unit.  Patient was continued on high flow oxygen via nasal cannula and diuresed intermittently with Bumex and Lasix.  She had improvement in her oxygen requirement and her FiO2 was down to 10 L/min.  She was found to have elevation of d-dimer and was started on anticoagulation with Lovenox.  Chest x-ray showed persistent bilateral airspace disease.  Her inflammatory markers were improving but on 7/15/2020 patient developed acute decompensation of her respiratory status with hypoxemia and required intubation and was put on ventilator.  Chest x-ray showed small right-sided pneumothorax.  General surgery was consulted and patient underwent thoracostomy and placement of percutaneous drainage tube.  Patient also had low blood patient was started on norepinephrine and vasopressin.  Patient also had increased FiO2 requirement and was needing 100% FiO2 via ventilator.  Patient was started on manual pronation and had improvement in her FiO2.  FiO2 requirement was down to 55% but patient developed acute limb ischemia of her right leg today with dark discoloration of the leg and pulselessness.  Stat arterial ultrasound showed occlusion of popliteal posterior tibial artery.  I called [facility] for transfer since no vascular surgery is available here.  I discussed with vascular surgery at [facility] who felt that patient was critically ill and recommended transfer to [facility] intensive care unit.  I discussed with   Juaquin, intensivist on-call at [facility] who kindly agreed to transfer the patient to her care.      Discharge Vitals/Physical Examination     Vital Signs:  Temp:  [97.8 °F (36.6 °C)-100.3 °F (37.9 °C)] 99.1 °F (37.3 °C)  Heart Rate:  [] 89  Resp:  [20-26] 26  BP: ()/() 114/59  FiO2 (%):  [55 %-60 %] 55 %  Mean Arterial Pressure (Non-Invasive) for the past 24 hrs (Last 3 readings):   Noninvasive MAP (mmHg)   07/17/20 1430 80   07/17/20 1415 80   07/17/20 1400 83     SpO2 Percentage    07/17/20 1400 07/17/20 1415 07/17/20 1430   SpO2: 98% 98% 98%     SpO2:  [95 %-100 %] 98 %  on   ;   Device (Oxygen Therapy): ventilator    Body mass index is 29.94 kg/m².  Wt Readings from Last 3 Encounters:   07/17/20 76.7 kg (169 lb)   11/16/19 76.5 kg (168 lb 11.2 oz)   07/10/19 78.5 kg (173 lb 1 oz)       This physical exam has been personally performed remotely in the unit aided by real-time audio/visual communication tools.  Nursing staff was present at bedside during this exam and assisted during exam. The use of a video visit has been reviewed with the patient and verbal informed consent has been obtained.      Physical Exam:  General: On vent  Head: Normocephalic, atraumatic  Eyes: Closed.  Heart: Tele reveals sinus rhythm  Lungs: on vent  Abdomen: No obvious abdominal distension.  MS: Muscle tone appears normal. No gross deformities.  Extremities: RLE is cold to touch and DP not palpable in RLE.   Skin: No visible bleeding, bruising, or rash.  Neurologic: Sedated.      Pertinent Laboratory/Radiology Results     Pertinent Laboratory Results:  Results from last 7 days   Lab Units 07/17/20  0220 07/16/20  1006 07/15/20  0738 07/15/20  0736  07/13/20  0121   CK TOTAL U/L 118 63 134  --    < > 230*   TROPONIN T ng/mL  --  0.105*  --  0.635*  --  <0.010    < > = values in this interval not displayed.         Results from last 7 days   Lab Units 07/17/20  0446   PH, ARTERIAL pH units 7.331*   PO2 ART mm Hg  82.8*   PCO2, ARTERIAL mm Hg 46.2*   HCO3 ART mmol/L 24.4     Results from last 7 days   Lab Units 07/17/20  0937 07/17/20  0220 07/16/20  1807 07/16/20  1006  07/15/20  0738 07/15/20  0737  07/14/20  0113   CRP mg/dL  --  32.03*  --   --   --  21.88*  --   --  13.32*   LACTATE mmol/L 2.5* 2.3* 2.0  --    < >  --   --    < >  --    WBC 10*3/mm3  --  22.82*  --  18.65*  --   --  22.60*  --  10.12   HEMOGLOBIN g/dL  --  12.1  --  12.0  --   --  13.5  --  11.9*   HEMATOCRIT %  --  39.0  --  38.9  --   --  43.0  --  37.2   MCV fL  --  98.0*  --  98.2*  --   --  96.6  --  94.9   MCHC g/dL  --  31.0*  --  30.8*  --   --  31.4*  --  32.0   PLATELETS 10*3/mm3  --  229  --  174  --   --  316  --  195    < > = values in this interval not displayed.     Results from last 7 days   Lab Units 07/15/20  0736 07/14/20  0915 07/14/20  0113 07/13/20  0121   SODIUM mmol/L 145  --  142 141   POTASSIUM mmol/L 4.2 3.8 4.4 3.3*   MAGNESIUM mg/dL 2.0  --   --   --    CHLORIDE mmol/L 107  --  107 106   CO2 mmol/L 21.6*  --  24.2 23.5   BUN mg/dL 28*  --  27* 23   CREATININE mg/dL 0.68  --  0.52* 0.55*   EGFR IF NONAFRICN AM mL/min/1.73 85  --  115 108   CALCIUM mg/dL 8.3*  --  8.7 8.5*   GLUCOSE mg/dL 139*  --  91 109*   ALBUMIN g/dL 2.95*  --   --   --    BILIRUBIN mg/dL 0.8  --   --   --    ALK PHOS U/L 225*  --   --   --    AST (SGOT) U/L 40*  --   --   --    ALT (SGPT) U/L 20  --   --   --    Estimated Creatinine Clearance: 60.5 mL/min (by C-G formula based on SCr of 0.68 mg/dL).  No results found for: AMMONIA    No results found for: HGBA1C, POCGLU  No results found for: HGBA1C  Lab Results   Component Value Date    TSH 1.270 07/06/2020    FREET4 1.05 03/13/2015       No results found for: BLOODCX  No results found for: URINECX  No results found for: WOUNDCX  No results found for: STOOLCX  Respiratory Culture   Date Value Ref Range Status   07/15/2020 Rare Normal Respiratory Valorie  Final     Microbiology Results (last 10 days)      Procedure Component Value - Date/Time    Respiratory Culture - Sputum, ET Suction [[id number]] Collected:  07/15/20 1208    Lab Status:  Final result Specimen:  Sputum from ET Suction Updated:  07/17/20 1132     Respiratory Culture Rare Normal Respiratory Valorie     Gram Stain Rare (1+) WBCs seen      Rare (1+) Epithelial cells seen      No organisms seen        Pain Management Panel     There is no flowsheet data to display.          Pertinent Radiology Results:  Imaging Results (All)     Procedure Component Value Units Date/Time    US Arterial Doppler Lower Extremity Right [[id number]] Collected:  07/17/20 1303     Updated:  07/17/20 1332    Narrative:       EXAMINATION: US ARTERIAL DOPPLER LOWER EXTREMITY  RIGHT-      CLINICAL INDICATION: temperature difference of LEs. Risk of  hypercoagulable state in the setting of Covid; J18.9-Pneumonia,  unspecified organism; U07.1-COVID-19; U07.1-COVID-19; J12.89-Other viral  pneumonia; J96.01-Acute respiratory failure with hypoxia        COMPARISON: None available.     Any stenoses are evaluated using the NASCET or similar method.     FINDINGS: Color Doppler imaging with pulse Doppler waveform to evaluate  the right lower extremity demonstrates monophasic waveforms in the right  lower extremity with occlusion of the popliteal and posterior tibial  arteries.       Impression:       1. Occlusion of the right popliteal and posterior tibial arteries.  2. Monophasic waveforms more proximally.      This report was finalized on 7/17/2020 1:30 PM by Dr. Jorge A Vizcaino MD.       US Venous Doppler Lower Extremity Right (duplex) [[id number]] Collected:  07/17/20 1139     Updated:  07/17/20 1143    Narrative:       US VENOUS DOPPLER LOWER EXTREMITY RIGHT (DUPLEX)-     CLINICAL INDICATION: temperature difference of the LE's. Risk of  hypercoagulable state in the setting of Covid.; J18.9-Pneumonia,  unspecified organism; U07.1-COVID-19; U07.1-COVID-19; J12.89-Other viral  pneumonia;  J96.01-Acute respiratory failure with hypoxia        COMPARISON: None available      TECHNIQUE: Color Doppler imaging was used with compression and  augmentation to evaluate the right lower extremity deep venous system.     FINDINGS:   There is patent spontaneous flow from the common femoral vein through  the posterior tibial veins.  There was no internal clot or area of noncompressibility in the right  lower extremity.  Normal augmentation was elicited where applicable.          Impression:       No DVT in the right lower extremity on today's exam.      This report was finalized on 7/17/2020 11:40 AM by Dr. Jorge A Vizcaino MD.       XR Chest 1 View [[id number]] Collected:  07/17/20 1003     Updated:  07/17/20 1026    Narrative:       XR CHEST 1 VW-     CLINICAL INDICATION: bilateral pneumonia, Covid positive;  J18.9-Pneumonia, unspecified organism; U07.1-COVID-19; U07.1-COVID-19;  J12.89-Other viral pneumonia; J96.01-Acute respiratory failure with  hypoxia        COMPARISON: 07/16/2020      TECHNIQUE: Single frontal view of the chest.     FINDINGS:     Right-sided thoracostomy tube appears to be changed in position. The tip  may reside outside of the pleural cavity. Recommend clinical  correlation.  Diffuse patchy airspace disease again noted.  There is no evidence of an acute osseous abnormality.   There are no suspicious-appearing parenchymal soft tissue nodules.          Impression:       Change in position of the right thoracostomy tube. The tip appears to  reside outside of the pleural cavity.     This report was finalized on 7/17/2020 10:04 AM by Dr. Jorge A Vizcaino MD.       XR Chest 1 View [[id number]] Collected:  07/16/20 0755     Updated:  07/16/20 0758    Narrative:       XR CHEST 1 VW-     CLINICAL INDICATION: check chest tube placement; J18.9-Pneumonia,  unspecified organism; U07.1-COVID-19; U07.1-COVID-19; J12.89-Other viral  pneumonia; J96.01-Acute respiratory failure with hypoxia        COMPARISON:  07/15/2020      TECHNIQUE: Single frontal view of the chest.     FINDINGS:     Chest tube placement similar to the previous exam.  Diffuse patchy airspace disease again noted  There is no evidence of an acute osseous abnormality.   There are no suspicious-appearing parenchymal soft tissue nodules.          Impression:       Little overall change     This report was finalized on 7/16/2020 7:56 AM by Dr. Jorge A Vizcaino MD.       XR Chest 1 View [[id number]] Collected:  07/15/20 1849     Updated:  07/15/20 1858    Narrative:       XR CHEST 1 VW-     CLINICAL INDICATION: pneumo, check chest tube placement;  J18.9-Pneumonia, unspecified organism; U07.1-COVID-19; U07.1-COVID-19;  J12.89-Other viral pneumonia; J96.01-Acute respiratory failure with  hypoxia        COMPARISON: 07/15/2020      TECHNIQUE: Single frontal view of the chest.     FINDINGS:     Chest tube remains in place.  Patchy airspace disease  The cardiac silhouette is normal. The pulmonary vasculature is  unremarkable.  There is no evidence of an acute osseous abnormality.   There are no suspicious-appearing parenchymal soft tissue nodules.          Impression:       No significant interval line placement     This report was finalized on 7/15/2020 6:56 PM by Dr. Jorge A Vizcaino MD.       CT Head Without Contrast [[id number]] Collected:  07/15/20 0509     Updated:  07/15/20 0512    Narrative:       CT Head WO    HISTORY:   Seizure after intubation.    TECHNIQUE:   Axial unenhanced head CT with multiplanar reformats. Radiation dose reduction techniques included automated exposure control or exposure modulation based on body size. Count of known CT and cardiac nuc med studies performed in previous 12 months: 2.     COMPARISON:   None.    FINDINGS:   Patient is status post left frontal craniotomy. There is encephalomalacia in the left frontal lobe. There is some ex vacuo dilatation of the anterior horn of the left lateral ventricle.  No definite acute infarct or  hemorrhage is seen. There are no  masses. Atherosclerotic calcifications are present in the carotid siphons and vertebral arteries. There is no skull fracture. Paranasal sinuses appear clear. Endotracheal tube is present.      Impression:         1. No definite acute intracranial findings.  2. Left frontal craniotomy with left frontal encephalomalacia.    Signer Name: Cristi Fields MD   Signed: 7/15/2020 5:09 AM   Workstation Name: [facility]    Radiology [facility]    XR Chest 1 View [[id number]] Collected:  07/15/20 0423     Updated:  07/15/20 0425    Narrative:       CR Chest 1 Vw    INDICATION:   Line placement.     COMPARISON:    Earlier same day    FINDINGS:  Single portable AP view(s) of the chest.    ET tube in the mid trachea. New right subclavian line tip in the SVC. Right-sided chest tube remains in place. Right pneumothorax is unchanged. No left-sided pneumothorax is seen. Heart size is normal. Bilateral infiltrates are stable.       Impression:       Right subclavian line tip in the SVC. Otherwise, no significant change.    Signer Name: Cristi Fields MD   Signed: 7/15/2020 4:23 AM   Workstation Name: [facility]    XR Chest 1 View [[id number]] Collected:  07/15/20 0249     Updated:  07/15/20 0251    Narrative:       CR Chest 1 Vw    INDICATION:   Chest tube placement for pneumothorax.     COMPARISON:    Earlier same day    FINDINGS:  Single portable AP view(s) of the chest.    New small bore right chest tube is in place. There is a persistent small right apical pneumothorax, slightly improved. No left-sided pneumothorax is seen. The remainder of the exam is unchanged.       Impression:       New small right chest tube with slight interval improvement in a small right apical pneumothorax.    Signer Name: Cristi Fields MD   Signed: 7/15/2020 2:49 AM   Workstation Name: [facility]    XR Chest 1 View [[id number]]  Collected:  07/15/20 0205     Updated:  07/15/20 0207    Narrative:       CR Chest 1 Vw    INDICATION:   Pneumonia. COVID 19. Hypoxia.     COMPARISON:    7/13/2020    FINDINGS:  Single portable AP view(s) of the chest.    Heart size is normal. Endotracheal tube in the mid trachea in good position. NG tube in the stomach. Coarse bilateral infiltrates are similar to prior. There is a new small right apical pneumothorax measuring about 10%. No midline shift. No left-sided  pneumothorax is seen.       Impression:       New small right pneumothorax with no midline shift.    NOTIFICATION: Critical Value/emergent results were called by telephone at the time of interpretation on 7/15/2020 2:02 AM to Dr. Hickey who verbally acknowledged these results.    Signer Name: Cristi Fields MD   Signed: 7/15/2020 2:05 AM   Workstation Name: [facility]    [facility]    XR Chest 1 View [[id number]] Collected:  07/13/20 1026     Updated:  07/13/20 1049    Narrative:       XR CHEST 1 VW-     CLINICAL INDICATION: dyspnea, covid19; J18.9-Pneumonia, unspecified  organism; U07.1-COVID-19        COMPARISON: 07/11/2020      TECHNIQUE: Single frontal view of the chest.     FINDINGS:     Interstitial and airspace disease bilaterally, slightly improved  The cardiac silhouette is normal. The pulmonary vasculature is  unremarkable.  There is no evidence of an acute osseous abnormality.   There are no suspicious-appearing parenchymal soft tissue nodules.          Impression:       Slight interval improvement     This report was finalized on 7/13/2020 10:26 AM by Dr. Jorge A Vizcaino MD.       XR Chest 1 View [[id number]] Collected:  07/11/20 1147     Updated:  07/11/20 1149    Narrative:       EXAMINATION: XR CHEST 1 VW-      CLINICAL INDICATION:     Hypoxia; J18.9-Pneumonia, unspecified organism;  U07.1-COVID-19     TECHNIQUE: Single AP view of chest.      COMPARISON: 07/10/2020      FINDINGS:   There is bibasilar atelectasis.    Bilateral airspace disease is grossly stable.     Heart size is stable.  No pneumothorax.           Impression:       Bilateral airspace disease is grossly stable.     This report was finalized on 7/11/2020 11:47 AM by Dr. Quincy Acevedo MD.       XR Chest 1 View [[id number]] Collected:  07/10/20 1640     Updated:  07/10/20 1643    Narrative:       EXAMINATION: XR CHEST 1 VW-      CLINICAL INDICATION:     central line placement; J18.9-Pneumonia,  unspecified organism; U07.1-COVID-19     TECHNIQUE: Single AP view of chest.      COMPARISON: 07/10/2020      FINDINGS:   There is bibasilar atelectasis.   Bilateral airspace disease is again noted. Left central line with tip in  SVC. Support tube and lines are in unchanged position.   Heart size is stable.  No pneumothorax.        Impression:       Bilateral airspace disease is again noted. Left central line  with tip in SVC.     This report was finalized on 7/10/2020 4:41 PM by Dr. Quincy Acevedo MD.       XR Chest 1 View [[id number]] Collected:  07/10/20 0757     Updated:  07/10/20 0800    Narrative:       EXAMINATION: XR CHEST 1 VW-      CLINICAL INDICATION:     Hypoxia; J18.9-Pneumonia, unspecified organism;  U07.1-COVID-19     TECHNIQUE: Single AP view of chest.      COMPARISON: 07/09/2020      FINDINGS:   There is bibasilar atelectasis.   Slightly worsening bilateral airspace disease.  Heart size is stable.  No pneumothorax.           Impression:       Slightly worsening bilateral airspace disease.     This report was finalized on 7/10/2020 7:58 AM by Dr. Quincy Acevedo MD.       XR Chest 1 View [[id number]] Collected:  07/09/20 0835     Updated:  07/09/20 0839    Narrative:       EXAMINATION: XR CHEST 1 VW-      CLINICAL INDICATION:     Hypoxia/COVID; J18.9-Pneumonia, unspecified  organism; U07.1-COVID-19     TECHNIQUE: Single AP view of chest.      COMPARISON: 07/08/2020      FINDINGS:   There is bibasilar atelectasis.   Worsened patchy bilateral airspace  disease.  Heart size is stable.  No pneumothorax.           Impression:       Worsened patchy bilateral airspace disease.     This report was finalized on 7/9/2020 8:36 AM by Dr. Quincy Acevedo MD.       XR Chest 1 View [[id number]] Collected:  07/08/20 0828     Updated:  07/08/20 0831    Narrative:       EXAMINATION:  XR CHEST 1 VW-      CLINICAL INDICATION:     hypoxia; J18.9-Pneumonia, unspecified organism;  U07.1-COVID-19     TECHNIQUE:  XR CHEST 1 VW-       COMPARISON: NONE      FINDINGS:   Worsening left lung airspace disease. Heart size is stable.   No pneumothorax.   No pleural effusion.   Bony and soft tissue structures are unremarkable.          Impression:       Worsening left lung airspace disease.        This report was finalized on 7/8/2020 8:29 AM by Dr. Quincy Acevedo MD.       CT Chest Pulmonary Embolism [[id number]] Collected:  07/05/20 1838     Updated:  07/05/20 1840    Narrative:       INDICATION:   Short of breath for 2 days. History of pneumonia and COPD.    TECHNIQUE:   CT angiogram of the chest with contrast. 3-D reformatted images were acquired.  Radiation dose reduction techniques included automated exposure control or exposure modulation based on body size. Radiation audit for number of CT and nuclear cardiology  exams performed in the last year:  1.      COMPARISON:   CT chest PE protocol from 11/15/2019.    FINDINGS:   There is no evidence for pulmonary embolism. There are atherosclerotic vascular calcifications including the coronary arteries. There is no evidence for thoracic aortic dissection. There is no pericardial effusion. There is no pleural effusion. There is  no axillary lymphadenopathy. There is a mildly enlarged right hilar node which is not changed from prior. There were several other prominent mediastinal nodes also not changed. This includes a AP window lymph node that measures about 1.1 cm in short axis  dimension and is mildly enlarged.    There is underlying chronic lung  disease with likely emphysematous changes at apices and some areas of air trapping. There is some dependent atelectasis at lung bases and there is patchy airspace disease in the right middle lobe posteriorly and medially  and in the lingula laterally. This is nonspecific. No acute congestive failure is suspected. No pneumothorax..      Impression:         1. No evidence for pulmonary embolism.  2. Likely underlying chronic lung disease with emphysematous changes at apices and areas of air trapping.  3. Patchy airspace disease in the right middle lobe and lingula is nonspecific. Follow-up to clearing is recommended. Please correlate for clinical evidence of early pneumonia. Covid type pneumonia not excluded. There is some dependent atelectasis as  well.  4. Prominent mediastinal and right hilar lymph nodes not significantly changed. One AP window lymph node is mildly enlarged at 1.1 cm short axis dimension but unchanged from November 2019. These are nonspecific.  5. Atherosclerotic vascular calcifications include coronary arteries    Signer Name: Tammy Millan MD   Signed: 7/5/2020 6:38 PM   [facility]    [facility]    XR Chest AP [[id number]] Collected:  07/05/20 1325     Updated:  07/05/20 1327    Narrative:       EXAMINATION: XR CHEST AP-      CLINICAL INDICATION:     COVID Evaluation, Cough, Fever     TECHNIQUE:  XR CHEST AP-      COMPARISON: 12/31/2019      FINDINGS:      Lungs are aerated.   Heart size, mediastinum, and pulmonary vascularity are unremarkable.   No pneumothorax.   No effusions.   No acute osseous findings.          Impression:       No radiographic evidence of acute cardiac or pulmonary  disease.     This report was finalized on 7/5/2020 1:25 PM by Dr. Josh Crump MD.             Test Results Pending at Discharge:      Discharge Disposition/Discharge Medications/Discharge Appointments     Discharge Disposition:   Short Term Hospital ([place] -  External)    Condition at Discharge:  Critically ill    Code Status While Inpatient:  Code Status and Medical Interventions:   Ordered at: 07/05/20 2049     Code Status:    CPR     Medical Interventions (Level of Support Prior to Arrest):    Full       Discharge Medications:     Discharge Medications      New Medications      Instructions Start Date   acetaminophen 325 MG tablet  Commonly known as:  TYLENOL   650 mg, Oral, Every 6 Hours PRN      acetylcysteine 20 % nebulizer solution  Commonly known as:  MUCOMYST   3 mL, Nebulization, Every 12 Hours Scheduled   Start Date:  July 18, 2020     alteplase 1 mg/mL in sterile water (preservative free) injection   2 mg, Intracatheter, As Needed      budesonide 0.5 MG/2ML nebulizer solution  Commonly known as:  PULMICORT   0.5 mg, Nebulization, 2 Times Daily - RT      chlorhexidine 0.12 % solution  Commonly known as:  PERIDEX   15 mL, Mouth/Throat, Every 12 Hours Scheduled      dexamethasone 4 MG/ML injection  Commonly known as:  DECADRON   6 mg, Intravenous, Daily   Start Date:  July 18, 2020     doxycycline  Commonly known as:  VIBRAMYCIN   100 mg, Intravenous, Every 12 Hours   Start Date:  July 18, 2020     enoxaparin 80 MG/0.8ML solution syringe  Commonly known as:  LOVENOX   80 mg, Subcutaneous, Every 12 Hours      fentaNYL Citrate 1500 MCG/30ML solution prefilled syringe   Nurse Loading Dose: 50 mcg Patient Bolus Dose: 0 mcg Lockout Interval: 10 Minutes Basal Rate: 50 mcg/hr One Hour Dose Limit: 300 mcg      lactobacillus acidophilus capsule capsule   1 capsule, Oral, Daily   Start Date:  July 18, 2020     levETIRAcetam 1,000 mg in sodium chloride 0.9 % 100 mL IVPB   1,000 mg, Intravenous, Every 12 Hours Scheduled      lidocaine 1 % injection  Commonly known as:  XYLOCAINE   5 mL, Subcutaneous, Once      magnesium sulfate 2 GM/50ML infusion   2 g, Intravenous, As Needed      meropenem  Commonly known as:  MERREM   1 g, Intravenous, Every 8 Hours      micafungin sodium  100 mg in sodium chloride 0.9 % 100 mL IVPB   100 mg, Intravenous, Every 24 Hours      OLANZapine 2.5 MG tablet  Commonly known as:  zyPREXA   2.5 mg, Oral, Nightly      pantoprazole 40 MG injection  Commonly known as:  PROTONIX   40 mg, Intravenous, Every 24 Hours Scheduled   Start Date:  July 18, 2020     phenylephrine 200 MCG/ML solution  Commonly known as:  CAROLIN-SYNEPHRINE   0.5-3 mcg/kg/min (37.9-227.4 mcg/min), Intravenous, Titrated      phenytoin 100 mg in sodium chloride 0.9 % 100 mL IVPB   100 mg, Intravenous, Every 8 Hours Scheduled      propofol 10 mg/mL emulsion infusion  Commonly known as:  DIPRIVAN   5-50 mcg/kg/min (397.5-3,975 mcg/min), Intravenous, Titrated      psyllium 28 % packet  Commonly known as:  METAMUCIL SMOOTH TEXTURE   1 packet, Oral, Daily   Start Date:  July 18, 2020     sodium chloride 0.9 % solution 100 mL with Vasopressin 20 UNIT/ML solution 20 Units   0.03 Units/min (0.03 Units/min), Intravenous, Titrated      vecuronium  Commonly known as:  NORCURON   50 mcg/kg (3.79 mg), Intravenous, Once As Needed      vecuronium 100 mg in sodium chloride 0.9 % 100 mL   0.6-1.2 mcg/kg/min (0.0455-0.091 mg/min), Intravenous, Titrated         Changes to Medications      Instructions Start Date   aspirin 81 MG EC tablet  What changed:  Another medication with the same name was added. Make sure you understand how and when to take each.   81 mg, Oral, Nightly      aspirin 81 MG EC tablet  What changed:  You were already taking a medication with the same name, and this prescription was added. Make sure you understand how and when to take each.   81 mg, Oral, Nightly      pramipexole 0.5 MG tablet  Commonly known as:  MIRAPEX  What changed:  Another medication with the same name was added. Make sure you understand how and when to take each.   1 mg, Oral, Nightly      pramipexole 1 MG tablet  Commonly known as:  MIRAPEX  What changed:  You were already taking a medication with the same name, and this  prescription was added. Make sure you understand how and when to take each.   1 mg, Oral, Nightly      rosuvastatin 20 MG tablet  Commonly known as:  CRESTOR  What changed:    · medication strength  · how much to take   20 mg, Oral, Nightly      Synthroid 25 MCG tablet  Generic drug:  levothyroxine  What changed:  Another medication with the same name was added. Make sure you understand how and when to take each.   25 mcg, Oral, Daily      levothyroxine 25 MCG tablet  Commonly known as:  Synthroid  What changed:  You were already taking a medication with the same name, and this prescription was added. Make sure you understand how and when to take each.   25 mcg, Oral, Daily   Start Date:  July 18, 2020     topiramate 100 MG tablet  Commonly known as:  TOPAMAX  What changed:  Another medication with the same name was added. Make sure you understand how and when to take each.   150 mg, Oral, Nightly      topiramate 100 MG tablet  Commonly known as:  TOPAMAX  What changed:  Another medication with the same name was added. Make sure you understand how and when to take each.   100 mg, Oral, Every Morning      topiramate 100 MG tablet  Commonly known as:  TOPAMAX  What changed:  You were already taking a medication with the same name, and this prescription was added. Make sure you understand how and when to take each.   100 mg, Oral, Every 12 Hours Scheduled      venlafaxine XR 75 MG 24 hr capsule  Commonly known as:  EFFEXOR-XR  What changed:  Another medication with the same name was added. Make sure you understand how and when to take each.   75 mg, Oral, Every Evening      venlafaxine XR 75 MG 24 hr capsule  Commonly known as:  EFFEXOR-XR  What changed:  You were already taking a medication with the same name, and this prescription was added. Make sure you understand how and when to take each.   75 mg, Oral, Every Evening         Continue These Medications      Instructions Start Date   calcium polycarbophil 625 MG  tablet  Commonly known as:  FIBERCON   625 mg, Oral, Daily         Stop These Medications    amitriptyline 50 MG tablet  Commonly known as:  ELAVIL     cyclobenzaprine 10 MG tablet  Commonly known as:  FLEXERIL     diazePAM 5 MG tablet  Commonly known as:  VALIUM     docusate sodium 250 MG capsule  Commonly known as:  COLACE     ezetimibe 10 MG tablet  Commonly known as:  ZETIA     furosemide 20 MG tablet  Commonly known as:  LASIX     HYDROcodone-acetaminophen 7.5-325 MG per tablet  Commonly known as:  NORCO     lansoprazole 30 MG capsule  Commonly known as:  PREVACID     lisinopril 20 MG tablet  Commonly known as:  PRINIVIL,ZESTRIL     magnesium oxide 400 (241.3 Mg) MG tablet tablet  Commonly known as:  MAGOX     NIFEdipine 10 MG capsule  Commonly known as:  PROCARDIA     phenytoin 100 MG ER capsule  Commonly known as:  DILANTIN     potassium chloride 20 MEQ CR tablet  Commonly known as:  K-DUR,KLOR-CON     risedronate 150 MG tablet  Commonly known as:  ACTONEL     Trelegy Ellipta 100-62.5-25 MCG/INH aerosol powder   Generic drug:  Fluticasone-Umeclidin-Vilant     vitamin D 1.25 MG (70732 UT) capsule capsule  Commonly known as:  ERGOCALCIFEROL            Discharge Appointments:              Ayush Jiang MD  Hospitalist Service -- [facility]       07/17/20  15:09    Discharge Time:   Please note that this discharge summary required more than 30 minutes to complete.    Please send a copy of this dictation to the following providers:    Cruzito Palumbo MD

## 2020-07-17 NOTE — NURSING NOTE
Patient daughter made aware of patient acceptance at  and that they have a bed available for her at this time. Informed her I would call back when the patient has left.

## 2020-07-17 NOTE — PROGRESS NOTES
LOS: 12 days   Patient Care Team:  Cruzito Palumbo MD as PCP - General (Internal Medicine)  Radha Abarca APRN as PCP - Claims Attributed    Chief Complaint: shortness of breath    Subjective     History of Present Illness     Manual pronation performed yesterday with start of paralytic agent. This has been discontinued since returning to the supination. RN reported significant, thick, bloody secretions were noted from the ET tube overnight. Settings today include rate 26, tidal volume 480, FiO2 60%, PEEP of 8.  She remains on Yony-Synephrine, vasopressin. EKG notable for atrial flutter this morning.     Subjective    Unable to obtain review of systems due to intubation and sedation, COVID-19 isolation.       Objective     Vital Signs  Temp:  [97.8 °F (36.6 °C)-98.5 °F (36.9 °C)] 97.8 °F (36.6 °C)  Heart Rate:  [] 93  Resp:  [26] 26  BP: ()/(33-76) 125/53  FiO2 (%):  [60 %-80 %] 60 %  Body mass index is 29.94 kg/m².    Intake/Output Summary (Last 24 hours) at 7/17/2020 0822  Last data filed at 7/17/2020 0600  Gross per 24 hour   Intake 1812.57 ml   Output 1075 ml   Net 737.57 ml     No intake/output data recorded.    Objective    Physical exam limited to due to COVID-19 isolation. Assessed by video visualization.   General:  Intubated sedated. Supine position.   HENT: normocephalic. Atraumatic.   Cardiac: normal rate and rhythm noted on telemetry  Lungs:  Intubated. Ventilator graphics reviewed. No autopeep. Peak pressure 26, with plateau pressure of 23.    Musculoskeletal: no significant deformity, joint abnormality.   RN reported right upper extremity swelling with cool lower right extremity as compared to the left. Upper extremities are warm.   Neurologic: Sedated.         Results Review:     I reviewed the patient's new clinical results.     ABG today notable for interval improvement of respiratory acidosis. PO2 remains stable on 60% FIO2.   CRP showed worsening again today to 32.03. Lactate  remains elevated at 2.3.   CBC shows increasing leukocytosis with ANC elevation.   Phenytoin level was within normal range.   Respiratory culture from 7/15 is pending, currently negative.     EKG from this morning is pending, suggestive of atrial flutter.     WBC WBC   Date Value Ref Range Status   07/17/2020 22.82 (H) 3.40 - 10.80 10*3/mm3 Final   07/16/2020 18.65 (H) 3.40 - 10.80 10*3/mm3 Final   07/15/2020 22.60 (H) 3.40 - 10.80 10*3/mm3 Final      HGB Hemoglobin   Date Value Ref Range Status   07/17/2020 12.1 12.0 - 15.9 g/dL Final   07/16/2020 12.0 12.0 - 15.9 g/dL Final   07/15/2020 13.5 12.0 - 15.9 g/dL Final      HCT Hematocrit   Date Value Ref Range Status   07/17/2020 39.0 34.0 - 46.6 % Final   07/16/2020 38.9 34.0 - 46.6 % Final   07/15/2020 43.0 34.0 - 46.6 % Final      Platlets No results found for: LABPLAT     PT/INR:  No results found for: PROTIME/No results found for: INR    Sodium Sodium   Date Value Ref Range Status   07/15/2020 145 136 - 145 mmol/L Final      Potassium Potassium   Date Value Ref Range Status   07/15/2020 4.2 3.5 - 5.2 mmol/L Final   07/14/2020 3.8 3.5 - 5.2 mmol/L Final      Chloride Chloride   Date Value Ref Range Status   07/15/2020 107 98 - 107 mmol/L Final      Bicarbonate No results found for: PLASMABICARB   BUN BUN   Date Value Ref Range Status   07/15/2020 28 (H) 8 - 23 mg/dL Final      Creatinine Creatinine   Date Value Ref Range Status   07/15/2020 0.68 0.57 - 1.00 mg/dL Final      Calcium Calcium   Date Value Ref Range Status   07/15/2020 8.3 (L) 8.6 - 10.5 mg/dL Final      Magnesium Magnesium   Date Value Ref Range Status   07/15/2020 2.0 1.6 - 2.4 mg/dL Final        pH pH, Arterial   Date Value Ref Range Status   07/17/2020 7.331 (L) 7.350 - 7.450 pH units Final     Comment:     84 Value below reference range   07/16/2020 7.317 (L) 7.350 - 7.450 pH units Final     Comment:     84 Value below reference range   07/16/2020 7.320 (L) 7.350 - 7.450 pH units Final      Comment:     84 Value below reference range   07/15/2020 7.302 (L) 7.350 - 7.450 pH units Final     Comment:     84 Value below reference range   07/15/2020 7.267 (L) 7.350 - 7.450 pH units Final     Comment:     84 Value below reference range      pO2 pO2, Arterial   Date Value Ref Range Status   07/17/2020 82.8 (L) 83.0 - 108.0 mm Hg Final   07/16/2020 170.0 (H) 83.0 - 108.0 mm Hg Final     Comment:     83 Value above reference range   07/16/2020 137.0 (H) 83.0 - 108.0 mm Hg Final     Comment:     83 Value above reference range   07/15/2020 116.0 (H) 83.0 - 108.0 mm Hg Final     Comment:     83 Value above reference range   07/15/2020 81.2 (L) 83.0 - 108.0 mm Hg Final      pCO2 pCO2, Arterial   Date Value Ref Range Status   07/17/2020 46.2 (H) 35.0 - 45.0 mm Hg Final     Comment:     83 Value above reference range   07/16/2020 45.8 (H) 35.0 - 45.0 mm Hg Final     Comment:     83 Value above reference range   07/16/2020 44.2 35.0 - 45.0 mm Hg Final   07/15/2020 42.1 35.0 - 45.0 mm Hg Final   07/15/2020 55.3 (H) 35.0 - 45.0 mm Hg Final     Comment:     83 Value above reference range      HCO3 HCO3, Arterial   Date Value Ref Range Status   07/17/2020 24.4 20.0 - 26.0 mmol/L Final   07/16/2020 23.4 20.0 - 26.0 mmol/L Final   07/16/2020 22.8 20.0 - 26.0 mmol/L Final   07/15/2020 20.8 20.0 - 26.0 mmol/L Final   07/15/2020 25.2 20.0 - 26.0 mmol/L Final          artificial tears  Both Eyes Q4H   aspirin 81 mg Oral Nightly   budesonide 0.5 mg Nebulization BID - RT   chlorhexidine 15 mL Mouth/Throat Q12H   dexamethasone 6 mg Intravenous Daily   doxycycline 100 mg Intravenous Q12H   enoxaparin 80 mg Subcutaneous Q12H   lactobacillus acidophilus 1 capsule Oral Daily   levETIRAcetam 1,000 mg Intravenous Q12H   levothyroxine 25 mcg Oral Daily   lidocaine 5 mL Subcutaneous Once   meropenem 1 g Intravenous Q8H   OLANZapine 2.5 mg Oral Nightly   pantoprazole 40 mg Intravenous Q24H   phenytoin (DILANTIN) IVPB 100 mg Intravenous Q8H    pramipexole 1 mg Oral Nightly   psyllium 1 packet Oral Daily   rosuvastatin 20 mg Oral Nightly   sodium chloride 10 mL Intravenous Q12H   sodium chloride 10 mL Intravenous Q12H   sodium chloride 10 mL Intravenous Q12H   sodium chloride 10 mL Intravenous Q12H   topiramate 100 mg Oral Q12H   venlafaxine XR 75 mg Oral Q PM       fentaNYL Citrate     Pharmacy Consult     phenylephrine 0.5-3 mcg/kg/min Last Rate: 3 mcg/kg/min (07/17/20 0521)   propofol 5-50 mcg/kg/min Last Rate: Stopped (07/17/20 0412)   sodium chloride 100 mL/hr Last Rate: 100 mL/hr (07/17/20 0524)   vasopressin (PITRESSIN) infusion 0.03 Units/min Last Rate: 0.03 Units/min (07/17/20 0521)   vecuronium (NORCURON) infusion 0.6-1.2 mcg/kg/min Last Rate: Stopped (07/17/20 0003)       Medication Review: I have reviewed the current medications.     Assessment/Plan     Patient Active Problem List   Diagnosis Code   • Elevated alkaline phosphatase level R74.8   • Encounter for screening for malignant neoplasm of colon Z12.11   • Pneumonia of both lungs due to infectious organism J18.9   • Pneumonia due to COVID-19 virus U07.1, J12.89       Assessment & Plan         Central nervous system  History of seizures with concern for recent seizure activity    Plan:  -Patient is currently on fentanyl. Propofol held, but unable follow commands; RASS goal: 0 to -1. Patient does not have biot's type of breathing pattern while on opioids.   Resuming sedation, paralytic agent due to ARDS. Resuming pronation for 14 hours today.   -Avoid nighttime disturbances and light exposure during night to maintain normal circadian rhythms to avoid hypoactive or hyperactive delirium.  -Phenytoin level was not within range.  On IV Keppra, phenytoin, topiramate       Cardiovascular system:  Septic shock  Pulmonary hypertension  Troponin leak  Atrial flutter    Plan:  -Goal CVP around 10 mmHg  -Vasopressor: sandie-synephrine, vasopressin,  Targeting mean arterial pressure of 65 mmHg      Results for orders placed during the hospital encounter of 07/05/20   Adult Transthoracic Echo Limited W/ Cont if Necessary Per Protocol    Narrative · Normal left ventricular cavity size and wall thickness noted. All left   ventricular wall segments contract normally.  · Estimated EF appears to be in the range of 61 - 65%.  · The aortic valve is structurally normal. No aortic valve regurgitation   is present. No aortic valve stenosis is present.  · The mitral valve is normal in structure. Mild mitral valve regurgitation   is present. No significant mitral valve stenosis is present.  · The tricuspid valve is normal. Mild tricuspid valve regurgitation is   present. Estimated right ventricular systolic pressure from tricuspid   regurgitation is moderately elevated (45-55 mmHg).  · There is no evidence of pericardial effusion.  · Comments: Patient seem to have developed pulmonary hypertension since   the last study on 6/10/2020.          -EKG overnight notable for atrial flutter.   On on aspirin, rosuvastatin  -Received single dose of metoprolol following episode of atrial fibrillation.  -Cardiology on board.  Previously consulted for troponin elevation  -Ordered bilateral upper and lower extremity arterial and venous doppler due to concern for thrombosis with temperature difference, swelling, with increased risk of hypercoagulability in the setting of COVID-19.   Cardiology team updated, following for ultrasound results.          Respiratory system:  Acute hypoxic respiratory failure requiring mechanical ventilation  Right spontaneous pneumothorax status post chest tube placement  Bilateral diffuse pneumonia due to COVID-19  Severe ARDS  Mixed component of acute pulmonary edema  Moderate pulmonary hypertension  Acute COPD exacerbation  Obstructive sleep apnea  Tobacco abuse history      Plan:  -Mode of mechanical ventilation: Descending ramp flow assist control volume-cycled mode of mechanical ventilation.      Vent Settings       Vt (Set, L): 0.48 L  Resp Rate (Set): 26     FiO2 (%): 60 %  PEEP/CPAP (cm H2O): 8 cm H20    Minute Ventilation (L/min) (Obs): 14.8 L/min  Resp Rate (Observed) Vent: 26     I:E Ratio (Obs): 1:1.60    PIP Observed (cm H2O): 26 cm H2O      -  Patient Lines/Drains/Airways Status    Active Airway     Name:   Placement date:   Placement time:   Site:   Days:    ETT    07/15/20    0043     2                -Secretions: significantly thick, bloodly secretions reported overnight.     -FIO2 titrated down to 55% after ABG review.       - I have reviewed the ventilator graphics. Flow time scalar was reviewed and auto PEEP is negative, volume time scalar was reviewed and leak is negative. Pressure volume loop was assessed.  Auto triggering is negative.  Missed triggering is negative.  Double triggering is negative.  Active expiration is negative.  Peak pressure is 26.  Plateau pressure is 23.        -Suctioning of oral secretions as per protocol  -Maintaining internal cuff pressures within recommended range.  Recommend careful monitoring of the internal cuff pressure during transport of the patient outside ICU.  -Oral care with chlorhexidine  -Keeping the patient at semi-recumbent position.  Head of the bed elevated at 35 degrees.  -On lateral rotation therapy  -Mouth care with oral chlorhexidine   -Consider vaccination with pneumococcal and influenza vaccine at hospital discharge  -Chest tube remains in place of the right lung for previous pneumothorax. Discussed with surgical team, will keep in place at this time until extubated to to increased risk of redevelopment of pneumothorax while intubated.   - Started mucomyst for thick secretions, will administered directly via ET tube and avoid nebulized form in the setting of COVID.   -On meropenem, doxycycline  -Received convalescent plasma and Remdesivir  -On IV dexamethasone 6 mg daily  -Resuming paralytic agent today for ARDS protocol. Will continue for  a total of 2 days and then can discontinue use.   We will also resume pronation today for approximately 14 hours, then return to supination.  Suspected that her troponin elevation was related to demand ischemia.         Liver, gallbladder, Pancreas and gastrointestinal system:  Transaminitis    Plan:  -GGT was elevated with elevation previously of LFTs.  Possibly related to underlying liver fibrosis and chronic inflammation, as noted on biopsy in 2019  -On pantoprazole for GI ulcer prophylaxis.  -On Metamucil  -We will hold tube feedings as needed during supination today for ARDS.  Free water flushes were increased to 50 mL/h.    Dietary Orders (From admission, onward)     Start     Ordered    07/17/20 0929  Peptamen Intense VHP (Vital HP); Tube Feeding Type: Continuous; Continuous Tube Feeding Start Rate (mL/hr): 15; Then Advance Rate By (mL/hr): 10; Every __ Hours: 4; To Goal Rate of (mL/hr): 45; Water Flush Amount (mL): 50  Diet Effective Now     Comments:  If manually proned change TF rate to 10 ml/hr and feed only in supine position.   Question Answer Comment   Tube Feeding Formula: Peptamen Intense VHP (Vital HP)    Tube Feeding Type Continuous    Continuous Tube Feeding Start Rate (mL/hr) 15    Then Advance Rate By (mL/hr) 10    Every __ Hours 4    To Goal Rate of (mL/hr) 45    Water Flush Amount (mL) 50    Water Flush Frequency Every 1 Hour        07/17/20 0929    07/15/20 0138  NPO Diet  Diet Effective Now      07/15/20 0138                       Genitourinary system:   Metabolic acidosis    Plan     Intake/Output Summary (Last 24 hours) at 7/17/2020 0827  Last data filed at 7/17/2020 0600  Gross per 24 hour   Intake 1812.57 ml   Output 1075 ml   Net 737.57 ml     -Avoiding nephrotoxic agent.  -Following serum creatinine and GFR closely with urine output daily.  -Discontinued NaCl infusion  -Increased the free water flush rate to 50 mL/h.         Endocrine system:  Hypothyroidism    Plan:  - Goal serum  glucose level is  140-180 mg/dL while in ICU.    - I strongly recommend starting insulin drip if 2 consecutive serum glucose levels are greater than 200 mg/dL.   -On levothyroxine 25 mcg p.o. daily         Infectious disease system:  Bilateral diffuse pneumonia due to COVID-19    No results found for: ACANTHNAEG, AFBCX, BPERTUSSISCX, BLOODCX  No results found for: BCIDPCR, CXREFLEX, CSFCX, CULTURETIS  No results found for: CULTURES, HSVCX, URCX  No results found for: EYECULTURE, GCCX, LABHSV  No results found for: LEGIONELLA, MRSACX, MUMPSCX, MYCOPLASCX  No results found for: NOCARDIACX, STOOLCX  No results found for: THROATCX, UNSTIMCULT, URINECX, CULTURE, VZVCULTUR  No results found for: VIRALCULTU, WOUNDCX     Plan:  - Current antibiotics: Meropenem, doxycycline  Patient previously received Remdesivir and convalescent plasma.  - Cultures:   Respiratory culture from 7/15 is pending, currently negative.   Previous negative assessment included prior respiratory culture, legionella antigen, blood cultures, strep culture, rapid strep screen, respiratory PCR, and influenza antigen.   COVID-19 testing was positive on 7/5/2020.   As bronchoscopy would likely be low yield while on broad-spectrum antibiotics with respiratory culture pending, no urgent indication for bronchoscopy at this time.  Infectious disease team on board.          Hematological system:  Leukocytosis in the setting of sepsis    Plan   - I recommend PRBC transfusion if hemoglobin is less than 7 g/dL unless active bleeding or cardiac ischemia.  Mechanical Order History:      Ordered        07/10/20 0848  Place Sequential Compression Device  Once         07/10/20 0848  Maintain Sequential Compression Device  Continuous,   Status:  Canceled                 Pharmalogical Order History:     Ordered     Dose Route Frequency Stop    07/12/20 1113  enoxaparin (LOVENOX) syringe 80 mg      80 mg SC Every 12 Hours --    07/12/20 1113  enoxaparin (LOVENOX) syringe  40 mg      40 mg SC Once 07/12/20 1205    07/12/20 1100  Pharmacy to Dose enoxaparin (LOVENOX)  Status:  Discontinued      -- XX Continuous PRN 07/13/20 1304    07/10/20 0848  enoxaparin (LOVENOX) syringe 40 mg  Status:  Discontinued      40 mg SC Daily 07/12/20 1113    07/07/20 1450  enoxaparin (LOVENOX) syringe 40 mg  Status:  Discontinued      40 mg SC Daily 07/09/20 2205    07/07/20 1449  enoxaparin (LOVENOX) syringe 40 mg  Status:  Discontinued      40 mg SC Nightly 07/07/20 1450    07/06/20 0817  heparin (porcine) 5000 UNIT/ML injection 5,000 Units  Status:  Discontinued      5,000 Units SC Every 8 Hours Scheduled 07/07/20 1449    07/05/20 2252  heparin (porcine) 5000 UNIT/ML injection 5,000 Units  Status:  Discontinued      5,000 Units SC Every 12 Hours Scheduled 07/06/20 0817        - On DVT prophylaxis.  I recommend holding chemical anticoagulation if platelet count is less than 50,000.       Rheumatological and dermatological system:  Right lower limb discoloration with temperature difference.  Concern of clot formation in the setting of hypercoagulability due to COVID.   Plan:  -Ordered bilateral upper and lower extremity DVT Dopplers and arterial Dopplers.     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 ? Acute on chronic as per cards. Needs urgent Vas surgery. Updated primary team Dr Jiang who is working on urgent transfer of the patient to higher center of care.   - Turn the patient every 2 hours.         Activity:  - I recommend aggressive PT/OT while in hospital to avoid critical care neuropathy/myopathy.       I have updated [facility] of the plan of care.       Purnima Bejarano PA-C  07/17/20  08:22    Scribed for Dr. Manrique by Purnima Bejarano PA-C    The clinical plan was discussed with Dr. Stringer and Dr. Jiang  The clinical plan was discussed extensively with the nurse, and respiratory therapist.   Critical Care time spent in direct patient care: 37 minutes (excluding  procedure time).  Patient is critically ill and is at higher risk for further mortality/morbidity.     I, Cayetano Manrique M.D. attest that the above note accurately reflects the work and decisions made by me. Patient was seen and evaluated by me, including history of present illness, physical exam, assessment, and treatment plan.  The above note was reviewed and edited by me.    Cayetano Manrique M.D  Pulmonary and Critical Care Medicine

## 2021-01-04 NOTE — CONSULTS
Consults    Patient Care Team:  Cruzito Palumbo MD as PCP - General (Internal Medicine)  Radha Abarca APRN as PCP - Claims Attributed    Chief complaint: COVID, sepsis    Subjective     History of Present Illness She is a 73 yo who has been admitted for COVID pnuemonia and sepsis and had deteriorated today. She was transferred to the CCU and may need pressors. A central line was requested.     Review of Systems   Constitutional: Positive for activity change, fatigue and fever.   HENT: Negative for congestion, mouth sores and trouble swallowing.    Eyes: Negative for discharge.   Respiratory: Positive for cough, chest tightness and shortness of breath.    Gastrointestinal: Negative for abdominal distention, blood in stool, constipation and rectal pain.   Endocrine: Negative for cold intolerance and polyphagia.   Genitourinary: Negative for pelvic pain.   Musculoskeletal: Positive for arthralgias and myalgias.   Skin: Negative for color change and wound.   Neurological: Positive for weakness.   Psychiatric/Behavioral: The patient is nervous/anxious.         Past Medical History:   Diagnosis Date   • Arthritis    • COPD (chronic obstructive pulmonary disease) (CMS/HCC)    • Coronary artery disease    • Disease of thyroid gland    • Elevated cholesterol    • GERD (gastroesophageal reflux disease)    • High cholesterol    • Hypertension    • Liver disease    • Seizures (CMS/HCC)    • Sleep apnea    • Stroke (CMS/HCC)    ,   Past Surgical History:   Procedure Laterality Date   • BLADDER SLING MODIFIED, ANTERIOR AND POSTERIOR VAGINAL REPAIR  2019   • BRAIN SURGERY     • CARDIAC CATHETERIZATION      x2   • CARDIAC SURGERY      heart stent placed   •  SECTION     • COLONOSCOPY N/A 2018    Procedure: COLONOSCOPY FOR SCREENING CPT CODE: ;  Surgeon: Jose Lazaro MD;  Location: [facility];  Service: Gastroenterology   • HEMORRHOIDECTOMY     ,   Family History   Problem Relation Age of Onset   •  Heart disease Father    • Lung cancer Father    ,   Social History     Tobacco Use   • Smoking status: Former Smoker   • Smokeless tobacco: Never Used   Substance Use Topics   • Alcohol use: No   • Drug use: No   ,   Medications Prior to Admission   Medication Sig Dispense Refill Last Dose   • amitriptyline (ELAVIL) 50 MG tablet Take 50 mg by mouth Every Night.   7/4/2020 at Unknown time   • aspirin 81 MG EC tablet Take 81 mg by mouth Every Night.   7/4/2020 at Unknown time   • calcium polycarbophil (FIBERCON) 625 MG tablet Take 625 mg by mouth Daily.   7/5/2020 at Unknown time   • cyclobenzaprine (FLEXERIL) 10 MG tablet Take 10 mg by mouth Every Night.   7/4/2020 at Unknown time   • diazePAM (VALIUM) 5 MG tablet Take 5 mg by mouth Every Night.  0 7/4/2020 at Unknown time   • docusate sodium (COLACE) 250 MG capsule Take 250 mg by mouth 2 (Two) Times a Day As Needed for Constipation.   Past Week at Unknown time   • ezetimibe (ZETIA) 10 MG tablet Take 10 mg by mouth Daily.   7/5/2020 at Unknown time   • Fluticasone-Umeclidin-Vilant (Trelegy Ellipta) 100-62.5-25 MCG/INH aerosol powder  Inhale 1 puff Daily.   7/5/2020 at Unknown time   • furosemide (LASIX) 20 MG tablet Take 20 mg by mouth Daily.   7/4/2020   • lansoprazole (PREVACID) 30 MG capsule Take 30 mg by mouth Every Morning.  0 7/5/2020 at Unknown time   • levothyroxine (SYNTHROID) 25 MCG tablet Take 25 mcg by mouth Daily.   7/5/2020 at Unknown time   • magnesium oxide (MAGOX) 400 (241.3 Mg) MG tablet tablet Take 400 mg by mouth Daily.   7/5/2020 at Unknown time   • NIFEdipine (PROCARDIA) 10 MG capsule Take 10 mg by mouth Daily.   7/5/2020 at AM   • phenytoin (DILANTIN) 100 MG ER capsule Take 2 capsules by mouth Every Morning.   7/5/2020 at Unknown time   • phenytoin (DILANTIN) 100 MG ER capsule Take 100 mg by mouth Every Night.   7/4/2020 at Unknown time   • potassium chloride (K-DUR,KLOR-CON) 20 MEQ CR tablet Take 20 mEq by mouth Daily.   7/5/2020 at Unknown  time   • pramipexole (MIRAPEX) 0.5 MG tablet Take 1 mg by mouth Every Night.   7/4/2020 at Unknown time   • rosuvastatin (CRESTOR) 10 MG tablet Take 10 mg by mouth Every Night.   7/4/2020 at Unknown time   • topiramate (TOPAMAX) 100 MG tablet Take 100 mg by mouth Every Morning.  0 7/5/2020 at Unknown time   • topiramate (TOPAMAX) 100 MG tablet Take 150 mg by mouth Every Night.   7/4/2020 at Unknown time   • venlafaxine XR (EFFEXOR-XR) 75 MG 24 hr capsule Take 75 mg by mouth Every Evening.   7/4/2020 at Unknown time   • vitamin D (ERGOCALCIFEROL) 1.25 MG (04189 UT) capsule capsule Take 50,000 Units by mouth 1 (One) Time Per Week. Mondays.   Past Week at Unknown time   • HYDROcodone-acetaminophen (NORCO) 7.5-325 MG per tablet Take 1 tablet by mouth 2 (Two) Times a Day As Needed for Moderate Pain .   Unknown at Unknown time   • lisinopril (PRINIVIL,ZESTRIL) 20 MG tablet Take 20 mg by mouth Daily.   11/15/2019 at am   • risedronate (ACTONEL) 150 MG tablet Take 150 mg by mouth Every 30 (Thirty) Days. with water on empty stomach, nothing by mouth or lie down for next 30 minutes.   Unknown at Unknown time   , Scheduled Meds:    albuterol sulfate HFA 2 puff Inhalation Q6H   amitriptyline 25 mg Oral Nightly   aspirin 81 mg Oral Nightly   benzonatate 200 mg Oral Q8H   budesonide-formoterol 2 puff Inhalation BID - RT   cholecalciferol 50,000 Units Oral Weekly   dexamethasone 6 mg Intravenous Daily   diazePAM 3.75 mg Oral Nightly   doxycycline 100 mg Intravenous Q12H   enoxaparin 40 mg Subcutaneous Daily   famotidine 20 mg Oral BID AC   Hydrocortisone (Perianal)  Rectal BID   INV [id number] remdesivir in NS IVPB (from room temp reconstituted vial) 100 mg Intravenous Q24H   lactobacillus acidophilus 1 capsule Oral Daily   levothyroxine 25 mcg Oral Daily   lidocaine 5 mL Subcutaneous Once   magnesium oxide 400 mg Oral Daily   meropenem 1 g Intravenous Q8H   phenytoin 100 mg Oral Q PM   phenytoin 200 mg Oral QAM   pramipexole 1 mg  Oral Nightly   psyllium 1 packet Oral Daily   sodium chloride 10 mL Intravenous Q12H   topiramate 100 mg Oral Q12H   venlafaxine XR 75 mg Oral Q PM   , Continuous Infusions:   , PRN Meds:  •  acetaminophen  •  cyclobenzaprine  •  docusate sodium  •  guaiFENesin-codeine  •  HYDROcodone-acetaminophen  •  hydrOXYzine  •  nitroglycerin  •  promethazine **OR** promethazine  •  [COMPLETED] Insert peripheral IV **AND** sodium chloride  •  sodium chloride and Allergies:  Sulfa antibiotics    Objective      Vital Signs  Temp:  [97.2 °F (36.2 °C)-98.7 °F (37.1 °C)] 98.7 °F (37.1 °C)  Heart Rate:  [75-92] 89  Resp:  [18-36] 28  BP: ()/(49-72) 132/56    Physical Exam   Constitutional: She is oriented to person, place, and time. She appears well-developed and well-nourished. She does not appear ill. No distress.   HENT:   Head: Normocephalic. Head is without laceration. Hair is normal.   Right Ear: Hearing and ear canal normal.   Left Ear: Hearing and ear canal normal.   Nose: Nose normal. No sinus tenderness. No epistaxis. Right sinus exhibits no maxillary sinus tenderness and no frontal sinus tenderness. Left sinus exhibits no maxillary sinus tenderness and no frontal sinus tenderness.   Eyes: Pupils are equal, round, and reactive to light. Conjunctivae and lids are normal.   Neck: Normal range of motion. No JVD present. No tracheal tenderness present. No tracheal deviation present. No thyroid mass and no thyromegaly present.   Cardiovascular: Normal rate and regular rhythm. Exam reveals no gallop.   No murmur heard.  Pulmonary/Chest: No stridor. She has wheezes. She has rales. She exhibits no tenderness.   Abdominal: Soft. Bowel sounds are normal. She exhibits no distension, no ascites and no mass. There is no tenderness. There is no rebound and no guarding. No hernia.   Musculoskeletal: She exhibits no edema or deformity.   Lymphadenopathy:     She has no cervical adenopathy.     She has no axillary adenopathy.         Right: No inguinal and no supraclavicular adenopathy present.        Left: No inguinal and no supraclavicular adenopathy present.   Neurological: She is alert and oriented to person, place, and time. She exhibits normal muscle tone.   Skin: Skin is warm, dry and intact. No rash noted. No erythema. No pallor.   Psychiatric: She has a normal mood and affect. Her behavior is normal. Thought content normal.   Vitals reviewed.      Results Review:    I reviewed the patient's new clinical results.  I reviewed the patient's new imaging results and agree with the interpretation.  I reviewed the patient's other test results and agree with the interpretation        Assessment/Plan       Pneumonia due to COVID-19 virus      Assessment & Plan place central line    I discussed the patients findings and my recommendations with patient, nursing staff and consulting provider    Jose Lazaro MD  07/10/20  16:17    Time:         [Negative] : Heme/Lymph

## (undated) DEVICE — CONN Y IRR DISP 1P/U

## (undated) DEVICE — GOWN,REINF,POLY,ECL,PP SLV,XL: Brand: MEDLINE

## (undated) DEVICE — Device: Brand: DEFENDO AIR/WATER/SUCTION AND BIOPSY VALVE

## (undated) DEVICE — TUBING, SUCTION, 3/16" X 10', STRAIGHT: Brand: MEDLINE

## (undated) DEVICE — Device: Brand: ENDOGATOR

## (undated) DEVICE — SYR LUERLOK 30CC

## (undated) DEVICE — TUBING, SUCTION, 1/4" X 20', STRAIGHT: Brand: MEDLINE INDUSTRIES, INC.

## (undated) DEVICE — Device